# Patient Record
Sex: FEMALE | Race: WHITE | NOT HISPANIC OR LATINO | URBAN - METROPOLITAN AREA
[De-identification: names, ages, dates, MRNs, and addresses within clinical notes are randomized per-mention and may not be internally consistent; named-entity substitution may affect disease eponyms.]

---

## 2024-05-29 ENCOUNTER — OFFICE VISIT (OUTPATIENT)
Dept: SURGERY | Facility: CLINIC | Age: 49
End: 2024-05-29
Payer: COMMERCIAL

## 2024-05-29 DIAGNOSIS — C85.90 T-CELL LYMPHOMA (MULTI): Primary | ICD-10-CM

## 2024-05-29 DIAGNOSIS — R59.0 INGUINAL ADENOPATHY: ICD-10-CM

## 2024-05-29 PROCEDURE — 99203 OFFICE O/P NEW LOW 30 MIN: CPT | Performed by: PHYSICIAN ASSISTANT

## 2024-05-29 RX ORDER — QUETIAPINE FUMARATE 100 MG/1
1 TABLET, FILM COATED ORAL DAILY
COMMUNITY
Start: 2017-05-16

## 2024-05-29 RX ORDER — AMLODIPINE BESYLATE 5 MG/1
5 TABLET ORAL DAILY
COMMUNITY
Start: 2023-10-01

## 2024-05-29 RX ORDER — HYDROXYCHLOROQUINE SULFATE 200 MG/1
1 TABLET, FILM COATED ORAL DAILY
COMMUNITY
Start: 2024-05-07

## 2024-05-29 RX ORDER — FAMOTIDINE 20 MG/1
TABLET, FILM COATED ORAL
COMMUNITY
Start: 2024-04-25

## 2024-05-29 RX ORDER — METOPROLOL TARTRATE 25 MG/1
1 TABLET, FILM COATED ORAL DAILY
COMMUNITY

## 2024-05-29 NOTE — PROGRESS NOTES
Subjective   Patient ID: Sagrario aGrber is a 48 y.o. female who presents for New Patient Visit (T-cell Lymphoma ).    HPI  The 48-year-old female who was admitted to the hospital for severe fatigue and adenopathy.  Fine-needle aspirate was done and was found to be a T-cell lymphoma she is now following with Dr. Iraheta and oncology.  She has massive adenopathy.  She has had biopsies in the past that were negative for any type of lymphoma.  She also has a history of lupus.  She is a current every day smoker.  She is here today to discuss an lymph node biopsy      Review of Systems  Review of systems is negative other than what is mentioned above        Physical Exam  Eyes: Conjunctiva non -icteric and eye lids are without obvious rash or drooping. Pupils are symmetric.   Ears, Nose, Mouth, and Throat: External ears and nose appear to be without deformity or rash. No lesions or masses noted. Hearing is grossly intact.   Neck:. No JVD noted, tracheal position is midline. No thyromegaly, no thyroid nodules  Head and Face: Examination of the head and face revealed no abnormalities.   Respiratory: No gasping or shortness of breath noted, no use of accessory muscles noted.  Lungs are clear to auscultate  Cardiovascular: Examination for edema is normal, regular rate and rhythm S1-S2  GI: Abdomen non tender to palpation, bowel sounds are present  Lymph system:.  Patient has bilateral cervical adenopathy supraclavicular adenopathy bilateral axillary adenopathy left greater than right and bilateral massive inguinal adenopathy.  Skin: No rashes or open lesions/ulcers identified on skin.   Musk: Digits/nails show no clubbing or cyanosis. No asymmetry or masses noted of the musculature. Examination of the muscles/joints/bones show normal range of motion. Gait is grossly normally.   Neurologic: Cranial nerves II- XII intact, motor strength 5/5 muscle strength of the lower extremities bilaterally and equal.      Objective     No  diagnosis found.   There is no problem list on file for this patient.     Allergies   Allergen Reactions    Amoxicillin Hives      Medication Documentation Review Audit       Reviewed by Yvette Whiteside MA (Medical Assistant) on 05/29/24 at 0912      Medication Order Taking? Sig Documenting Provider Last Dose Status   amLODIPine (Norvasc) 5 mg tablet [id number] Yes Take 1 tablet (5 mg) by mouth once daily. Historical Provider, MD Taking Active   famotidine (Pepcid) 20 mg tablet [id number] Yes TAKE 1 TABLET BY MOUTH TWICE DAILY FOR 7 DAYS AS NEEDED for itching Historical Provider, MD Taking Active   hydroxychloroquine (Plaquenil) 200 mg tablet [id number] Yes Take 1 tablet (200 mg) by mouth once daily. Historical Provider, MD Taking Active   metoprolol tartrate (Lopressor) 25 mg tablet [id number] Yes Take 1 tablet (25 mg) by mouth once daily. Historical Provider, MD Taking Active   QUEtiapine (SEROquel) 100 mg tablet [id number] Yes Take 1 tablet (100 mg) by mouth once daily. Historical Provider, MD Taking Active                    Past Medical History:   Diagnosis Date    Essential (primary) hypertension 05/24/2017    HTN (hypertension), benign    Essential (primary) hypertension 10/09/2017    HTN (hypertension), benign    Personal history of nicotine dependence 05/24/2017    History of nicotine dependence     Social History     Tobacco Use   Smoking Status Every Day    Types: Cigarettes   Smokeless Tobacco Not on file     No family history on file.   Past Surgical History:   Procedure Laterality Date    OTHER SURGICAL HISTORY  05/24/2017    Oral Surgery Tooth Extraction [place] Tooth       Assessment/Plan   Patient was instructed that she will need a right inguinal biopsy.  Patient was instructed the entire lymph node would not be removed given how large it is.  This is done on an outpatient basis under general anesthesia and will take 1 hour.  Pathology will be sent out.  This can take up to 1 week or more to return.  We  discussed the possibility of bleeding and infection.  All questions were answered.  Patient would like to proceed.    Encounter Diagnoses   Name Primary?    T-cell lymphoma (Multi) Yes    Inguinal adenopathy      I have reviewed all data including labs,radiologic and previous reports.      **Portions of this medical record have been created using voice recognition software and may have minor errors which are inherent in voice recognition systems. It has not been fully edited for typographical or grammatical errors**

## 2024-06-12 ENCOUNTER — LAB REQUISITION (OUTPATIENT)
Dept: LAB | Facility: HOSPITAL | Age: 49
End: 2024-06-12
Payer: COMMERCIAL

## 2024-06-13 LAB
LABORATORY COMMENT REPORT: NORMAL
PATH REPORT.GROSS SPEC: NORMAL
PATH REPORT.TOTAL CANCER: NORMAL

## 2024-07-29 ENCOUNTER — PATIENT OUTREACH (OUTPATIENT)
Dept: CARE COORDINATION | Facility: CLINIC | Age: 49
End: 2024-07-29
Payer: COMMERCIAL

## 2024-07-29 NOTE — PROGRESS NOTES
Outreach call to patient to support a smooth transition of care from recent admission. Pt's number was a hotel number and the  that answered said she was no longer at the hotel and was in a facility. I then called the pt's daughter number listed and lvm.  Will continue to monitor through transition period.  Kirby Erwin RN [facility] [place]  [phone]

## 2024-08-12 ENCOUNTER — PATIENT OUTREACH (OUTPATIENT)
Dept: CARE COORDINATION | Facility: CLINIC | Age: 49
End: 2024-08-12
Payer: COMMERCIAL

## 2024-08-12 NOTE — PROGRESS NOTES
Outreach call to fu on PCP visit after dc, no answer.  Kirby Erwin RN [facility] [place]  [phone]

## 2024-09-03 ENCOUNTER — PATIENT OUTREACH (OUTPATIENT)
Dept: CARE COORDINATION | Facility: CLINIC | Age: 49
End: 2024-09-03
Payer: COMMERCIAL

## 2024-09-03 NOTE — PROGRESS NOTES
Outreach call to patient to follow up after 30 days of hospital discharge.   Unable to reach pt.   Kirby Erwin RN [facility] [place]  [phone]

## 2024-10-01 ENCOUNTER — LAB REQUISITION (OUTPATIENT)
Dept: LAB | Facility: HOSPITAL | Age: 49
End: 2024-10-01
Payer: COMMERCIAL

## 2024-10-01 PROCEDURE — 88321 CONSLTJ&REPRT SLD PREP ELSWR: CPT | Performed by: PATHOLOGY

## 2024-10-03 ENCOUNTER — TUMOR BOARD CONFERENCE (OUTPATIENT)
Dept: HEMATOLOGY/ONCOLOGY | Facility: HOSPITAL | Age: 49
End: 2024-10-03
Payer: COMMERCIAL

## 2024-10-04 LAB
LABORATORY COMMENT REPORT: NORMAL
LABORATORY COMMENT REPORT: NORMAL
PATH REPORT.FINAL DX SPEC: NORMAL
PATH REPORT.FINAL DX SPEC: NORMAL
PATH REPORT.GROSS SPEC: NORMAL
PATH REPORT.GROSS SPEC: NORMAL
PATH REPORT.RELEVANT HX SPEC: NORMAL
PATH REPORT.RELEVANT HX SPEC: NORMAL
PATH REPORT.TOTAL CANCER: NORMAL
PATH REPORT.TOTAL CANCER: NORMAL

## 2024-10-07 LAB
LABORATORY COMMENT REPORT: NORMAL
PATH REPORT.COMMENTS IMP SPEC: NORMAL
PATH REPORT.FINAL DX SPEC: NORMAL
PATH REPORT.GROSS SPEC: NORMAL
PATH REPORT.MICROSCOPIC SPEC OTHER STN: NORMAL
PATH REPORT.RELEVANT HX SPEC: NORMAL
PATH REPORT.TOTAL CANCER: NORMAL

## 2024-11-20 NOTE — PROGRESS NOTES
Patient ID: Sagrario Garber is a 49 y.o. female.    Diagnosis:   Angioimmunoblastic T-cell lymphoma,  CD30 pos,  stage IV disease, bulky tumor, high LDH, excellent partial remission after initial frontline therapy.        Treatment:   Oncology History    No history exists.       Response:     Past Medical History:     Past Medical History:   Diagnosis Date    Essential (primary) hypertension 05/24/2017    HTN (hypertension), benign    Essential (primary) hypertension 10/09/2017    HTN (hypertension), benign    Personal history of nicotine dependence 05/24/2017    History of nicotine dependence       Surgical History:     Past Surgical History:   Procedure Laterality Date    OTHER SURGICAL HISTORY  05/24/2017    Oral Surgery Tooth Extraction [place] Tooth        Family History:   Rheumatoid arthritis mother, skin cancer father and sister    Social History:     Social History     Tobacco Use    Smoking status: Every Day     Types: Cigarettes   Substance Use Topics    Alcohol use: Not Currently    Drug use: Never      -------------------------------------------------------------------------------------------------------  Subjective       HPI    49-year-old woman who has had lymphadenopathy for several years.  Previous biopsy in 2022 was read as reactive.  In May 2024 she presented with increasing lymph nodes repeat biopsy confirmed T cell lymphoma with TFH phenotype, CD30 positive and Alk negative.  Extremely bulky tumor.  High LDH. Bone marrow minimally involved.  (3-4% abnormal cells by flow)Treatment was administered consisting of brentuximab- CHP  An excellent response has been obtained, but several small lymph nodes with SUV of 3.4 remain.  Completed cycle 6 of brentuximab CHP 9/26/24    Review of Systems - Oncology   -------------------------------------------------------------------------------------------------------  Objective   BSA: There is no height or weight on file to calculate BSA.  There were no vitals  taken for this visit.    Physical Exam    Performance Status:  {ECOG performance status:59001}  -------------------------------------------------------------------------------------------------------  Assessment/Plan    {Assess/PlanSGal:49063}    RTC:          -------------------------------------------------------------------------------------------------------  Gunjan Varela MD

## 2024-11-21 ENCOUNTER — PATIENT OUTREACH (OUTPATIENT)
Dept: HEMATOLOGY/ONCOLOGY | Facility: HOSPITAL | Age: 49
End: 2024-11-21
Payer: COMMERCIAL

## 2024-11-21 ENCOUNTER — APPOINTMENT (OUTPATIENT)
Dept: HEMATOLOGY/ONCOLOGY | Facility: HOSPITAL | Age: 49
End: 2024-11-21
Payer: COMMERCIAL

## 2024-12-03 ENCOUNTER — PATIENT OUTREACH (OUTPATIENT)
Dept: HEMATOLOGY/ONCOLOGY | Facility: HOSPITAL | Age: 49
End: 2024-12-03
Payer: COMMERCIAL

## 2024-12-04 PROBLEM — C84.48 PERIPHERAL T CELL LYMPHOMA OF LYMPH NODES OF MULTIPLE SITES (MULTI): Status: ACTIVE | Noted: 2024-12-04

## 2024-12-05 ENCOUNTER — LAB (OUTPATIENT)
Dept: LAB | Facility: HOSPITAL | Age: 49
End: 2024-12-05
Payer: COMMERCIAL

## 2024-12-05 ENCOUNTER — SOCIAL WORK (OUTPATIENT)
Dept: HEMATOLOGY/ONCOLOGY | Facility: HOSPITAL | Age: 49
End: 2024-12-05
Payer: COMMERCIAL

## 2024-12-05 ENCOUNTER — OFFICE VISIT (OUTPATIENT)
Dept: HEMATOLOGY/ONCOLOGY | Facility: HOSPITAL | Age: 49
End: 2024-12-05
Payer: COMMERCIAL

## 2024-12-05 VITALS
RESPIRATION RATE: 16 BRPM | HEART RATE: 100 BPM | TEMPERATURE: 97.9 F | SYSTOLIC BLOOD PRESSURE: 157 MMHG | OXYGEN SATURATION: 96 % | WEIGHT: 159.61 LBS | DIASTOLIC BLOOD PRESSURE: 93 MMHG

## 2024-12-05 DIAGNOSIS — C84.40 PERIPHERAL T-CELL LYMPHOMA, NOT ELSEWHERE CLASSIFIED, UNSPECIFIED SITE (MULTI): ICD-10-CM

## 2024-12-05 DIAGNOSIS — Z76.82 STEM CELL TRANSPLANT CANDIDATE: ICD-10-CM

## 2024-12-05 DIAGNOSIS — C84.48 PERIPHERAL T CELL LYMPHOMA OF LYMPH NODES OF MULTIPLE SITES (MULTI): ICD-10-CM

## 2024-12-05 LAB
ALBUMIN SERPL BCP-MCNC: 4.5 G/DL (ref 3.4–5)
ALP SERPL-CCNC: 81 U/L (ref 33–110)
ALT SERPL W P-5'-P-CCNC: 64 U/L (ref 7–45)
ANION GAP SERPL CALC-SCNC: 15 MMOL/L (ref 10–20)
AST SERPL W P-5'-P-CCNC: 33 U/L (ref 9–39)
B2 MICROGLOB SERPL-MCNC: 2.3 MG/L (ref 0.7–2.2)
BASOPHILS # BLD MANUAL: 0.06 X10*3/UL (ref 0–0.1)
BASOPHILS NFR BLD MANUAL: 1 %
BILIRUB SERPL-MCNC: 0.4 MG/DL (ref 0–1.2)
BUN SERPL-MCNC: 8 MG/DL (ref 6–23)
BURR CELLS BLD QL SMEAR: ABNORMAL
CALCIUM SERPL-MCNC: 9.8 MG/DL (ref 8.6–10.3)
CHLORIDE SERPL-SCNC: 102 MMOL/L (ref 98–107)
CO2 SERPL-SCNC: 28 MMOL/L (ref 21–32)
CREAT SERPL-MCNC: 0.67 MG/DL (ref 0.5–1.05)
DACRYOCYTES BLD QL SMEAR: ABNORMAL
EGFRCR SERPLBLD CKD-EPI 2021: >90 ML/MIN/1.73M*2
EOSINOPHIL # BLD MANUAL: 0.94 X10*3/UL (ref 0–0.7)
EOSINOPHIL NFR BLD MANUAL: 17 %
ERYTHROCYTE [DISTWIDTH] IN BLOOD BY AUTOMATED COUNT: 12.8 % (ref 11.5–14.5)
GLUCOSE SERPL-MCNC: 101 MG/DL (ref 74–99)
HBV CORE AB SER QL: NONREACTIVE
HBV SURFACE AG SERPL QL IA: NONREACTIVE
HCT VFR BLD AUTO: 44 % (ref 36–46)
HCV AB SER QL: NONREACTIVE
HGB BLD-MCNC: 14.8 G/DL (ref 12–16)
HIV 1+2 AB+HIV1 P24 AG SERPL QL IA: NONREACTIVE
IMM GRANULOCYTES # BLD AUTO: 0.44 X10*3/UL (ref 0–0.7)
IMM GRANULOCYTES NFR BLD AUTO: 8 % (ref 0–0.9)
LDH SERPL L TO P-CCNC: 290 U/L (ref 84–246)
LYMPHOCYTES # BLD MANUAL: 1.76 X10*3/UL (ref 1.2–4.8)
LYMPHOCYTES NFR BLD MANUAL: 32 %
MCH RBC QN AUTO: 30.9 PG (ref 26–34)
MCHC RBC AUTO-ENTMCNC: 33.6 G/DL (ref 32–36)
MCV RBC AUTO: 92 FL (ref 80–100)
MONOCYTES # BLD MANUAL: 0.44 X10*3/UL (ref 0.1–1)
MONOCYTES NFR BLD MANUAL: 8 %
NEUTROPHILS # BLD MANUAL: 2.26 X10*3/UL (ref 1.2–7.7)
NEUTS BAND # BLD MANUAL: 0.06 X10*3/UL (ref 0–0.7)
NEUTS BAND NFR BLD MANUAL: 1 %
NEUTS SEG # BLD MANUAL: 2.2 X10*3/UL (ref 1.2–7)
NEUTS SEG NFR BLD MANUAL: 40 %
NRBC BLD-RTO: 0 /100 WBCS (ref 0–0)
PLATELET # BLD AUTO: 291 X10*3/UL (ref 150–450)
PLATELET CLUMP BLD QL SMEAR: PRESENT
POTASSIUM SERPL-SCNC: 3.7 MMOL/L (ref 3.5–5.3)
PROT SERPL-MCNC: 7.5 G/DL (ref 6.4–8.2)
RBC # BLD AUTO: 4.79 X10*6/UL (ref 4–5.2)
RBC MORPH BLD: ABNORMAL
SODIUM SERPL-SCNC: 141 MMOL/L (ref 136–145)
TOTAL CELLS COUNTED BLD: 100
VARIANT LYMPHS # BLD MANUAL: 0.06 X10*3/UL (ref 0–0.5)
VARIANT LYMPHS NFR BLD: 1 %
WBC # BLD AUTO: 5.5 X10*3/UL (ref 4.4–11.3)

## 2024-12-05 PROCEDURE — 86704 HEP B CORE ANTIBODY TOTAL: CPT

## 2024-12-05 PROCEDURE — 82232 ASSAY OF BETA-2 PROTEIN: CPT

## 2024-12-05 PROCEDURE — 99205 OFFICE O/P NEW HI 60 MIN: CPT | Performed by: INTERNAL MEDICINE

## 2024-12-05 PROCEDURE — 99215 OFFICE O/P EST HI 40 MIN: CPT | Performed by: INTERNAL MEDICINE

## 2024-12-05 PROCEDURE — 85007 BL SMEAR W/DIFF WBC COUNT: CPT

## 2024-12-05 PROCEDURE — 86803 HEPATITIS C AB TEST: CPT

## 2024-12-05 PROCEDURE — 87340 HEPATITIS B SURFACE AG IA: CPT

## 2024-12-05 PROCEDURE — 84075 ASSAY ALKALINE PHOSPHATASE: CPT

## 2024-12-05 PROCEDURE — 83615 LACTATE (LD) (LDH) ENZYME: CPT

## 2024-12-05 PROCEDURE — 87389 HIV-1 AG W/HIV-1&-2 AB AG IA: CPT

## 2024-12-05 PROCEDURE — 36415 COLL VENOUS BLD VENIPUNCTURE: CPT

## 2024-12-05 PROCEDURE — 85027 COMPLETE CBC AUTOMATED: CPT

## 2024-12-05 ASSESSMENT — PAIN SCALES - GENERAL: PAINLEVEL_OUTOF10: 8

## 2024-12-05 NOTE — PROGRESS NOTES
"Patient ID: Sagrario Garber is a 49 y.o. female.    Diagnosis:   Angioimmunoblastic T-cell lymphoma,  CD30 pos,  stage IV disease, bulky tumor, high LDH, excellent partial remission after initial frontline therapy.        Treatment:   Oncology History Overview Note   T cell lymphoma with TFH phenotype angioimmunoblastic CD30+, AK negative  11/10/21 CT imaging showed extensive hilar, mediastinal and bilateral axillary lymphadenopathy  2/14/22 right inguinal node biopsy follicular hyperplasia  4/6/22 right axillary lymph node reactive changes  5/12/24 progressive adenopathy, right inguinal node T cell lymphoma with TFH phenotype, CD30 focally positive  6/12/24 started BV-CHP Pet after cycle 5 interval resoluation of bulky adenopathy in axilla, abdomen, pelvis and groin focal hypermetabolic uptake in right inguinal node  9/25/24 C6D1 of BV-CHP     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      -HTN    -Patient has history of lupus for past 21/2 years and rheumatoid arthritis: currently on placquenil sees Dr. Dobbs for rheumatologist presenting with rash, pain in body, diffuse swellin joint pain. Was on Saphnelo, interferon alpha antagonist July 2023   Past Medical History:   Diagnosis Date    Essential (primary) hypertension 05/24/2017    HTN (hypertension), benign    Essential (primary) hypertension 10/09/2017    HTN (hypertension), benign    Personal history of nicotine dependence 05/24/2017    History of nicotine dependence       Surgical History:     Past Surgical History:   Procedure Laterality Date    OTHER SURGICAL HISTORY  05/24/2017    Oral Surgery Tooth Extraction [place] Tooth        Family History:   Rheumatoid arthritis mother, skin cancer father and sister, 2 daughters, 1 son with severe autism is in a home. Has an older sister estranged.     Social History:   is an alcoholic, and has been sober for 4 months, he is in a sober house. Lost home has lost her income and most recently was staying with her daughter and can't return there. Smokes 8 cigarettes, no ETOH, finished 10th grade level,worked full time.  at [employer] for 5 years lived at the [facility].  No .  for 6 years, previsoulsy relationship of 19 years with father of her children.        Social History     Tobacco Use    Smoking status: Every Day     Types: Cigarettes   Substance Use Topics    Alcohol use: Not Currently    Drug use: Never      -------------------------------------------------------------------------------------------------------  Subjective       HPI    49-year-old woman  with reported history of lupus and rheumatoid arthritis for several years who has had lymphadenopathy for several years.  Previous biopsy in 2022 was read as reactive.  In May 2024 she presented with increasing lymph nodes repeat biopsy confirmed T cell lymphoma with TFH phenotype, CD30 positive and Alk negative. Extremely bulky tumor.  High LDH.  Bone marrow minimally involved.  (3-4% abnormal cells by flow).     June 2024 treatment was administered consisting of brentuximab- CHP  Shortly after starting BV CHP patient developed abdominal ascites and was unable to eat and required TPN as well as discharge to nursing facility  PET imaging after cycle5 showed an excellent response has been obtained, but several small lymph nodes with SUV of 3.4 remain.  Completed cycle 6 of brentuximab CHP 9/26/24. Patient is referred by Dr. Iraheta for evaluation for autologous stem cell transplantation    Patient missed appointment last week due to hospitalization for mental illness  depression and anxiety 11/12/24 and was  discharged 11/26/2024 to skilled nursing.      Patient seen today 12/5/24 for first outpatient consultation for high risk TFH phenotype angioimmunoblastic T cell lymphoma. She has been putting on weight, no fevers or sweats, has itchy rash over shins, now has mouth sores. Now on cymbalta and ativan in addition. Currently in SNF to get stronger, but she is able to bathe and feed herself,  able to walk. Gets sweats not drenching, denies shortness of breath.  Does not know when she will be discharged from this facility. Eating fine and gaining weight. Notes painful ulcerative lesions lower lips and wonders whether she might have herpetic lesions    Review of Systems   All other systems reviewed and are negative.     -------------------------------------------------------------------------------------------------------  Objective   BSA: There is no height or weight on file to calculate BSA.  BP (!) 157/93 Comment: didnt take BP meds  Pulse 100   Temp 36.6 °C (97.9 °F)   Resp 16   Wt 72.4 kg (159 lb 9.8 oz)   SpO2 96%     Physical Exam  Constitutional:       Appearance: Normal appearance. She is well-developed.      Comments: Looks anxious.    HENT:      Head: Normocephalic and atraumatic.      Nose: Nose normal.      Mouth/Throat:      Dentition: Gum lesions (ulcerative lesions in front of bottom teeth) present.   Eyes:      Extraocular Movements: Extraocular movements intact.      Conjunctiva/sclera: Conjunctivae normal.      Pupils: Pupils are equal, round, and reactive to light.   Cardiovascular:      Rate and Rhythm: Normal rate and regular rhythm.   Pulmonary:      Breath sounds: Normal breath sounds and air entry.   Abdominal:      General: Abdomen is flat. Bowel sounds are normal.      Palpations: Abdomen is soft.   Musculoskeletal:         General: Normal range of motion.   Lymphadenopathy:      Lower Body: Right inguinal adenopathy present. Left inguinal adenopathy present.      Comments: Bilateral  enlarged inguinal lymph nodes, Right inguinal node rubbery, about 4x4 cm and left inguinal node about 3x3 cm   Skin:     General: Skin is warm and dry.             Comments: Rash in a malar distribution over cheeks. Flat macular rash most prominent over shins, however mildly erythematous patchy rash over anterior and posterior trunk as well   Neurological:      General: No focal deficit present.      Mental Status: She is alert and oriented to person, place, and time.   Psychiatric:         Mood and Affect: Mood normal.         Behavior: Behavior normal. Behavior is cooperative.         Thought Content: Thought content normal.      Comments: Anxious, normal cognition     Chest wall mediport intact    Performance Status:  Symptomatic; fully ambulatory  -------------------------------------------------------------------------------------------------------  Assessment/Plan      Angioimmunoblastic T-cell lymphoma,  CD30 pos,  stage IV disease, bulky tumor, high LDH, excellent partial remission after initial frontline therapy. Patient completed BV P September 2024 and PET scan after cycle 5 showed excellent response with some minimal residual uptake in right inguinal area.  Inguinal nodes obvious on physical exam ,,also had body rash which could be consistent with her lupus or antioimmunoblastic lymphoma.  I have recommended a repeat PET scan and bone marrow biopsy to assess response status as almost 3 months since last imaging.  Provided the patient has a very good response I would recommend consolidation with an autologous stem cell transplant with BEAM preparation.  We briefly discussed today the procedures involved in peripheral stem cell mobilization followed by  hospitalization for high dose chemotherapy and recovery from side effects. If patient is in a good remission, I estimate durable remission with this aggressive approach at 50-60%.    If patient has active disease would probably favor a romidepsin aza  regimen      2. History of lupus. Rheumatologist   Dr. Cathy Dobbs,  [address] [facility]  [phone] currently only on placquenil.  Attempt to contact his office for records regarding presentation, treatment and request potentially for followup    3. Psychosocial: patient has life long struggles with anxiety and depression and has had recent hospitalization- will need hospital mental health team on board.  Tragically,  patient is currently homeless and her  who is a recovering alcoholic currently lives in a sober house.  Unclear whether patient could return to SNF after her transplant for care, seems that she will not be able to stay with her daughter again. Social work in to provide support.      RTC: needs PET scan and BM biopsy ASAp, discussed autologous stem transplant procedure.  Concerned whether rash may be part of T cell lymphoma.  Return on December 26th.  Patient introduced to Nancy Galvan, nurse coordinator.          -------------------------------------------------------------------------------------------------------  Gunjan Varela MD

## 2024-12-05 NOTE — PROGRESS NOTES
SW met with patient during NPV on this day. The following psychosocial information was gathered:  Patient has recently lived in several different locations:  Initially, patient was living with her adult daughter, but patient reports that this relationship became strained, leading to the patient needing to move out. 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 The motel agreed to cover the cost of her room and board in exchange for work there. When she was diagnosed with lymphoma and unable to continue working because of it, she lost employment and subsequently housing. She stopped working in . Following this loss of housing, she was admitted to the hospital for a brief period, and upon discharge, went to a SNF, [facility] p: [phone] where she currently resides.    Currently, she is uncertain of when her discharge from the SNF will be. SW attempted to contact discharge planning/SW at facility, but  stated that it was not a good time and requested that SW call back later. Further information from SNF dc plan pending.    Patient's social support system is limited: Patient states that her  is actively struggling with alcohol addiction and is recently discharged from rehab. He is currently 3 months sober. He is not actively involved with the patient. Patient and daughter have a strained relationship and she is not actively involved with supporting the patient as well. Father  several years ago, and mother is aging and not able to act as caregiver, per patient. She denies having a / caregiver available to act in caregiver role after discharge from transplant admission.    Patient applied for SSDI in 2024, and application is still pending at this time.    [facility] continues to follow for transplant planning/psychosocial involvement.    ADDENDUM 1530 on 24:  [facility] discharge planner does not have confirmation/cannot  guarantee that patient will be approved by insurance to remain at facility for duration of time needed with 24/7 caregiver. A discharge plan/timeline for discharge has not yet been confirmed, per SNF discharge planner.

## 2024-12-05 NOTE — PROGRESS NOTES
12/5/24 11:30AM    Today I met with patient to discuss Auto SCT. I provided the patient with a binder and KARINA video instructions. Patient knows I will be in contact once we determine a timeline to transplant after disease reassessment.     Nancy Galvan RN

## 2024-12-12 ENCOUNTER — SOCIAL WORK (OUTPATIENT)
Dept: HEMATOLOGY/ONCOLOGY | Facility: HOSPITAL | Age: 49
End: 2024-12-12
Payer: COMMERCIAL

## 2024-12-12 NOTE — PROGRESS NOTES
SW attempted to reach patient on this day to schedule time to complete BMT Pre-Transplant Assessment. SW received voicemail box. Left voicemail requesting callback. Awaiting callback to schedule time/gain further psychosocial information.

## 2024-12-19 ENCOUNTER — LAB (OUTPATIENT)
Dept: LAB | Facility: HOSPITAL | Age: 49
End: 2024-12-19
Payer: COMMERCIAL

## 2024-12-19 ENCOUNTER — PROCEDURE VISIT (OUTPATIENT)
Dept: HEMATOLOGY/ONCOLOGY | Facility: HOSPITAL | Age: 49
End: 2024-12-19
Payer: COMMERCIAL

## 2024-12-19 VITALS
WEIGHT: 159.39 LBS | HEART RATE: 106 BPM | SYSTOLIC BLOOD PRESSURE: 133 MMHG | RESPIRATION RATE: 18 BRPM | TEMPERATURE: 98.4 F | DIASTOLIC BLOOD PRESSURE: 88 MMHG | OXYGEN SATURATION: 97 %

## 2024-12-19 DIAGNOSIS — C84.40 PERIPHERAL T-CELL LYMPHOMA, NOT ELSEWHERE CLASSIFIED, UNSPECIFIED SITE (MULTI): ICD-10-CM

## 2024-12-19 DIAGNOSIS — C84.40 PERIPHERAL T-CELL LYMPHOMA, UNSPECIFIED BODY REGION (MULTI): Primary | ICD-10-CM

## 2024-12-19 LAB
BASOPHILS # BLD AUTO: 0.07 X10*3/UL (ref 0–0.1)
BASOPHILS NFR BLD AUTO: 1.7 %
EOSINOPHIL # BLD AUTO: 0.26 X10*3/UL (ref 0–0.7)
EOSINOPHIL NFR BLD AUTO: 6.2 %
ERYTHROCYTE [DISTWIDTH] IN BLOOD BY AUTOMATED COUNT: 12.9 % (ref 11.5–14.5)
HCT VFR BLD AUTO: 46.7 % (ref 36–46)
HGB BLD-MCNC: 15.6 G/DL (ref 12–16)
HOLD SPECIMEN: NORMAL
IMM GRANULOCYTES # BLD AUTO: 0.18 X10*3/UL (ref 0–0.7)
IMM GRANULOCYTES NFR BLD AUTO: 4.3 % (ref 0–0.9)
LYMPHOCYTES # BLD AUTO: 0.97 X10*3/UL (ref 1.2–4.8)
LYMPHOCYTES NFR BLD AUTO: 23.3 %
MCH RBC QN AUTO: 30.5 PG (ref 26–34)
MCHC RBC AUTO-ENTMCNC: 33.4 G/DL (ref 32–36)
MCV RBC AUTO: 91 FL (ref 80–100)
MONOCYTES # BLD AUTO: 0.43 X10*3/UL (ref 0.1–1)
MONOCYTES NFR BLD AUTO: 10.3 %
NEUTROPHILS # BLD AUTO: 2.26 X10*3/UL (ref 1.2–7.7)
NEUTROPHILS NFR BLD AUTO: 54.2 %
NRBC BLD-RTO: 0 /100 WBCS (ref 0–0)
PLATELET # BLD AUTO: 245 X10*3/UL (ref 150–450)
RBC # BLD AUTO: 5.12 X10*6/UL (ref 4–5.2)
WBC # BLD AUTO: 4.2 X10*3/UL (ref 4.4–11.3)

## 2024-12-19 PROCEDURE — 36415 COLL VENOUS BLD VENIPUNCTURE: CPT

## 2024-12-19 PROCEDURE — 38222 DX BONE MARROW BX & ASPIR: CPT | Mod: LT | Performed by: PHYSICIAN ASSISTANT

## 2024-12-19 PROCEDURE — 85025 COMPLETE CBC W/AUTO DIFF WBC: CPT

## 2024-12-19 PROCEDURE — 85097 BONE MARROW INTERPRETATION: CPT | Performed by: PHYSICIAN ASSISTANT

## 2024-12-19 PROCEDURE — 38222 DX BONE MARROW BX & ASPIR: CPT | Performed by: PHYSICIAN ASSISTANT

## 2024-12-19 NOTE — PROGRESS NOTES
Informed consent was obtained and potential risks including bleeding, infection and pain were reviewed with the patient.       The patient was placed in the prone position, and the Left posterior iliac crest was prepped with chlorhexidine.     The skin, subcutaneous tissues, and periosteum were anesthetized with 5mL of 1% lidocaine and 8mL of 2% lidocaine.     A small incision was made with a #15 scalpel, and the Jamshidi needle was advanced through the periosteum into the intramedullary space.    7 mL bone marrow was aspirated; the needle was then advanced further we attempted but core biopsy was not obtained.     The needle was removed and hemostasis achieved.     The procedure was tolerated well and there were no complications.    Procedure supervised by: Conrad Parish PA-C

## 2024-12-23 ENCOUNTER — HOSPITAL ENCOUNTER (OUTPATIENT)
Dept: RADIOLOGY | Facility: HOSPITAL | Age: 49
Discharge: HOME | End: 2024-12-23
Payer: COMMERCIAL

## 2024-12-23 DIAGNOSIS — C84.48 PERIPHERAL T CELL LYMPHOMA OF LYMPH NODES OF MULTIPLE SITES (MULTI): Primary | ICD-10-CM

## 2024-12-23 DIAGNOSIS — C84.40 PERIPHERAL T-CELL LYMPHOMA, NOT ELSEWHERE CLASSIFIED, UNSPECIFIED SITE (MULTI): ICD-10-CM

## 2024-12-23 LAB — GLUCOSE BLD MANUAL STRIP-MCNC: 119 MG/DL (ref 74–99)

## 2024-12-23 PROCEDURE — 3430000001 HC RX 343 DIAGNOSTIC RADIOPHARMACEUTICALS: Mod: SE | Performed by: INTERNAL MEDICINE

## 2024-12-23 PROCEDURE — 82947 ASSAY GLUCOSE BLOOD QUANT: CPT

## 2024-12-23 PROCEDURE — 78815 PET IMAGE W/CT SKULL-THIGH: CPT | Mod: PS

## 2024-12-23 PROCEDURE — A9552 F18 FDG: HCPCS | Mod: SE | Performed by: INTERNAL MEDICINE

## 2024-12-23 PROCEDURE — 78816 PET IMAGE W/CT FULL BODY: CPT | Mod: PET TUMOR SUBSQ TX STRATEGY | Performed by: RADIOLOGY

## 2024-12-23 RX ORDER — FLUDEOXYGLUCOSE F 18 200 MCI/ML
14.6 INJECTION, SOLUTION INTRAVENOUS
Status: COMPLETED | OUTPATIENT
Start: 2024-12-23 | End: 2024-12-23

## 2024-12-24 LAB
CHROM ANALY OVERALL INTERP-IMP: NORMAL
ELECTRONICALLY SIGNED BY CYTOGENETICS: NORMAL

## 2024-12-26 ENCOUNTER — SOCIAL WORK (OUTPATIENT)
Dept: HEMATOLOGY/ONCOLOGY | Facility: HOSPITAL | Age: 49
End: 2024-12-26

## 2024-12-26 ENCOUNTER — OFFICE VISIT (OUTPATIENT)
Dept: HEMATOLOGY/ONCOLOGY | Facility: HOSPITAL | Age: 49
End: 2024-12-26
Payer: COMMERCIAL

## 2024-12-26 ENCOUNTER — TUMOR BOARD CONFERENCE (OUTPATIENT)
Dept: HEMATOLOGY/ONCOLOGY | Facility: HOSPITAL | Age: 49
End: 2024-12-26
Payer: COMMERCIAL

## 2024-12-26 VITALS
WEIGHT: 163.36 LBS | SYSTOLIC BLOOD PRESSURE: 126 MMHG | OXYGEN SATURATION: 98 % | RESPIRATION RATE: 16 BRPM | HEART RATE: 101 BPM | TEMPERATURE: 97.5 F | DIASTOLIC BLOOD PRESSURE: 80 MMHG

## 2024-12-26 DIAGNOSIS — C84.40 PERIPHERAL T-CELL LYMPHOMA, NOT ELSEWHERE CLASSIFIED, UNSPECIFIED SITE (MULTI): ICD-10-CM

## 2024-12-26 PROCEDURE — 99215 OFFICE O/P EST HI 40 MIN: CPT | Performed by: INTERNAL MEDICINE

## 2024-12-26 ASSESSMENT — ENCOUNTER SYMPTOMS
ABDOMINAL DISTENTION: 0
ARTHRALGIAS: 1
FEVER: 0
APPETITE CHANGE: 0
DYSURIA: 0
CHEST TIGHTNESS: 0
HEMATURIA: 0
CHILLS: 0
ABDOMINAL PAIN: 0

## 2024-12-26 ASSESSMENT — PAIN SCALES - GENERAL: PAINLEVEL_OUTOF10: 0-NO PAIN

## 2024-12-26 NOTE — PROGRESS NOTES
"Patient ID: Sagrario Garber is a 49 y.o. female.    Diagnosis:   Angioimmunoblastic T-cell lymphoma,  CD30 pos,  stage IV disease, bulky tumor, high LDH, excellent partial remission after initial frontline therapy.        Treatment:   Oncology History Overview Note   T cell lymphoma with TFH phenotype angioimmunoblastic CD30+, AK negative  11/10/21 CT imaging showed extensive hilar, mediastinal and bilateral axillary lymphadenopathy  2/14/22 right inguinal node biopsy follicular hyperplasia  4/6/22 right axillary lymph node reactive changes  5/12/24 progressive adenopathy, right inguinal node T cell lymphoma with TFH phenotype, CD30 focally positive  6/12/24 started BV-CHP Pet after cycle 5 interval resoluation of bulky adenopathy in axilla, abdomen, pelvis and groin focal hypermetabolic uptake in right inguinal node  9/25/24 C6D1 of BV-CHP     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      -HTN    -Patient has history of lupus for past 21/2 years and rheumatoid arthritis: currently on placquenil sees Dr. Dobbs for rheumatologist presenting with rash, pain in body, diffuse swellin joint pain. Was on Saphnelo, interferon alpha antagonist July 2023     Past Medical History:   Diagnosis Date    Essential (primary) hypertension 05/24/2017    HTN (hypertension), benign    Essential (primary) hypertension 10/09/2017    HTN (hypertension), benign    Personal history of nicotine dependence 05/24/2017    History of nicotine dependence       Surgical History:     Past Surgical History:   Procedure Laterality Date    OTHER SURGICAL HISTORY  05/24/2017    Oral Surgery Tooth Extraction [place] " Tooth        Family History:   Rheumatoid arthritis mother, skin cancer father and sister, 2 daughters, 1 son with severe autism is in a home. Has an older sister estranged.     Social History:   is an alcoholic, and has been sober for 4 months, he is in a sober house. Lost home has lost her income and most recently was staying with her daughter and can't return there. Smokes 8 cigarettes, no ETOH, finished 10th grade level,worked full time.  at [employer] for 5 years lived at the [facility].  No .  for 6 years, previsoulsy relationship of 19 years with father of her children.        Social History     Tobacco Use    Smoking status: Every Day     Types: Cigarettes   Substance Use Topics    Alcohol use: Not Currently    Drug use: Never      -------------------------------------------------------------------------------------------------------  Subjective       HPI    49-year-old woman  with reported history of lupus and rheumatoid arthritis for several years, anxiety and depression who has had lymphadenopathy for several years.  Previous biopsy in 2022 was read as reactive.  In May 2024 she presented with increasing lymph nodes repeat biopsy confirmed T cell lymphoma with TFH phenotype, CD30 positive and Alk negative. Extremely bulky tumor.  High LDH.  Bone marrow minimally involved.  (3-4% abnormal cells by flow).     June 2024 treatment was administered consisting of brentuximab- CHP  Shortly after starting BV CHP patient developed abdominal ascites and was unable to eat and required TPN as well as discharge to nursing facility  PET imaging after cycle5 showed an excellent response has been obtained, but several small lymph nodes with SUV of 3.4 remain.  Completed cycle 6 of brentuximab CHP 9/26/24. Patient is referred by Dr. Iraheta for evaluation for autologous stem cell transplantation    Patient hospitalized for mental illness  depression and anxiety 11/12/24 and was discharged 11/26/2024 to  skilled nursing.      Patient initially seen 12/5/24 for first outpatient consultation for high risk TFH phenotype angioimmunoblastic T cell lymphoma. She has been putting on weight, no fevers or sweats, has itchy rash over shins, now has mouth sores. Now on cymbalta and ativan in addition. Currently in SNF to get stronger, but she is able to bathe and feed herself,  able to walk. Gets sweats not drenching, denies shortness of breath.  Does not know when she will be discharged from this facility. Eating fine and gaining weight. Notes painful ulcerative lesions lower lips and wonders whether she might have herpetic lesions    Restaging evaluation 12/23/24 includes a PET scan which shows unchanged minimally PET avid right inguinal node and several non pet avid but left axillary and pretracheal nodes.  BM biopsy 12/19/24 shows low level 1.5% involvement by flow cytometry only.    Today (12/26/24) she is doing fair with no active symptoms. Denies any new rash, lumps/bumps, fevers, chills, CP, SOB, AP, diarrhea. Currently resides in nursing home.     Review of Systems   Constitutional:  Negative for appetite change, chills and fever.   HENT:   Negative for lump/mass.    Respiratory:  Negative for chest tightness.    Cardiovascular:  Negative for chest pain.   Gastrointestinal:  Negative for abdominal distention and abdominal pain.   Genitourinary:  Negative for dysuria and hematuria.    Musculoskeletal:  Positive for arthralgias.   Skin:  Positive for itching.   All other systems reviewed and are negative.     -------------------------------------------------------------------------------------------------------  Objective   BSA: There is no height or weight on file to calculate BSA.  There were no vitals taken for this visit.    Physical Exam  Constitutional:       Appearance: Normal appearance. She is well-developed.      Comments: Looks anxious.    HENT:      Head: Normocephalic and atraumatic.      Nose: Nose normal.       Mouth/Throat:      Dentition: Gum lesions (ulcerative lesions in front of bottom teeth) present.   Eyes:      Extraocular Movements: Extraocular movements intact.      Conjunctiva/sclera: Conjunctivae normal.      Pupils: Pupils are equal, round, and reactive to light.   Cardiovascular:      Rate and Rhythm: Normal rate and regular rhythm.   Pulmonary:      Breath sounds: Normal breath sounds and air entry.   Abdominal:      General: Abdomen is flat. Bowel sounds are normal.      Palpations: Abdomen is soft.   Musculoskeletal:         General: Normal range of motion.   Lymphadenopathy:      Lower Body: Right inguinal adenopathy present. Left inguinal adenopathy present.      Comments: Bilateral enlarged inguinal lymph nodes, Right inguinal node rubbery, about 4x4 cm and left inguinal node about 3x3 cm   Skin:     General: Skin is warm and dry.             Comments: Post scratching multiple small petechia looking superficial excoriations with overlying crusts   Neurological:      General: No focal deficit present.      Mental Status: She is alert and oriented to person, place, and time.   Psychiatric:         Mood and Affect: Mood normal.         Behavior: Behavior normal. Behavior is cooperative.         Thought Content: Thought content normal.      Comments: Anxious, normal cognition   Chest wall mediport intact    Performance Status:  Symptomatic; fully ambulatory  -------------------------------------------------------------------------------------------------------  Assessment/Plan      1.Angioimmunoblastic T-cell lymphoma,  CD30 pos,  stage IV disease, bulky tumor,   - High LDH, excellent partial remission after initial frontline therapy.   - Patient completed BV CHP September 2024 and PET scan after cycle 5 showed excellent response with some minimal residual uptake in right inguinal area.  Inguinal nodes obvious on physical exam ,,also had body rash which could be consistent with her lupus or  antioimmunoblastic lymphoma.    - Provided the patient has a very good response I would recommend consolidation with an autologous stem cell transplant with BEAM preparation.    - Briefly discussed the procedures involved in peripheral stem cell mobilization followed by  hospitalization for high dose chemotherapy and recovery from side effects. If patient is in a good remission, I estimate durable remission with this aggressive approach at 50-60%.  -Restaging evaluation 12/23/24 includes a PET scan which shows unchanged minimally PET avid right inguinal node   and several non pet avid but left axillary and pretracheal nodes.  -BM biopsy 12/19/24 shows low level 1.5% involvement by flow cytometry only.  -Today we discussed again the role of consolidation with ASCT; however we need to sort out her housing situation    2. History of lupus  Rheumatologist Dr. Cathy Dobbs,  [address] [facility]  [phone] currently only on placquenil.  Attempt to contact his office for records regarding presentation, treatment and request potentially for followup    3. Psychosocial: patient has life long struggles with anxiety and depression and has had recent hospitalization- will need hospital mental health team on board.  Tragically,  patient is currently homeless and her  who is a recovering alcoholic currently lives in a sober house.  Unclear whether patient could return to SNF after her transplant for care, seems that she will not be able to stay with her daughter again. Social work met her today.     Attending     Patient seems to have had an adequate response to 6 cycles of BVCHP with minimal residual disease and organ function testing underway.  PET imagning personally reviewed by me. She appears to be an excellent candidate for an autologous transplant consolidation with BEAM.  Unfortunately patient is homeless which ethically should not influence potentially life saving treatments.  Encouraged  patient to keep in touch with her  and work on obtaining SSI disability.  Ongoing planning for this.     RTC in 2 weeks. Patient introduced to Nancy Galvan, nurse coordinator.    -------------------------------------------------------------------------------------------------------  Gunjan Varela MD

## 2024-12-26 NOTE — PROGRESS NOTES
Social Work Note    ZAHRA spoke with patient on this day while in clinic to follow up re: housing and caregiver statuses.  Patient states that caregiver availability remains unchanged; her daughter is not able to fulfill the caregiver role (continued strained relationship), and the rest of her family is unavailable due to own psychosocial stressors/health concerns.  She is currently still at [facility] but she is unsure how much longer she will remain there.    ZAHRA spoke with SNF discharge planner who states that she has filed 2 appeals to insurance request to discharge. The first has appeal has already been denied by insurance, and the second appeal was submitted 12/20/24. Discharge planner states that if this appeal is also denied, patient will be requested to discharge. Discharge plan is to a homeless shelter.    SW spoke with patient about this plan while she was in clinic and patient understood that this was her option of where to go.    She is hopeful to be able to move forward with auto transplant, and at the same time, understands the complicated social situation (e.g., uncertain projected dates of need for caregiver/facility stay, and the lack of a guarantee that insurance will cover facility stay). ZAHRA has communicated the above concerns to inpatient ZAHRA Mendiola, to be able to anticipate this unusual circumstance for discharge planning.    Patient also provides temporary phone number where she can be reached: [phone].

## 2024-12-26 NOTE — TUMOR BOARD NOTE
TUMOR BOARD DISCUSSION SUMMARY    PRESENTER: Dr. Gunjan Varela    DIAGNOSIS: T cell lymphoma     SUMMARY/PRESENTATION: Sagrario Garber is a 49 y.o. female patient who presents with angioimmunoblastic T cell lymphoma with TFH phenotype    History: HTN, rheumatoid arthritis, lupus      Previous treatment: BV-CHP       Information reviewed: Radiology Review and Pathology Review    Radiology: PET CT 12/23/24  IMPRESSION:  Evaluation is limited secondary to diffuse soft tissue uptake. Within  this limitation:  1. Stable mildly FDG avid right inguinal lymph node.  2. Non FDG avid enlarged left axillary, right paratracheal and  retroperitoneal lymph nodes. Low-grade lymphomatous involvement  cannot be excluded.    Pathology: Bone Marrow Biopsy 12/19/24  --NORMOCELLULAR BONE MARROW (30-40%) WITH MATURING TRILINEAGE HEMATOPOIESIS WITH LOW LEVEL INVOLVEMENT BY T CELL LYMPHOMA (ABNORMAL T CELLS DETECTED BY FLOW CYTOMETRY), SEE NOTE     NOTE; A core biopsy was not available for review. Although morphologically lymphoma is not evidence, by flow cytometry a small (1.5%) abnormal CD4+, CD3 dim partial T cell population was detected consistent with patient's history of T cell lymphoma. Myeloid NGS is pending to assess for IDH and TET2 mutations. Clinical correlation recommended.      RECOMMENDATIONS: Proceed with auto transplant           Disclaimer     Psychiatric tumor board recommendations represent the consensus opinion of physicians present at a weekly patient care conference. The treating SCC physician is not always present, and many of the physicians formulating the recommendation have not personally seen or examined the patient under discussion. It is understood that the treating SCC physician considers the expertise of the Tumor Board Recommendation in formulating his/her plan for the patient. However, in many situations, based on individualized patient considerations, a different plan is determined by the treating physician to be  the optimal medical management.     Scribe Attestation  By signing my name below, I, Yessi Mulligan, Scribsaba   attest that this documentation has been prepared under the direction and in the presence of MALIGNANT HEME TUMOR BOARD.

## 2024-12-27 LAB
ELECTRONICALLY SIGNED BY: NORMAL
MYELOID NGS RESULTS: NORMAL
PATH REPORT.ADDENDUM SPEC: NORMAL
PATH REPORT.COMMENTS IMP SPEC: NORMAL
PATH REPORT.FINAL DX SPEC: NORMAL
PATH REPORT.GROSS SPEC: NORMAL
PATH REPORT.MICROSCOPIC SPEC OTHER STN: NORMAL
PATH REPORT.RELEVANT HX SPEC: NORMAL
PATH REPORT.TOTAL CANCER: NORMAL

## 2024-12-29 LAB
CELL COUNT (BLOOD): 3.52 X10*3/UL
CELL POPULATIONS: NORMAL
DIAGNOSIS: NORMAL
FLOW DIFFERENTIAL: NORMAL
FLOW TEST ORDERED: NORMAL
LAB TEST METHOD: NORMAL
NUMBER OF CELLS COLLECTED: NORMAL PER TUBE
PATH REPORT.TOTAL CANCER: NORMAL
SIGNATURE COMMENT: NORMAL
SPECIMEN VIABILITY: NORMAL

## 2025-01-02 ENCOUNTER — EDUCATION (OUTPATIENT)
Dept: OTHER | Facility: HOSPITAL | Age: 50
End: 2025-01-02
Payer: COMMERCIAL

## 2025-01-02 ENCOUNTER — SOCIAL WORK (OUTPATIENT)
Dept: HEMATOLOGY/ONCOLOGY | Facility: HOSPITAL | Age: 50
End: 2025-01-02
Payer: COMMERCIAL

## 2025-01-02 DIAGNOSIS — Z79.899 HIGH RISK MEDICATION USE: ICD-10-CM

## 2025-01-02 DIAGNOSIS — R06.89 DIFFICULTY BREATHING: ICD-10-CM

## 2025-01-02 DIAGNOSIS — Z76.82 STEM CELL TRANSPLANT CANDIDATE: ICD-10-CM

## 2025-01-02 DIAGNOSIS — C84.48 PERIPHERAL T CELL LYMPHOMA OF LYMPH NODES OF MULTIPLE SITES (MULTI): ICD-10-CM

## 2025-01-02 DIAGNOSIS — D69.6 THROMBOCYTOPENIA (CMS-HCC): ICD-10-CM

## 2025-01-02 DIAGNOSIS — Z79.899 ENCOUNTER FOR MONITORING CARDIOTOXIC DRUG THERAPY: ICD-10-CM

## 2025-01-02 DIAGNOSIS — Z51.81 ENCOUNTER FOR MONITORING CARDIOTOXIC DRUG THERAPY: ICD-10-CM

## 2025-01-02 NOTE — PROGRESS NOTES
12/26/24 1:00PM    Patient Education  Learner: patient  Educated on: Autologous Stem Cell Transplant  Readiness: acceptance  Preferred method for education: Explanation, Binder, Folder  Method used: explanation, handout, and video  Response: demonstrated understanding, needs reinforcement, and verbalizes understanding  Barriers: None  Preferred language: English    Today I met with Sagrario and to begin our discussion about autologous stem cell transplant. I explained that Dr. Varela has discussed with her the need for transplant. We discussed the general concept of transplant including stem cell collection, high dose chemotherapy administration, and cell rescue. We reviewed the workup process including organ function testing and laboratory testing, required for MD and financial clearance to proceed with stem cell collection and transplant. She will be mobilized using filgrastim/plerixafor. The administration and potential side effects of these medications were reviewed and the patient will be provided Lexicomp materials on these drugs. The patient was educated about the stem cell collection process and discussed that our collection target is 4 million CD34 cells. We discussed that once enough cells for transplant are obtained, she will admit for high dose chemotherapy and cell rescue and will remain in the hospital for 3-4 weeks. Sagrario will be receiving BEAM as the preparative regimen prior to transplant. Administration and potential side effects of this treatment were reviewed with the patient. We discussed neutropenia, anemia, thrombocytopenia, and the potential complications associated with these events. Infection prevention measures such as strict hand washing, low pathogen diet, daily showering/CHG bathing, and frequent walking were reviewed. We discussed the goals of discharge and the need for a caregiver and someone to accompany her to post-transplant visits in the Infusion center, which will occur 2-3  times per week. We discussed the importance of having a reliable caregiver post-transplant to drive patient to appointments, help with medication adherence, and assist with ADLS such as cooking and cleaning. The patient verbalized understanding of these measures and we are determining caregiver status with social work involvement. We discussed the need for staying close to the hospital following discharge. Patient has my contact information if she has any questions in the meantime. I will meet with patient again on 1/9 at [facility] to provide calendars.    Nancy Galvan RN
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
## 2025-01-08 ASSESSMENT — ENCOUNTER SYMPTOMS
ARTHRALGIAS: 1
CHEST TIGHTNESS: 0
HEMATURIA: 0
ABDOMINAL PAIN: 0
FEVER: 0
ABDOMINAL DISTENTION: 0
APPETITE CHANGE: 0
CHILLS: 0
DYSURIA: 0

## 2025-01-09 ENCOUNTER — DOCUMENTATION (OUTPATIENT)
Dept: OTHER | Facility: HOSPITAL | Age: 50
End: 2025-01-09
Payer: COMMERCIAL

## 2025-01-09 ENCOUNTER — OFFICE VISIT (OUTPATIENT)
Dept: HEMATOLOGY/ONCOLOGY | Facility: HOSPITAL | Age: 50
End: 2025-01-09
Payer: COMMERCIAL

## 2025-01-09 VITALS
HEART RATE: 90 BPM | TEMPERATURE: 97.5 F | DIASTOLIC BLOOD PRESSURE: 73 MMHG | WEIGHT: 166.89 LBS | OXYGEN SATURATION: 94 % | RESPIRATION RATE: 18 BRPM | SYSTOLIC BLOOD PRESSURE: 138 MMHG

## 2025-01-09 DIAGNOSIS — C84.40 PERIPHERAL T-CELL LYMPHOMA, NOT ELSEWHERE CLASSIFIED, UNSPECIFIED SITE (MULTI): ICD-10-CM

## 2025-01-09 PROCEDURE — 99214 OFFICE O/P EST MOD 30 MIN: CPT | Performed by: INTERNAL MEDICINE

## 2025-01-09 ASSESSMENT — PAIN SCALES - GENERAL: PAINLEVEL_OUTOF10: 8

## 2025-01-09 NOTE — PROGRESS NOTES
"Patient ID: Sagrario Garber is a 49 y.o. female.    Diagnosis:   Angioimmunoblastic T-cell lymphoma,  CD30 pos,  stage IV disease, bulky tumor, high LDH, excellent partial remission after initial frontline therapy.        Treatment:   Oncology History Overview Note   T cell lymphoma with TFH phenotype angioimmunoblastic CD30+, AK negative  11/10/21 CT imaging showed extensive hilar, mediastinal and bilateral axillary lymphadenopathy  2/14/22 right inguinal node biopsy follicular hyperplasia  4/6/22 right axillary lymph node reactive changes  5/12/24 progressive adenopathy, right inguinal node T cell lymphoma with TFH phenotype, CD30 focally positive  6/12/24 started BV-CHP Pet after cycle 5 interval resoluation of bulky adenopathy in axilla, abdomen, pelvis and groin focal hypermetabolic uptake in right inguinal node  9/25/24 C6D1 of BV-CHP     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      -HTN    -Patient has history of lupus for past 21/2 years and rheumatoid arthritis: currently on placquenil sees Dr. Dobbs for rheumatologist presenting with rash, pain in body, diffuse swellin joint pain. Was on Saphnelo, interferon alpha antagonist July 2023     Past Medical History:   Diagnosis Date    Essential (primary) hypertension 05/24/2017    HTN (hypertension), benign    Essential (primary) hypertension 10/09/2017    HTN (hypertension), benign    Personal history of nicotine dependence 05/24/2017    History of nicotine dependence       Surgical History:     Past Surgical History:   Procedure Laterality Date    OTHER SURGICAL HISTORY  05/24/2017    Oral Surgery Tooth Extraction Latta " Tooth        Family History:   Rheumatoid arthritis mother, skin cancer father and sister, 2 daughters, 1 son with severe autism is in a home. Has an older sister estranged.     Social History:   is an alcoholic, and has been sober for 4 months, he is in a sober house. Lost home has lost her income and most recently was staying with her daughter and can't return there. Smokes 8 cigarettes, no ETOH, finished 10th grade level,worked full time.  at [employer] for 5 years lived at the [facility].  No .  for 6 years, previsoulsy relationship of 19 years with father of her children.        Social History     Tobacco Use    Smoking status: Every Day     Types: Cigarettes   Substance Use Topics    Alcohol use: Not Currently    Drug use: Never      -------------------------------------------------------------------------------------------------------  Subjective       HPI    49-year-old woman  with reported history of lupus and rheumatoid arthritis for several years, anxiety and depression who has had lymphadenopathy for several years.  Previous biopsy in 2022 was read as reactive.  In May 2024 she presented with increasing lymph nodes repeat biopsy confirmed T cell lymphoma with TFH phenotype, CD30 positive and Alk negative. Extremely bulky tumor.  High LDH.  Bone marrow minimally involved.  (3-4% abnormal cells by flow).     June 2024 treatment was administered consisting of brentuximab- CHP  Shortly after starting BV CHP patient developed abdominal ascites and was unable to eat and required TPN as well as discharge to nursing facility  PET imaging after cycle5 showed an excellent response has been obtained, but several small lymph nodes with SUV of 3.4 remain.  Completed cycle 6 of brentuximab CHP 9/26/24. Patient is referred by Dr. Iraheta for evaluation for autologous stem cell transplantation    Patient hospitalized for mental illness  depression and anxiety 11/12/24 and was discharged 11/26/2024 to  skilled nursing.      Patient initially seen 12/5/24 for first outpatient consultation for high risk TFH phenotype angioimmunoblastic T cell lymphoma. She has been putting on weight, no fevers or sweats, has itchy rash over shins, now has mouth sores. Now on cymbalta and ativan in addition. Currently in SNF to get stronger, but she is able to bathe and feed herself,  able to walk. Gets sweats not drenching, denies shortness of breath.  Does not know when she will be discharged from this facility. Eating fine and gaining weight. Notes painful ulcerative lesions lower lips and wonders whether she might have herpetic lesions    Restaging evaluation 12/23/24 includes a PET scan which shows unchanged minimally PET avid right inguinal node and several non pet avid but left axillary and pretracheal nodes.  BM biopsy 12/19/24 shows low level 1.5% involvement by flow cytometry only.    Today (1/ 9/25) she is doing fair with no active symptoms. Denies any new rash, lumps/bumps, fevers, chills, CP, SOB, AP, diarrhea. Currently resides in nursing home. Working on SSI,  No shortness of breath,  lower legs itch at times.    Review of Systems   Constitutional:  Negative for appetite change, chills and fever.   HENT:   Negative for lump/mass.    Respiratory:  Negative for chest tightness.    Cardiovascular:  Negative for chest pain.   Gastrointestinal:  Negative for abdominal distention and abdominal pain.   Genitourinary:  Negative for dysuria and hematuria.    Musculoskeletal:  Positive for arthralgias.   Skin:  Positive for itching.   All other systems reviewed and are negative.     -------------------------------------------------------------------------------------------------------  Objective   BSA: There is no height or weight on file to calculate BSA.  /73   Pulse 90   Temp 36.4 °C (97.5 °F)   Resp 18   Wt 75.7 kg (166 lb 14.2 oz)   SpO2 94%     Physical Exam  Constitutional:       Appearance: Normal appearance.  She is well-developed.      Comments: Looks anxious.    HENT:      Head: Normocephalic and atraumatic.      Nose: Nose normal.      Mouth/Throat:      Dentition: Gum lesions (ulcerative lesions in front of bottom teeth) present.   Eyes:      Extraocular Movements: Extraocular movements intact.      Conjunctiva/sclera: Conjunctivae normal.      Pupils: Pupils are equal, round, and reactive to light.   Cardiovascular:      Rate and Rhythm: Normal rate and regular rhythm.   Pulmonary:      Breath sounds: Normal breath sounds and air entry.   Abdominal:      General: Abdomen is flat. Bowel sounds are normal.      Palpations: Abdomen is soft.   Musculoskeletal:         General: Normal range of motion.   Lymphadenopathy:      Lower Body: Right inguinal adenopathy present. Left inguinal adenopathy present.      Comments: Bilateral enlarged inguinal lymph nodes, Right inguinal node rubbery, about 4x4 cm and left inguinal node about 3x3 cm   Skin:     General: Skin is warm and dry.      Findings: Rash (dry rash above ankles) present.             Comments: Post scratching multiple small petechia looking superficial excoriations with overlying crusts   Neurological:      General: No focal deficit present.      Mental Status: She is alert and oriented to person, place, and time.   Psychiatric:         Mood and Affect: Mood normal.         Behavior: Behavior normal. Behavior is cooperative.         Thought Content: Thought content normal.      Comments: Anxious, normal cognition     Chest wall mediport intact    Performance Status:  Symptomatic; fully ambulatory  -------------------------------------------------------------------------------------------------------  Assessment/Plan      1.Angioimmunoblastic T-cell lymphoma,  CD30 pos,  stage IV disease, bulky tumor,   - High LDH, excellent partial remission after initial frontline therapy.   - Patient completed BV CHP September 2024 and PET scan after cycle 5 showed excellent  response with some minimal residual uptake in right inguinal area.  Inguinal nodes obvious on physical exam ,,also had body rash which could be consistent with her lupus or antioimmunoblastic lymphoma.    - Provided the patient has a very good response I would recommend consolidation with an autologous stem cell transplant with BEAM preparation.    - Briefly discussed the procedures involved in peripheral stem cell mobilization followed by  hospitalization for high dose chemotherapy and recovery from side effects. If patient is in a good remission, I estimate durable remission with this aggressive approach at 50-60%.  -Restaging evaluation 12/23/24 includes a PET scan which shows unchanged minimally PET avid right inguinal node   and several non pet avid but left axillary and pretracheal nodes.  -BM biopsy 12/19/24 shows low level 1.5% involvement by flow cytometry only.  -Patient has a tragic home situation and is currently homeless,  she is applying for SSI and currently lives in a nursing home.  Patient case discussed at BMT tumor board and consensus was that this should not preclude her from a potentially curative autologous stem cell transplant.  PBPC collections planned for early February.  Organ function testing in progress  2. History of lupus  Rheumatologist Dr. Cathy Dobbs,  [address] [facility]  [phone] currently only on placquenil.  Attempt to contact his office for records regarding presentation, treatment and request potentially for followup    3. Psychosocial: patient has life long struggles with anxiety and depression and has had recent hospitalization- will need hospital mental health team on board.  Tragically,  patient is currently homeless and her  who is a recovering alcoholic currently lives in a sober house.  Unclear whether patient could return to SNF after her transplant for care, seems that she will not be able to stay with her daughter again. Social work  met her today.     Attending     Patient seems to have had an adequate response to 6 cycles of BVCHP with minimal residual disease and organ function testing underway.  PET damianning personally reviewed by me. She appears to be an excellent candidate for an autologous transplant consolidation with BEAM.  Unfortunately patient is homeless which ethically should not influence potentially life saving treatments.  Encouraged patient to keep in touch with her  and work on obtaining SSI disability.  Ongoing planning for this.     RTC  mobilization for February 8,collection 2/12 and 2/13 , admissiion on February 18th. in 2 weeks. Patient met with  Nancy Galvan, nurse coordinator.    -------------------------------------------------------------------------------------------------------  Gunjan Varela MD

## 2025-01-09 NOTE — PROGRESS NOTES
1/9/2025 1:30PM    Met with patient at [facility] with Dr. Varela. I provided patient with folder containing calendars for testing, collection, and admission along with other transplant-related materials. I reviewed the calendars with the patient and reinforced Auto SCT education. Patient will do pre-transplant testing on 1/22. Patient has my contact information.    Nancy Galvan RN

## 2025-01-21 DIAGNOSIS — Z76.82 STEM CELL TRANSPLANT CANDIDATE: ICD-10-CM

## 2025-01-21 DIAGNOSIS — C84.48 PERIPHERAL T CELL LYMPHOMA OF LYMPH NODES OF MULTIPLE SITES (MULTI): ICD-10-CM

## 2025-01-22 ENCOUNTER — LAB (OUTPATIENT)
Dept: LAB | Facility: HOSPITAL | Age: 50
End: 2025-01-22
Payer: COMMERCIAL

## 2025-01-22 ENCOUNTER — CLINICAL SUPPORT (OUTPATIENT)
Dept: OTHER | Facility: HOSPITAL | Age: 50
End: 2025-01-22
Payer: COMMERCIAL

## 2025-01-22 ENCOUNTER — OFFICE VISIT (OUTPATIENT)
Dept: CARDIOLOGY | Facility: HOSPITAL | Age: 50
End: 2025-01-22
Payer: COMMERCIAL

## 2025-01-22 ENCOUNTER — HOSPITAL ENCOUNTER (OUTPATIENT)
Dept: RESPIRATORY THERAPY | Facility: HOSPITAL | Age: 50
Discharge: HOME | End: 2025-01-22
Payer: COMMERCIAL

## 2025-01-22 ENCOUNTER — HOSPITAL ENCOUNTER (OUTPATIENT)
Dept: CARDIOLOGY | Facility: HOSPITAL | Age: 50
Discharge: HOME | End: 2025-01-22
Payer: COMMERCIAL

## 2025-01-22 ENCOUNTER — HOSPITAL ENCOUNTER (OUTPATIENT)
Dept: RADIOLOGY | Facility: HOSPITAL | Age: 50
Discharge: HOME | End: 2025-01-22
Payer: COMMERCIAL

## 2025-01-22 VITALS
WEIGHT: 166.45 LBS | RESPIRATION RATE: 16 BRPM | SYSTOLIC BLOOD PRESSURE: 117 MMHG | TEMPERATURE: 97.2 F | HEART RATE: 88 BPM | DIASTOLIC BLOOD PRESSURE: 74 MMHG | OXYGEN SATURATION: 98 %

## 2025-01-22 DIAGNOSIS — Z76.82 STEM CELL TRANSPLANT CANDIDATE: ICD-10-CM

## 2025-01-22 DIAGNOSIS — C84.48 PERIPHERAL T CELL LYMPHOMA OF LYMPH NODES OF MULTIPLE SITES (MULTI): ICD-10-CM

## 2025-01-22 DIAGNOSIS — Z51.81 ENCOUNTER FOR MONITORING CARDIOTOXIC DRUG THERAPY: ICD-10-CM

## 2025-01-22 DIAGNOSIS — D69.6 THROMBOCYTOPENIA (CMS-HCC): ICD-10-CM

## 2025-01-22 DIAGNOSIS — R06.89 DIFFICULTY BREATHING: ICD-10-CM

## 2025-01-22 DIAGNOSIS — Z79.899 ENCOUNTER FOR MONITORING CARDIOTOXIC DRUG THERAPY: ICD-10-CM

## 2025-01-22 DIAGNOSIS — Z79.899 HIGH RISK MEDICATION USE: ICD-10-CM

## 2025-01-22 DIAGNOSIS — R07.89 ATYPICAL CHEST PAIN: Primary | ICD-10-CM

## 2025-01-22 PROBLEM — M32.9 LUPUS (SYSTEMIC LUPUS ERYTHEMATOSUS) (MULTI): Status: ACTIVE | Noted: 2025-01-22

## 2025-01-22 PROBLEM — F32.A DEPRESSION: Status: ACTIVE | Noted: 2025-01-22

## 2025-01-22 LAB
ABO GROUP (TYPE) IN BLOOD: NORMAL
ALBUMIN SERPL BCP-MCNC: 4.4 G/DL (ref 3.4–5)
ALP SERPL-CCNC: 70 U/L (ref 33–110)
ALT SERPL W P-5'-P-CCNC: 29 U/L (ref 7–45)
AMPHETAMINES UR QL SCN: ABNORMAL
ANION GAP SERPL CALC-SCNC: 14 MMOL/L (ref 10–20)
ANTIBODY SCREEN: NORMAL
AORTIC VALVE PEAK VELOCITY: 1.15 M/S
APTT PPP: 33 SECONDS (ref 27–38)
AST SERPL W P-5'-P-CCNC: 18 U/L (ref 9–39)
AV PEAK GRADIENT: 5 MMHG
AVA (PEAK VEL): 2.92 CM2
BARBITURATES UR QL SCN: ABNORMAL
BASOPHILS # BLD AUTO: 0.06 X10*3/UL (ref 0–0.1)
BASOPHILS NFR BLD AUTO: 0.7 %
BENZODIAZ UR QL SCN: ABNORMAL
BILIRUB SERPL-MCNC: 0.3 MG/DL (ref 0–1.2)
BNP SERPL-MCNC: 14 PG/ML (ref 0–99)
BUN SERPL-MCNC: 13 MG/DL (ref 6–23)
BZE UR QL SCN: ABNORMAL
CA-I BLD-SCNC: 1.24 MMOL/L (ref 1.1–1.33)
CALCIUM SERPL-MCNC: 9.6 MG/DL (ref 8.6–10.3)
CANNABINOIDS UR QL SCN: ABNORMAL
CARDIAC TROPONIN I PNL SERPL HS: 15 NG/L (ref 0–34)
CHLORIDE SERPL-SCNC: 101 MMOL/L (ref 98–107)
CMV IGG AVIDITY SERPL IA-RTO: NONREACTIVE %
CO2 SERPL-SCNC: 29 MMOL/L (ref 21–32)
CREAT SERPL-MCNC: 0.65 MG/DL (ref 0.5–1.05)
EBV EA IGG SER QL: NEGATIVE
EBV NA AB SER QL: POSITIVE
EBV VCA IGG SER IA-ACNC: POSITIVE
EBV VCA IGM SER IA-ACNC: NEGATIVE
EGFRCR SERPLBLD CKD-EPI 2021: >90 ML/MIN/1.73M*2
EJECTION FRACTION APICAL 4 CHAMBER: 66.4
EJECTION FRACTION: 64 %
EOSINOPHIL # BLD AUTO: 0.21 X10*3/UL (ref 0–0.7)
EOSINOPHIL NFR BLD AUTO: 2.5 %
ERYTHROCYTE [DISTWIDTH] IN BLOOD BY AUTOMATED COUNT: 13.4 % (ref 11.5–14.5)
FENTANYL+NORFENTANYL UR QL SCN: ABNORMAL
GLUCOSE SERPL-MCNC: 100 MG/DL (ref 74–99)
HBV CORE AB SER QL: NONREACTIVE
HBV CORE IGM SER QL: NONREACTIVE
HBV SURFACE AB SER-ACNC: <3.1 MIU/ML
HBV SURFACE AG SERPL QL IA: NONREACTIVE
HCT VFR BLD AUTO: 46.1 % (ref 36–46)
HCV AB SER QL: NONREACTIVE
HEMOGLOBIN A2: 2.9 % (ref 2–3.5)
HEMOGLOBIN A: 96.7 % (ref 95.8–98)
HEMOGLOBIN F: 0.4 % (ref 0–2)
HEMOGLOBIN IDENTIFICATION INTERPRETATION: NORMAL
HERPES SIMPLEX VIRUS 1 IGG: 7.3 INDEX
HERPES SIMPLEX VIRUS 2 IGG: <0.2 INDEX
HGB BLD-MCNC: 15.4 G/DL (ref 12–16)
HIV 1+2 AB+HIV1 P24 AG SERPL QL IA: NONREACTIVE
IMM GRANULOCYTES # BLD AUTO: 0.02 X10*3/UL (ref 0–0.7)
IMM GRANULOCYTES NFR BLD AUTO: 0.2 % (ref 0–0.9)
INR PPP: 1 (ref 0.9–1.1)
LEFT ATRIUM VOLUME AREA LENGTH INDEX BSA: 20.1 ML/M2
LEFT VENTRICLE INTERNAL DIMENSION DIASTOLE: 4.68 CM (ref 3.5–6)
LEFT VENTRICULAR OUTFLOW TRACT DIAMETER: 2.11 CM
LYMPHOCYTES # BLD AUTO: 1.86 X10*3/UL (ref 1.2–4.8)
LYMPHOCYTES NFR BLD AUTO: 22.1 %
MAGNESIUM SERPL-MCNC: 1.95 MG/DL (ref 1.6–2.4)
MCH RBC QN AUTO: 30.9 PG (ref 26–34)
MCHC RBC AUTO-ENTMCNC: 33.4 G/DL (ref 32–36)
MCV RBC AUTO: 93 FL (ref 80–100)
METHADONE UR QL SCN: ABNORMAL
MGC ASCENT PFT - FEV1 - POST: 2.37
MGC ASCENT PFT - FEV1 - PRE: 2.25
MGC ASCENT PFT - FEV1 - PREDICTED: 2.8
MGC ASCENT PFT - FVC - POST: 3.58
MGC ASCENT PFT - FVC - PRE: 3.51
MGC ASCENT PFT - FVC - PREDICTED: 3.46
MITRAL VALVE E/A RATIO: 0.9
MONOCYTES # BLD AUTO: 0.59 X10*3/UL (ref 0.1–1)
MONOCYTES NFR BLD AUTO: 7 %
NEUTROPHILS # BLD AUTO: 5.69 X10*3/UL (ref 1.2–7.7)
NEUTROPHILS NFR BLD AUTO: 67.5 %
NRBC BLD-RTO: 0 /100 WBCS (ref 0–0)
OPIATES UR QL SCN: ABNORMAL
OXYCODONE+OXYMORPHONE UR QL SCN: ABNORMAL
PATH REVIEW-HGB IDENTIFICATION: NORMAL
PCP UR QL SCN: ABNORMAL
PLATELET # BLD AUTO: 240 X10*3/UL (ref 150–450)
POTASSIUM SERPL-SCNC: 3.6 MMOL/L (ref 3.5–5.3)
PROT SERPL-MCNC: 7.2 G/DL (ref 6.4–8.2)
PROTHROMBIN TIME: 10.8 SECONDS (ref 9.8–12.8)
RBC # BLD AUTO: 4.98 X10*6/UL (ref 4–5.2)
RH FACTOR (ANTIGEN D): NORMAL
RIGHT VENTRICLE FREE WALL PEAK S': 14 CM/S
RIGHT VENTRICLE PEAK SYSTOLIC PRESSURE: 24 MMHG
SODIUM SERPL-SCNC: 140 MMOL/L (ref 136–145)
T GONDII IGG SER-ACNC: REACTIVE
TREPONEMA PALLIDUM IGG+IGM AB [PRESENCE] IN SERUM OR PLASMA BY IMMUNOASSAY: NONREACTIVE
TRICUSPID ANNULAR PLANE SYSTOLIC EXCURSION: 2.1 CM
URATE SERPL-MCNC: 4.1 MG/DL (ref 2.3–6.7)
VARICELLA ZOSTER IGG INDEX: 1.6 IA
VZV IGG SER QL IA: POSITIVE
WBC # BLD AUTO: 8.4 X10*3/UL (ref 4.4–11.3)

## 2025-01-22 PROCEDURE — 87799 DETECT AGENT NOS DNA QUANT: CPT

## 2025-01-22 PROCEDURE — 94060 EVALUATION OF WHEEZING: CPT | Performed by: STUDENT IN AN ORGANIZED HEALTH CARE EDUCATION/TRAINING PROGRAM

## 2025-01-22 PROCEDURE — 93306 TTE W/DOPPLER COMPLETE: CPT

## 2025-01-22 PROCEDURE — 99214 OFFICE O/P EST MOD 30 MIN: CPT | Performed by: INTERNAL MEDICINE

## 2025-01-22 PROCEDURE — 82330 ASSAY OF CALCIUM: CPT

## 2025-01-22 PROCEDURE — 86644 CMV ANTIBODY: CPT

## 2025-01-22 PROCEDURE — 86665 EPSTEIN-BARR CAPSID VCA: CPT

## 2025-01-22 PROCEDURE — 86645 CMV ANTIBODY IGM: CPT

## 2025-01-22 PROCEDURE — 86704 HEP B CORE ANTIBODY TOTAL: CPT

## 2025-01-22 PROCEDURE — 86780 TREPONEMA PALLIDUM: CPT

## 2025-01-22 PROCEDURE — 93306 TTE W/DOPPLER COMPLETE: CPT | Performed by: INTERNAL MEDICINE

## 2025-01-22 PROCEDURE — 85730 THROMBOPLASTIN TIME PARTIAL: CPT

## 2025-01-22 PROCEDURE — 94729 DIFFUSING CAPACITY: CPT | Performed by: STUDENT IN AN ORGANIZED HEALTH CARE EDUCATION/TRAINING PROGRAM

## 2025-01-22 PROCEDURE — 84484 ASSAY OF TROPONIN QUANT: CPT

## 2025-01-22 PROCEDURE — 87340 HEPATITIS B SURFACE AG IA: CPT

## 2025-01-22 PROCEDURE — 84550 ASSAY OF BLOOD/URIC ACID: CPT

## 2025-01-22 PROCEDURE — 87389 HIV-1 AG W/HIV-1&-2 AB AG IA: CPT

## 2025-01-22 PROCEDURE — 86777 TOXOPLASMA ANTIBODY: CPT

## 2025-01-22 PROCEDURE — 80307 DRUG TEST PRSMV CHEM ANLYZR: CPT

## 2025-01-22 PROCEDURE — 83021 HEMOGLOBIN CHROMOTOGRAPHY: CPT

## 2025-01-22 PROCEDURE — 86696 HERPES SIMPLEX TYPE 2 TEST: CPT

## 2025-01-22 PROCEDURE — 83880 ASSAY OF NATRIURETIC PEPTIDE: CPT

## 2025-01-22 PROCEDURE — 86803 HEPATITIS C AB TEST: CPT

## 2025-01-22 PROCEDURE — 94726 PLETHYSMOGRAPHY LUNG VOLUMES: CPT | Performed by: STUDENT IN AN ORGANIZED HEALTH CARE EDUCATION/TRAINING PROGRAM

## 2025-01-22 PROCEDURE — 86850 RBC ANTIBODY SCREEN: CPT

## 2025-01-22 PROCEDURE — 93005 ELECTROCARDIOGRAM TRACING: CPT

## 2025-01-22 PROCEDURE — 86706 HEP B SURFACE ANTIBODY: CPT

## 2025-01-22 PROCEDURE — 86705 HEP B CORE ANTIBODY IGM: CPT

## 2025-01-22 PROCEDURE — 83735 ASSAY OF MAGNESIUM: CPT

## 2025-01-22 PROCEDURE — 80365 DRUG SCREENING OXYCODONE: CPT

## 2025-01-22 PROCEDURE — 85025 COMPLETE CBC W/AUTO DIFF WBC: CPT

## 2025-01-22 PROCEDURE — 86787 VARICELLA-ZOSTER ANTIBODY: CPT

## 2025-01-22 PROCEDURE — 36415 COLL VENOUS BLD VENIPUNCTURE: CPT

## 2025-01-22 PROCEDURE — 86790 VIRUS ANTIBODY NOS: CPT

## 2025-01-22 PROCEDURE — 94726 PLETHYSMOGRAPHY LUNG VOLUMES: CPT

## 2025-01-22 PROCEDURE — 80053 COMPREHEN METABOLIC PANEL: CPT

## 2025-01-22 PROCEDURE — 71046 X-RAY EXAM CHEST 2 VIEWS: CPT

## 2025-01-22 PROCEDURE — 99244 OFF/OP CNSLTJ NEW/EST MOD 40: CPT | Performed by: INTERNAL MEDICINE

## 2025-01-22 RX ORDER — DULOXETIN HYDROCHLORIDE 60 MG/1
60 CAPSULE, DELAYED RELEASE ORAL DAILY
COMMUNITY

## 2025-01-22 RX ORDER — ATORVASTATIN CALCIUM 40 MG/1
40 TABLET, FILM COATED ORAL DAILY
COMMUNITY

## 2025-01-22 RX ORDER — PREDNISONE 1 MG/1
1 TABLET ORAL DAILY
COMMUNITY

## 2025-01-22 ASSESSMENT — ENCOUNTER SYMPTOMS
LIGHT-HEADEDNESS: 0
SHORTNESS OF BREATH: 0
NECK PAIN: 1
HEADACHES: 0
CHOKING: 0
DIZZINESS: 0
EYES NEGATIVE: 1
ARTHRALGIAS: 1
WHEEZING: 0
FEVER: 0
PALPITATIONS: 1
STRIDOR: 0
GASTROINTESTINAL NEGATIVE: 1
CHILLS: 0
FATIGUE: 1
COUGH: 0
NERVOUS/ANXIOUS: 1

## 2025-01-22 ASSESSMENT — PAIN SCALES - GENERAL: PAINLEVEL_OUTOF10: 8

## 2025-01-22 NOTE — PROGRESS NOTES
I had the pleasure of meeting 49 year old Sagrario Gee (MRN [id number]) for an Autologous DHQ. Collection is planned for 2/12 and 2/13 on SCC 2. Pt has PMH of T cell lymphoma, lupus, rheumatoid arthritis, peripheral neuropathy, anxiety, and depression. No recent travel or exposure to any communicable diseases. Reviewed PI sheet with patient and answered all questions. Reviewed this information with Dr. Cardozo; IV calcium has been indicated for hx peripheral neuropathy.     Allergies: Atenolol, penicillin, solu-medrol    Vital Signs: Ht= 165.3cm, Wt= 76kg, BP= 123/79, SpO2= 99% @room air, RR= 18, T= 36.5, HR= 81    Medications: Ativan, amlodipine, famotidine, plaquenil, Seroquel, Lipitor, Percocet

## 2025-01-22 NOTE — PROGRESS NOTES
CARDIOLOGY NEW PATIENT OFFICE VISIT    Patient:  Sagrario Garber  YOB: 1975  MRN: [id number]       Chief Complaint/Active Symptoms:       Sagrario Garber is a 49 y.o. female who is being seen today at the request of Dr. Gunjan Varela for evaluation of stem cell transplant.    No chief complaint on file.      History of Present Illness:   HPI    Patient is here for cardio-oncology evaluation prior to SCT. Patient has no known cardiac history. Has occasional chest discomfort - once every month or two that is brief in duration and describes like a muscle cramp. No history of MI. No regular or exercise provoked chest pain or discomfort. Denies SOB, orthopnea or PND. Is aware of an increased heart rate at times, usually when anxious or upset. NO syncope or near syncope. No edema.     Has received treatment at [facility]. Information regarding her total dose of Doxorubicin received from the Oncology group. Cannot access their records/information.     Cancer Diagnosis: Angioimmunoblastic Peripheral T cell lymphoma  Oncologist: Gunjan Varela  Treatment: BVCHP - completed 9/2024, for autologous SCT  Total dose: 300 mg/m2    Risk Factors: Tobacco use, Hx SLE, HLD    Testing:   Echo prelim today - Normal LV Function, EF 60%.   EKG today - unable to be reviewed  Prior EKG 10/30/2011 - sinus tachycardia, left atrial enlargement.   BNP 14  No lipid panel available.     Allergies:     Allergies   Allergen Reactions    Amoxicillin Hives    Atenolol Hives    Prednisone Hives     Specifically Solumedrol        Outpatient Medications:     Current Outpatient Medications   Medication Instructions    amLODIPine (NORVASC) 5 mg, Daily    atorvastatin (LIPITOR) 40 mg, Daily    DULoxetine (CYMBALTA) 60 mg, Daily    famotidine (Pepcid) 20 mg tablet TAKE 1 TABLET BY MOUTH TWICE DAILY FOR 7 DAYS AS NEEDED for itching    hydroxychloroquine (Plaquenil) 200 mg tablet 1 tablet, Daily    metoprolol tartrate (Lopressor)  25 mg tablet 1 tablet, Daily    predniSONE (DELTASONE) 1 mg, Daily    QUEtiapine (SEROquel) 100 mg tablet 1 tablet, Daily        Past Medical History:     Past Medical History:   Diagnosis Date    Essential (primary) hypertension 05/24/2017    HTN (hypertension), benign    Essential (primary) hypertension 10/09/2017    HTN (hypertension), benign    Personal history of nicotine dependence 05/24/2017    History of nicotine dependence       Social History:     Social History     Socioeconomic History    Marital status: Single   Tobacco Use    Smoking status: Every Day     Types: Cigarettes   Substance and Sexual Activity    Alcohol use: Not Currently    Drug use: Never       Family History:      No family history on file.    Review of Systems:     Review of Systems   Constitutional:  Positive for fatigue. Negative for chills and fever.   HENT:  Positive for congestion.    Eyes: Negative.    Respiratory:  Negative for cough, choking, shortness of breath, wheezing and stridor.    Cardiovascular:  Positive for chest pain and palpitations. Negative for leg swelling.   Gastrointestinal: Negative.    Endocrine: Positive for cold intolerance.   Genitourinary: Negative.    Musculoskeletal:  Positive for arthralgias and neck pain.   Neurological:  Negative for dizziness, syncope, light-headedness and headaches.   Psychiatric/Behavioral:  The patient is nervous/anxious.    All other systems reviewed and are negative.      Objective:     Vitals:    01/22/25 1118   BP: 117/74   Pulse: 88   Resp: 16   Temp: 36.2 °C (97.2 °F)   SpO2: 98%       Vitals:    01/22/25 1118   Weight: 75.5 kg (166 lb 7.2 oz)       Physical Examination:   GENERAL:  Well developed, well nourished, in no acute distress.  HEENT: NC AT EOMI with anicteric sclera  NECK:  Supple, no JVD, no bruit.  CHEST:  Symmetric and nontender.  LUNGS:  Normal respiratory effort. Bilateral breath sounds clear to auscultation.   HEART:  PMI nondisplaced. Rate and rhythm regular  with no evident murmur, no gallop appreciated. There are no rubs, clicks or heaves.  PERIPHERAL VASCULAR:  Pulses present and equally palpable; 2+ throughout.  ABDOMEN: Soft, NT, ND without HSM or palpable organomegaly, no bruits, normoactive bowel sounds  MUSCULOSKELETAL: No significant joint or limb deformity  EXTREMITIES:  Warm with good color, no clubbing or cyanosis.  There is no edema noted.  NEURO/PSYCH:  Alert and oriented times three with with mild anxiety. No focal motor deficits, normal gait and ambulation  SKIN: No rashes on exposed skin, no reported skin lesions.     Lab:     CBC:   Lab Results   Component Value Date    WBC 8.4 01/22/2025    RBC 4.98 01/22/2025    HGB 15.4 01/22/2025    HCT 46.1 (H) 01/22/2025     01/22/2025      Lab Results   Component Value Date    WBC 8.4 01/22/2025    WBC 4.2 (L) 12/19/2024    WBC 5.5 12/05/2024    RBC 4.98 01/22/2025    RBC 5.12 12/19/2024    RBC 4.79 12/05/2024    HGB 15.4 01/22/2025    HGB 15.6 12/19/2024    HGB 14.8 12/05/2024    HCT 46.1 (H) 01/22/2025    HCT 46.7 (H) 12/19/2024    HCT 44.0 12/05/2024    MCV 93 01/22/2025    MCV 91 12/19/2024    MCV 92 12/05/2024    MCH 30.9 01/22/2025    MCH 30.5 12/19/2024    MCH 30.9 12/05/2024    MCHC 33.4 01/22/2025    MCHC 33.4 12/19/2024    MCHC 33.6 12/05/2024    RDW 13.4 01/22/2025    RDW 12.9 12/19/2024    RDW 12.8 12/05/2024     01/22/2025     12/19/2024     12/05/2024       CMP:    Lab Results   Component Value Date     01/22/2025    K 3.6 01/22/2025     01/22/2025    CO2 29 01/22/2025    BUN 13 01/22/2025    CREATININE 0.65 01/22/2025    GLUCOSE 100 (H) 01/22/2025    CALCIUM 9.6 01/22/2025     Lab Results   Component Value Date     01/22/2025     12/05/2024    K 3.6 01/22/2025    K 3.7 12/05/2024     01/22/2025     12/05/2024    CO2 29 01/22/2025    CO2 28 12/05/2024    BUN 13 01/22/2025    BUN 8 12/05/2024    CREATININE 0.65 01/22/2025    CREATININE  "0.67 12/05/2024     Lab Results   Component Value Date    ALKPHOS 70 01/22/2025    ALT 29 01/22/2025    AST 18 01/22/2025    PROT 7.2 01/22/2025    BILITOT 0.3 01/22/2025       Magnesium:    Lab Results   Component Value Date    MG 1.95 01/22/2025       Lipid Profile:    No results found for: \"CHLPL\", \"TRIG\", \"HDL\", \"LDLCALC\", \"LDLDIRECT\"    TSH:    No results found for: \"TSH\"    BNP:   Lab Results   Component Value Date    BNP 14 01/22/2025        PT/INR:    Lab Results   Component Value Date    PROTIME 10.8 01/22/2025    INR 1.0 01/22/2025       HgBA1c:    No results found for: \"HGBA1C\"    Cardiac Enzymes:    No results found for: \"TROPHS\"    Labs reviewed.     Diagnostic Studies:     No echocardiogram results found for the past 12 months.  Echo images for today reviewed - normal LV function,    No nuclear medicine results found for the past 12 months    No valid procedures specified.    Radiology:     No valid procedures specified.    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 Hx of cardiotoxic chemotherapy. Stem cell transplant candidate. Patient without signs or symptoms of heart failure. Chest pain as described is atypical and not suggestive of angina. May proceed with transplant as clinically appropriate. Will be seen in follow-up by Wendy Márquez given her hx of Doxorubicin at 300 mg/m2. Current echo with normal LV function.   Atypical chest pain. Not suggestive of angina and limited risk factors for CAD (tobacco use). Patient self reports elevated cholesterol but no lipid panel in current chart. Would get lipid panel but wait until post-transplant to start Rx.   Anxiety/depression.  Under the care of psychiatry. Feels stable at this time but anxious.     Will follow " post-transplant.

## 2025-01-23 LAB
CMV DNA SERPL NAA+PROBE-LOG IU: NORMAL {LOG_IU}/ML
EBV DNA SPEC NAA+PROBE-LOG#: NORMAL {LOG_COPIES}/ML
LABORATORY COMMENT REPORT: NOT DETECTED
LABORATORY COMMENT REPORT: NOT DETECTED

## 2025-01-24 LAB
6MAM UR CFM-MCNC: <25 NG/ML
ADENOVIRUS QPCR,PLASMA, VIRC: NOT DETECTED COPIES/ML
CMV IGM SERPL-ACNC: <8 AU/ML
CODEINE UR CFM-MCNC: <50 NG/ML
HYDROCODONE CTO UR CFM-MCNC: <25 NG/ML
HYDROMORPHONE UR CFM-MCNC: 516 NG/ML
MORPHINE UR CFM-MCNC: <50 NG/ML
NORHYDROCODONE UR CFM-MCNC: <25 NG/ML
NOROXYCODONE UR CFM-MCNC: >1000 NG/ML
OXYCODONE UR CFM-MCNC: >2500 NG/ML
OXYMORPHONE UR CFM-MCNC: 2442 NG/ML

## 2025-01-25 LAB — HTLV I+II AB SER QL IA: NEGATIVE

## 2025-02-05 ASSESSMENT — ENCOUNTER SYMPTOMS
FEVER: 0
ARTHRALGIAS: 1
ABDOMINAL PAIN: 0
HEMATURIA: 0
DYSURIA: 0
CHILLS: 0
APPETITE CHANGE: 0
CHEST TIGHTNESS: 0
ABDOMINAL DISTENTION: 0

## 2025-02-06 ENCOUNTER — TELEPHONE (OUTPATIENT)
Dept: OTHER | Facility: HOSPITAL | Age: 50
End: 2025-02-06

## 2025-02-06 ENCOUNTER — LAB (OUTPATIENT)
Dept: OTHER | Facility: HOSPITAL | Age: 50
End: 2025-02-06
Payer: COMMERCIAL

## 2025-02-06 ENCOUNTER — OFFICE VISIT (OUTPATIENT)
Dept: HEMATOLOGY/ONCOLOGY | Facility: HOSPITAL | Age: 50
End: 2025-02-06
Payer: COMMERCIAL

## 2025-02-06 ENCOUNTER — LAB (OUTPATIENT)
Dept: LAB | Facility: HOSPITAL | Age: 50
End: 2025-02-06
Payer: COMMERCIAL

## 2025-02-06 VITALS
OXYGEN SATURATION: 98 % | RESPIRATION RATE: 17 BRPM | SYSTOLIC BLOOD PRESSURE: 115 MMHG | DIASTOLIC BLOOD PRESSURE: 74 MMHG | HEART RATE: 88 BPM | WEIGHT: 169.31 LBS | TEMPERATURE: 97.7 F

## 2025-02-06 DIAGNOSIS — C84.48 PERIPHERAL T CELL LYMPHOMA OF LYMPH NODES OF MULTIPLE SITES (MULTI): ICD-10-CM

## 2025-02-06 DIAGNOSIS — Z76.82 STEM CELL TRANSPLANT CANDIDATE: ICD-10-CM

## 2025-02-06 LAB
ALBUMIN SERPL BCP-MCNC: 4.4 G/DL (ref 3.4–5)
ALP SERPL-CCNC: 86 U/L (ref 33–110)
ALT SERPL W P-5'-P-CCNC: 39 U/L (ref 7–45)
ANION GAP SERPL CALC-SCNC: 14 MMOL/L (ref 10–20)
AST SERPL W P-5'-P-CCNC: 21 U/L (ref 9–39)
BASOPHILS # BLD AUTO: 0.08 X10*3/UL (ref 0–0.1)
BASOPHILS NFR BLD AUTO: 0.6 %
BILIRUB SERPL-MCNC: 0.4 MG/DL (ref 0–1.2)
BUN SERPL-MCNC: 17 MG/DL (ref 6–23)
CALCIUM SERPL-MCNC: 9.7 MG/DL (ref 8.6–10.3)
CHLORIDE SERPL-SCNC: 104 MMOL/L (ref 98–107)
CO2 SERPL-SCNC: 27 MMOL/L (ref 21–32)
CREAT SERPL-MCNC: 0.66 MG/DL (ref 0.5–1.05)
EGFRCR SERPLBLD CKD-EPI 2021: >90 ML/MIN/1.73M*2
EOSINOPHIL # BLD AUTO: 0.14 X10*3/UL (ref 0–0.7)
EOSINOPHIL NFR BLD AUTO: 1 %
ERYTHROCYTE [DISTWIDTH] IN BLOOD BY AUTOMATED COUNT: 13.9 % (ref 11.5–14.5)
GLUCOSE SERPL-MCNC: 127 MG/DL (ref 74–99)
HCG UR QL IA.RAPID: NEGATIVE
HCT VFR BLD AUTO: 44.4 % (ref 36–46)
HGB BLD-MCNC: 15 G/DL (ref 12–16)
IMM GRANULOCYTES # BLD AUTO: 0.04 X10*3/UL (ref 0–0.7)
IMM GRANULOCYTES NFR BLD AUTO: 0.3 % (ref 0–0.9)
LYMPHOCYTES # BLD AUTO: 0.99 X10*3/UL (ref 1.2–4.8)
LYMPHOCYTES NFR BLD AUTO: 7.3 %
MAGNESIUM SERPL-MCNC: 1.99 MG/DL (ref 1.6–2.4)
MCH RBC QN AUTO: 31.4 PG (ref 26–34)
MCHC RBC AUTO-ENTMCNC: 33.8 G/DL (ref 32–36)
MCV RBC AUTO: 93 FL (ref 80–100)
MONOCYTES # BLD AUTO: 0.48 X10*3/UL (ref 0.1–1)
MONOCYTES NFR BLD AUTO: 3.6 %
NEUTROPHILS # BLD AUTO: 11.77 X10*3/UL (ref 1.2–7.7)
NEUTROPHILS NFR BLD AUTO: 87.2 %
NRBC BLD-RTO: 0 /100 WBCS (ref 0–0)
PLATELET # BLD AUTO: 268 X10*3/UL (ref 150–450)
POTASSIUM SERPL-SCNC: 4.6 MMOL/L (ref 3.5–5.3)
PROT SERPL-MCNC: 7.3 G/DL (ref 6.4–8.2)
RBC # BLD AUTO: 4.78 X10*6/UL (ref 4–5.2)
SODIUM SERPL-SCNC: 140 MMOL/L (ref 136–145)
WBC # BLD AUTO: 13.5 X10*3/UL (ref 4.4–11.3)

## 2025-02-06 PROCEDURE — 99214 OFFICE O/P EST MOD 30 MIN: CPT | Performed by: INTERNAL MEDICINE

## 2025-02-06 PROCEDURE — 81025 URINE PREGNANCY TEST: CPT

## 2025-02-06 PROCEDURE — 87799 DETECT AGENT NOS DNA QUANT: CPT

## 2025-02-06 PROCEDURE — 85025 COMPLETE CBC W/AUTO DIFF WBC: CPT

## 2025-02-06 PROCEDURE — 93005 ELECTROCARDIOGRAM TRACING: CPT

## 2025-02-06 PROCEDURE — 36415 COLL VENOUS BLD VENIPUNCTURE: CPT

## 2025-02-06 PROCEDURE — 84075 ASSAY ALKALINE PHOSPHATASE: CPT

## 2025-02-06 PROCEDURE — 83735 ASSAY OF MAGNESIUM: CPT

## 2025-02-06 RX ORDER — HEPARIN SODIUM,PORCINE/PF 10 UNIT/ML
50 SYRINGE (ML) INTRAVENOUS AS NEEDED
OUTPATIENT
Start: 2025-02-11

## 2025-02-06 RX ORDER — DIPHENHYDRAMINE HYDROCHLORIDE 50 MG/ML
50 INJECTION INTRAMUSCULAR; INTRAVENOUS AS NEEDED
Status: CANCELLED | OUTPATIENT
Start: 2025-02-08

## 2025-02-06 RX ORDER — FAMOTIDINE 10 MG/ML
20 INJECTION INTRAVENOUS ONCE AS NEEDED
Status: CANCELLED | OUTPATIENT
Start: 2025-02-08

## 2025-02-06 RX ORDER — HYDROMORPHONE HYDROCHLORIDE 2 MG/1
2 TABLET ORAL 2 TIMES DAILY PRN
COMMUNITY

## 2025-02-06 RX ORDER — LORAZEPAM 0.5 MG/1
0.5 TABLET ORAL 2 TIMES DAILY PRN
COMMUNITY

## 2025-02-06 RX ORDER — LOPERAMIDE HYDROCHLORIDE 2 MG/1
2 CAPSULE ORAL ONCE AS NEEDED
Status: CANCELLED | OUTPATIENT
Start: 2025-02-08

## 2025-02-06 RX ORDER — ALBUTEROL SULFATE 0.83 MG/ML
3 SOLUTION RESPIRATORY (INHALATION) AS NEEDED
Status: CANCELLED | OUTPATIENT
Start: 2025-02-08

## 2025-02-06 RX ORDER — BISMUTH SUBSALICYLATE 262 MG
1 TABLET,CHEWABLE ORAL DAILY
COMMUNITY

## 2025-02-06 RX ORDER — PREDNISONE 10 MG/1
10 TABLET ORAL DAILY
COMMUNITY

## 2025-02-06 RX ORDER — HEPARIN 100 UNIT/ML
500 SYRINGE INTRAVENOUS AS NEEDED
OUTPATIENT
Start: 2025-02-11

## 2025-02-06 RX ORDER — OXYCODONE AND ACETAMINOPHEN 5; 325 MG/1; MG/1
2 TABLET ORAL EVERY 6 HOURS PRN
COMMUNITY

## 2025-02-06 RX ORDER — EPINEPHRINE 0.3 MG/.3ML
0.3 INJECTION SUBCUTANEOUS EVERY 5 MIN PRN
Status: CANCELLED | OUTPATIENT
Start: 2025-02-08

## 2025-02-06 RX ORDER — HEPARIN SODIUM 1000 [USP'U]/ML
2000 INJECTION, SOLUTION INTRAVENOUS; SUBCUTANEOUS AS NEEDED
OUTPATIENT
Start: 2025-02-11

## 2025-02-06 RX ORDER — PLERIXAFOR 20 MG/ML
20 INJECTION, SOLUTION SUBCUTANEOUS ONCE
Status: CANCELLED | OUTPATIENT
Start: 2025-02-08

## 2025-02-06 ASSESSMENT — PAIN SCALES - GENERAL: PAINLEVEL_OUTOF10: 8

## 2025-02-06 NOTE — PROGRESS NOTES
2/6/25 3:30PM    Met with patient at pre-mobilization visit with Dr. Varela. I provided patient with calendars for her upcoming collection and transplant and reviewed them in-depth. All questions were answered thoroughly by both myself and Dr. Varela. Patient reviewed and signed consents (NMDP 3Z02, CASE 12Z05C, and Mobilization/Collection, Storage) with Dr. Varela. Patient provided directions to B402 for line placement on 2/11. Patient has my contact information if she needs anything.     Nancy Galvan RN

## 2025-02-06 NOTE — PROGRESS NOTES
This RN went to [facility] room 39 during patient visit with  and Pharmacist to obtain EKG on patient per order. EKG obtained per order. Pt remains in room alert, oriented, ambulatory, and unaccompanied.

## 2025-02-06 NOTE — PROGRESS NOTES
PRE-AUTOLOGOUS STEM CELL TRANSPLANT ONCOLOGY PHARMACY EDUCATION NOTE   Sagrario Garber is a 49 y.o. female with a diagnosis of  angioimmunoblastic T-cell lymphoma  scheduled to undergo an autologous stem cell transplant on 2/24/2025. The patient will receive conditioning chemotherapy with BEAM: carmustine 300 mg/m2 IV D-6, etoposide 200 mg/m2 IV D-5, D-4, D-3, D-2, cytarabine 200 mg/m2 IV every 12 hours D-5, D-4, D-3, D-2, and melphalan 140 mg/m2 IV D-1. The patient's primary transplant oncologist is Dr. Varela. Patient presents to clinic today for evaluation. Pharmacist was consulted to provide education regarding conditioning chemotherapy, which will be initiated on 2/18/2025 and assist with transitions of care.    Chemotherapy Education: The chemotherapy regimen was discussed with the patient/family including treatment schedule, potential side effects, and management of side effects. Potential side effects discussed include: myelosuppression, infection, nausea/vomiting, diarrhea, alopecia, fatigue, mucositis, taste changes, and infusion reactions. Management techniques of these side effects were discussed. Supportive care medications were briefly discussed in terms of use and potential side effects. Patient does not have ondansetron and prochlorperazine available at home. Prescriptions will need to be sent.     Home Medications: Reviewed medication list. Drug interactions identified include: fluconazole x quetiapine/levofloxacin - increased risk for QTc prolongation. Last EKG performed on 1/22/2025 - unable to be read.  ECHO showed normal LV function. Cardiology is following. Patient currently does not take herbal supplements. Discussed that herbal supplements are not regulated by the FDA and thus have not been well studied to assess drug-drug, drug-food, drug-disease interactions.     Anti-Infective Prophylaxis: The patient will require the following anti-infective medications.     HSV Fungal Bacterial PJP    Acyclovir 400 mg tablets: take 1 tablet by mouth every 12 hours Fluconazole 200 mg tablets: take 2 tablets by mouth once daily Levofloxacin 500 mg tablets: take 1 tablet by mouth once daily Bactrim /160 mg tablets: take 1 tablet by mouth on Mondays, Wednesdays, and Fridays   Start on Day 0 and continue for 1 year Start on Day +1 and continue until engraftment Start once neutropenic and continue until engraftment Start on day +30 and continue for 3-6 months after transplant     All questions were answered. Patient/family verbalized understanding of the plan of care and information provided. Will follow as necessary. Time spent with patient/family and/or coordinating care: 60 minutes.      Darlyn Kelly, PharmD  PGY-2 Oncology Pharmacy Resident      Current Outpatient Medications   Medication Instructions    amLODIPine (NORVASC) 5 mg, Daily    atorvastatin (LIPITOR) 40 mg, Daily    DULoxetine (CYMBALTA) 60 mg, Daily    famotidine (Pepcid) 20 mg tablet TAKE 1 TABLET BY MOUTH TWICE DAILY FOR 7 DAYS AS NEEDED for itching    HYDROmorphone (DILAUDID) 2 mg, 2 times daily PRN    hydroxychloroquine (Plaquenil) 200 mg tablet 1 tablet, Daily    LORazepam (ATIVAN) 0.5 mg, 2 times daily PRN    metoprolol tartrate (Lopressor) 25 mg tablet 1 tablet, Daily    multivitamin tablet 1 tablet, Daily    oxyCODONE-acetaminophen (Percocet) 5-325 mg tablet 2 tablets, Every 6 hours PRN    predniSONE (DELTASONE) 10 mg, Daily    QUEtiapine (SEROquel) 100 mg tablet 1 tablet, Daily

## 2025-02-06 NOTE — PROGRESS NOTES
"Patient ID: Sagrario Garber is a 49 y.o. female.    Diagnosis:   Angioimmunoblastic T-cell lymphoma,  CD30 pos,  stage IV disease, bulky tumor, high LDH, excellent partial remission after initial frontline therapy.        Treatment:   Oncology History Overview Note   T cell lymphoma with TFH phenotype angioimmunoblastic CD30+, AK negative  11/10/21 CT imaging showed extensive hilar, mediastinal and bilateral axillary lymphadenopathy  2/14/22 right inguinal node biopsy follicular hyperplasia  4/6/22 right axillary lymph node reactive changes  5/12/24 progressive adenopathy, right inguinal node T cell lymphoma with TFH phenotype, CD30 focally positive  6/12/24 started BV-CHP Pet after cycle 5 interval resoluation of bulky adenopathy in axilla, abdomen, pelvis and groin focal hypermetabolic uptake in right inguinal node  9/25/24 C6D1 of BV-CHP     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      -HTN    -Patient has history of lupus for past 21/2 years and rheumatoid arthritis: currently on placquenil sees Dr. Dobbs for rheumatologist presenting with rash, pain in body, diffuse swellin joint pain. Was on Saphnelo, interferon alpha antagonist July 2023     Past Medical History:   Diagnosis Date    Essential (primary) hypertension 05/24/2017    HTN (hypertension), benign    Essential (primary) hypertension 10/09/2017    HTN (hypertension), benign    Personal history of nicotine dependence 05/24/2017    History of nicotine dependence       Surgical History:     Past Surgical History:   Procedure Laterality Date    OTHER SURGICAL HISTORY  05/24/2017    Oral Surgery Tooth Extraction [facility] " Tooth        Family History:   Rheumatoid arthritis mother, skin cancer father and sister, 2 daughters, 1 son with severe autism is in a home. Has an older sister estranged.     Social History:   is an alcoholic, and has been sober for 4 months, he is in a sober house. Lost home has lost her income and most recently was staying with her daughter and can't return there. Smokes 8 cigarettes, no ETOH, finished 10th grade level,worked full time.  at [employer] for 5 years lived at the [facility].  No .  for 6 years, previsoulsy relationship of 19 years with father of her children.        Social History     Tobacco Use    Smoking status: Every Day     Types: Cigarettes   Substance Use Topics    Alcohol use: Not Currently    Drug use: Never      -------------------------------------------------------------------------------------------------------  Subjective       HPI    49-year-old woman  with reported history of lupus and rheumatoid arthritis for several years, anxiety and depression who has had lymphadenopathy for several years.  Previous biopsy in 2022 was read as reactive.  In May 2024 she presented with increasing lymph nodes repeat biopsy confirmed T cell lymphoma with TFH phenotype, CD30 positive and Alk negative. Extremely bulky tumor.  High LDH.  Bone marrow minimally involved.  (3-4% abnormal cells by flow).     June 2024 treatment was administered consisting of brentuximab- CHP  Shortly after starting BV CHP patient developed abdominal ascites and was unable to eat and required TPN as well as discharge to nursing facility  PET imaging after cycle5 showed an excellent response has been obtained, but several small lymph nodes with SUV of 3.4 remain.  Completed cycle 6 of brentuximab CHP 9/26/24. Patient is referred by Dr. Iraheta for evaluation for autologous stem cell transplantation    Patient hospitalized for mental illness  depression and anxiety 11/12/24 and was discharged 11/26/2024 to  skilled nursing.      Patient initially seen 12/5/24 for first outpatient consultation for high risk TFH phenotype angioimmunoblastic T cell lymphoma. She has been putting on weight, no fevers or sweats, has itchy rash over shins, now has mouth sores. Now on cymbalta and ativan in addition. Currently in SNF to get stronger, but she is able to bathe and feed herself,  able to walk. Gets sweats not drenching, denies shortness of breath.  Does not know when she will be discharged from this facility. Eating fine and gaining weight. Notes painful ulcerative lesions lower lips and wonders whether she might have herpetic lesions    Restaging evaluation 12/23/24 includes a PET scan which shows unchanged minimally PET avid right inguinal node and several non pet avid but left axillary and pretracheal nodes.  BM biopsy 12/19/24 shows low level 1.5% involvement by flow cytometry only.    Today (2/6/25) patient is being seen in preparation for peripheral blood progenitor cell collections in preparation for an autologous stem cell transplant with  BEAM.  She is doing fair and has contracted a recent URI, for last 9 or 10 days. tested negative for COVID per patient. Denies any new rash, lumps/bumps, fevers, chills, CP, SOB, AP, diarrhea. Currently resides in nursing home. Patient has been approved for SSI.  No shortness of breath,  lower legs itch at times. Crocheting to keep calm. Has been gaining weight    Review of Systems   Constitutional:  Negative for appetite change, chills and fever.   HENT:   Negative for lump/mass.    Respiratory:  Negative for chest tightness.    Cardiovascular:  Negative for chest pain.   Gastrointestinal:  Negative for abdominal distention and abdominal pain.   Genitourinary:  Negative for dysuria and hematuria.    Musculoskeletal:  Positive for arthralgias.   Skin:  Positive for itching.   All other systems reviewed and are negative.      -------------------------------------------------------------------------------------------------------  Objective   BSA: There is no height or weight on file to calculate BSA.  There were no vitals taken for this visit.    Physical Exam  Constitutional:       Appearance: Normal appearance. She is well-developed.      Comments: Generally looks well.    HENT:      Head: Normocephalic and atraumatic.      Nose: Nose normal.      Mouth/Throat:      Dentition: Gum lesions (ulcerative lesions in front of bottom teeth) present.   Eyes:      Extraocular Movements: Extraocular movements intact.      Conjunctiva/sclera: Conjunctivae normal.      Pupils: Pupils are equal, round, and reactive to light.   Cardiovascular:      Rate and Rhythm: Normal rate and regular rhythm.   Pulmonary:      Breath sounds: Normal breath sounds and air entry.   Abdominal:      General: Abdomen is flat. Bowel sounds are normal.      Palpations: Abdomen is soft.   Musculoskeletal:         General: Normal range of motion.   Lymphadenopathy:      Lower Body: Right inguinal adenopathy present. Left inguinal adenopathy present.      Comments: Right inguinal node remains enlarged about 3x 3 cm, unchanged.   Skin:     General: Skin is warm and dry.      Findings: Rash (dry rash above ankles) present.             Comments: Post scratching multiple small petechia looking superficial excoriations with overlying crusts   Neurological:      General: No focal deficit present.      Mental Status: She is alert and oriented to person, place, and time.   Psychiatric:         Mood and Affect: Mood normal.         Behavior: Behavior normal. Behavior is cooperative.         Thought Content: Thought content normal.      Comments: Anxious, normal cognition     Chest wall mediport intact    Performance Status:  Symptomatic; fully ambulatory  -------------------------------------------------------------------------------------------------------  Assessment/Plan       1.Angioimmunoblastic T-cell lymphoma,  CD30 pos,  stage IV disease, bulky tumor,   - High LDH, excellent partial remission after initial frontline therapy.   - Patient completed BV CHP September 2024 and PET scan after cycle 5 showed excellent response with some minimal residual uptake in right inguinal area.  Inguinal nodes obvious on physical exam ,,also had body rash which could be consistent with her lupus or antioimmunoblastic lymphoma.    - Provided the patient has a very good response I would recommend consolidation with an autologous stem cell transplant with BEAM preparation.    - Briefly discussed the procedures involved in peripheral stem cell mobilization followed by  hospitalization for high dose chemotherapy and recovery from side effects. If patient is in a good remission, I estimate durable remission with this aggressive approach at 50-60%.  -Restaging evaluation 12/23/24 includes a PET scan which shows unchanged minimally PET avid right inguinal node   and several non pet avid but left axillary and pretracheal nodes.  -BM biopsy 12/19/24 shows low level 1.5% involvement by flow cytometry only.  -Patient has a tragic home situation and is currently homeless,  she is applying for SSI and currently lives in a nursing home.  Patient case discussed at BMT tumor board and consensus was that this should not preclude her from a potentially curative autologous stem cell transplant.     Organ function testing:    PFTs 1/22/25 FEV1 80%,%,FEV1/FVC 79%, DLCO 104%  ECHO LVEF64%    2/6/25  Patient overall is doing well. Today we reviewed PBPC collections procedures. I reviewed risks of autologous PBPC procedures with the patient including potential thrombosis, bleeding, thrombi, infection and pneumothorax from central venous catheter,  side effects of neupogen including bone pain, rash, fever, splenic swelling, and nausea and if applicable gi distress from plerixafor.  Complications of fluid shifts and  hypocalcemia from mobilization as well as possibility of unsuccessful mobilization reviewed. Patient had all his questions ansered and signed mobilization consent. Tumor repository and database studies signed and reviewed as well.  All questions answered. Patient will be receiving claritin to prevent bone pain and prn immodium for diarrhea from plerixafor.     2. History of lupus  Rheumatologist Dr. Cathy Dobbs,  [address] [facility]  [phone] currently only on placquenil.      3. Psychosocial: patient has life long struggles with anxiety and depression and has had recent hospitalization- will need [facility] mental health team on board.  Tragically,  patient is currently homeless and her  who is a recovering alcoholic currently lives in a sober house.  Unclear whether patient could return to SNF after her transplant for care, seems that she will not be able to stay with her daughter again.     RTC  mobilization for February 6,collection 2/12 and 2/13 , admissiion on February 18th. in 2 weeks. Patient met with  Nancy Galvan, nurse coordinator.    -------------------------------------------------------------------------------------------------------  Gunjan Varela MD

## 2025-02-08 ENCOUNTER — INFUSION (OUTPATIENT)
Dept: HEMATOLOGY/ONCOLOGY | Facility: HOSPITAL | Age: 50
End: 2025-02-08
Payer: COMMERCIAL

## 2025-02-08 VITALS
WEIGHT: 171.3 LBS | OXYGEN SATURATION: 97 % | HEART RATE: 98 BPM | RESPIRATION RATE: 18 BRPM | TEMPERATURE: 97.2 F | DIASTOLIC BLOOD PRESSURE: 76 MMHG | SYSTOLIC BLOOD PRESSURE: 122 MMHG

## 2025-02-08 DIAGNOSIS — C84.48 PERIPHERAL T CELL LYMPHOMA OF LYMPH NODES OF MULTIPLE SITES (MULTI): ICD-10-CM

## 2025-02-08 DIAGNOSIS — Z76.82 STEM CELL TRANSPLANT CANDIDATE: ICD-10-CM

## 2025-02-08 PROCEDURE — 2500000004 HC RX 250 GENERAL PHARMACY W/ HCPCS (ALT 636 FOR OP/ED): Mod: JZ,SE | Performed by: INTERNAL MEDICINE

## 2025-02-08 PROCEDURE — 96372 THER/PROPH/DIAG INJ SC/IM: CPT

## 2025-02-08 RX ORDER — FAMOTIDINE 10 MG/ML
20 INJECTION INTRAVENOUS ONCE AS NEEDED
Status: CANCELLED | OUTPATIENT
Start: 2025-02-09

## 2025-02-08 RX ORDER — EPINEPHRINE 0.3 MG/.3ML
0.3 INJECTION SUBCUTANEOUS EVERY 5 MIN PRN
Status: CANCELLED | OUTPATIENT
Start: 2025-02-09

## 2025-02-08 RX ORDER — LOPERAMIDE HYDROCHLORIDE 2 MG/1
2 CAPSULE ORAL ONCE AS NEEDED
Status: CANCELLED | OUTPATIENT
Start: 2025-02-09

## 2025-02-08 RX ORDER — ALBUTEROL SULFATE 0.83 MG/ML
3 SOLUTION RESPIRATORY (INHALATION) AS NEEDED
Status: CANCELLED | OUTPATIENT
Start: 2025-02-09

## 2025-02-08 RX ORDER — PLERIXAFOR 20 MG/ML
20 INJECTION, SOLUTION SUBCUTANEOUS ONCE
Status: CANCELLED | OUTPATIENT
Start: 2025-02-09

## 2025-02-08 RX ORDER — DIPHENHYDRAMINE HYDROCHLORIDE 50 MG/ML
50 INJECTION INTRAMUSCULAR; INTRAVENOUS AS NEEDED
Status: CANCELLED | OUTPATIENT
Start: 2025-02-09

## 2025-02-08 RX ADMIN — FILGRASTIM-SNDZ 780 MCG: 480 INJECTION, SOLUTION INTRAVENOUS; SUBCUTANEOUS at 08:57

## 2025-02-08 ASSESSMENT — PAIN SCALES - GENERAL: PAINLEVEL_OUTOF10: 7

## 2025-02-09 ENCOUNTER — INFUSION (OUTPATIENT)
Dept: HEMATOLOGY/ONCOLOGY | Facility: HOSPITAL | Age: 50
End: 2025-02-09
Payer: COMMERCIAL

## 2025-02-09 VITALS
RESPIRATION RATE: 18 BRPM | WEIGHT: 170.19 LBS | SYSTOLIC BLOOD PRESSURE: 134 MMHG | OXYGEN SATURATION: 100 % | HEIGHT: 66 IN | TEMPERATURE: 97.9 F | HEART RATE: 93 BPM | DIASTOLIC BLOOD PRESSURE: 75 MMHG | BODY MASS INDEX: 27.35 KG/M2

## 2025-02-09 DIAGNOSIS — Z76.82 STEM CELL TRANSPLANT CANDIDATE: ICD-10-CM

## 2025-02-09 DIAGNOSIS — C84.48 PERIPHERAL T CELL LYMPHOMA OF LYMPH NODES OF MULTIPLE SITES (MULTI): ICD-10-CM

## 2025-02-09 PROCEDURE — 96372 THER/PROPH/DIAG INJ SC/IM: CPT

## 2025-02-09 PROCEDURE — 2500000004 HC RX 250 GENERAL PHARMACY W/ HCPCS (ALT 636 FOR OP/ED): Mod: JZ,SE | Performed by: INTERNAL MEDICINE

## 2025-02-09 RX ORDER — ALBUTEROL SULFATE 0.83 MG/ML
3 SOLUTION RESPIRATORY (INHALATION) AS NEEDED
OUTPATIENT
Start: 2025-02-10

## 2025-02-09 RX ORDER — PLERIXAFOR 20 MG/ML
20 INJECTION, SOLUTION SUBCUTANEOUS ONCE
OUTPATIENT
Start: 2025-02-10

## 2025-02-09 RX ORDER — EPINEPHRINE 0.3 MG/.3ML
0.3 INJECTION SUBCUTANEOUS EVERY 5 MIN PRN
OUTPATIENT
Start: 2025-02-10

## 2025-02-09 RX ORDER — FAMOTIDINE 10 MG/ML
20 INJECTION INTRAVENOUS ONCE AS NEEDED
OUTPATIENT
Start: 2025-02-10

## 2025-02-09 RX ORDER — LOPERAMIDE HYDROCHLORIDE 2 MG/1
2 CAPSULE ORAL ONCE AS NEEDED
OUTPATIENT
Start: 2025-02-10

## 2025-02-09 RX ORDER — DIPHENHYDRAMINE HYDROCHLORIDE 50 MG/ML
50 INJECTION INTRAMUSCULAR; INTRAVENOUS AS NEEDED
OUTPATIENT
Start: 2025-02-10

## 2025-02-09 RX ADMIN — FILGRASTIM-SNDZ 780 MCG: 480 INJECTION, SOLUTION INTRAVENOUS; SUBCUTANEOUS at 08:28

## 2025-02-09 NOTE — PROGRESS NOTES
Pt here for pre BMT zarxio. Pt tolerating injections well. No complaints at this time. Injections given in right arm. Pt given follow up and discharged home.

## 2025-02-10 ENCOUNTER — TREATMENT (OUTPATIENT)
Dept: OTHER | Facility: HOSPITAL | Age: 50
End: 2025-02-10
Payer: COMMERCIAL

## 2025-02-10 VITALS
SYSTOLIC BLOOD PRESSURE: 134 MMHG | OXYGEN SATURATION: 96 % | HEART RATE: 107 BPM | TEMPERATURE: 96.4 F | WEIGHT: 170.86 LBS | DIASTOLIC BLOOD PRESSURE: 79 MMHG | BODY MASS INDEX: 27.17 KG/M2 | RESPIRATION RATE: 18 BRPM

## 2025-02-10 DIAGNOSIS — Z76.82 STEM CELL TRANSPLANT CANDIDATE: ICD-10-CM

## 2025-02-10 DIAGNOSIS — C84.48 PERIPHERAL T CELL LYMPHOMA OF LYMPH NODES OF MULTIPLE SITES (MULTI): ICD-10-CM

## 2025-02-10 PROCEDURE — 2500000004 HC RX 250 GENERAL PHARMACY W/ HCPCS (ALT 636 FOR OP/ED): Mod: JZ,SE | Performed by: INTERNAL MEDICINE

## 2025-02-10 PROCEDURE — 96372 THER/PROPH/DIAG INJ SC/IM: CPT | Performed by: INTERNAL MEDICINE

## 2025-02-10 RX ORDER — FAMOTIDINE 10 MG/ML
20 INJECTION INTRAVENOUS ONCE AS NEEDED
Status: CANCELLED | OUTPATIENT
Start: 2025-02-11

## 2025-02-10 RX ORDER — DIPHENHYDRAMINE HYDROCHLORIDE 50 MG/ML
50 INJECTION INTRAMUSCULAR; INTRAVENOUS AS NEEDED
Status: CANCELLED | OUTPATIENT
Start: 2025-02-11

## 2025-02-10 RX ORDER — ALBUTEROL SULFATE 0.83 MG/ML
3 SOLUTION RESPIRATORY (INHALATION) AS NEEDED
Status: CANCELLED | OUTPATIENT
Start: 2025-02-11

## 2025-02-10 RX ORDER — LOPERAMIDE HYDROCHLORIDE 2 MG/1
2 CAPSULE ORAL ONCE AS NEEDED
Status: CANCELLED | OUTPATIENT
Start: 2025-02-11

## 2025-02-10 RX ORDER — PLERIXAFOR 20 MG/ML
20 INJECTION, SOLUTION SUBCUTANEOUS ONCE
Status: CANCELLED | OUTPATIENT
Start: 2025-02-11

## 2025-02-10 RX ORDER — EPINEPHRINE 0.3 MG/.3ML
0.3 INJECTION SUBCUTANEOUS EVERY 5 MIN PRN
Status: CANCELLED | OUTPATIENT
Start: 2025-02-11

## 2025-02-10 RX ADMIN — FILGRASTIM-SNDZ 780 MCG: 480 INJECTION, SOLUTION INTRAVENOUS; SUBCUTANEOUS at 08:40

## 2025-02-10 NOTE — PROGRESS NOTES
Pt presents to ASCTU via self-ambulation for filgrastim injection 780 mcg given in the left arm. Tolerated well. Pt left via self-ambulation.

## 2025-02-11 ENCOUNTER — TREATMENT (OUTPATIENT)
Dept: OTHER | Facility: HOSPITAL | Age: 50
End: 2025-02-11
Payer: COMMERCIAL

## 2025-02-11 ENCOUNTER — HOSPITAL ENCOUNTER (OUTPATIENT)
Dept: RADIOLOGY | Facility: HOSPITAL | Age: 50
Discharge: HOME | End: 2025-02-11
Payer: COMMERCIAL

## 2025-02-11 VITALS
TEMPERATURE: 97.9 F | OXYGEN SATURATION: 97 % | RESPIRATION RATE: 16 BRPM | DIASTOLIC BLOOD PRESSURE: 66 MMHG | SYSTOLIC BLOOD PRESSURE: 101 MMHG | HEART RATE: 98 BPM

## 2025-02-11 VITALS
DIASTOLIC BLOOD PRESSURE: 68 MMHG | RESPIRATION RATE: 18 BRPM | OXYGEN SATURATION: 94 % | SYSTOLIC BLOOD PRESSURE: 113 MMHG | HEART RATE: 107 BPM | WEIGHT: 168.65 LBS | TEMPERATURE: 97.7 F | BODY MASS INDEX: 26.82 KG/M2

## 2025-02-11 DIAGNOSIS — C84.48 PERIPHERAL T CELL LYMPHOMA OF LYMPH NODES OF MULTIPLE SITES (MULTI): Primary | ICD-10-CM

## 2025-02-11 DIAGNOSIS — C84.48 PERIPHERAL T CELL LYMPHOMA OF LYMPH NODES OF MULTIPLE SITES (MULTI): ICD-10-CM

## 2025-02-11 DIAGNOSIS — Z76.82 STEM CELL TRANSPLANT CANDIDATE: ICD-10-CM

## 2025-02-11 LAB — TOXOPLASMA GONDII PCR QUANTITATIVE,WHOLE BLOOD: NOT DETECTED COPIES/ML

## 2025-02-11 PROCEDURE — 2780000003 HC OR 278 NO HCPCS

## 2025-02-11 PROCEDURE — 99152 MOD SED SAME PHYS/QHP 5/>YRS: CPT

## 2025-02-11 PROCEDURE — 96372 THER/PROPH/DIAG INJ SC/IM: CPT

## 2025-02-11 PROCEDURE — C1752 CATH,HEMODIALYSIS,SHORT-TERM: HCPCS

## 2025-02-11 PROCEDURE — 96372 THER/PROPH/DIAG INJ SC/IM: CPT | Mod: 59 | Performed by: INTERNAL MEDICINE

## 2025-02-11 PROCEDURE — 2500000004 HC RX 250 GENERAL PHARMACY W/ HCPCS (ALT 636 FOR OP/ED): Mod: SE | Performed by: STUDENT IN AN ORGANIZED HEALTH CARE EDUCATION/TRAINING PROGRAM

## 2025-02-11 PROCEDURE — 76937 US GUIDE VASCULAR ACCESS: CPT | Performed by: RADIOLOGY

## 2025-02-11 PROCEDURE — 99153 MOD SED SAME PHYS/QHP EA: CPT

## 2025-02-11 PROCEDURE — 77001 FLUOROGUIDE FOR VEIN DEVICE: CPT | Performed by: RADIOLOGY

## 2025-02-11 PROCEDURE — 96372 THER/PROPH/DIAG INJ SC/IM: CPT | Performed by: INTERNAL MEDICINE

## 2025-02-11 PROCEDURE — 7100000009 HC PHASE TWO TIME - INITIAL BASE CHARGE

## 2025-02-11 PROCEDURE — 36556 INSERT NON-TUNNEL CV CATH: CPT | Performed by: RADIOLOGY

## 2025-02-11 PROCEDURE — 2500000004 HC RX 250 GENERAL PHARMACY W/ HCPCS (ALT 636 FOR OP/ED): Mod: JZ,SE | Performed by: INTERNAL MEDICINE

## 2025-02-11 PROCEDURE — 99153 MOD SED SAME PHYS/QHP EA: CPT | Performed by: RADIOLOGY

## 2025-02-11 PROCEDURE — 99152 MOD SED SAME PHYS/QHP 5/>YRS: CPT | Performed by: RADIOLOGY

## 2025-02-11 PROCEDURE — 2500000004 HC RX 250 GENERAL PHARMACY W/ HCPCS (ALT 636 FOR OP/ED): Mod: SE | Performed by: INTERNAL MEDICINE

## 2025-02-11 PROCEDURE — 2500000004 HC RX 250 GENERAL PHARMACY W/ HCPCS (ALT 636 FOR OP/ED): Mod: SE | Performed by: RADIOLOGY

## 2025-02-11 PROCEDURE — 7100000010 HC PHASE TWO TIME - EACH INCREMENTAL 1 MINUTE

## 2025-02-11 RX ORDER — ALBUTEROL SULFATE 0.83 MG/ML
3 SOLUTION RESPIRATORY (INHALATION) AS NEEDED
Status: CANCELLED | OUTPATIENT
Start: 2025-02-12

## 2025-02-11 RX ORDER — PLERIXAFOR 20 MG/ML
20 INJECTION, SOLUTION SUBCUTANEOUS ONCE
Status: COMPLETED | OUTPATIENT
Start: 2025-02-11 | End: 2025-02-11

## 2025-02-11 RX ORDER — DIPHENHYDRAMINE HYDROCHLORIDE 50 MG/ML
50 INJECTION INTRAMUSCULAR; INTRAVENOUS AS NEEDED
Status: CANCELLED | OUTPATIENT
Start: 2025-02-12

## 2025-02-11 RX ORDER — MIDAZOLAM HYDROCHLORIDE 1 MG/ML
INJECTION INTRAMUSCULAR; INTRAVENOUS
Status: COMPLETED | OUTPATIENT
Start: 2025-02-11 | End: 2025-02-11

## 2025-02-11 RX ORDER — LANOLIN ALCOHOL/MO/W.PET/CERES
400 CREAM (GRAM) TOPICAL ONCE AS NEEDED
Status: CANCELLED | OUTPATIENT
Start: 2025-02-12

## 2025-02-11 RX ORDER — PLERIXAFOR 20 MG/ML
20 INJECTION, SOLUTION SUBCUTANEOUS ONCE
Status: CANCELLED | OUTPATIENT
Start: 2025-02-12

## 2025-02-11 RX ORDER — LOPERAMIDE HYDROCHLORIDE 2 MG/1
2 CAPSULE ORAL ONCE AS NEEDED
Status: CANCELLED | OUTPATIENT
Start: 2025-02-12

## 2025-02-11 RX ORDER — HEPARIN 100 UNIT/ML
500 SYRINGE INTRAVENOUS AS NEEDED
Status: DISCONTINUED | OUTPATIENT
Start: 2025-02-11 | End: 2025-02-12 | Stop reason: HOSPADM

## 2025-02-11 RX ORDER — FAMOTIDINE 10 MG/ML
20 INJECTION INTRAVENOUS ONCE AS NEEDED
Status: CANCELLED | OUTPATIENT
Start: 2025-02-12

## 2025-02-11 RX ORDER — FENTANYL CITRATE 50 UG/ML
INJECTION, SOLUTION INTRAMUSCULAR; INTRAVENOUS
Status: COMPLETED | OUTPATIENT
Start: 2025-02-11 | End: 2025-02-11

## 2025-02-11 RX ORDER — CALCIUM CARBONATE 200(500)MG
2 TABLET,CHEWABLE ORAL EVERY 5 MIN PRN
Status: CANCELLED | OUTPATIENT
Start: 2025-02-12

## 2025-02-11 RX ORDER — FAMOTIDINE 10 MG/ML
20 INJECTION, SOLUTION INTRAVENOUS ONCE AS NEEDED
Status: CANCELLED | OUTPATIENT
Start: 2025-02-12

## 2025-02-11 RX ORDER — EPINEPHRINE 0.3 MG/.3ML
0.3 INJECTION SUBCUTANEOUS EVERY 5 MIN PRN
Status: CANCELLED | OUTPATIENT
Start: 2025-02-12

## 2025-02-11 RX ORDER — MAGNESIUM SULFATE HEPTAHYDRATE 40 MG/ML
4 INJECTION, SOLUTION INTRAVENOUS ONCE AS NEEDED
Status: CANCELLED | OUTPATIENT
Start: 2025-02-12

## 2025-02-11 RX ORDER — POTASSIUM CHLORIDE 750 MG/1
40 TABLET, FILM COATED, EXTENDED RELEASE ORAL ONCE AS NEEDED
Status: CANCELLED | OUTPATIENT
Start: 2025-02-12

## 2025-02-11 RX ORDER — MAGNESIUM SULFATE HEPTAHYDRATE 40 MG/ML
2 INJECTION, SOLUTION INTRAVENOUS ONCE AS NEEDED
Status: CANCELLED | OUTPATIENT
Start: 2025-02-12

## 2025-02-11 RX ADMIN — FILGRASTIM-SNDZ 780 MCG: 480 INJECTION, SOLUTION INTRAVENOUS; SUBCUTANEOUS at 09:26

## 2025-02-11 RX ADMIN — PLERIXAFOR 20 MG: 24 SOLUTION SUBCUTANEOUS at 16:29

## 2025-02-11 RX ADMIN — FENTANYL CITRATE 50 MCG: 50 INJECTION, SOLUTION INTRAMUSCULAR; INTRAVENOUS at 13:00

## 2025-02-11 RX ADMIN — MIDAZOLAM HYDROCHLORIDE 0.5 MG: 1 INJECTION, SOLUTION INTRAMUSCULAR; INTRAVENOUS at 12:31

## 2025-02-11 RX ADMIN — Medication 500 UNITS: at 14:38

## 2025-02-11 RX ADMIN — FENTANYL CITRATE 50 MCG: 50 INJECTION, SOLUTION INTRAMUSCULAR; INTRAVENOUS at 12:31

## 2025-02-11 ASSESSMENT — PAIN SCALES - GENERAL
PAINLEVEL_OUTOF10: 4
PAINLEVEL_OUTOF10: 0 - NO PAIN
PAINLEVEL_OUTOF10: 8
PAINLEVEL_OUTOF10: 0 - NO PAIN
PAINLEVEL_OUTOF10: 0 - NO PAIN
PAINLEVEL_OUTOF10: 8
PAINLEVEL_OUTOF10: 0 - NO PAIN
PAINLEVEL_OUTOF10: 8
PAINLEVEL_OUTOF10: 0 - NO PAIN

## 2025-02-11 ASSESSMENT — PAIN DESCRIPTION - DESCRIPTORS: DESCRIPTORS: ACHING

## 2025-02-11 ASSESSMENT — PAIN - FUNCTIONAL ASSESSMENT

## 2025-02-11 NOTE — PROGRESS NOTES
PATIENT EDUCATION:  Learner: patient  Educated on: plan of care. Zarxio injection   Readiness: acceptance  Preferred learning method: preferred: listening  Method used: explanation  Response: demonstrated understanding  Barriers: None  Preferred language: English

## 2025-02-11 NOTE — POST-PROCEDURE NOTE
Interventional Radiology Brief Postprocedure Note    Attending: Dr. Barrett Boyd MD    Assistant: Dr. 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 Need for nontunneled central venous catheter for stem cell transplant.    Description of procedure: A time out was performed identifying the correct procedure, the correct location with the nursing staff.  The right supraclavicular lower cervical neck was prepped with 2% chlorhexidine and draped with a full length sterile sheet in the usual fashion.  1% lidocaine was administered subcutaneously and down to the level outside of the right internal jugular vein for local anesthesia.  The right internal jugular vein was subsequently accessed under ultrasound guidance with an 22 gauge needle.  A catheter was inserted via the seldinger technique under fluoroscopic examination that confirmed superior right atrium tip location.  Dark, nonpulsatile blood was withdrawn and flushed easily with normal saline and each port was locked with heparin.  The catheter was sutured and sterilely dressed over the site prior to removal of drapes.      The patient tolerated the procedure well and there were no complications.      Anesthesia:  Local, Sedation    Complications: None    Estimated Blood Loss: minimal    Medications (Filter: Administrations occurring from 1220 to 1312 on 02/11/25) As of 02/11/25 1312      fentaNYL PF (Sublimaze) injection (mcg) Total dose:  100 mcg      Date/Time Rate/Dose/Volume Action       02/11/25  1231 50 mcg Given      1300 50 mcg Given               midazolam (Versed) injection (mg) Total dose:  0.5 mg      Date/Time Rate/Dose/Volume Action       02/11/25  1231 0.5 mg Given                   No specimens collected      See detailed result report with images in PACS.    The patient tolerated the procedure well without incident or  complication and is in stable condition.

## 2025-02-11 NOTE — PROGRESS NOTES
Patient arrived to SCT unit via independent ambulation. Vitals taken earlier in shift. Patient received subcutaneous Plerixafor injection given into the L upper arm. Patient then left unit via independent ambulation with no further needs or assistance.

## 2025-02-11 NOTE — PRE-PROCEDURE NOTE
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    Relevant review of systems: NA    Relevant Labs:   Lab Results   Component Value Date    CREATININE 0.66 02/06/2025    EGFR >90 02/06/2025    INR 1.0 01/22/2025    PROTIME 10.8 01/22/2025       Planned Sedation/Anesthesia: Moderate    Airway assessment: normal    Directed physical examination:    Physical Exam  HENT:      Head: Normocephalic.   Eyes:      Pupils: Pupils are equal, round, and reactive to light.   Pulmonary:      Effort: Pulmonary effort is normal.   Musculoskeletal:      Cervical back: Normal range of motion.   Skin:     General: Skin is warm and dry.   Neurological:      Mental Status: She is alert.          Mallampati: II (hard and soft palate, upper portion of tonsils and uvula visible)    ASA Score: ASA 3 - Patient with moderate systemic disease with functional limitations    Benefits, risks and alternatives of procedure and planned sedation have been discussed with the patient and/or their representative. All questions answered and they agree to proceed.

## 2025-02-11 NOTE — PROGRESS NOTES
Patient in clinic for filgrastim (zarxio) injection. Tolerated without issue. Patient to have port placed this afternoon and will return to ASCTU at 4:30 for plerixafor injection. Patient aware of the plan.

## 2025-02-12 ENCOUNTER — TREATMENT (OUTPATIENT)
Dept: OTHER | Facility: HOSPITAL | Age: 50
End: 2025-02-12
Payer: COMMERCIAL

## 2025-02-12 VITALS
OXYGEN SATURATION: 95 % | TEMPERATURE: 99.3 F | DIASTOLIC BLOOD PRESSURE: 68 MMHG | HEART RATE: 90 BPM | RESPIRATION RATE: 18 BRPM | SYSTOLIC BLOOD PRESSURE: 117 MMHG

## 2025-02-12 VITALS
DIASTOLIC BLOOD PRESSURE: 74 MMHG | OXYGEN SATURATION: 99 % | SYSTOLIC BLOOD PRESSURE: 104 MMHG | WEIGHT: 168.98 LBS | BODY MASS INDEX: 26.87 KG/M2 | HEART RATE: 106 BPM | TEMPERATURE: 97.5 F | RESPIRATION RATE: 18 BRPM

## 2025-02-12 DIAGNOSIS — C84.48 PERIPHERAL T CELL LYMPHOMA OF LYMPH NODES OF MULTIPLE SITES (MULTI): ICD-10-CM

## 2025-02-12 DIAGNOSIS — Z76.82 STEM CELL TRANSPLANT CANDIDATE: Primary | ICD-10-CM

## 2025-02-12 DIAGNOSIS — Z76.82 STEM CELL TRANSPLANT CANDIDATE: ICD-10-CM

## 2025-02-12 LAB
ABO GROUP (TYPE) IN BLOOD: NORMAL
ALBUMIN SERPL BCP-MCNC: 3.4 G/DL (ref 3.4–5)
ALBUMIN SERPL BCP-MCNC: 4.1 G/DL (ref 3.4–5)
ALP SERPL-CCNC: 140 U/L (ref 33–110)
ALP SERPL-CCNC: 183 U/L (ref 33–110)
ALT SERPL W P-5'-P-CCNC: 16 U/L (ref 7–45)
ALT SERPL W P-5'-P-CCNC: 20 U/L (ref 7–45)
ANION GAP SERPL CALC-SCNC: 13 MMOL/L (ref 10–20)
ANION GAP SERPL CALC-SCNC: 13 MMOL/L (ref 10–20)
AST SERPL W P-5'-P-CCNC: 11 U/L (ref 9–39)
AST SERPL W P-5'-P-CCNC: 14 U/L (ref 9–39)
AUTOMATED IMMATURE GRAN % COLLECTION: 7.7 %
AUTOMATED IMMATURE GRAN ABSOLUTE COLLECTION: 24.12 X10*3/UL
BASOPHIL % COLLECTION: 0 %
BASOPHIL ABSOLUTE COLLECTION: 0.14 X10*3/UL
BASOPHILS # BLD MANUAL: 0 X10*3/UL (ref 0–0.1)
BASOPHILS NFR BLD MANUAL: 0 %
BILIRUB SERPL-MCNC: 0.3 MG/DL (ref 0–1.2)
BILIRUB SERPL-MCNC: 0.3 MG/DL (ref 0–1.2)
BUN SERPL-MCNC: 10 MG/DL (ref 6–23)
BUN SERPL-MCNC: 8 MG/DL (ref 6–23)
CA-I BLD-SCNC: 1.17 MMOL/L (ref 1.1–1.33)
CALCIUM SERPL-MCNC: 9.3 MG/DL (ref 8.6–10.3)
CALCIUM SERPL-MCNC: 9.9 MG/DL (ref 8.6–10.3)
CD34 CELLS # SPEC: 2949.82 VIABLE CD34+/UL
CD34 CELLS NFR SPEC: 0.22 %
CHLORIDE SERPL-SCNC: 104 MMOL/L (ref 98–107)
CHLORIDE SERPL-SCNC: 104 MMOL/L (ref 98–107)
CO2 SERPL-SCNC: 29 MMOL/L (ref 21–32)
CO2 SERPL-SCNC: 32 MMOL/L (ref 21–32)
CREAT SERPL-MCNC: 0.62 MG/DL (ref 0.5–1.05)
CREAT SERPL-MCNC: 0.66 MG/DL (ref 0.5–1.05)
EGFRCR SERPLBLD CKD-EPI 2021: >90 ML/MIN/1.73M*2
EGFRCR SERPLBLD CKD-EPI 2021: >90 ML/MIN/1.73M*2
EOSINOPHIL # BLD MANUAL: 1.56 X10*3/UL (ref 0–0.7)
EOSINOPHIL % COLLECTION: 0.2 %
EOSINOPHIL ABSOLUTE COLLECTION: 0.61 X10*3/UL
EOSINOPHIL NFR BLD MANUAL: 3 %
ERYTHROCYTE [DISTWIDTH] IN BLOOD BY AUTOMATED COUNT: 14.7 % (ref 11.5–14.5)
ERYTHROCYTE [DISTWIDTH] IN BLOOD BY AUTOMATED COUNT: 14.7 % (ref 11.5–14.5)
GLUCOSE SERPL-MCNC: 115 MG/DL (ref 74–99)
GLUCOSE SERPL-MCNC: 97 MG/DL (ref 74–99)
HCT VFR BLD AUTO: 36.6 % (ref 36–46)
HCT VFR BLD AUTO: 42.5 % (ref 36–46)
HCT, COLLECTION: 2.3 %
HGB BLD-MCNC: 12.2 G/DL (ref 12–16)
HGB BLD-MCNC: 13.9 G/DL (ref 12–16)
HGB, COLLECTION: 0.9 G/DL
IMM GRANULOCYTES # BLD AUTO: 8.15 X10*3/UL (ref 0–0.7)
IMM GRANULOCYTES NFR BLD AUTO: 15.7 % (ref 0–0.9)
LYMPHOCYTE % COLLECTION: 20.8 %
LYMPHOCYTE ABSOLUTE COLLECTION: 65.39 X10*3/UL
LYMPHOCYTES # BLD MANUAL: 3.12 X10*3/UL (ref 1.2–4.8)
LYMPHOCYTES NFR BLD MANUAL: 6 %
MAGNESIUM SERPL-MCNC: 1.66 MG/DL (ref 1.6–2.4)
MAGNESIUM SERPL-MCNC: 1.86 MG/DL (ref 1.6–2.4)
MCH RBC QN AUTO: 31.3 PG (ref 26–34)
MCH RBC QN AUTO: 31.4 PG (ref 26–34)
MCHC RBC AUTO-ENTMCNC: 32.7 G/DL (ref 32–36)
MCHC RBC AUTO-ENTMCNC: 33.3 G/DL (ref 32–36)
MCV RBC AUTO: 94 FL (ref 80–100)
MCV RBC AUTO: 96 FL (ref 80–100)
MONOCYTE % COLLECTION: 25 %
MONOCYTE ABSOLUTE COLLECTION: 78.41 X10*3/UL
MONOCYTES # BLD MANUAL: 4.16 X10*3/UL (ref 0.1–1)
MONOCYTES NFR BLD MANUAL: 8 %
MYELOCYTES # BLD MANUAL: 1.04 X10*3/UL
MYELOCYTES NFR BLD MANUAL: 2 %
NEUTROPHIL % COLLECTION: 46.3 %
NEUTROPHIL ABSOLUTE COLLECTION: 145.19 X10*3/UL
NEUTROPHILS # BLD MANUAL: 41.08 X10*3/UL (ref 1.2–7.7)
NEUTS BAND # BLD MANUAL: 5.2 X10*3/UL (ref 0–0.7)
NEUTS BAND NFR BLD MANUAL: 10 %
NEUTS SEG # BLD MANUAL: 35.88 X10*3/UL (ref 1.2–7)
NEUTS SEG NFR BLD MANUAL: 69 %
NRBC BLD-RTO: 0 /100 WBCS (ref 0–0)
NRBC BLD-RTO: 0 /100 WBCS (ref 0–0)
NUCLEATED RBC, COLLECTION: 0.1 /100 WBCS
OVALOCYTES BLD QL SMEAR: ABNORMAL
PLATELET # BLD AUTO: 158 X10*3/UL (ref 150–450)
PLATELET # BLD AUTO: 212 X10*3/UL (ref 150–450)
PLATELET CLUMP BLD QL SMEAR: PRESENT
PLT, COLLECTION: 1542 X10*3/UL
POLYCHROMASIA BLD QL SMEAR: ABNORMAL
POTASSIUM SERPL-SCNC: 3.5 MMOL/L (ref 3.5–5.3)
POTASSIUM SERPL-SCNC: 4 MMOL/L (ref 3.5–5.3)
PROT SERPL-MCNC: 5.5 G/DL (ref 6.4–8.2)
PROT SERPL-MCNC: 6.7 G/DL (ref 6.4–8.2)
RBC # BLD AUTO: 3.89 X10*6/UL (ref 4–5.2)
RBC # BLD AUTO: 4.44 X10*6/UL (ref 4–5.2)
RBC MORPH BLD: ABNORMAL
RBC, COLLECTION: 0.18 X10*6/UL
RH FACTOR (ANTIGEN D): NORMAL
SODIUM SERPL-SCNC: 142 MMOL/L (ref 136–145)
SODIUM SERPL-SCNC: 145 MMOL/L (ref 136–145)
TOTAL CELLS COUNTED BLD: 100
VARIANT LYMPHS # BLD MANUAL: 1.04 X10*3/UL (ref 0–0.5)
VARIANT LYMPHS NFR BLD: 2 %
VIABLE CELLS NFR SPEC: 99.7 %
WBC # BLD AUTO: 32.9 X10*3/UL (ref 4.4–11.3)
WBC # BLD AUTO: 52 X10*3/UL (ref 4.4–11.3)
WBC, COLLECTION: 313.9 X10*3/UL

## 2025-02-12 PROCEDURE — 38214 VOLUME DEPLETE OF HARVEST: CPT

## 2025-02-12 PROCEDURE — 88185 FLOWCYTOMETRY/TC ADD-ON: CPT

## 2025-02-12 PROCEDURE — 85027 COMPLETE CBC AUTOMATED: CPT

## 2025-02-12 PROCEDURE — 80053 COMPREHEN METABOLIC PANEL: CPT

## 2025-02-12 PROCEDURE — 36511 APHERESIS WBC: CPT

## 2025-02-12 PROCEDURE — 96366 THER/PROPH/DIAG IV INF ADDON: CPT

## 2025-02-12 PROCEDURE — 96365 THER/PROPH/DIAG IV INF INIT: CPT

## 2025-02-12 PROCEDURE — 82330 ASSAY OF CALCIUM: CPT

## 2025-02-12 PROCEDURE — 38207 CRYOPRESERVE STEM CELLS: CPT

## 2025-02-12 PROCEDURE — 85007 BL SMEAR W/DIFF WBC COUNT: CPT

## 2025-02-12 PROCEDURE — 85025 COMPLETE CBC W/AUTO DIFF WBC: CPT

## 2025-02-12 PROCEDURE — 83735 ASSAY OF MAGNESIUM: CPT

## 2025-02-12 PROCEDURE — 96372 THER/PROPH/DIAG INJ SC/IM: CPT | Performed by: INTERNAL MEDICINE

## 2025-02-12 PROCEDURE — 2500000004 HC RX 250 GENERAL PHARMACY W/ HCPCS (ALT 636 FOR OP/ED): Mod: JZ,SE | Performed by: INTERNAL MEDICINE

## 2025-02-12 PROCEDURE — 2500000004 HC RX 250 GENERAL PHARMACY W/ HCPCS (ALT 636 FOR OP/ED): Mod: SE | Performed by: INTERNAL MEDICINE

## 2025-02-12 RX ORDER — ALBUTEROL SULFATE 0.83 MG/ML
3 SOLUTION RESPIRATORY (INHALATION) AS NEEDED
OUTPATIENT
Start: 2025-02-13

## 2025-02-12 RX ORDER — DIPHENHYDRAMINE HYDROCHLORIDE 50 MG/ML
50 INJECTION INTRAMUSCULAR; INTRAVENOUS AS NEEDED
OUTPATIENT
Start: 2025-02-13

## 2025-02-12 RX ORDER — HEPARIN 100 UNIT/ML
500 SYRINGE INTRAVENOUS AS NEEDED
OUTPATIENT
Start: 2025-02-12

## 2025-02-12 RX ORDER — FAMOTIDINE 10 MG/ML
20 INJECTION INTRAVENOUS ONCE AS NEEDED
OUTPATIENT
Start: 2025-02-13

## 2025-02-12 RX ORDER — EPINEPHRINE 0.3 MG/.3ML
0.3 INJECTION SUBCUTANEOUS EVERY 5 MIN PRN
OUTPATIENT
Start: 2025-02-13

## 2025-02-12 RX ORDER — CALCIUM CARBONATE 200(500)MG
2 TABLET,CHEWABLE ORAL EVERY 5 MIN PRN
OUTPATIENT
Start: 2025-02-13

## 2025-02-12 RX ORDER — MAGNESIUM SULFATE HEPTAHYDRATE 40 MG/ML
4 INJECTION, SOLUTION INTRAVENOUS ONCE AS NEEDED
OUTPATIENT
Start: 2025-02-13

## 2025-02-12 RX ORDER — HEPARIN SODIUM 1000 [USP'U]/ML
2000 INJECTION, SOLUTION INTRAVENOUS; SUBCUTANEOUS AS NEEDED
Status: DISCONTINUED | OUTPATIENT
Start: 2025-02-12 | End: 2025-02-12 | Stop reason: HOSPADM

## 2025-02-12 RX ORDER — MAGNESIUM SULFATE HEPTAHYDRATE 40 MG/ML
2 INJECTION, SOLUTION INTRAVENOUS ONCE AS NEEDED
OUTPATIENT
Start: 2025-02-13

## 2025-02-12 RX ORDER — POTASSIUM CHLORIDE 20 MEQ/1
40 TABLET, EXTENDED RELEASE ORAL ONCE AS NEEDED
OUTPATIENT
Start: 2025-02-13

## 2025-02-12 RX ORDER — PLERIXAFOR 20 MG/ML
20 INJECTION, SOLUTION SUBCUTANEOUS ONCE
OUTPATIENT
Start: 2025-02-13

## 2025-02-12 RX ORDER — HEPARIN SODIUM,PORCINE/PF 10 UNIT/ML
50 SYRINGE (ML) INTRAVENOUS AS NEEDED
OUTPATIENT
Start: 2025-02-12

## 2025-02-12 RX ORDER — HEPARIN SODIUM 1000 [USP'U]/ML
2000 INJECTION, SOLUTION INTRAVENOUS; SUBCUTANEOUS AS NEEDED
OUTPATIENT
Start: 2025-02-12

## 2025-02-12 RX ORDER — LANOLIN ALCOHOL/MO/W.PET/CERES
400 CREAM (GRAM) TOPICAL ONCE AS NEEDED
OUTPATIENT
Start: 2025-02-13

## 2025-02-12 RX ORDER — LOPERAMIDE HYDROCHLORIDE 2 MG/1
2 CAPSULE ORAL ONCE AS NEEDED
OUTPATIENT
Start: 2025-02-13

## 2025-02-12 RX ADMIN — HEPARIN SODIUM 2000 UNITS: 1000 INJECTION INTRAVENOUS; SUBCUTANEOUS at 12:29

## 2025-02-12 RX ADMIN — FILGRASTIM-SNDZ 780 MCG: 480 INJECTION, SOLUTION INTRAVENOUS; SUBCUTANEOUS at 07:38

## 2025-02-12 RX ADMIN — HEPARIN SODIUM 2000 UNITS: 1000 INJECTION INTRAVENOUS; SUBCUTANEOUS at 12:28

## 2025-02-12 RX ADMIN — CALCIUM GLUCONATE 50 ML/HR: 20 INJECTION, SOLUTION INTRAVENOUS at 10:12

## 2025-02-12 RX ADMIN — CALCIUM GLUCONATE 50 ML/HR: 20 INJECTION, SOLUTION INTRAVENOUS at 08:32

## 2025-02-12 ASSESSMENT — PAIN - FUNCTIONAL ASSESSMENT: PAIN_FUNCTIONAL_ASSESSMENT: 0-10

## 2025-02-12 ASSESSMENT — PAIN SCALES - GENERAL: PAINLEVEL_OUTOF10: 7

## 2025-02-12 NOTE — PROGRESS NOTES
Pt arrived to unit alert, oriented and ambulatory, and unaccompanied. Pt was brought to hospital via provide a ride. Vitals taken, subcutaneous filgrastim given, and labs drawn via apheresis catheter; okay to use line verified via EMR. Report was called to Dr. Cortez and OK to Proceed given at 0824. Collection began at 0835 and continued for 149 minutes with no acute issues reported or observed. Pt had  IV calcium gluconate infusing during duration of collection. At the end of collection, vitals were taken and labs were drawn from patient and product. Apheresis catheter was flushed with saline and caps replaced. Post labs were reviewed with patient and patient required no interventions.  Apheresis catheter dressing changed per protocol and lumens flushed with heparin. Pt confirmed understanding of all instructions and teaching and has no additional learning needs at this time. At 1320 pt was informed that the target was met and no further injections or days of collection were needed. Pt left the unit at 1320 alert, oriented and ambulatory, and unaccompanied- patient to meet provide a ride  down in lobby level.

## 2025-02-12 NOTE — POST-PROCEDURE NOTE
This is a 49 y.o. female with  Angioimmunoblastic T-cell lymphoma  who underwent peripheral stem cell collection procedure #1 for autologous stem cell transplant.     I saw and evaluated the patient during the procedure.  The patient was resting in bed.  The vascular access functioned well.     Pre-procedure labs:  WBC   Date/Time Value Ref Range Status   02/12/2025 07:50 AM 52.0 (HH) 4.4 - 11.3 x10*3/uL Final     Comment:     Previous result verified on 2/12/2025 0825 on specimen/case [id number] called with component WBC Auto for procedure CBC and Auto Differential with value 52.0 x10*3/uL.     Hemoglobin   Date/Time Value Ref Range Status   02/12/2025 07:50 AM 13.9 12.0 - 16.0 g/dL Final     Hematocrit   Date/Time Value Ref Range Status   02/12/2025 07:50 AM 42.5 36.0 - 46.0 % Final     Platelets   Date/Time Value Ref Range Status   02/12/2025 07:50  150 - 450 x10*3/uL Final      POCT Calcium, Ionized   Date/Time Value Ref Range Status   02/12/2025 07:50 AM 1.17 1.1 - 1.33 mmol/L Final     Comment:     The performance characteristics of ionized calcium tested  in heparinized plasma or serum have been validated by the  individual  laboratory site where testing is performed.   Testing on heparinized plasma or serum is not approved by   the FDA; however, such approval is not necessary.      CD34%   Date/Time Value Ref Range Status   02/12/2025 07:50 AM 0.2176 not established % Final     Comment:     Percent is of total cells analyzed.        Pre-procedure vital signs at 0809:  T: 37 C, HR: 91, RR: 18, /64  Pulse oximetry: 98% on room air    Post-procedure vital signs at 1117:  T: 37.4 C, HR: 90, RR: 18, BP: 117/68       Outcome: Peripheral stem cell collection completed.   Adverse Reaction: No    Assessment and Plan:  49 y.o. female with  Angioimmunoblastic T-cell Lymphoma  who underwent peripheral stem cell collection procedure #1 for autologous stem cell transplant.  Draw post-procedure  labs  Vascular access to be managed by clinical team.  Further procedures to be determined based on patient's reaching target collection goal.

## 2025-02-12 NOTE — PROGRESS NOTES
Pt presents to ASCTU via self-ambulation for filgrastim injection 780 mcg given in the right upper arm. Tolerated well. Pt remains on unit for stem cell collection.

## 2025-02-12 NOTE — POST-PROCEDURE NOTE
This is a 49 y.o. female with  Angioimmunoblastic T-cell lymphoma  who underwent peripheral stem cell collection procedure #1 for autologous stem cell transplant.     I saw and evaluated the patient during the procedure.  The patient was resting in bed.  The vascular access functioned well.     Pre-procedure labs:  WBC   Date/Time Value Ref Range Status   02/12/2025 11:01 AM 32.9 (H) 4.4 - 11.3 x10*3/uL Final     Hemoglobin   Date/Time Value Ref Range Status   02/12/2025 11:01 AM 12.2 12.0 - 16.0 g/dL Final     Hematocrit   Date/Time Value Ref Range Status   02/12/2025 11:01 AM 36.6 36.0 - 46.0 % Final     Platelets   Date/Time Value Ref Range Status   02/12/2025 11:01  150 - 450 x10*3/uL Final      POCT Calcium, Ionized   Date/Time Value Ref Range Status   02/12/2025 07:50 AM 1.17 1.1 - 1.33 mmol/L Final     Comment:     The performance characteristics of ionized calcium tested  in heparinized plasma or serum have been validated by the  individual  laboratory site where testing is performed.   Testing on heparinized plasma or serum is not approved by   the FDA; however, such approval is not necessary.      CD34%   Date/Time Value Ref Range Status   02/12/2025 07:50 AM 0.2176 not established % Final     Comment:     Percent is of total cells analyzed.        Pre-procedure vital signs at 0809:  T: 37 C, HR: 91, RR: 18, /64  Pulse oximetry: 98% on room air    Post-procedure vital signs at 1117:  T: 37.4 C, HR: 90, RR: 18, BP: 117/68     Pulse oximetry: 95% on room air    Outcome: Peripheral stem cell collection completed.   Adverse Reaction: No    Assessment and Plan:  49 y.o. female with  Angioimmunoblastic T-cell lymphoma  who underwent peripheral stem cell collection procedure #1 for autologous stem cell transplant.  Draw post-procedure labs  Vascular access to be managed by clinical team.  Further procedures to be determined based on patient's reaching target collection goal.

## 2025-02-13 ENCOUNTER — APPOINTMENT (OUTPATIENT)
Dept: OTHER | Facility: HOSPITAL | Age: 50
End: 2025-02-13
Payer: COMMERCIAL

## 2025-02-13 DIAGNOSIS — Z76.82 STEM CELL TRANSPLANT CANDIDATE: ICD-10-CM

## 2025-02-13 DIAGNOSIS — C84.48 PERIPHERAL T CELL LYMPHOMA OF LYMPH NODES OF MULTIPLE SITES (MULTI): ICD-10-CM

## 2025-02-14 PROBLEM — C84.40: Status: ACTIVE | Noted: 2025-02-14

## 2025-02-16 ASSESSMENT — ENCOUNTER SYMPTOMS
ARTHRALGIAS: 1
CHEST TIGHTNESS: 0
FEVER: 0
ABDOMINAL PAIN: 0
DYSURIA: 0
ABDOMINAL DISTENTION: 0
APPETITE CHANGE: 0
CHILLS: 0
HEMATURIA: 0

## 2025-02-17 ENCOUNTER — DOCUMENTATION (OUTPATIENT)
Dept: OTHER | Facility: HOSPITAL | Age: 50
End: 2025-02-17

## 2025-02-17 ENCOUNTER — LAB (OUTPATIENT)
Dept: HEMATOLOGY/ONCOLOGY | Facility: HOSPITAL | Age: 50
End: 2025-02-17
Payer: COMMERCIAL

## 2025-02-17 ENCOUNTER — OFFICE VISIT (OUTPATIENT)
Dept: HEMATOLOGY/ONCOLOGY | Facility: HOSPITAL | Age: 50
End: 2025-02-17
Payer: COMMERCIAL

## 2025-02-17 ENCOUNTER — APPOINTMENT (OUTPATIENT)
Dept: LAB | Facility: HOSPITAL | Age: 50
End: 2025-02-17
Payer: COMMERCIAL

## 2025-02-17 VITALS
SYSTOLIC BLOOD PRESSURE: 116 MMHG | WEIGHT: 168.43 LBS | TEMPERATURE: 97 F | HEART RATE: 90 BPM | DIASTOLIC BLOOD PRESSURE: 73 MMHG | RESPIRATION RATE: 17 BRPM | OXYGEN SATURATION: 98 % | BODY MASS INDEX: 26.78 KG/M2

## 2025-02-17 DIAGNOSIS — C84.48 PERIPHERAL T CELL LYMPHOMA OF LYMPH NODES OF MULTIPLE SITES (MULTI): ICD-10-CM

## 2025-02-17 DIAGNOSIS — Z76.82 STEM CELL TRANSPLANT CANDIDATE: ICD-10-CM

## 2025-02-17 DIAGNOSIS — C84.48 PERIPHERAL T CELL LYMPHOMA OF LYMPH NODES OF MULTIPLE SITES (MULTI): Primary | ICD-10-CM

## 2025-02-17 LAB
ALBUMIN SERPL BCP-MCNC: 4.3 G/DL (ref 3.4–5)
ALP SERPL-CCNC: 104 U/L (ref 33–110)
ALT SERPL W P-5'-P-CCNC: 25 U/L (ref 7–45)
ANION GAP SERPL CALC-SCNC: 13 MMOL/L (ref 10–20)
AST SERPL W P-5'-P-CCNC: 17 U/L (ref 9–39)
BASOPHILS # BLD AUTO: 0.1 X10*3/UL (ref 0–0.1)
BASOPHILS NFR BLD AUTO: 0.6 %
BILIRUB SERPL-MCNC: 0.3 MG/DL (ref 0–1.2)
BUN SERPL-MCNC: 10 MG/DL (ref 6–23)
CALCIUM SERPL-MCNC: 9.9 MG/DL (ref 8.6–10.3)
CHLORIDE SERPL-SCNC: 103 MMOL/L (ref 98–107)
CO2 SERPL-SCNC: 29 MMOL/L (ref 21–32)
CREAT SERPL-MCNC: 0.58 MG/DL (ref 0.5–1.05)
EGFRCR SERPLBLD CKD-EPI 2021: >90 ML/MIN/1.73M*2
EOSINOPHIL # BLD AUTO: 0.33 X10*3/UL (ref 0–0.7)
EOSINOPHIL NFR BLD AUTO: 2 %
ERYTHROCYTE [DISTWIDTH] IN BLOOD BY AUTOMATED COUNT: 14.6 % (ref 11.5–14.5)
GLUCOSE SERPL-MCNC: 116 MG/DL (ref 74–99)
HCG UR QL IA.RAPID: NEGATIVE
HCT VFR BLD AUTO: 43.2 % (ref 36–46)
HGB BLD-MCNC: 14.4 G/DL (ref 12–16)
IMM GRANULOCYTES # BLD AUTO: 0.7 X10*3/UL (ref 0–0.7)
IMM GRANULOCYTES NFR BLD AUTO: 4.2 % (ref 0–0.9)
LYMPHOCYTES # BLD AUTO: 0.94 X10*3/UL (ref 1.2–4.8)
LYMPHOCYTES NFR BLD AUTO: 5.6 %
MAGNESIUM SERPL-MCNC: 1.98 MG/DL (ref 1.6–2.4)
MCH RBC QN AUTO: 31 PG (ref 26–34)
MCHC RBC AUTO-ENTMCNC: 33.3 G/DL (ref 32–36)
MCV RBC AUTO: 93 FL (ref 80–100)
MONOCYTES # BLD AUTO: 0.56 X10*3/UL (ref 0.1–1)
MONOCYTES NFR BLD AUTO: 3.3 %
NEUTROPHILS # BLD AUTO: 14.2 X10*3/UL (ref 1.2–7.7)
NEUTROPHILS NFR BLD AUTO: 84.3 %
NRBC BLD-RTO: 0 /100 WBCS (ref 0–0)
PLATELET # BLD AUTO: 127 X10*3/UL (ref 150–450)
POTASSIUM SERPL-SCNC: 4.2 MMOL/L (ref 3.5–5.3)
PROT SERPL-MCNC: 6.9 G/DL (ref 6.4–8.2)
RBC # BLD AUTO: 4.64 X10*6/UL (ref 4–5.2)
SODIUM SERPL-SCNC: 141 MMOL/L (ref 136–145)
URATE SERPL-MCNC: 4.9 MG/DL (ref 2.3–6.7)
WBC # BLD AUTO: 16.8 X10*3/UL (ref 4.4–11.3)

## 2025-02-17 PROCEDURE — 36591 DRAW BLOOD OFF VENOUS DEVICE: CPT

## 2025-02-17 PROCEDURE — 99215 OFFICE O/P EST HI 40 MIN: CPT | Performed by: INTERNAL MEDICINE

## 2025-02-17 PROCEDURE — 85025 COMPLETE CBC W/AUTO DIFF WBC: CPT

## 2025-02-17 PROCEDURE — 84550 ASSAY OF BLOOD/URIC ACID: CPT

## 2025-02-17 PROCEDURE — 83735 ASSAY OF MAGNESIUM: CPT

## 2025-02-17 PROCEDURE — 2500000004 HC RX 250 GENERAL PHARMACY W/ HCPCS (ALT 636 FOR OP/ED): Mod: SE | Performed by: INTERNAL MEDICINE

## 2025-02-17 PROCEDURE — 80053 COMPREHEN METABOLIC PANEL: CPT

## 2025-02-17 PROCEDURE — 81025 URINE PREGNANCY TEST: CPT

## 2025-02-17 RX ORDER — ONDANSETRON HYDROCHLORIDE 8 MG/1
16 TABLET, FILM COATED ORAL ONCE
Status: CANCELLED | OUTPATIENT
Start: 2025-02-19 | End: 2025-02-19

## 2025-02-17 RX ORDER — EPINEPHRINE 1 MG/ML
0.3 INJECTION INTRAMUSCULAR; INTRAVENOUS; SUBCUTANEOUS EVERY 5 MIN PRN
Status: CANCELLED | OUTPATIENT
Start: 2025-02-18

## 2025-02-17 RX ORDER — HEPARIN SODIUM,PORCINE/PF 10 UNIT/ML
50 SYRINGE (ML) INTRAVENOUS AS NEEDED
OUTPATIENT
Start: 2025-02-17

## 2025-02-17 RX ORDER — HEPARIN 100 UNIT/ML
500 SYRINGE INTRAVENOUS AS NEEDED
OUTPATIENT
Start: 2025-02-17

## 2025-02-17 RX ORDER — DEXAMETHASONE 4 MG/1
12 TABLET ORAL ONCE
Status: CANCELLED | OUTPATIENT
Start: 2025-02-20 | End: 2025-02-20

## 2025-02-17 RX ORDER — DEXAMETHASONE 4 MG/1
12 TABLET ORAL ONCE
Status: CANCELLED | OUTPATIENT
Start: 2025-02-21 | End: 2025-02-21

## 2025-02-17 RX ORDER — HEPARIN SODIUM 1000 [USP'U]/ML
2000 INJECTION, SOLUTION INTRAVENOUS; SUBCUTANEOUS AS NEEDED
OUTPATIENT
Start: 2025-02-17

## 2025-02-17 RX ORDER — DEXAMETHASONE 4 MG/1
12 TABLET ORAL ONCE
Status: CANCELLED | OUTPATIENT
Start: 2025-02-18 | End: 2025-02-18

## 2025-02-17 RX ORDER — LORAZEPAM 2 MG/ML
1 INJECTION INTRAMUSCULAR AS NEEDED
OUTPATIENT
Start: 2025-02-24 | End: 2025-02-24

## 2025-02-17 RX ORDER — OLANZAPINE 5 MG/1
5 TABLET ORAL NIGHTLY
Status: CANCELLED | OUTPATIENT
Start: 2025-02-18

## 2025-02-17 RX ORDER — FLUCONAZOLE 200 MG/1
400 TABLET ORAL DAILY
OUTPATIENT
Start: 2025-02-25

## 2025-02-17 RX ORDER — DIPHENHYDRAMINE HYDROCHLORIDE 50 MG/ML
50 INJECTION INTRAMUSCULAR; INTRAVENOUS AS NEEDED
Status: CANCELLED | OUTPATIENT
Start: 2025-02-18

## 2025-02-17 RX ORDER — DEXAMETHASONE 4 MG/1
12 TABLET ORAL ONCE
Status: CANCELLED | OUTPATIENT
Start: 2025-02-19 | End: 2025-02-19

## 2025-02-17 RX ORDER — HEPARIN SODIUM,PORCINE/PF 10 UNIT/ML
50 SYRINGE (ML) INTRAVENOUS AS NEEDED
Status: DISCONTINUED | OUTPATIENT
Start: 2025-02-17 | End: 2025-02-17 | Stop reason: HOSPADM

## 2025-02-17 RX ORDER — DEXAMETHASONE 4 MG/1
12 TABLET ORAL ONCE
Status: CANCELLED | OUTPATIENT
Start: 2025-02-23 | End: 2025-02-23

## 2025-02-17 RX ORDER — ACYCLOVIR 400 MG/1
400 TABLET ORAL EVERY 12 HOURS
OUTPATIENT
Start: 2025-02-24

## 2025-02-17 RX ORDER — PROCHLORPERAZINE EDISYLATE 5 MG/ML
10 INJECTION INTRAMUSCULAR; INTRAVENOUS EVERY 6 HOURS PRN
Status: CANCELLED | OUTPATIENT
Start: 2025-02-18

## 2025-02-17 RX ORDER — ALBUTEROL SULFATE 0.83 MG/ML
3 SOLUTION RESPIRATORY (INHALATION) AS NEEDED
Status: CANCELLED | OUTPATIENT
Start: 2025-02-18

## 2025-02-17 RX ORDER — ONDANSETRON HYDROCHLORIDE 8 MG/1
16 TABLET, FILM COATED ORAL ONCE
Status: CANCELLED | OUTPATIENT
Start: 2025-02-20 | End: 2025-02-20

## 2025-02-17 RX ORDER — DIPHENHYDRAMINE HCL 25 MG
25 CAPSULE ORAL ONCE
OUTPATIENT
Start: 2025-02-24 | End: 2025-02-24

## 2025-02-17 RX ORDER — ONDANSETRON HYDROCHLORIDE 8 MG/1
16 TABLET, FILM COATED ORAL ONCE
Status: CANCELLED | OUTPATIENT
Start: 2025-02-22 | End: 2025-02-22

## 2025-02-17 RX ORDER — MELPHALAN HCL 50 MG
140 VIAL (EA) INTRAVENOUS ONCE
Status: CANCELLED | OUTPATIENT
Start: 2025-02-23 | End: 2025-02-23

## 2025-02-17 RX ORDER — HEPARIN SODIUM 1000 [USP'U]/ML
2000 INJECTION, SOLUTION INTRAVENOUS; SUBCUTANEOUS AS NEEDED
Status: DISCONTINUED | OUTPATIENT
Start: 2025-02-17 | End: 2025-02-17 | Stop reason: HOSPADM

## 2025-02-17 RX ORDER — DEXAMETHASONE 4 MG/1
12 TABLET ORAL ONCE
Status: CANCELLED | OUTPATIENT
Start: 2025-02-22 | End: 2025-02-22

## 2025-02-17 RX ORDER — ONDANSETRON HYDROCHLORIDE 2 MG/ML
8 INJECTION, SOLUTION INTRAVENOUS ONCE
Status: CANCELLED | OUTPATIENT
Start: 2025-02-18 | End: 2025-02-18

## 2025-02-17 RX ORDER — ONDANSETRON HYDROCHLORIDE 8 MG/1
16 TABLET, FILM COATED ORAL ONCE
Status: CANCELLED | OUTPATIENT
Start: 2025-02-23 | End: 2025-02-23

## 2025-02-17 RX ORDER — ONDANSETRON HYDROCHLORIDE 8 MG/1
16 TABLET, FILM COATED ORAL ONCE
Status: CANCELLED | OUTPATIENT
Start: 2025-02-21 | End: 2025-02-21

## 2025-02-17 RX ORDER — SODIUM CHLORIDE 9 MG/ML
125 INJECTION, SOLUTION INTRAVENOUS CONTINUOUS
OUTPATIENT
Start: 2025-02-24

## 2025-02-17 RX ORDER — PROCHLORPERAZINE MALEATE 10 MG
10 TABLET ORAL EVERY 6 HOURS PRN
Status: CANCELLED | OUTPATIENT
Start: 2025-02-18

## 2025-02-17 RX ORDER — FAMOTIDINE 10 MG/ML
20 INJECTION, SOLUTION INTRAVENOUS AS NEEDED
Status: CANCELLED | OUTPATIENT
Start: 2025-02-18

## 2025-02-17 RX ADMIN — HEPARIN, PORCINE (PF) 10 UNIT/ML INTRAVENOUS SYRINGE 50 UNITS: at 13:12

## 2025-02-17 RX ADMIN — HEPARIN, PORCINE (PF) 10 UNIT/ML INTRAVENOUS SYRINGE 50 UNITS: at 13:16

## 2025-02-17 RX ADMIN — HEPARIN SODIUM 2000 UNITS: 1000 INJECTION INTRAVENOUS; SUBCUTANEOUS at 13:26

## 2025-02-17 RX ADMIN — HEPARIN SODIUM 2000 UNITS: 1000 INJECTION INTRAVENOUS; SUBCUTANEOUS at 13:12

## 2025-02-17 RX ADMIN — HEPARIN, PORCINE (PF) 10 UNIT/ML INTRAVENOUS SYRINGE 50 UNITS: at 13:14

## 2025-02-17 ASSESSMENT — PAIN SCALES - GENERAL: PAINLEVEL_OUTOF10: 8

## 2025-02-17 NOTE — PROGRESS NOTES
"Patient ID: Sagrario Garber is a 49 y.o. female.    Diagnosis:   Angioimmunoblastic T-cell lymphoma,  CD30 pos,  stage IV disease, bulky tumor, high LDH, excellent partial remission after initial frontline therapy.        Treatment:   Oncology History Overview Note   T cell lymphoma with TFH phenotype angioimmunoblastic CD30+, AK negative  11/10/21 CT imaging showed extensive hilar, mediastinal and bilateral axillary lymphadenopathy  2/14/22 right inguinal node biopsy follicular hyperplasia  4/6/22 right axillary lymph node reactive changes  5/12/24 progressive adenopathy, right inguinal node T cell lymphoma with TFH phenotype, CD30 focally positive  6/12/24 started BV-CHP Pet after cycle 5 interval resoluation of bulky adenopathy in axilla, abdomen, pelvis and groin focal hypermetabolic uptake in right inguinal node  9/25/24 C6D1 of BV-CHP     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      -HTN    -Patient has history of lupus for past 21/2 years and rheumatoid arthritis: currently on placquenil sees Dr. Dobbs for rheumatologist presenting with rash, pain in body, diffuse swellin joint pain. Was on Saphnelo, interferon alpha antagonist July 2023     Past Medical History:   Diagnosis Date    Essential (primary) hypertension 05/24/2017    HTN (hypertension), benign    Essential (primary) hypertension 10/09/2017    HTN (hypertension), benign    Personal history of nicotine dependence 05/24/2017    History of nicotine dependence       Surgical History:     Past Surgical History:   Procedure Laterality Date    OTHER SURGICAL HISTORY  05/24/2017 "    Oral Surgery Tooth Extraction [facility]        Family History:   Rheumatoid arthritis mother, skin cancer father and sister, 2 daughters, 1 son with severe autism is in a home. Has an older sister estranged.     Social History:   is an alcoholic, and has been sober for 4 months, he is in a sober house. Lost home has lost her income and most recently was staying with her daughter and can't return there. Smokes 8 cigarettes, no ETOH, finished 10th grade level,worked full time.  at [employer] for 5 years lived at the [facility].  No .  for 6 years, previsoulsy relationship of 19 years with father of her children.        Social History     Tobacco Use    Smoking status: Every Day     Types: Cigarettes   Substance Use Topics    Alcohol use: Not Currently    Drug use: Never      -------------------------------------------------------------------------------------------------------  Subjective       HPI    49-year-old woman  with reported history of lupus and rheumatoid arthritis for several years, anxiety and depression who has had lymphadenopathy for several years.  Previous biopsy in 2022 was read as reactive.  In May 2024 she presented with increasing lymph nodes repeat biopsy confirmed T cell lymphoma with TFH phenotype, CD30 positive and Alk negative. Extremely bulky tumor.  High LDH.  Bone marrow minimally involved.  (3-4% abnormal cells by flow).     June 2024 treatment was administered consisting of brentuximab- CHP  Shortly after starting BV CHP patient developed abdominal ascites and was unable to eat and required TPN as well as discharge to nursing facility  PET imaging after cycle5 showed an excellent response has been obtained, but several small lymph nodes with SUV of 3.4 remain.  Completed cycle 6 of brentuximab CHP 9/26/24. Patient is referred by Dr. Iraheta for evaluation for autologous stem cell transplantation    Patient hospitalized for mental illness  depression and anxiety  11/12/24 and was discharged 11/26/2024 to skilled nursing.      Patient initially seen 12/5/24 for first outpatient consultation for high risk TFH phenotype angioimmunoblastic T cell lymphoma. Remains in SNF to get stronger, but has pretty much recovered and remains there due to social issues. She is pretty much independent with her ADLS.    Restaging evaluation 12/23/24 includes a PET scan which shows unchanged minimally PET avid right inguinal node and several non pet avid but left axillary and pretracheal nodes.  BM biopsy 12/19/24 shows low level 1.5% involvement by flow cytometry only.    Today (2/17/25) patient is being seen in preparation for admission for BEAM preparative regimen.  She collected 5.68x 106 CD 34 cells in one collection.  URI cleared up,  denies fevers, new rash, stable inguinal.lymph nodes CP, SOB, AP, diarrhea. Currently resides in nursing home. Patient has been approved for SSI.  No shortness of breath,  lower legs itch at times. Crocheting to keep calm. Has been gaining weight.  who is a recovering alcoholic has a stroke involving left side of body a few days ago and is recovering nicely.    Review of Systems   Constitutional:  Negative for appetite change, chills and fever.   HENT:   Negative for lump/mass.    Respiratory:  Negative for chest tightness.    Cardiovascular:  Negative for chest pain.   Gastrointestinal:  Negative for abdominal distention and abdominal pain.   Genitourinary:  Negative for dysuria and hematuria.    Musculoskeletal:  Positive for arthralgias.   Skin:  Positive for itching.   All other systems reviewed and are negative.     -------------------------------------------------------------------------------------------------------  Objective   BSA: 1.89 meters squared  /73   Pulse 90   Temp 36.1 °C (97 °F)   Resp 17   Wt 76.4 kg (168 lb 6.9 oz)   SpO2 98%   BMI 26.78 kg/m²     Physical Exam  Constitutional:       Appearance: Normal appearance. She  is well-developed.      Comments: Generally looks well.    HENT:      Head: Normocephalic and atraumatic.      Nose: Nose normal.      Mouth/Throat:      Dentition: No gum lesions.   Eyes:      Extraocular Movements: Extraocular movements intact.      Conjunctiva/sclera: Conjunctivae normal.      Pupils: Pupils are equal, round, and reactive to light.   Cardiovascular:      Rate and Rhythm: Normal rate and regular rhythm.   Pulmonary:      Breath sounds: Normal breath sounds and air entry.   Abdominal:      General: Abdomen is flat. Bowel sounds are normal.      Palpations: Abdomen is soft.   Musculoskeletal:         General: Normal range of motion.   Lymphadenopathy:      Lower Body: Right inguinal adenopathy present. Left inguinal adenopathy present.      Comments: Right inguinal node remains enlarged about 3x 3 cm, unchanged.   Skin:     General: Skin is warm and dry.      Findings: Rash (dry rash above ankles) present.             Comments: Post scratching multiple small petechia looking superficial excoriations with overlying crusts   Neurological:      General: No focal deficit present.      Mental Status: She is alert and oriented to person, place, and time.   Psychiatric:         Mood and Affect: Mood normal.         Behavior: Behavior normal. Behavior is cooperative.         Thought Content: Thought content normal.      Comments: Anxious, normal cognition     Chest wall mediport intact    Performance Status:  Symptomatic; fully ambulatory  -------------------------------------------------------------------------------------------------------  Assessment/Plan      1.Angioimmunoblastic T-cell lymphoma,  CD30 pos,  stage IV disease, bulky tumor,   - High LDH, excellent partial remission after initial frontline therapy.   - Patient completed BV CHP September 2024 and PET scan after cycle 5 showed excellent response with some minimal residual uptake in right inguinal area.  Inguinal nodes obvious on physical  exam ,,also had body rash which could be consistent with her lupus or antioimmunoblastic lymphoma.    - Provided the patient has a very good response I would recommend consolidation with an autologous stem cell transplant with BEAM preparation.    - Briefly discussed the procedures involved in peripheral stem cell mobilization followed by  hospitalization for high dose chemotherapy and recovery from side effects. If patient is in a good remission, I estimate durable remission with this aggressive approach at 50-60%.  -Restaging evaluation 12/23/24 includes a PET scan which shows unchanged minimally PET avid right inguinal node   and several non pet avid but left axillary and pretracheal nodes.  -BM biopsy 12/19/24 shows low level 1.5% involvement by flow cytometry only.  -Patient has a tragic home situation and is currently homeless,  she is applying for SSI and currently lives in a nursing home.  Patient case discussed at BMT tumor board and consensus was that this should not preclude her from a potentially curative autologous stem cell transplant.     Organ function testing:    PFTs 1/22/25 FEV1 80%,%,FEV1/FVC 79%, DLCO 104%  ECHO LVEF64%CMI 4: for age > 40, anxiety disorder, and rheumatologic condition    2/17/25 Patient collected 5.68 x 106 CD 34 cells/kg in one collection on 2/12.  She is feeling well and is ready to be admitted for her autologous transplant with BEAM preparation.  Today we discussed bcnu, etoposide, cytarabine and melphalan chemotherapy and toxicities of each agent with general toxicities of this combination of infections which could be severe, hair loss, GI toxicities, cytopenias requiring transfusions and potential for organ toxicities severe enough to require management in the intensive care unit and even potential, albeit small for death. The importance of eating, bathing, and remaining active mentally and physically reviewed.  Autologous transplant consent reviewed and signed with  the patient.   All questions answered.     We also discussed who would be her health care power of  should she be unable to decide for herself. Given her husbands recent health issues, patient states that her mother would be health care power dea , Stephanie Wilson.     2. History of lupus  Rheumatologist Dr. Cathy Dobbs,  [id number] [facility]  [phone] currently only on placquenil.      3. Psychosocial: patient has life long struggles with anxiety and depression and has had recent hospitalization- will need hospital mental health team on board.  Tragically,  patient is currently homeless and her  who is a recovering alcoholic currently lives in a sober house.  Unclear whether patient could return to SNF after her transplant for care, seems that she will not be able to stay with her daughter again.     RTC  Admission for BEAM on 2/18/25.Patient met with  Nancy Galvan, nurse coordinator.  Please have patient fill out health care power of  paperwork on admission.  Given her home insecurity will  need to have social work start discharge planning on admission.   -------------------------------------------------------------------------------------------------------  Gunjan Varela MD

## 2025-02-17 NOTE — PROGRESS NOTES
2/17/25 1:30PM    Met with patient at pre-admission appointment with Dr. Varela. I provided patient with a calendar for her transplant and reviewed it with her. All questions were answered by both myself and Dr. Varela. I reinforced Auto SCT education with the patient. Patient reviewed and signed transplant consent with Dr. Varela. Patient scheduled to admit tomorrow, 2/18, with BEAM prep and a T=0 of 2/24. Patient has my contact information if she needs anything prior to admission.    Nancy Galvan RN

## 2025-02-18 ENCOUNTER — APPOINTMENT (OUTPATIENT)
Dept: HEMATOLOGY/ONCOLOGY | Facility: HOSPITAL | Age: 50
End: 2025-02-18
Payer: COMMERCIAL

## 2025-02-18 ENCOUNTER — HOSPITAL ENCOUNTER (INPATIENT)
Facility: HOSPITAL | Age: 50
End: 2025-02-18
Attending: INTERNAL MEDICINE | Admitting: NURSE PRACTITIONER
Payer: COMMERCIAL

## 2025-02-18 ENCOUNTER — APPOINTMENT (OUTPATIENT)
Dept: RADIOLOGY | Facility: HOSPITAL | Age: 50
End: 2025-02-18
Payer: COMMERCIAL

## 2025-02-18 DIAGNOSIS — M32.19 OTHER SYSTEMIC LUPUS ERYTHEMATOSUS WITH OTHER ORGAN INVOLVEMENT: ICD-10-CM

## 2025-02-18 DIAGNOSIS — C84.48 PERIPHERAL T CELL LYMPHOMA OF LYMPH NODES OF MULTIPLE SITES (MULTI): Primary | ICD-10-CM

## 2025-02-18 DIAGNOSIS — F33.1 MODERATE EPISODE OF RECURRENT MAJOR DEPRESSIVE DISORDER: ICD-10-CM

## 2025-02-18 DIAGNOSIS — M32.9 SYSTEMIC LUPUS ERYTHEMATOSUS, UNSPECIFIED SLE TYPE, UNSPECIFIED ORGAN INVOLVEMENT STATUS (MULTI): ICD-10-CM

## 2025-02-18 DIAGNOSIS — C86.50 ANGIOIMMUNOBLASTIC T-CELL LYMPHOMA: ICD-10-CM

## 2025-02-18 PROCEDURE — 71045 X-RAY EXAM CHEST 1 VIEW: CPT

## 2025-02-18 PROCEDURE — 2500000001 HC RX 250 WO HCPCS SELF ADMINISTERED DRUGS (ALT 637 FOR MEDICARE OP): Mod: SE | Performed by: NURSE PRACTITIONER

## 2025-02-18 PROCEDURE — 1170000001 HC PRIVATE ONCOLOGY ROOM DAILY

## 2025-02-18 PROCEDURE — 93005 ELECTROCARDIOGRAM TRACING: CPT

## 2025-02-18 PROCEDURE — 71045 X-RAY EXAM CHEST 1 VIEW: CPT | Performed by: RADIOLOGY

## 2025-02-18 PROCEDURE — 2500000004 HC RX 250 GENERAL PHARMACY W/ HCPCS (ALT 636 FOR OP/ED): Mod: SE | Performed by: INTERNAL MEDICINE

## 2025-02-18 PROCEDURE — 2500000005 HC RX 250 GENERAL PHARMACY W/O HCPCS: Mod: SE | Performed by: NURSE PRACTITIONER

## 2025-02-18 PROCEDURE — 99223 1ST HOSP IP/OBS HIGH 75: CPT | Performed by: STUDENT IN AN ORGANIZED HEALTH CARE EDUCATION/TRAINING PROGRAM

## 2025-02-18 PROCEDURE — 2500000002 HC RX 250 W HCPCS SELF ADMINISTERED DRUGS (ALT 637 FOR MEDICARE OP, ALT 636 FOR OP/ED): Mod: SE | Performed by: INTERNAL MEDICINE

## 2025-02-18 PROCEDURE — S4991 NICOTINE PATCH NONLEGEND: HCPCS | Mod: SE | Performed by: NURSE PRACTITIONER

## 2025-02-18 PROCEDURE — 2500000002 HC RX 250 W HCPCS SELF ADMINISTERED DRUGS (ALT 637 FOR MEDICARE OP, ALT 636 FOR OP/ED): Mod: SE | Performed by: NURSE PRACTITIONER

## 2025-02-18 RX ORDER — ATORVASTATIN CALCIUM 40 MG/1
40 TABLET, FILM COATED ORAL DAILY
Status: DISCONTINUED | OUTPATIENT
Start: 2025-02-19 | End: 2025-03-17 | Stop reason: HOSPADM

## 2025-02-18 RX ORDER — PANTOPRAZOLE SODIUM 40 MG/1
40 TABLET, DELAYED RELEASE ORAL
Status: DISCONTINUED | OUTPATIENT
Start: 2025-02-19 | End: 2025-03-17 | Stop reason: HOSPADM

## 2025-02-18 RX ORDER — PROCHLORPERAZINE EDISYLATE 5 MG/ML
10 INJECTION INTRAMUSCULAR; INTRAVENOUS EVERY 6 HOURS PRN
Status: DISCONTINUED | OUTPATIENT
Start: 2025-02-18 | End: 2025-02-26

## 2025-02-18 RX ORDER — FAMOTIDINE 10 MG/ML
20 INJECTION, SOLUTION INTRAVENOUS AS NEEDED
Status: COMPLETED | OUTPATIENT
Start: 2025-02-18 | End: 2025-02-18

## 2025-02-18 RX ORDER — AMLODIPINE BESYLATE 5 MG/1
5 TABLET ORAL DAILY
Status: DISCONTINUED | OUTPATIENT
Start: 2025-02-19 | End: 2025-03-11

## 2025-02-18 RX ORDER — ONDANSETRON HYDROCHLORIDE 2 MG/ML
8 INJECTION, SOLUTION INTRAVENOUS ONCE
Status: COMPLETED | OUTPATIENT
Start: 2025-02-18 | End: 2025-02-18

## 2025-02-18 RX ORDER — HYDROMORPHONE HYDROCHLORIDE 2 MG/1
2 TABLET ORAL 2 TIMES DAILY PRN
Status: DISCONTINUED | OUTPATIENT
Start: 2025-02-18 | End: 2025-03-17 | Stop reason: HOSPADM

## 2025-02-18 RX ORDER — EPINEPHRINE 1 MG/ML
0.3 INJECTION, SOLUTION, CONCENTRATE INTRAVENOUS EVERY 5 MIN PRN
Status: DISCONTINUED | OUTPATIENT
Start: 2025-02-18 | End: 2025-03-09

## 2025-02-18 RX ORDER — DEXAMETHASONE 4 MG/1
12 TABLET ORAL ONCE
Status: COMPLETED | OUTPATIENT
Start: 2025-02-18 | End: 2025-02-18

## 2025-02-18 RX ORDER — POLYETHYLENE GLYCOL 3350 17 G/17G
17 POWDER, FOR SOLUTION ORAL DAILY
Status: DISCONTINUED | OUTPATIENT
Start: 2025-02-18 | End: 2025-02-22

## 2025-02-18 RX ORDER — PROCHLORPERAZINE MALEATE 10 MG
10 TABLET ORAL EVERY 6 HOURS PRN
Status: DISCONTINUED | OUTPATIENT
Start: 2025-02-18 | End: 2025-02-27

## 2025-02-18 RX ORDER — LORAZEPAM 0.5 MG/1
0.5 TABLET ORAL 2 TIMES DAILY PRN
Status: DISCONTINUED | OUTPATIENT
Start: 2025-02-18 | End: 2025-03-17 | Stop reason: HOSPADM

## 2025-02-18 RX ORDER — PREDNISONE 10 MG/1
10 TABLET ORAL DAILY
Status: DISCONTINUED | OUTPATIENT
Start: 2025-02-19 | End: 2025-03-17 | Stop reason: HOSPADM

## 2025-02-18 RX ORDER — SODIUM CHLORIDE 0.9 G/100ML
30 INJECTION, SOLUTION IRRIGATION
Status: DISCONTINUED | OUTPATIENT
Start: 2025-02-18 | End: 2025-02-21

## 2025-02-18 RX ORDER — MULTIVIT-MIN/IRON FUM/FOLIC AC 7.5 MG-4
1 TABLET ORAL DAILY
COMMUNITY

## 2025-02-18 RX ORDER — ALBUTEROL SULFATE 0.83 MG/ML
3 SOLUTION RESPIRATORY (INHALATION) AS NEEDED
Status: DISCONTINUED | OUTPATIENT
Start: 2025-02-18 | End: 2025-03-17 | Stop reason: HOSPADM

## 2025-02-18 RX ORDER — OLANZAPINE 5 MG/1
5 TABLET ORAL NIGHTLY
Status: COMPLETED | OUTPATIENT
Start: 2025-02-18 | End: 2025-02-27

## 2025-02-18 RX ORDER — QUETIAPINE FUMARATE 25 MG/1
100 TABLET, FILM COATED ORAL NIGHTLY
Status: DISCONTINUED | OUTPATIENT
Start: 2025-02-18 | End: 2025-03-17 | Stop reason: HOSPADM

## 2025-02-18 RX ORDER — IBUPROFEN 200 MG
1 TABLET ORAL DAILY
Status: DISCONTINUED | OUTPATIENT
Start: 2025-02-18 | End: 2025-02-20

## 2025-02-18 RX ORDER — HYDROXYCHLOROQUINE SULFATE 200 MG/1
200 TABLET, FILM COATED ORAL DAILY
Status: DISCONTINUED | OUTPATIENT
Start: 2025-02-19 | End: 2025-03-17 | Stop reason: HOSPADM

## 2025-02-18 RX ORDER — DULOXETIN HYDROCHLORIDE 60 MG/1
60 CAPSULE, DELAYED RELEASE ORAL DAILY
Status: DISCONTINUED | OUTPATIENT
Start: 2025-02-19 | End: 2025-02-20

## 2025-02-18 RX ORDER — DIPHENHYDRAMINE HYDROCHLORIDE 50 MG/ML
50 INJECTION, SOLUTION INTRAMUSCULAR; INTRAVENOUS AS NEEDED
Status: COMPLETED | OUTPATIENT
Start: 2025-02-18 | End: 2025-02-18

## 2025-02-18 RX ADMIN — FAMOTIDINE 20 MG: 10 INJECTION INTRAVENOUS at 22:48

## 2025-02-18 RX ADMIN — HYDROMORPHONE HYDROCHLORIDE 2 MG: 2 TABLET ORAL at 18:15

## 2025-02-18 RX ADMIN — DIPHENHYDRAMINE HYDROCHLORIDE 50 MG: 50 INJECTION INTRAMUSCULAR; INTRAVENOUS at 22:48

## 2025-02-18 RX ADMIN — SODIUM CHLORIDE 500 ML: 9 INJECTION, SOLUTION INTRAVENOUS at 22:48

## 2025-02-18 RX ADMIN — QUETIAPINE FUMARATE 100 MG: 25 TABLET ORAL at 20:36

## 2025-02-18 RX ADMIN — NICOTINE 1 PATCH: 14 PATCH, EXTENDED RELEASE TRANSDERMAL at 17:55

## 2025-02-18 RX ADMIN — ONDANSETRON 8 MG: 2 INJECTION INTRAMUSCULAR; INTRAVENOUS at 20:36

## 2025-02-18 RX ADMIN — DEXAMETHASONE 12 MG: 4 TABLET ORAL at 20:36

## 2025-02-18 RX ADMIN — SODIUM CHLORIDE 30 ML: 900 IRRIGANT IRRIGATION at 13:49

## 2025-02-18 RX ADMIN — Medication 522 MG: at 21:49

## 2025-02-18 RX ADMIN — OLANZAPINE 5 MG: 5 TABLET, FILM COATED ORAL at 20:36

## 2025-02-18 RX ADMIN — FOSAPREPITANT 150 MG: 150 INJECTION, POWDER, LYOPHILIZED, FOR SOLUTION INTRAVENOUS at 20:36

## 2025-02-18 SDOH — SOCIAL STABILITY: SOCIAL INSECURITY
WITHIN THE LAST YEAR, HAVE YOU BEEN KICKED, HIT, SLAPPED, OR OTHERWISE PHYSICALLY HURT BY YOUR PARTNER OR EX-PARTNER?: NO

## 2025-02-18 SDOH — SOCIAL STABILITY: SOCIAL INSECURITY: WERE YOU ABLE TO COMPLETE ALL THE BEHAVIORAL HEALTH SCREENINGS?: YES

## 2025-02-18 SDOH — SOCIAL STABILITY: SOCIAL INSECURITY: DO YOU FEEL UNSAFE GOING BACK TO THE PLACE WHERE YOU ARE LIVING?: NO

## 2025-02-18 SDOH — SOCIAL STABILITY: SOCIAL INSECURITY
WITHIN THE LAST YEAR, HAVE YOU BEEN RAPED OR FORCED TO HAVE ANY KIND OF SEXUAL ACTIVITY BY YOUR PARTNER OR EX-PARTNER?: NO

## 2025-02-18 SDOH — ECONOMIC STABILITY: INCOME INSECURITY: IN THE PAST 12 MONTHS HAS THE ELECTRIC, GAS, OIL, OR WATER COMPANY THREATENED TO SHUT OFF SERVICES IN YOUR HOME?: YES

## 2025-02-18 SDOH — SOCIAL STABILITY: SOCIAL INSECURITY: HAVE YOU HAD THOUGHTS OF HARMING ANYONE ELSE?: NO

## 2025-02-18 SDOH — SOCIAL STABILITY: SOCIAL INSECURITY: ARE YOU OR HAVE YOU BEEN THREATENED OR ABUSED PHYSICALLY, EMOTIONALLY, OR SEXUALLY BY ANYONE?: NO

## 2025-02-18 SDOH — SOCIAL STABILITY: SOCIAL INSECURITY: WITHIN THE LAST YEAR, HAVE YOU BEEN AFRAID OF YOUR PARTNER OR EX-PARTNER?: NO

## 2025-02-18 SDOH — SOCIAL STABILITY: SOCIAL INSECURITY: DO YOU FEEL ANYONE HAS EXPLOITED OR TAKEN ADVANTAGE OF YOU FINANCIALLY OR OF YOUR PERSONAL PROPERTY?: NO

## 2025-02-18 SDOH — ECONOMIC STABILITY: FOOD INSECURITY
WITHIN THE PAST 12 MONTHS, YOU WORRIED THAT YOUR FOOD WOULD RUN OUT BEFORE YOU GOT THE MONEY TO BUY MORE.: SOMETIMES TRUE

## 2025-02-18 SDOH — SOCIAL STABILITY: SOCIAL INSECURITY: WITHIN THE LAST YEAR, HAVE YOU BEEN HUMILIATED OR EMOTIONALLY ABUSED IN OTHER WAYS BY YOUR PARTNER OR EX-PARTNER?: NO

## 2025-02-18 SDOH — SOCIAL STABILITY: SOCIAL INSECURITY: ABUSE: ADULT

## 2025-02-18 SDOH — SOCIAL STABILITY: SOCIAL INSECURITY: HAS ANYONE EVER THREATENED TO HURT YOUR FAMILY OR YOUR PETS?: NO

## 2025-02-18 SDOH — ECONOMIC STABILITY: FOOD INSECURITY: WITHIN THE PAST 12 MONTHS, THE FOOD YOU BOUGHT JUST DIDN'T LAST AND YOU DIDN'T HAVE MONEY TO GET MORE.: SOMETIMES TRUE

## 2025-02-18 SDOH — SOCIAL STABILITY: SOCIAL INSECURITY: DOES ANYONE TRY TO KEEP YOU FROM HAVING/CONTACTING OTHER FRIENDS OR DOING THINGS OUTSIDE YOUR HOME?: NO

## 2025-02-18 SDOH — SOCIAL STABILITY: SOCIAL INSECURITY: ARE THERE ANY APPARENT SIGNS OF INJURIES/BEHAVIORS THAT COULD BE RELATED TO ABUSE/NEGLECT?: NO

## 2025-02-18 ASSESSMENT — ENCOUNTER SYMPTOMS
FEVER: 0
WEAKNESS: 0
DIZZINESS: 0
VOMITING: 0
NAUSEA: 0
RHINORRHEA: 0
CONSTIPATION: 0
SORE THROAT: 0
ABDOMINAL PAIN: 0
SHORTNESS OF BREATH: 0
BLOOD IN STOOL: 0
HEADACHES: 0
ACTIVITY CHANGE: 0
COUGH: 0
JOINT SWELLING: 1
SINUS PAIN: 0
DYSURIA: 0
CHEST TIGHTNESS: 0
FATIGUE: 0
FLANK PAIN: 0
DIFFICULTY URINATING: 0
ARTHRALGIAS: 1
SINUS PRESSURE: 0
NUMBNESS: 0
TROUBLE SWALLOWING: 0
PALPITATIONS: 0
ABDOMINAL DISTENTION: 0
WHEEZING: 0
CHILLS: 0
NECK STIFFNESS: 0
PHOTOPHOBIA: 0

## 2025-02-18 ASSESSMENT — ACTIVITIES OF DAILY LIVING (ADL)
FEEDING YOURSELF: INDEPENDENT
HEARING - RIGHT EAR: FUNCTIONAL
DRESSING YOURSELF: INDEPENDENT
BATHING: INDEPENDENT
HEARING - LEFT EAR: FUNCTIONAL
ADEQUATE_TO_COMPLETE_ADL: YES
GROOMING: INDEPENDENT
LACK_OF_TRANSPORTATION: YES
PATIENT'S MEMORY ADEQUATE TO SAFELY COMPLETE DAILY ACTIVITIES?: YES
WALKS IN HOME: INDEPENDENT
JUDGMENT_ADEQUATE_SAFELY_COMPLETE_DAILY_ACTIVITIES: YES
TOILETING: INDEPENDENT

## 2025-02-18 ASSESSMENT — COGNITIVE AND FUNCTIONAL STATUS - GENERAL
PATIENT BASELINE BEDBOUND: NO
DAILY ACTIVITIY SCORE: 24
MOBILITY SCORE: 24

## 2025-02-18 ASSESSMENT — PATIENT HEALTH QUESTIONNAIRE - PHQ9
1. LITTLE INTEREST OR PLEASURE IN DOING THINGS: NOT AT ALL
SUM OF ALL RESPONSES TO PHQ9 QUESTIONS 1 & 2: 0
2. FEELING DOWN, DEPRESSED OR HOPELESS: NOT AT ALL

## 2025-02-18 ASSESSMENT — COLUMBIA-SUICIDE SEVERITY RATING SCALE - C-SSRS
2. HAVE YOU ACTUALLY HAD ANY THOUGHTS OF KILLING YOURSELF?: NO
6. HAVE YOU EVER DONE ANYTHING, STARTED TO DO ANYTHING, OR PREPARED TO DO ANYTHING TO END YOUR LIFE?: NO
1. IN THE PAST MONTH, HAVE YOU WISHED YOU WERE DEAD OR WISHED YOU COULD GO TO SLEEP AND NOT WAKE UP?: NO

## 2025-02-18 ASSESSMENT — PAIN SCALES - GENERAL
PAINLEVEL_OUTOF10: 2
PAINLEVEL_OUTOF10: 7

## 2025-02-18 ASSESSMENT — LIFESTYLE VARIABLES
PRESCIPTION_ABUSE_PAST_12_MONTHS: NO
SKIP TO QUESTIONS 9-10: 1
SUBSTANCE_ABUSE_PAST_12_MONTHS: NO
AUDIT-C TOTAL SCORE: 0
AUDIT-C TOTAL SCORE: 0
HOW OFTEN DO YOU HAVE A DRINK CONTAINING ALCOHOL: NEVER
HOW MANY STANDARD DRINKS CONTAINING ALCOHOL DO YOU HAVE ON A TYPICAL DAY: PATIENT DOES NOT DRINK
HOW OFTEN DO YOU HAVE 6 OR MORE DRINKS ON ONE OCCASION: NEVER

## 2025-02-18 ASSESSMENT — PAIN DESCRIPTION - LOCATION: LOCATION: BACK

## 2025-02-18 ASSESSMENT — PAIN - FUNCTIONAL ASSESSMENT: PAIN_FUNCTIONAL_ASSESSMENT: 0-10

## 2025-02-18 NOTE — H&P
History Of Present Illness  Ms. 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 admitting for Auto Transplant prepped with BEAM (T0=2/24/25)      Past Medical History  She has a past medical history of Essential (primary) hypertension (05/24/2017), Essential (primary) hypertension (10/09/2017), and Personal history of nicotine dependence (05/24/2017).    Surgical History  She has a past surgical history that includes Other surgical history (05/24/2017).    Oncology History Overview Note   T cell lymphoma with TFH phenotype angioimmunoblastic CD30+, AK negative  11/10/21 CT imaging showed extensive hilar, mediastinal and bilateral axillary lymphadenopathy  2/14/22 right inguinal node biopsy follicular hyperplasia  4/6/22 right axillary lymph node reactive changes  5/12/24 progressive adenopathy, right inguinal node T cell lymphoma with TFH phenotype, CD30 focally positive  6/12/24 started BV-CHP Pet after cycle 5 interval resoluation of bulky adenopathy in axilla, abdomen, pelvis and groin focal hypermetabolic uptake in right inguinal node  9/25/24 C6D1 of BV-CHP     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 She does not have any smokeless tobacco history on file. She reports that she does not currently use alcohol. She reports that she does not use drugs.     Allergies  Amoxicillin, Atenolol, and Prednisone    Review of Systems   Constitutional:  Negative for activity change, chills, fatigue and fever.   HENT:  Negative for congestion, ear pain, mouth sores, nosebleeds, postnasal drip, rhinorrhea, sinus pressure, sinus pain, sore throat and trouble swallowing.    Eyes:  Negative for  photophobia and visual disturbance.   Respiratory:  Negative for cough, chest tightness, shortness of breath and wheezing.    Cardiovascular:  Positive for leg swelling. Negative for chest pain and palpitations.   Gastrointestinal:  Negative for abdominal distention, abdominal pain, blood in stool, constipation, nausea and vomiting.   Genitourinary:  Negative for difficulty urinating, dysuria, flank pain and vaginal bleeding.   Musculoskeletal:  Positive for arthralgias and joint swelling. Negative for neck stiffness.   Neurological:  Negative for dizziness, syncope, weakness, numbness and headaches.   Psychiatric/Behavioral:  Negative for behavioral problems.         Physical Exam  Constitutional:       General: She is not in acute distress.     Appearance: Normal appearance. She is not ill-appearing.   HENT:      Head: Normocephalic.      Mouth/Throat:      Mouth: Mucous membranes are moist.      Pharynx: Oropharynx is clear.      Comments: Poor dentition  Eyes:      General: No scleral icterus.     Pupils: Pupils are equal, round, and reactive to light.   Cardiovascular:      Rate and Rhythm: Normal rate and regular rhythm.      Pulses: Normal pulses.      Heart sounds: Normal heart sounds.   Pulmonary:      Effort: Pulmonary effort is normal.      Breath sounds: Normal breath sounds.   Abdominal:      General: Bowel sounds are normal.      Palpations: Abdomen is soft.   Musculoskeletal:         General: Normal range of motion.      Cervical back: Normal range of motion. No rigidity.      Right lower le+ Edema present.      Left lower le+ Edema present.   Lymphadenopathy:      Lower Body: Right inguinal adenopathy (round LN 4 x 4 cm) present.   Skin:     Capillary Refill: Capillary refill takes less than 2 seconds.      Coloration: Skin is pale.      Findings: Bruising present.   Neurological:      Mental Status: She is oriented to person, place, and time.          Last Recorded Vitals  Blood pressure  "115/79, pulse 100, resp. rate 18, height 1.685 m (5' 6.34\"), weight 77.1 kg (169 lb 15.6 oz), SpO2 95%.    Relevant Results  Scheduled medications  [START ON 2/19/2025] amLODIPine, 5 mg, oral, Daily  [START ON 2/19/2025] atorvastatin, 40 mg, oral, Daily  [START ON 2/19/2025] DULoxetine, 60 mg, oral, Daily  [START ON 2/19/2025] hydroxychloroquine, 200 mg, oral, Daily  nicotine, 1 patch, transdermal, Daily  [START ON 2/19/2025] pantoprazole, 40 mg, oral, Daily before breakfast  polyethylene glycol, 17 g, oral, Daily  [START ON 2/19/2025] predniSONE, 10 mg, oral, Daily  QUEtiapine, 100 mg, oral, Nightly  sodium chloride, 30 mL, irrigation, 5x daily      Continuous medications     PRN medications  PRN medications: alteplase, HYDROmorphone, LORazepam       Assessment/Plan   Assessment & Plan  Peripheral T-cell lymphoma (Multi)    Angioimmunoblastic T-cell lymphoma    .  Ms. 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 admitting for Auto Transplant prepped with BEAM (T0=2/24/25)     Currently T-6 Auto prepped with BEAM (T0=2/24/25)     ONC: (per chart review; see onc history for complete treatment)    --Angioimmunoblastic T-Cell Lymphoma (CD 30+ AK Negative)   Staging PET s/p Cycle 5: excellent response (see PET dated 12/23/24)   S/P BV-CHP (June 2024) x 6 cycles      Autologous Stem Cell Transplant   Prep: BEAM   Carmustine 300mg/m2 (T-6)   Cytarabine 200mg/m2 (T-5, -4, -3, -2)   Etoposide 200mg/m2 (T-5, -4, -3, -2)   Melphalan 140mg/m2 (T-1)   T0= 2/24/25: Cells Infused 5.68 x 10^6 CD34 cells      Surveillance Monitoring   IgG: On admission   Coag/Fibrinogen 2x/week   LDH/Hapto: Weekly       HEME:   --Mild Thrombocytopenia likely secondary to recent chemotherapy, no evidence of bleeding    --No evidence of DIC or hypercoag state   --Transfuse if Hgb<7 and/or Plt<10 (hold DVT ppx when PLT<50k)   --Lovenox 40mg for DVT ppx  "     ID:   Allergy: Amoxicillin    Leukocytosis on admit (WBC 16k) secondary to recent Neulasta administration,    --No evidence of active infection on admit   --Start ACV on T+0 and Fluconazole T+1     --Plan Levofloxacin prophylaxis when neutropenic   --Plan Cefepime for neutropenic fever due to PCN allergy    --Plan Bactrim 800/160mg qMWF start T+30    FEN/GI:   Admit Wt: 77.1 Kg    --Hydration per chemotherapy order set    --PPI prophy w/protonix on admit   --Monitor electrolytes, replace as needed   --Dietician consulted on admission     CARDIAC:   History of HTN  --Continue home Norvasc 5mg/day and Atorvastatin 40mg/day @ HS    --ECHO (1/22/25): EF 60-65%  --Weekly EKG (2/18): PENDING   --PT consulted on admission     RHEUMATOID:   History of Lupus and Rheumatoid Arthritis    --Currently on Placquenil, Prednisone 10mg/day,    --Follows with Dr Dobbs        --Previously on Saphnelo (interferon alpha antagonist until 7/2023)     PSYCH:   Anxiety/Depression    --Continue home Seroquil, Cymbalta   --Will need in patient psych consult (2/19): PENDING      MISC:   Nicotine Dependence    Currently smoker; Nicotine patch ordered on admit   Patient considering smoking cessation    Social Situation, currently homeless, living in SNF     DISPO:   Full Code confirmed on admit    NOK Mom: Stephanie Wilson [phone]   Non-Tunneled CVC (placed 2/11) remove at discharge     Blood Consent Signed on Admission   Primary Onc: Dr. Gunjan Varela      Patient seen, examined and discussed with Malignant Heme attending Dr. Houston Mcgraw, APRN-CNP

## 2025-02-18 NOTE — PROGRESS NOTES
Pharmacy Medication History Review    Sagrario Garber is a 49 y.o. female admitted for Peripheral T-cell lymphoma (Multi). Pharmacy reviewed the patient's [id number] medications and allergies for accuracy.    Medications ADDED:  None  Medications CHANGED:  Cymbalta  Multivitamin  Seroquel  Medications REMOVED:   Pepcid     The list below reflects the updated PTA list.   Prior to Admission Medications   Prescriptions Informant   DULoxetine (Cymbalta) 60 mg DR capsule Other   Sig: Take 1 capsule (60 mg) by mouth 2 times a day. Do not crush or chew.   HYDROmorphone (Dilaudid) 2 mg tablet Other   Sig: Take 1 tablet (2 mg) by mouth 2 times a day as needed for severe pain (7 - 10).   LORazepam (Ativan) 0.5 mg tablet Other   Sig: Take 1 tablet (0.5 mg) by mouth 2 times a day as needed for anxiety.   QUEtiapine (SEROquel) 50 mg tablet Other   Sig: Take 1 tablet (50 mg) by mouth once daily at bedtime.   amLODIPine (Norvasc) 5 mg tablet Other   Sig: Take 1 tablet (5 mg) by mouth once daily.   atorvastatin (Lipitor) 40 mg tablet Other   Sig: Take 1 tablet (40 mg) by mouth once daily at bedtime.   hydroxychloroquine (Plaquenil) 200 mg tablet Other   Sig: Take 1 tablet (200 mg) by mouth once daily.   multivitamin with minerals tablet Other   Sig: Take 1 tablet by mouth once daily.   oxyCODONE-acetaminophen (Percocet) 5-325 mg tablet Other   Sig: Take 2 tablets by mouth every 6 hours if needed (pain).   predniSONE (Deltasone) 10 mg tablet Other   Sig: Take 1 tablet (10 mg) by mouth once daily.      Facility-Administered Medications: None        The list below reflects the updated allergy list. Please review each documented allergy for additional clarification and justification.  Allergies  Reviewed by BREONNA Jenkins-LISSY on 2/18/2025        Severity Reactions Comments    Amoxicillin Medium Hives Patient says gets hives/welts     Atenolol Medium Hives Patient says gets hives/welts    Prednisone Medium Hives  "Specifically Solumedrol Able to tolerate prednisone  Patient says gets hives/welts            Patient declines M2B at discharge.     Sources:   [facility]  Pharmacy dispense history  Chart Review  SNF  [facility] #[phone] medication list    Additional Comments:  I called the [facility] SNF #[phone] and requested the medication list. I was informed that the medication list was given to the patient along with her               medications. The patient had medications and SNF documents in a gray sealed bag with her belongings. The patient gave me the SNF documents, which included the medication list. A copy          was made and provided to the floor  to include in the physical chart and the original given back to patient. Both medications and SNF documents were left with the patient  The SNF documents were uploaded to the patient's chart under Chart Review media tab  The PTA med list was updated per SNF medication list      Jefferson Lawrence, PharmD  Transitions of Care Pharmacist  02/18/25     Secure Chat preferred   If no response call [phone] or Vocera \"Med Rec\"    "

## 2025-02-18 NOTE — HOSPITAL COURSE
Ms. 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 admitting for Auto Transplant prepped with BEAM (T0=2/24/25)

## 2025-02-19 ENCOUNTER — APPOINTMENT (OUTPATIENT)
Dept: HEMATOLOGY/ONCOLOGY | Facility: HOSPITAL | Age: 50
End: 2025-02-19
Payer: COMMERCIAL

## 2025-02-19 LAB
ALBUMIN SERPL BCP-MCNC: 3.8 G/DL (ref 3.4–5)
ALBUMIN SERPL BCP-MCNC: 3.8 G/DL (ref 3.4–5)
ALP SERPL-CCNC: 88 U/L (ref 33–110)
ALP SERPL-CCNC: 89 U/L (ref 33–110)
ALT SERPL W P-5'-P-CCNC: 19 U/L (ref 7–45)
ALT SERPL W P-5'-P-CCNC: 20 U/L (ref 7–45)
ANION GAP SERPL CALC-SCNC: 13 MMOL/L (ref 10–20)
APTT PPP: 29 SECONDS (ref 27–38)
AST SERPL W P-5'-P-CCNC: 11 U/L (ref 9–39)
AST SERPL W P-5'-P-CCNC: 11 U/L (ref 9–39)
BASOPHILS # BLD AUTO: 0.03 X10*3/UL (ref 0–0.1)
BASOPHILS # BLD AUTO: 0.03 X10*3/UL (ref 0–0.1)
BASOPHILS NFR BLD AUTO: 0.5 %
BASOPHILS NFR BLD AUTO: 0.5 %
BILIRUB DIRECT SERPL-MCNC: 0 MG/DL (ref 0–0.3)
BILIRUB DIRECT SERPL-MCNC: 0 MG/DL (ref 0–0.3)
BILIRUB SERPL-MCNC: 0.3 MG/DL (ref 0–1.2)
BILIRUB SERPL-MCNC: 0.3 MG/DL (ref 0–1.2)
BUN SERPL-MCNC: 14 MG/DL (ref 6–23)
CALCIUM SERPL-MCNC: 8.7 MG/DL (ref 8.6–10.6)
CHLORIDE SERPL-SCNC: 106 MMOL/L (ref 98–107)
CO2 SERPL-SCNC: 25 MMOL/L (ref 21–32)
CREAT SERPL-MCNC: 0.57 MG/DL (ref 0.5–1.05)
EGFRCR SERPLBLD CKD-EPI 2021: >90 ML/MIN/1.73M*2
EOSINOPHIL # BLD AUTO: 0.06 X10*3/UL (ref 0–0.7)
EOSINOPHIL # BLD AUTO: 0.07 X10*3/UL (ref 0–0.7)
EOSINOPHIL NFR BLD AUTO: 1 %
EOSINOPHIL NFR BLD AUTO: 1.1 %
ERYTHROCYTE [DISTWIDTH] IN BLOOD BY AUTOMATED COUNT: 14.2 % (ref 11.5–14.5)
ERYTHROCYTE [DISTWIDTH] IN BLOOD BY AUTOMATED COUNT: 14.3 % (ref 11.5–14.5)
FIBRINOGEN PPP-MCNC: 317 MG/DL (ref 200–400)
GLUCOSE SERPL-MCNC: 157 MG/DL (ref 74–99)
HCT VFR BLD AUTO: 38.4 % (ref 36–46)
HCT VFR BLD AUTO: 39.7 % (ref 36–46)
HGB BLD-MCNC: 12.4 G/DL (ref 12–16)
HGB BLD-MCNC: 12.6 G/DL (ref 12–16)
IGG SERPL-MCNC: 643 MG/DL (ref 700–1600)
IMM GRANULOCYTES # BLD AUTO: 0.07 X10*3/UL (ref 0–0.7)
IMM GRANULOCYTES # BLD AUTO: 0.08 X10*3/UL (ref 0–0.7)
IMM GRANULOCYTES NFR BLD AUTO: 1.1 % (ref 0–0.9)
IMM GRANULOCYTES NFR BLD AUTO: 1.4 % (ref 0–0.9)
INR PPP: 1 (ref 0.9–1.1)
LDH SERPL L TO P-CCNC: 201 U/L (ref 84–246)
LDH SERPL L TO P-CCNC: 222 U/L (ref 84–246)
LYMPHOCYTES # BLD AUTO: 0.39 X10*3/UL (ref 1.2–4.8)
LYMPHOCYTES # BLD AUTO: 0.48 X10*3/UL (ref 1.2–4.8)
LYMPHOCYTES NFR BLD AUTO: 6.6 %
LYMPHOCYTES NFR BLD AUTO: 7.9 %
MAGNESIUM SERPL-MCNC: 1.99 MG/DL (ref 1.6–2.4)
MAGNESIUM SERPL-MCNC: 2 MG/DL (ref 1.6–2.4)
MCH RBC QN AUTO: 30.2 PG (ref 26–34)
MCH RBC QN AUTO: 31.1 PG (ref 26–34)
MCHC RBC AUTO-ENTMCNC: 31.2 G/DL (ref 32–36)
MCHC RBC AUTO-ENTMCNC: 32.8 G/DL (ref 32–36)
MCV RBC AUTO: 95 FL (ref 80–100)
MCV RBC AUTO: 97 FL (ref 80–100)
MONOCYTES # BLD AUTO: 0.05 X10*3/UL (ref 0.1–1)
MONOCYTES # BLD AUTO: 0.06 X10*3/UL (ref 0.1–1)
MONOCYTES NFR BLD AUTO: 0.8 %
MONOCYTES NFR BLD AUTO: 1 %
NEUTROPHILS # BLD AUTO: 5.27 X10*3/UL (ref 1.2–7.7)
NEUTROPHILS # BLD AUTO: 5.41 X10*3/UL (ref 1.2–7.7)
NEUTROPHILS NFR BLD AUTO: 88.6 %
NEUTROPHILS NFR BLD AUTO: 89.5 %
NRBC BLD-RTO: 0 /100 WBCS (ref 0–0)
NRBC BLD-RTO: 0 /100 WBCS (ref 0–0)
PHOSPHATE SERPL-MCNC: 4.1 MG/DL (ref 2.5–4.9)
PLATELET # BLD AUTO: 110 X10*3/UL (ref 150–450)
PLATELET # BLD AUTO: 110 X10*3/UL (ref 150–450)
POTASSIUM SERPL-SCNC: 4.3 MMOL/L (ref 3.5–5.3)
PROT SERPL-MCNC: 5.9 G/DL (ref 6.4–8.2)
PROT SERPL-MCNC: 6 G/DL (ref 6.4–8.2)
PROTHROMBIN TIME: 11.3 SECONDS (ref 9.8–12.8)
RBC # BLD AUTO: 4.05 X10*6/UL (ref 4–5.2)
RBC # BLD AUTO: 4.11 X10*6/UL (ref 4–5.2)
SODIUM SERPL-SCNC: 140 MMOL/L (ref 136–145)
WBC # BLD AUTO: 5.9 X10*3/UL (ref 4.4–11.3)
WBC # BLD AUTO: 6.1 X10*3/UL (ref 4.4–11.3)

## 2025-02-19 PROCEDURE — 99233 SBSQ HOSP IP/OBS HIGH 50: CPT | Performed by: STUDENT IN AN ORGANIZED HEALTH CARE EDUCATION/TRAINING PROGRAM

## 2025-02-19 PROCEDURE — 84075 ASSAY ALKALINE PHOSPHATASE: CPT | Performed by: NURSE PRACTITIONER

## 2025-02-19 PROCEDURE — 2500000002 HC RX 250 W HCPCS SELF ADMINISTERED DRUGS (ALT 637 FOR MEDICARE OP, ALT 636 FOR OP/ED): Mod: SE | Performed by: INTERNAL MEDICINE

## 2025-02-19 PROCEDURE — 93005 ELECTROCARDIOGRAM TRACING: CPT

## 2025-02-19 PROCEDURE — 85384 FIBRINOGEN ACTIVITY: CPT | Performed by: NURSE PRACTITIONER

## 2025-02-19 PROCEDURE — 83615 LACTATE (LD) (LDH) ENZYME: CPT | Performed by: NURSE PRACTITIONER

## 2025-02-19 PROCEDURE — 84100 ASSAY OF PHOSPHORUS: CPT | Performed by: NURSE PRACTITIONER

## 2025-02-19 PROCEDURE — 97161 PT EVAL LOW COMPLEX 20 MIN: CPT | Mod: GP

## 2025-02-19 PROCEDURE — 85610 PROTHROMBIN TIME: CPT | Performed by: NURSE PRACTITIONER

## 2025-02-19 PROCEDURE — 82784 ASSAY IGA/IGD/IGG/IGM EACH: CPT | Performed by: NURSE PRACTITIONER

## 2025-02-19 PROCEDURE — 2500000004 HC RX 250 GENERAL PHARMACY W/ HCPCS (ALT 636 FOR OP/ED): Mod: SE | Performed by: NURSE PRACTITIONER

## 2025-02-19 PROCEDURE — 1170000001 HC PRIVATE ONCOLOGY ROOM DAILY

## 2025-02-19 PROCEDURE — 2500000005 HC RX 250 GENERAL PHARMACY W/O HCPCS: Mod: SE | Performed by: NURSE PRACTITIONER

## 2025-02-19 PROCEDURE — 82565 ASSAY OF CREATININE: CPT | Performed by: NURSE PRACTITIONER

## 2025-02-19 PROCEDURE — S4991 NICOTINE PATCH NONLEGEND: HCPCS | Mod: SE | Performed by: NURSE PRACTITIONER

## 2025-02-19 PROCEDURE — 85025 COMPLETE CBC W/AUTO DIFF WBC: CPT | Performed by: NURSE PRACTITIONER

## 2025-02-19 PROCEDURE — 83735 ASSAY OF MAGNESIUM: CPT | Performed by: NURSE PRACTITIONER

## 2025-02-19 PROCEDURE — 2500000001 HC RX 250 WO HCPCS SELF ADMINISTERED DRUGS (ALT 637 FOR MEDICARE OP): Mod: SE | Performed by: NURSE PRACTITIONER

## 2025-02-19 PROCEDURE — 2500000004 HC RX 250 GENERAL PHARMACY W/ HCPCS (ALT 636 FOR OP/ED): Mod: SE | Performed by: INTERNAL MEDICINE

## 2025-02-19 PROCEDURE — 2500000002 HC RX 250 W HCPCS SELF ADMINISTERED DRUGS (ALT 637 FOR MEDICARE OP, ALT 636 FOR OP/ED): Mod: SE | Performed by: NURSE PRACTITIONER

## 2025-02-19 RX ORDER — ONDANSETRON HYDROCHLORIDE 8 MG/1
16 TABLET, FILM COATED ORAL ONCE
Status: COMPLETED | OUTPATIENT
Start: 2025-02-19 | End: 2025-02-19

## 2025-02-19 RX ORDER — DIPHENHYDRAMINE HYDROCHLORIDE 50 MG/ML
25 INJECTION, SOLUTION INTRAMUSCULAR; INTRAVENOUS DAILY
Status: COMPLETED | OUTPATIENT
Start: 2025-02-20 | End: 2025-02-22

## 2025-02-19 RX ORDER — FAMOTIDINE 10 MG/ML
20 INJECTION, SOLUTION INTRAVENOUS ONCE
Status: COMPLETED | OUTPATIENT
Start: 2025-02-19 | End: 2025-02-19

## 2025-02-19 RX ORDER — DIPHENHYDRAMINE HYDROCHLORIDE 50 MG/ML
25 INJECTION, SOLUTION INTRAMUSCULAR; INTRAVENOUS ONCE
Status: COMPLETED | OUTPATIENT
Start: 2025-02-19 | End: 2025-02-19

## 2025-02-19 RX ORDER — DEXAMETHASONE 4 MG/1
12 TABLET ORAL ONCE
Status: COMPLETED | OUTPATIENT
Start: 2025-02-19 | End: 2025-02-19

## 2025-02-19 RX ADMIN — CYTARABINE 380 MG: 100 INJECTION, SOLUTION INTRATHECAL; INTRAVENOUS; SUBCUTANEOUS at 10:03

## 2025-02-19 RX ADMIN — QUETIAPINE FUMARATE 100 MG: 25 TABLET ORAL at 20:44

## 2025-02-19 RX ADMIN — PREDNISONE 10 MG: 10 TABLET ORAL at 10:03

## 2025-02-19 RX ADMIN — SODIUM CHLORIDE 380 MG: 0.9 INJECTION, SOLUTION INTRAVENOUS at 11:46

## 2025-02-19 RX ADMIN — POLYETHYLENE GLYCOL 3350 17 G: 17 POWDER, FOR SOLUTION ORAL at 13:42

## 2025-02-19 RX ADMIN — FAMOTIDINE 20 MG: 10 INJECTION INTRAVENOUS at 13:53

## 2025-02-19 RX ADMIN — HYDROXYCHLOROQUINE SULFATE 200 MG: 200 TABLET, FILM COATED ORAL at 10:03

## 2025-02-19 RX ADMIN — DIPHENHYDRAMINE HYDROCHLORIDE 25 MG: 50 INJECTION INTRAMUSCULAR; INTRAVENOUS at 13:52

## 2025-02-19 RX ADMIN — HYDROMORPHONE HYDROCHLORIDE 2 MG: 2 TABLET ORAL at 13:40

## 2025-02-19 RX ADMIN — SODIUM CHLORIDE 30 ML: 900 IRRIGANT IRRIGATION at 14:21

## 2025-02-19 RX ADMIN — PANTOPRAZOLE SODIUM 40 MG: 40 TABLET, DELAYED RELEASE ORAL at 10:04

## 2025-02-19 RX ADMIN — ONDANSETRON HYDROCHLORIDE 16 MG: 8 TABLET, FILM COATED ORAL at 10:03

## 2025-02-19 RX ADMIN — CYTARABINE 380 MG: 100 INJECTION, SOLUTION INTRATHECAL; INTRAVENOUS; SUBCUTANEOUS at 20:44

## 2025-02-19 RX ADMIN — NICOTINE 1 PATCH: 14 PATCH, EXTENDED RELEASE TRANSDERMAL at 16:30

## 2025-02-19 RX ADMIN — SODIUM CHLORIDE 30 ML: 900 IRRIGANT IRRIGATION at 10:33

## 2025-02-19 RX ADMIN — DEXAMETHASONE 12 MG: 4 TABLET ORAL at 10:03

## 2025-02-19 RX ADMIN — DULOXETINE HYDROCHLORIDE 60 MG: 60 CAPSULE, DELAYED RELEASE ORAL at 10:04

## 2025-02-19 RX ADMIN — ATORVASTATIN CALCIUM 40 MG: 40 TABLET, FILM COATED ORAL at 10:03

## 2025-02-19 RX ADMIN — OLANZAPINE 5 MG: 5 TABLET, FILM COATED ORAL at 20:44

## 2025-02-19 RX ADMIN — LORAZEPAM 0.5 MG: 0.5 TABLET ORAL at 16:36

## 2025-02-19 RX ADMIN — AMLODIPINE BESYLATE 5 MG: 5 TABLET ORAL at 10:03

## 2025-02-19 SDOH — ECONOMIC STABILITY: GENERAL: SOCIAL DRIVERS OF HEALTH CONSIDERATIONS: HOUSING INSECURITY;SOCIOECONOMIC CONCERNS

## 2025-02-19 ASSESSMENT — ACTIVITIES OF DAILY LIVING (ADL): ADL_ASSISTANCE: INDEPENDENT

## 2025-02-19 ASSESSMENT — COGNITIVE AND FUNCTIONAL STATUS - GENERAL
MOBILITY SCORE: 23
DAILY ACTIVITIY SCORE: 24
MOBILITY SCORE: 24
MOBILITY SCORE: 24
CLIMB 3 TO 5 STEPS WITH RAILING: A LITTLE
DAILY ACTIVITIY SCORE: 24

## 2025-02-19 ASSESSMENT — PAIN SCALES - GENERAL
PAINLEVEL_OUTOF10: 2
PAINLEVEL_OUTOF10: 8

## 2025-02-19 ASSESSMENT — PAIN - FUNCTIONAL ASSESSMENT: PAIN_FUNCTIONAL_ASSESSMENT: 0-10

## 2025-02-19 NOTE — PROGRESS NOTES
Physical Therapy    Physical Therapy Evaluation    Patient Name: Sagrario Garber  MRN: [id number]  Department: [facility]  Room: [facility]-  Today's Date: 2/19/2025   Time Calculation  Start Time: 1220  Stop Time: 1237  Time Calculation (min): 17 min    Assessment/Plan   PT Assessment  PT Assessment Results:  (potential for impaired mobility, strength and balance with prolonged hospitialization)  Rehab Prognosis: Good  Barriers to Discharge Home: Social Drivers of Health considerations  Social Drivers of Health Considerations: Housing insecurity, Socioeconomic concerns  Evaluation/Treatment Tolerance: Patient tolerated treatment well  Medical Staff Made Aware: Yes  Strengths: Attitude of self  Barriers to Participation: Comorbidities  End of Session Communication: Bedside nurse  Assessment Comment: pt admitted for auto transplant 2/24/25. pt does not demonstrate any impairments but benefits from PT following with proglonged hosptialization to reduce risk of impaired mobility and strength.  End of Session Patient Position: Bed, 3 rail up, Alarm off, not on at start of session  IP OR SWING BED PT PLAN  Inpatient or Swing Bed: Inpatient  PT Plan  Treatment/Interventions: Bed mobility, Transfer training, Gait training, Stair training, Balance training, Strengthening, Endurance training, Range of motion, Therapeutic exercise, Therapeutic activity, Home exercise program, Positioning  PT Plan: Ongoing PT  PT Frequency: 1 time per week  PT Discharge Recommendations: No PT needed after discharge  Equipment Recommended upon Discharge:  (TBD)  PT Recommended Transfer Status: Independent  PT - OK to Discharge: Yes    Subjective   General Visit Information:  General  Reason for Referral: Auto Transplant prepped with [facility] (T0=2/24/25)  Past Medical History Relevant to Rehab: Anxiety/Depression, HTN, Lupus and 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 doesnt have set housing post DC  Prior Level of Function:  Prior Function Per Pt/Caregiver Report  Level of [place]: Independent with ADLs and functional transfers  ADL Assistance: Independent  Homemaking Assistance: Independent  Ambulatory Assistance: Independent  Vocational: Unemployed  Prior Function Comments: - falls; uses provide a ride  Precautions:  Precautions  Medical Precautions: Fall precautions  Precautions Comment: protective (H/H 12.6/38.4; WBC 6.1; platelets 110)             Objective   Pain:  Pain Assessment  Pain Assessment: 0-10  0-10 (Numeric) Pain Score: 2  Pain Type: Chronic pain  Pain Location: Generalized  Cognition:  Cognition  Overall Cognitive Status: Within Functional Limits    General Assessments:                  Activity Tolerance  Endurance: Tolerates 10 - 20 min exercise with multiple rests    Sensation  Light Touch:  (neuropathy feet)            Perception  Inattention/Neglect: Appears intact  Initiation: Appears intact  Motor Planning: Appears intact  Perseveration: Not present      Coordination  Movements are Fluid and Coordinated: Yes    Postural Control  Postural Control: Within Functional Limits    Static Sitting Balance  Static Sitting-Balance Support: No upper extremity supported, Feet supported  Static Sitting-Level of Assistance: Independent  Dynamic Sitting Balance  Dynamic Sitting-Balance Support: No upper extremity supported, Feet supported  Dynamic Sitting-Level of Assistance: Independent  Dynamic Sitting-Balance: Trunk control activities, Forward lean    Static Standing Balance  Static Standing-Balance Support: Right upper extremity supported (IV pole)  Static Standing-Level of Assistance: Distant  supervision  Dynamic Standing Balance  Dynamic Standing-Balance Support: Right upper extremity supported (IV pole)  Dynamic Standing-Level of Assistance: Close supervision  Dynamic Standing-Balance: Turning  Functional Assessments:  Bed Mobility  Bed Mobility: Yes  Bed Mobility 1  Bed Mobility 1: Supine to sitting, Sitting to supine  Level of Assistance 1: Distant supervision  Bed Mobility Comments 1: HOB max elevated    Transfers  Transfer: Yes  Transfer 1  Transfer From 1: Sit to, Stand to  Transfer to 1: Sit, Stand  Technique 1: Sit to stand, Stand to sit  Transfer Device 1:  (NA)  Transfer Level of Assistance 1: Close supervision  Trials/Comments 1: x1; additional 9 during 30 STS    Ambulation/Gait Training  Ambulation/Gait Training Performed: Yes  Ambulation/Gait Training 1  Surface 1: Level tile  Device 1: IV Pole  Assistance 1: Close supervision  Quality of Gait 1: Narrow base of support  Comments/Distance (ft) 1: 15ftx1; 1220 ft    Stairs  Stairs: No  Extremity/Trunk Assessments:  RUE   RUE : Within Functional Limits  LUE   LUE: Within Functional Limits  RLE   RLE : Within Functional Limits  LLE   LLE : Within Functional Limits  Outcome Measures:  [facility] Basic Mobility  Turning from your back to your side while in a flat bed without using bedrails: None  Moving from lying on your back to sitting on the side of a flat bed without using bedrails: None  Moving to and from bed to chair (including a wheelchair): None  Standing up from a chair using your arms (e.g. wheelchair or bedside chair): None  To walk in hospital room: None  Climbing 3-5 steps with railing: A little  Basic Mobility - Total Score: 23    Tinetti  Sitting Balance: Steady, safe  Arises: Able without using arms  Attempts to Arise: Able to arise, one attempt  Immediate Standing Balance (First 5 Seconds): Steady without walker or other support  Standing Balance: Narrow stance without support  Nudged: Steady without walker or other support  Eyes  Closed: Steady  Turned 360 Degrees: Steadiness: Steady  Turned 360 Degrees: Continuity of Steps: Continuous  Sitting Down: Safe, smooth motion  Balance Score: 16  Initiation of Gait: No hesitancy  Step Height: R Swing Foot: Right foot complete clears floor  Step Length: R Swing Foot: Passes left stance foot  Step Height: L Swing Foot: Left foot complete clears floor  Step Length: L Swing Foot: Passes right stance foot  Step Symmetry: Right and left step appear equal  Step Continuity: Steps appear continuous  Path: Mild/moderate deviation or uses walking aid  Trunk: No sway but flexion of knees or back or spreads arms out while walking  Walking Time: Heels almost touching while walking  Gait Score: 10  Total Score: 26    Other Measures  6 min Walk Test: 1220ft; use of IV pole  30 Second Sit to Stand: 9    Encounter Problems       Encounter Problems (Active)       Balance       pt will achieve >/=24/28 on Tinetti Assessment.  (Progressing)       Start:  02/19/25    Expected End:  03/12/25            Pt will perform the 30 second STS test performing 13 STS to demonstrate average functional lower limb muscle strength for a community dwelling adult.  (Progressing)       Start:  02/19/25    Expected End:  03/12/25               Mobility       STG - Patient will ambulate >/= 1000 ft independently  (Progressing)       Start:  02/19/25    Expected End:  03/12/25            STG - Patient will ascend and descend a flight of stairs independently  (Progressing)       Start:  02/19/25    Expected End:  03/12/25            pt will ambulate >600 feet during 6MWT without requiring rest breaks with stable vitals and RPE </=13/20, RPD </=3/10  (Progressing)       Start:  02/19/25    Expected End:  03/12/25               PT Transfers       STG - Patient will perform bed mobility independently  (Progressing)       Start:  02/19/25    Expected End:  03/12/25            STG - Patient will transfer sit to and from stand independently   (Progressing)       Start:  02/19/25    Expected End:  03/12/25                       Education Documentation  Precautions, taught by Cherry Dasilva PT at 2/19/2025  2:30 PM.  Learner: Patient  Readiness: Acceptance  Method: Explanation  Response: Verbalizes Understanding    Body Mechanics, taught by Cherry Dasilva PT at 2/19/2025  2:30 PM.  Learner: Patient  Readiness: Acceptance  Method: Explanation  Response: Verbalizes Understanding    Mobility Training, taught by Cherry Dasilva PT at 2/19/2025  2:30 PM.  Learner: Patient  Readiness: Acceptance  Method: Explanation  Response: Verbalizes Understanding    Education Comments  No comments found.      02/19/25 at 2:31 PM - Cherry Dasilva PT

## 2025-02-19 NOTE — NURSING NOTE
Dose # 1 of carmustine chemotherapy 522 mg in 656.6 mL given over 2 hours via right trialysis catheter.  Dose up at 2149  and down at 2349.  Pre-medicated with dexamethasone, ondansetron, and fosaprepitant.  Patient, dose and rate verified with second RN, Ronda Martin.  + BBR obtained via syringe aspiration before and after administration.  Patient with no side effects.

## 2025-02-19 NOTE — PROGRESS NOTES
"Sagrario Garber is a 49 y.o. female on day 1 of admission presenting with Peripheral T-cell lymphoma (Multi).    Subjective   Afebrile. Offers no complaints    Had mild reaction to Carmustine, rescue meds given without residual compromise. Currently off oxygen and feeling well, sitting up in bed eating breakfast with Mom and sister Liliana at bedside.        Objective     Physical Exam  Constitutional:       General: She is not in acute distress.     Appearance: Normal appearance. She is not ill-appearing.   HENT:      Head: Normocephalic.      Nose: Nose normal. No congestion.      Mouth/Throat:      Mouth: Mucous membranes are moist.      Pharynx: Oropharynx is clear.      Comments: Poor dentition   Eyes:      General: No scleral icterus.     Pupils: Pupils are equal, round, and reactive to light.   Cardiovascular:      Rate and Rhythm: Normal rate and regular rhythm.      Heart sounds: Normal heart sounds.      Comments: Trace BLE edema (non pitting)  Pulmonary:      Effort: Pulmonary effort is normal.      Breath sounds: Normal breath sounds and air entry.   Abdominal:      General: Bowel sounds are normal. There is no distension.      Palpations: Abdomen is soft.      Tenderness: There is no abdominal tenderness.   Lymphadenopathy:      Lower Body: No right inguinal (Right inguinal adenopathy (round LN 4 x 4 cm)) adenopathy.   Skin:     General: Skin is warm.      Coloration: Skin is pale.   Neurological:      Mental Status: She is alert.         Last Recorded Vitals  Blood pressure 104/68, pulse 97, temperature 36.2 °C (97.2 °F), temperature source Temporal, resp. rate 18, height 1.685 m (5' 6.34\"), weight 77.4 kg (170 lb 10.5 oz), SpO2 92%.  Intake/Output last 3 Shifts:  I/O last 3 completed shifts:  In: 906.6 (11.7 mL/kg) [IV Piggyback:906.6]  Out: - (0 mL/kg)   Weight: 77.4 kg     Relevant Results  Scheduled medications  amLODIPine, 5 mg, oral, Daily  atorvastatin, 40 mg, oral, Daily  cytarabine, 200 mg/m2 " (Treatment Plan Recorded), intravenous, q12h  DULoxetine, 60 mg, oral, Daily  etoposide, 200 mg/m2 (Treatment Plan Recorded), intravenous, Once  hydroxychloroquine, 200 mg, oral, Daily  nicotine, 1 patch, transdermal, Daily  OLANZapine, 5 mg, oral, Nightly  pantoprazole, 40 mg, oral, Daily before breakfast  polyethylene glycol, 17 g, oral, Daily  predniSONE, 10 mg, oral, Daily  QUEtiapine, 100 mg, oral, Nightly  sodium chloride, 30 mL, irrigation, 5x daily         This patient has a central line   Reason for the central line remaining today? Hemodynamic monitoring    Assessment/Plan   Assessment & Plan  Peripheral T-cell lymphoma (Multi)    Angioimmunoblastic T-cell lymphoma    Ms. 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 admitting for Auto Transplant prepped with [facility] (T0=2/24/25)     Currently T-5 Auto prepped with [facility] (T0=2/24/25)      ONC: (per chart review; see onc history for complete treatment)    --Angioimmunoblastic T-Cell Lymphoma (CD 30+ AK Negative)   Staging PET s/p Cycle 5: excellent response (see PET dated 12/23/24)   S/P BV-CHP (June 2024) x 6 cycles       Autologous Stem Cell Transplant   Prep: [facility]   Carmustine 300mg/m2 (T-6)   Cytarabine 200mg/m2 (T-5, -4, -3, -2)   Etoposide 200mg/m2 (T-5, -4, -3, -2)   Melphalan 140mg/m2 (T-1)    Cells Collected 5.68 x 10^6 CD34 cells    Cells Infused (2/24): PENDING      Surveillance Monitoring   IgG: On admission : PENDING  Coag/Fibrinogen 2x/week (2/18): WNL   LDH/Hapto: Weekly (2/18): WNL       HEME:   --Mild Thrombocytopenia likely secondary to recent chemotherapy, no evidence of bleeding    --No evidence of DIC or hypercoag state   --Transfuse if Hgb<7 and/or Plt<10 (hold DVT ppx when PLT<50k)   --Lovenox 40mg for DVT ppx       ID:   Allergy: Amoxicillin    --No evidence of active infection on admit   --Start ACV on T+0 and Fluconazole T+1     --Plan  Levofloxacin prophylaxis when neutropenic   --Plan Cefepime for neutropenic fever due to PCN allergy    --Plan Bactrim 800/160mg qMWF start T+30     FEN/GI:   Admit Wt: 77.1 Kg    --Hydration per chemotherapy order set    --PPI prophy w/protonix on admit   --Monitor electrolytes, replace as needed   --Dietician consulted on admission      CARDIAC:   History of HTN  --Continue home Norvasc 5mg/day and Atorvastatin 40mg/day @ HS    --ECHO (1/22/25): EF 60-65%  --Weekly EKG (2/18): PENDING   --PT consulted on admission      RHEUMATOID:   History of Lupus and Rheumatoid Arthritis    --Currently on Placquenil, Prednisone 10mg/day,    --Follows with Dr Dobbs        --Previously on Saphnelo (interferon alpha antagonist until 7/2023)      PSYCH:   Anxiety/Depression    --Continue home Seroquil, Cymbalta   --Will need in patient psych consult (2/20): PENDING       MISC:   Nicotine Dependence    Currently smoker; Nicotine patch ordered on admit   Patient considering smoking cessation    Social Situation, currently homeless, living in SNF      DISPO:   Full Code confirmed on admit    NOK Mom: Stephanie Wilson [phone]   Non-Tunneled CVC (placed 2/11) remove at discharge     Blood Consent Signed on Admission   Primary Onc: Dr. Gunjan Varela       Patient seen, examined and discussed with Malignant Heme attending Dr. Houston Mcgraw, APRN-CNP

## 2025-02-19 NOTE — CARE PLAN
The patient's goals for the shift include      The clinical goals for the shift include Pt will remain HDS throughout shift    Problem: Pain - Adult  Goal: Verbalizes/displays adequate comfort level or baseline comfort level  Outcome: Progressing     Problem: Safety - Adult  Goal: Free from fall injury  Outcome: Progressing     Problem: Nutrition  Goal: Nutrient intake appropriate for maintaining nutritional needs  Outcome: Progressing     Problem: Fall/Injury  Goal: Not fall by end of shift  Outcome: Progressing  Goal: Be free from injury by end of the shift  Outcome: Progressing  Goal: Verbalize understanding of personal risk factors for fall in the hospital  Outcome: Progressing  Goal: Verbalize understanding of risk factor reduction measures to prevent injury from fall in the home  Outcome: Progressing  Goal: Use assistive devices by end of the shift  Outcome: Progressing  Goal: Pace activities to prevent fatigue by end of the shift  Outcome: Progressing

## 2025-02-19 NOTE — SIGNIFICANT EVENT
Rapid Response Nurse Note: RADAR alert: 7    Pager time:   Arrival time:   Event end time:   Location: [facility]  [x] Triage by phone or secure messaging    Rapid response initiated by:  [] Rapid response RN [] Family [] Nursing Supervisor [] Physician   [x] RADAR auto page [] Sepsis auto-page [] RN [] RT   [] NP/PA [] Other:     Primary reason for call:   [] BAT [] New CPAP/BiPAP [] Bleeding [] Change in mental status   [] Chest pain [] Code blue [] FiO2 >/= 50% [] HR </= 40 bpm   [] HR >/= 130 bpm [] Hyperglycemia [] Hypoglycemia [x] RADAR    [] RR </= 8 bpm [] RR >/= 30 bpm [] SBP </= 90 mmHg [] SpO2 < 90%   [] Seizure [] Sepsis [] Shortness of breath  [] Staff concern: see comments     Initial VS and/or RADAR VS: T 36.3 °C; HR 81; RR 18; BP 89/56; SPO2 93%.    Providers present at bedside (if applicable):     Name of ICU Provider contacted (if applicable):     Interventions:  [x] None [] ABG/VBG [] Assist w/ICU transfer [] BAT paged    [] Bag mask [] Blood [] Cardioversion [] Code Blue   [] Code blue for intubation [] Code status changed [] Chest x-ray [] EKG   [] IV fluid/bolus [] KUB x-ray [] Labs/cultures [] Medication   [] Nebulizer treatment [] NIPPV (CPAP/BiPAP) [] Oxygen [] Oral airway   [] Peripheral IV [] Palliative care consult [] CT/MRI [] Sepsis protocol    [] Suctioned [] Other:     Outcome:  [] Coded and  [] Code blue for intubation [] Coded and transferred to ICU []  on division   [x] Remained on division (no change) [] Remained on division + additional monitoring [] Remained in ED [] Transferred to ED   [] Transferred to ICU [] Transferred to inpatient status [] Transferred for interventions (procedure) [] Transferred to ICU stepdown    [] Transferred to surgery [] Transferred to telemetry [] Sepsis protocol [] STEMI protocol   [] Stroke protocol [x] Bedside nurse instructed to page rapid response for any concerns or acute change in condition/VS     Additional Comments:  Reviewed above RADAR VS with bedside RN.  Per bedside RN, the patient is asleep after getting benadryl, zyprexa, seroquel and zofran prior to Carmustine infusion.   Patient is currently getting a 500 ml fluid bolus per the teams orders.   No interventions by rapid response team indicated at this time.  Staff to page rapid response for any concerns or acute change in condition/VS.

## 2025-02-20 LAB
ALBUMIN SERPL BCP-MCNC: 3.6 G/DL (ref 3.4–5)
ALP SERPL-CCNC: 80 U/L (ref 33–110)
ALT SERPL W P-5'-P-CCNC: 19 U/L (ref 7–45)
ANION GAP SERPL CALC-SCNC: 8 MMOL/L (ref 10–20)
AST SERPL W P-5'-P-CCNC: 11 U/L (ref 9–39)
ATRIAL RATE: 85 BPM
BASOPHILS # BLD AUTO: 0.01 X10*3/UL (ref 0–0.1)
BASOPHILS NFR BLD AUTO: 0.2 %
BILIRUB DIRECT SERPL-MCNC: 0 MG/DL (ref 0–0.3)
BILIRUB SERPL-MCNC: 0.2 MG/DL (ref 0–1.2)
BUN SERPL-MCNC: 17 MG/DL (ref 6–23)
CALCIUM SERPL-MCNC: 8.6 MG/DL (ref 8.6–10.6)
CHLORIDE SERPL-SCNC: 110 MMOL/L (ref 98–107)
CO2 SERPL-SCNC: 27 MMOL/L (ref 21–32)
CREAT SERPL-MCNC: 0.5 MG/DL (ref 0.5–1.05)
EGFRCR SERPLBLD CKD-EPI 2021: >90 ML/MIN/1.73M*2
EOSINOPHIL # BLD AUTO: 0.01 X10*3/UL (ref 0–0.7)
EOSINOPHIL NFR BLD AUTO: 0.2 %
ERYTHROCYTE [DISTWIDTH] IN BLOOD BY AUTOMATED COUNT: 14.2 % (ref 11.5–14.5)
GLUCOSE SERPL-MCNC: 195 MG/DL (ref 74–99)
HCT VFR BLD AUTO: 33.8 % (ref 36–46)
HGB BLD-MCNC: 11 G/DL (ref 12–16)
IMM GRANULOCYTES # BLD AUTO: 0.04 X10*3/UL (ref 0–0.7)
IMM GRANULOCYTES NFR BLD AUTO: 0.7 % (ref 0–0.9)
LYMPHOCYTES # BLD AUTO: 0.48 X10*3/UL (ref 1.2–4.8)
LYMPHOCYTES NFR BLD AUTO: 8.8 %
MAGNESIUM SERPL-MCNC: 2.18 MG/DL (ref 1.6–2.4)
MCH RBC QN AUTO: 30.8 PG (ref 26–34)
MCHC RBC AUTO-ENTMCNC: 32.5 G/DL (ref 32–36)
MCV RBC AUTO: 95 FL (ref 80–100)
MONOCYTES # BLD AUTO: 0.24 X10*3/UL (ref 0.1–1)
MONOCYTES NFR BLD AUTO: 4.4 %
NEUTROPHILS # BLD AUTO: 4.66 X10*3/UL (ref 1.2–7.7)
NEUTROPHILS NFR BLD AUTO: 85.7 %
NRBC BLD-RTO: 0 /100 WBCS (ref 0–0)
P AXIS: 68 DEGREES
P OFFSET: 196 MS
P ONSET: 147 MS
PHOSPHATE SERPL-MCNC: 2.9 MG/DL (ref 2.5–4.9)
PLATELET # BLD AUTO: 146 X10*3/UL (ref 150–450)
POTASSIUM SERPL-SCNC: 3.9 MMOL/L (ref 3.5–5.3)
PR INTERVAL: 136 MS
PROT SERPL-MCNC: 5.6 G/DL (ref 6.4–8.2)
Q ONSET: 215 MS
QRS COUNT: 14 BEATS
QRS DURATION: 92 MS
QT INTERVAL: 374 MS
QTC CALCULATION(BAZETT): 445 MS
QTC FREDERICIA: 420 MS
R AXIS: 31 DEGREES
RBC # BLD AUTO: 3.57 X10*6/UL (ref 4–5.2)
SODIUM SERPL-SCNC: 141 MMOL/L (ref 136–145)
T AXIS: 40 DEGREES
T OFFSET: 402 MS
VENTRICULAR RATE: 85 BPM
WBC # BLD AUTO: 5.4 X10*3/UL (ref 4.4–11.3)

## 2025-02-20 PROCEDURE — 84100 ASSAY OF PHOSPHORUS: CPT | Performed by: NURSE PRACTITIONER

## 2025-02-20 PROCEDURE — 2500000001 HC RX 250 WO HCPCS SELF ADMINISTERED DRUGS (ALT 637 FOR MEDICARE OP): Mod: SE | Performed by: NURSE PRACTITIONER

## 2025-02-20 PROCEDURE — 2500000005 HC RX 250 GENERAL PHARMACY W/O HCPCS: Mod: SE | Performed by: NURSE PRACTITIONER

## 2025-02-20 PROCEDURE — 83735 ASSAY OF MAGNESIUM: CPT | Performed by: NURSE PRACTITIONER

## 2025-02-20 PROCEDURE — 2500000004 HC RX 250 GENERAL PHARMACY W/ HCPCS (ALT 636 FOR OP/ED): Mod: SE | Performed by: NURSE PRACTITIONER

## 2025-02-20 PROCEDURE — 85025 COMPLETE CBC W/AUTO DIFF WBC: CPT | Performed by: NURSE PRACTITIONER

## 2025-02-20 PROCEDURE — 80053 COMPREHEN METABOLIC PANEL: CPT | Performed by: NURSE PRACTITIONER

## 2025-02-20 PROCEDURE — 1170000001 HC PRIVATE ONCOLOGY ROOM DAILY

## 2025-02-20 PROCEDURE — S4991 NICOTINE PATCH NONLEGEND: HCPCS | Mod: SE | Performed by: NURSE PRACTITIONER

## 2025-02-20 PROCEDURE — 99233 SBSQ HOSP IP/OBS HIGH 50: CPT | Performed by: STUDENT IN AN ORGANIZED HEALTH CARE EDUCATION/TRAINING PROGRAM

## 2025-02-20 PROCEDURE — 2500000004 HC RX 250 GENERAL PHARMACY W/ HCPCS (ALT 636 FOR OP/ED): Mod: SE | Performed by: INTERNAL MEDICINE

## 2025-02-20 PROCEDURE — 2500000002 HC RX 250 W HCPCS SELF ADMINISTERED DRUGS (ALT 637 FOR MEDICARE OP, ALT 636 FOR OP/ED): Mod: SE | Performed by: INTERNAL MEDICINE

## 2025-02-20 PROCEDURE — 2500000002 HC RX 250 W HCPCS SELF ADMINISTERED DRUGS (ALT 637 FOR MEDICARE OP, ALT 636 FOR OP/ED): Mod: SE | Performed by: NURSE PRACTITIONER

## 2025-02-20 RX ORDER — NICOTINE 7MG/24HR
1 PATCH, TRANSDERMAL 24 HOURS TRANSDERMAL DAILY
Status: DISCONTINUED | OUTPATIENT
Start: 2025-02-20 | End: 2025-03-02

## 2025-02-20 RX ORDER — DULOXETIN HYDROCHLORIDE 60 MG/1
60 CAPSULE, DELAYED RELEASE ORAL 2 TIMES DAILY
Status: DISCONTINUED | OUTPATIENT
Start: 2025-02-20 | End: 2025-03-17 | Stop reason: HOSPADM

## 2025-02-20 RX ORDER — ONDANSETRON HYDROCHLORIDE 8 MG/1
16 TABLET, FILM COATED ORAL ONCE
Status: COMPLETED | OUTPATIENT
Start: 2025-02-20 | End: 2025-02-20

## 2025-02-20 RX ORDER — OXYCODONE HYDROCHLORIDE 5 MG/1
5 TABLET ORAL EVERY 6 HOURS PRN
Status: DISCONTINUED | OUTPATIENT
Start: 2025-02-20 | End: 2025-03-17 | Stop reason: HOSPADM

## 2025-02-20 RX ORDER — SENNOSIDES 8.6 MG/1
1 TABLET ORAL NIGHTLY
Status: DISCONTINUED | OUTPATIENT
Start: 2025-02-20 | End: 2025-02-22

## 2025-02-20 RX ORDER — DEXAMETHASONE 4 MG/1
12 TABLET ORAL ONCE
Status: COMPLETED | OUTPATIENT
Start: 2025-02-20 | End: 2025-02-20

## 2025-02-20 RX ADMIN — OXYCODONE 5 MG: 5 TABLET ORAL at 20:47

## 2025-02-20 RX ADMIN — OXYCODONE 5 MG: 5 TABLET ORAL at 15:16

## 2025-02-20 RX ADMIN — QUETIAPINE FUMARATE 100 MG: 25 TABLET ORAL at 20:41

## 2025-02-20 RX ADMIN — DIPHENHYDRAMINE HYDROCHLORIDE 25 MG: 50 INJECTION INTRAMUSCULAR; INTRAVENOUS at 09:31

## 2025-02-20 RX ADMIN — SODIUM CHLORIDE 380 MG: 0.9 INJECTION, SOLUTION INTRAVENOUS at 12:38

## 2025-02-20 RX ADMIN — SODIUM CHLORIDE 30 ML: 900 IRRIGANT IRRIGATION at 18:42

## 2025-02-20 RX ADMIN — CYTARABINE 380 MG: 100 INJECTION, SOLUTION INTRATHECAL; INTRAVENOUS; SUBCUTANEOUS at 10:59

## 2025-02-20 RX ADMIN — DULOXETINE HYDROCHLORIDE 60 MG: 60 CAPSULE, DELAYED RELEASE ORAL at 09:32

## 2025-02-20 RX ADMIN — HYDROXYCHLOROQUINE SULFATE 200 MG: 200 TABLET, FILM COATED ORAL at 09:32

## 2025-02-20 RX ADMIN — HYDROMORPHONE HYDROCHLORIDE 2 MG: 2 TABLET ORAL at 09:32

## 2025-02-20 RX ADMIN — PREDNISONE 10 MG: 10 TABLET ORAL at 09:32

## 2025-02-20 RX ADMIN — ONDANSETRON HYDROCHLORIDE 16 MG: 8 TABLET, FILM COATED ORAL at 09:32

## 2025-02-20 RX ADMIN — SODIUM CHLORIDE 30 ML: 900 IRRIGANT IRRIGATION at 22:37

## 2025-02-20 RX ADMIN — PANTOPRAZOLE SODIUM 40 MG: 40 TABLET, DELAYED RELEASE ORAL at 06:13

## 2025-02-20 RX ADMIN — AMLODIPINE BESYLATE 5 MG: 5 TABLET ORAL at 09:32

## 2025-02-20 RX ADMIN — SODIUM CHLORIDE 30 ML: 900 IRRIGANT IRRIGATION at 13:36

## 2025-02-20 RX ADMIN — POLYETHYLENE GLYCOL 3350 17 G: 17 POWDER, FOR SOLUTION ORAL at 13:36

## 2025-02-20 RX ADMIN — DULOXETINE HYDROCHLORIDE 60 MG: 60 CAPSULE, DELAYED RELEASE ORAL at 20:41

## 2025-02-20 RX ADMIN — SODIUM CHLORIDE 30 ML: 900 IRRIGANT IRRIGATION at 09:41

## 2025-02-20 RX ADMIN — CYTARABINE 380 MG: 100 INJECTION, SOLUTION INTRATHECAL; INTRAVENOUS; SUBCUTANEOUS at 22:37

## 2025-02-20 RX ADMIN — SENNOSIDES 8.6 MG: 8.6 TABLET, FILM COATED ORAL at 20:41

## 2025-02-20 RX ADMIN — DEXAMETHASONE 12 MG: 4 TABLET ORAL at 09:31

## 2025-02-20 RX ADMIN — LORAZEPAM 0.5 MG: 0.5 TABLET ORAL at 09:32

## 2025-02-20 RX ADMIN — OLANZAPINE 5 MG: 5 TABLET, FILM COATED ORAL at 20:41

## 2025-02-20 RX ADMIN — LORAZEPAM 0.5 MG: 0.5 TABLET ORAL at 22:47

## 2025-02-20 RX ADMIN — NICOTINE 7 MG/24 HR DAILY TRANSDERMAL PATCH 1 PATCH: at 10:58

## 2025-02-20 RX ADMIN — ATORVASTATIN CALCIUM 40 MG: 40 TABLET, FILM COATED ORAL at 09:32

## 2025-02-20 ASSESSMENT — COGNITIVE AND FUNCTIONAL STATUS - GENERAL
DAILY ACTIVITIY SCORE: 24
MOBILITY SCORE: 24
DAILY ACTIVITIY SCORE: 24
MOBILITY SCORE: 24

## 2025-02-20 ASSESSMENT — ACTIVITIES OF DAILY LIVING (ADL): LACK_OF_TRANSPORTATION: YES

## 2025-02-20 ASSESSMENT — PAIN SCALES - GENERAL
PAINLEVEL_OUTOF10: 0 - NO PAIN
PAINLEVEL_OUTOF10: 8
PAINLEVEL_OUTOF10: 8

## 2025-02-20 NOTE — PROGRESS NOTES
02/20/25 1300   Discharge Planning   Living Arrangements Other (Comment)  (nursing home)   Support Systems Other (Comment)  (daughter sometimes)   Type of Residence Nursing home/residential care   Home or Post Acute Services Post acute facilities (Rehab/SNF/etc)   Type of Post Acute Facility Services Other (Comment)   Expected Discharge Disposition SNF   Financial Resource Strain   How hard is it for you to pay for the very basics like food, housing, medical care, and heating? Hard   Housing Stability   In the last 12 months, was there a time when you were not able to pay the mortgage or rent on time? Y   In the past 12 months, how many times have you moved where you were living? 4   At any time in the past 12 months, were you homeless or living in a shelter (including now)? N   Transportation Needs   In the past 12 months, has lack of transportation kept you from medical appointments or from getting medications? yes   In the past 12 months, has lack of transportation kept you from meetings, work, or from getting things needed for daily living? Yes     SW met with the patient at the bedside to complete discharge assessment and assist with planning for the patient's care at discharge. Patient shared that [facility] until this admission. Patient shared that when she went to the SNF she was very deconditioned and needed assistance with her ADL's. Patient shared she is doing much better and would prefer not returning to the nursing facility if possible. Patient did share that she is homeless and the address on her demographics is her daughter's address and she will not be able to stay with her daughter at discharge. The patient shared her daughter is supportive but lives over an hour an a half away and she would not be able to transport the patient to her appointments.  Patient prefers staying in the [facility] and being closer to her medical  team.   Patient shared she has a severely autistic son and he lives in a group home on the [place] [facility]. Patient has a  and he recently completed a program for alcohol abuse and currently is  living with his sponsor. They have a good relationship but he is not available for support.   Patient provided the patient with information about [facility] and the patient is very interested and SW left a message for intake at [facility] and requested a call back. Will continue to follow for support and assisting with a safe discharge plan.  QI Garcia  2/20/25@1:45pm

## 2025-02-20 NOTE — PROGRESS NOTES
"Sagrario Garber is a 49 y.o. female on day 2 of admission presenting with Peripheral T-cell lymphoma (Multi).    Subjective   Offers no complaints denies CP SOB palpitations HA vision changes no abdominal pain noo NVD no dysuria or flank pain no bleeding noted. Appetite robust, ambulating in halls       + mild constipation        Objective     Physical Exam  Constitutional:       General: She is not in acute distress.     Appearance: Normal appearance. She is not ill-appearing.   HENT:      Head: Normocephalic.      Nose: Nose normal. No congestion.      Mouth/Throat:      Mouth: Mucous membranes are moist.      Pharynx: Oropharynx is clear.      Comments: Poor dentition   Eyes:      General: No scleral icterus.     Pupils: Pupils are equal, round, and reactive to light.   Cardiovascular:      Rate and Rhythm: Normal rate and regular rhythm.      Heart sounds: Normal heart sounds.      Comments: Trace BLE edema (non pitting)  Pulmonary:      Effort: Pulmonary effort is normal.      Breath sounds: Normal breath sounds and air entry.   Abdominal:      General: Bowel sounds are normal. There is no distension.      Palpations: Abdomen is soft.      Tenderness: There is no abdominal tenderness.   Lymphadenopathy:      Lower Body: No right inguinal (Right inguinal adenopathy (round LN 4 x 4 cm)) adenopathy.   Skin:     General: Skin is warm.      Coloration: Skin is pale.      Comments: Facial erythema    Neurological:      Mental Status: She is alert.         Last Recorded Vitals  Blood pressure 100/64, pulse 76, temperature 36.5 °C (97.7 °F), temperature source Oral, resp. rate 18, height 1.685 m (5' 6.34\"), weight 77.4 kg (170 lb 10.5 oz), SpO2 97%.  Intake/Output last 3 Shifts:  I/O last 3 completed shifts:  In: 2285 (29.5 mL/kg) [IV Piggyback:2285]  Out: - (0 mL/kg)   Weight: 77.4 kg     Relevant Results  Scheduled medications  amLODIPine, 5 mg, oral, Daily  atorvastatin, 40 mg, oral, Daily  cytarabine, 200 mg/m2 " (Treatment Plan Recorded), intravenous, q12h  diphenhydrAMINE, 25 mg, intravenous, Daily  DULoxetine, 60 mg, oral, Daily  etoposide, 200 mg/m2 (Treatment Plan Recorded), intravenous, Once  hydroxychloroquine, 200 mg, oral, Daily  nicotine, 1 patch, transdermal, Daily  OLANZapine, 5 mg, oral, Nightly  pantoprazole, 40 mg, oral, Daily before breakfast  polyethylene glycol, 17 g, oral, Daily  predniSONE, 10 mg, oral, Daily  QUEtiapine, 100 mg, oral, Nightly  sodium chloride, 30 mL, irrigation, 5x daily         This patient has a central line   Reason for the central line remaining today? Hemodynamic monitoring    Assessment/Plan   Assessment & Plan  Peripheral T-cell lymphoma (Multi)    Angioimmunoblastic T-cell lymphoma    Ms. 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 admitting for Auto Transplant prepped with BE[facility] (T0=2/24/25)     Currently T-4 Auto prepped with BEAM (T0=2/24/25)      ONC: (per chart review; see onc history for complete treatment)    --Angioimmunoblastic T-Cell Lymphoma (CD 30+ AK Negative)   Staging PET s/p Cycle 5: excellent response (see PET dated 12/23/24)   S/P BV-CHP (June 2024) x 6 cycles       Autologous Stem Cell Transplant   Prep: BEAM   Carmustine 300mg/m2 (T-6)   Cytarabine 200mg/m2 (T-5, -4, -3, -2)   Etoposide 200mg/m2 (T-5, -4, -3, -2)   --Due to RA high risk for reactions scheduled Benadryl 25mg with each dose   Melphalan 140mg/m2 (T-1)    Cells Collected 5.68 x 10^6 CD34 cells    Cells Infused (2/24): PENDING      Surveillance Monitoring   IgG: On admission : 445  Coag/Fibrinogen 2x/week (2/18): WNL   LDH/Hapto: Weekly (2/18): WNL       HEME:   --Mild Thrombocytopenia likely secondary to recent chemotherapy, no evidence of bleeding    --No evidence of DIC or hypercoag state   --Transfuse if Hgb<7 and/or Plt<10 (hold DVT ppx when PLT<50k)   --Lovenox 40mg for DVT ppx       ID:   Allergy:  Amoxicillin    --No evidence of active infection on admit   --Start ACV on T+0 and Fluconazole T+1     --Plan Levofloxacin prophylaxis when neutropenic   --Plan Cefepime for neutropenic fever due to PCN allergy    --Plan Bactrim 800/160mg qMWF start T+30     FEN/GI:   Admit Wt: 77.1 Kg    --Hydration per chemotherapy order set    --PPI prophy w/protonix on admit   --Monitor electrolytes, replace as needed   --Dietician consulted on admission   Constipation   --Miralax and Senna      CARDIAC:   History of HTN  --Continue home Norvasc 5mg/day and Atorvastatin 40mg/day @ HS    --ECHO (1/22/25): EF 60-65%  --Weekly EKG (2/18) due to QT prolonging meds: (2/19)   --PT consulted on admission      RHEUMATOID:   History of Lupus and Rheumatoid Arthritis    --Currently on Placquenil, Prednisone 10mg/day,    --Follows with Dr Dobbs        --Previously on Saphnelo (interferon alpha antagonist until 7/2023)      PSYCH:   Anxiety/Depression    --Continue home Seroquil, Cymbalta   --Consider in patient psych consult:        MISC:   Nicotine Dependence    Currently smoker; Nicotine patch ordered on admit   Patient considering smoking cessation    Social Situation, currently homeless, living in SNF      DISPO:   Full Code confirmed on admit    NOK Mom: Stephanie Wilson [phone]   Non-Tunneled CVC (placed 2/11) remove at discharge     Blood Consent Signed on Admission   Primary Onc: Dr. Gunjan Varela       Patient seen, examined and discussed with Malignant Heme attending Dr. Houston Mcgraw, APRN-CNP

## 2025-02-20 NOTE — CARE PLAN
The patient's goals for the shift include      The clinical goals for the shift include Pt will remain HDS throughout shift    Problem: Pain - Adult  Goal: Verbalizes/displays adequate comfort level or baseline comfort level  Outcome: Progressing     Problem: Safety - Adult  Goal: Free from fall injury  Outcome: Progressing     Problem: Discharge Planning  Goal: Discharge to home or other facility with appropriate resources  Outcome: Progressing     Problem: Nutrition  Goal: Nutrient intake appropriate for maintaining nutritional needs  Outcome: Progressing     Problem: Fall/Injury  Goal: Not fall by end of shift  Outcome: Progressing  Goal: Be free from injury by end of the shift  Outcome: Progressing  Goal: Verbalize understanding of personal risk factors for fall in the hospital  Outcome: Progressing  Goal: Verbalize understanding of risk factor reduction measures to prevent injury from fall in the home  Outcome: Progressing  Goal: Use assistive devices by end of the shift  Outcome: Progressing  Goal: Pace activities to prevent fatigue by end of the shift  Outcome: Progressing

## 2025-02-21 LAB
ALBUMIN SERPL BCP-MCNC: 3.6 G/DL (ref 3.4–5)
ALP SERPL-CCNC: 65 U/L (ref 33–110)
ALT SERPL W P-5'-P-CCNC: 26 U/L (ref 7–45)
ANION GAP SERPL CALC-SCNC: 11 MMOL/L (ref 10–20)
APTT PPP: 26 SECONDS (ref 27–38)
AST SERPL W P-5'-P-CCNC: 15 U/L (ref 9–39)
BASOPHILS # BLD AUTO: 0 X10*3/UL (ref 0–0.1)
BASOPHILS NFR BLD AUTO: 0 %
BILIRUB DIRECT SERPL-MCNC: 0 MG/DL (ref 0–0.3)
BILIRUB SERPL-MCNC: 0.3 MG/DL (ref 0–1.2)
BUN SERPL-MCNC: 17 MG/DL (ref 6–23)
CALCIUM SERPL-MCNC: 8.5 MG/DL (ref 8.6–10.6)
CHLORIDE SERPL-SCNC: 109 MMOL/L (ref 98–107)
CO2 SERPL-SCNC: 26 MMOL/L (ref 21–32)
CREAT SERPL-MCNC: 0.54 MG/DL (ref 0.5–1.05)
EGFRCR SERPLBLD CKD-EPI 2021: >90 ML/MIN/1.73M*2
EOSINOPHIL # BLD AUTO: 0 X10*3/UL (ref 0–0.7)
EOSINOPHIL NFR BLD AUTO: 0 %
ERYTHROCYTE [DISTWIDTH] IN BLOOD BY AUTOMATED COUNT: 14.5 % (ref 11.5–14.5)
FIBRINOGEN PPP-MCNC: 216 MG/DL (ref 200–400)
GLUCOSE SERPL-MCNC: 111 MG/DL (ref 74–99)
HCT VFR BLD AUTO: 33 % (ref 36–46)
HGB BLD-MCNC: 11 G/DL (ref 12–16)
IMM GRANULOCYTES # BLD AUTO: 0.03 X10*3/UL (ref 0–0.7)
IMM GRANULOCYTES NFR BLD AUTO: 0.8 % (ref 0–0.9)
INR PPP: 0.9 (ref 0.9–1.1)
LDH SERPL L TO P-CCNC: 177 U/L (ref 84–246)
LYMPHOCYTES # BLD AUTO: 0.77 X10*3/UL (ref 1.2–4.8)
LYMPHOCYTES NFR BLD AUTO: 19.7 %
MAGNESIUM SERPL-MCNC: 2.16 MG/DL (ref 1.6–2.4)
MCH RBC QN AUTO: 31.7 PG (ref 26–34)
MCHC RBC AUTO-ENTMCNC: 33.3 G/DL (ref 32–36)
MCV RBC AUTO: 95 FL (ref 80–100)
MONOCYTES # BLD AUTO: 0.12 X10*3/UL (ref 0.1–1)
MONOCYTES NFR BLD AUTO: 3.1 %
NEUTROPHILS # BLD AUTO: 2.98 X10*3/UL (ref 1.2–7.7)
NEUTROPHILS NFR BLD AUTO: 76.4 %
NRBC BLD-RTO: 0 /100 WBCS (ref 0–0)
PHOSPHATE SERPL-MCNC: 2.7 MG/DL (ref 2.5–4.9)
PLATELET # BLD AUTO: 157 X10*3/UL (ref 150–450)
POTASSIUM SERPL-SCNC: 3.8 MMOL/L (ref 3.5–5.3)
PROT SERPL-MCNC: 5.4 G/DL (ref 6.4–8.2)
PROTHROMBIN TIME: 10.5 SECONDS (ref 9.8–12.8)
RBC # BLD AUTO: 3.47 X10*6/UL (ref 4–5.2)
SODIUM SERPL-SCNC: 142 MMOL/L (ref 136–145)
WBC # BLD AUTO: 3.9 X10*3/UL (ref 4.4–11.3)

## 2025-02-21 PROCEDURE — S4991 NICOTINE PATCH NONLEGEND: HCPCS | Mod: SE | Performed by: NURSE PRACTITIONER

## 2025-02-21 PROCEDURE — 84100 ASSAY OF PHOSPHORUS: CPT | Performed by: NURSE PRACTITIONER

## 2025-02-21 PROCEDURE — 2500000004 HC RX 250 GENERAL PHARMACY W/ HCPCS (ALT 636 FOR OP/ED): Mod: SE | Performed by: NURSE PRACTITIONER

## 2025-02-21 PROCEDURE — 83735 ASSAY OF MAGNESIUM: CPT | Performed by: NURSE PRACTITIONER

## 2025-02-21 PROCEDURE — 85610 PROTHROMBIN TIME: CPT | Performed by: NURSE PRACTITIONER

## 2025-02-21 PROCEDURE — 2500000001 HC RX 250 WO HCPCS SELF ADMINISTERED DRUGS (ALT 637 FOR MEDICARE OP): Mod: SE | Performed by: NURSE PRACTITIONER

## 2025-02-21 PROCEDURE — 80053 COMPREHEN METABOLIC PANEL: CPT | Performed by: NURSE PRACTITIONER

## 2025-02-21 PROCEDURE — 2500000002 HC RX 250 W HCPCS SELF ADMINISTERED DRUGS (ALT 637 FOR MEDICARE OP, ALT 636 FOR OP/ED): Mod: SE | Performed by: INTERNAL MEDICINE

## 2025-02-21 PROCEDURE — 2500000004 HC RX 250 GENERAL PHARMACY W/ HCPCS (ALT 636 FOR OP/ED): Mod: SE | Performed by: INTERNAL MEDICINE

## 2025-02-21 PROCEDURE — 85025 COMPLETE CBC W/AUTO DIFF WBC: CPT | Performed by: NURSE PRACTITIONER

## 2025-02-21 PROCEDURE — 2500000005 HC RX 250 GENERAL PHARMACY W/O HCPCS: Mod: SE | Performed by: NURSE PRACTITIONER

## 2025-02-21 PROCEDURE — 83615 LACTATE (LD) (LDH) ENZYME: CPT | Performed by: NURSE PRACTITIONER

## 2025-02-21 PROCEDURE — 1170000001 HC PRIVATE ONCOLOGY ROOM DAILY

## 2025-02-21 PROCEDURE — 2500000002 HC RX 250 W HCPCS SELF ADMINISTERED DRUGS (ALT 637 FOR MEDICARE OP, ALT 636 FOR OP/ED): Mod: SE | Performed by: NURSE PRACTITIONER

## 2025-02-21 PROCEDURE — 85384 FIBRINOGEN ACTIVITY: CPT | Performed by: NURSE PRACTITIONER

## 2025-02-21 PROCEDURE — 99233 SBSQ HOSP IP/OBS HIGH 50: CPT | Performed by: STUDENT IN AN ORGANIZED HEALTH CARE EDUCATION/TRAINING PROGRAM

## 2025-02-21 RX ORDER — DEXAMETHASONE 4 MG/1
12 TABLET ORAL ONCE
Status: COMPLETED | OUTPATIENT
Start: 2025-02-21 | End: 2025-02-21

## 2025-02-21 RX ORDER — ONDANSETRON HYDROCHLORIDE 8 MG/1
16 TABLET, FILM COATED ORAL ONCE
Status: COMPLETED | OUTPATIENT
Start: 2025-02-21 | End: 2025-02-21

## 2025-02-21 RX ADMIN — OLANZAPINE 5 MG: 5 TABLET, FILM COATED ORAL at 20:26

## 2025-02-21 RX ADMIN — LORAZEPAM 0.5 MG: 0.5 TABLET ORAL at 20:29

## 2025-02-21 RX ADMIN — NICOTINE 7 MG/24 HR DAILY TRANSDERMAL PATCH 1 PATCH: at 09:00

## 2025-02-21 RX ADMIN — AMLODIPINE BESYLATE 5 MG: 5 TABLET ORAL at 08:59

## 2025-02-21 RX ADMIN — DEXAMETHASONE 12 MG: 4 TABLET ORAL at 08:59

## 2025-02-21 RX ADMIN — ATORVASTATIN CALCIUM 40 MG: 40 TABLET, FILM COATED ORAL at 08:59

## 2025-02-21 RX ADMIN — DULOXETINE HYDROCHLORIDE 60 MG: 60 CAPSULE, DELAYED RELEASE ORAL at 08:59

## 2025-02-21 RX ADMIN — QUETIAPINE FUMARATE 100 MG: 25 TABLET ORAL at 21:07

## 2025-02-21 RX ADMIN — PREDNISONE 10 MG: 10 TABLET ORAL at 08:59

## 2025-02-21 RX ADMIN — DIPHENHYDRAMINE HYDROCHLORIDE 25 MG: 50 INJECTION INTRAMUSCULAR; INTRAVENOUS at 09:00

## 2025-02-21 RX ADMIN — PANTOPRAZOLE SODIUM 40 MG: 40 TABLET, DELAYED RELEASE ORAL at 06:23

## 2025-02-21 RX ADMIN — LORAZEPAM 0.5 MG: 0.5 TABLET ORAL at 10:17

## 2025-02-21 RX ADMIN — ETOPOSIDE 380 MG: 20 INJECTION, SOLUTION INTRAVENOUS at 11:26

## 2025-02-21 RX ADMIN — SENNOSIDES 8.6 MG: 8.6 TABLET, FILM COATED ORAL at 20:26

## 2025-02-21 RX ADMIN — ONDANSETRON HYDROCHLORIDE 16 MG: 8 TABLET, FILM COATED ORAL at 08:59

## 2025-02-21 RX ADMIN — POLYETHYLENE GLYCOL 3350 17 G: 17 POWDER, FOR SOLUTION ORAL at 13:55

## 2025-02-21 RX ADMIN — OXYCODONE 5 MG: 5 TABLET ORAL at 20:29

## 2025-02-21 RX ADMIN — SODIUM CHLORIDE 30 ML: 900 IRRIGANT IRRIGATION at 06:23

## 2025-02-21 RX ADMIN — CYTARABINE 380 MG: 100 INJECTION, SOLUTION INTRATHECAL; INTRAVENOUS; SUBCUTANEOUS at 21:07

## 2025-02-21 RX ADMIN — DULOXETINE HYDROCHLORIDE 60 MG: 60 CAPSULE, DELAYED RELEASE ORAL at 20:26

## 2025-02-21 RX ADMIN — CYTARABINE 380 MG: 100 INJECTION, SOLUTION INTRATHECAL; INTRAVENOUS; SUBCUTANEOUS at 09:54

## 2025-02-21 RX ADMIN — HYDROXYCHLOROQUINE SULFATE 200 MG: 200 TABLET, FILM COATED ORAL at 08:59

## 2025-02-21 RX ADMIN — OXYCODONE 5 MG: 5 TABLET ORAL at 10:17

## 2025-02-21 ASSESSMENT — COGNITIVE AND FUNCTIONAL STATUS - GENERAL
MOBILITY SCORE: 24
MOBILITY SCORE: 24
DAILY ACTIVITIY SCORE: 24
DAILY ACTIVITIY SCORE: 24

## 2025-02-21 ASSESSMENT — PAIN - FUNCTIONAL ASSESSMENT
PAIN_FUNCTIONAL_ASSESSMENT: 0-10

## 2025-02-21 ASSESSMENT — PAIN SCALES - GENERAL
PAINLEVEL_OUTOF10: 5 - MODERATE PAIN
PAINLEVEL_OUTOF10: 0 - NO PAIN
PAINLEVEL_OUTOF10: 0 - NO PAIN
PAINLEVEL_OUTOF10: 7
PAINLEVEL_OUTOF10: 6

## 2025-02-21 NOTE — PROGRESS NOTES
Art Therapy Note    Sagrario Garber     Therapy Session  Referral Type: New referral this admission  Visit Type: New visit  Session Start Time: 1541  Session End Time: 1547  Intervention Delivery: In-person  Conflict of Service: None  Number of family members present: 0              Treatment/Interventions  Areas of Focus: Self-expression, Normalization, Coping, Relaxation  Art Therapy Interventions: Assessment, Education/instruction, Empathic listening/validating emotions  Interruption: No  Patient Fell Asleep at End of Session: No    Post-assessment  Total Session Time (min): 6 minutes    Narrative  Assessment Detail: ATR had two encounters with Pt today.  When rounding the Pt approached ATR about services and the two spoke of how services work. ATR offered to se the Pt in a little while, simpl needing time to gather materials requested.  Pt open to having a visit in her room later.  Evaluation: ATR made visit to Pt.'s room to officially discuss servcies, assess needs and want, explore the AT cart of interventions and pick desired beading materials requested.  Pt shared with ATR she likes to stay busy and enjoys crafting and shared how long she would be here and tht she was getting a transplant.  Currently, is interested in beadingherself a bracelet and necklace.  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  She did not wnat to work at time of visit but wished to ahve the materials for later in the evening when she become bored.  The Pt wa appreciaitive for opfferings and is open to future visits and session to maeningful work durign her admission.  Follow-up: ATR, per Pt request will continue to follow Pt and offer AT services    Education Documentation  No documentation found.

## 2025-02-21 NOTE — PROGRESS NOTES
"Sagrario Garber is a 49 y.o. female on day 3 of admission presenting with Peripheral T-cell lymphoma (Multi).    Subjective   Patient feels well, is keeping busy with crafts. She denies CP, SOB, palpitations, HA, vision changes no abdominal pain, NVD, dysuria or flank pain or bleeding noted. She is tolerating PO well. She has not had a BM and is agreeable to a laxative.        Objective     Physical Exam  Constitutional:       General: She is not in acute distress.     Appearance: Normal appearance. She is not ill-appearing.   HENT:      Head: Normocephalic.      Nose: Nose normal. No congestion.      Mouth/Throat:      Mouth: Mucous membranes are moist.      Pharynx: Oropharynx is clear.      Comments: Poor dentition   Eyes:      General: No scleral icterus.     Pupils: Pupils are equal, round, and reactive to light.   Cardiovascular:      Rate and Rhythm: Normal rate and regular rhythm.      Heart sounds: Normal heart sounds.      Comments: Trace BLE edema (non pitting)  Pulmonary:      Effort: Pulmonary effort is normal.      Breath sounds: Normal breath sounds and air entry.   Abdominal:      General: Bowel sounds are normal. There is no distension.      Palpations: Abdomen is soft.      Tenderness: There is no abdominal tenderness.   Lymphadenopathy:      Lower Body: No right inguinal (Right inguinal adenopathy (round LN 4 x 4 cm)) adenopathy.   Skin:     General: Skin is warm.      Coloration: Skin is pale.      Comments: Facial erythema    Neurological:      Mental Status: She is alert.       Last Recorded Vitals  Blood pressure 124/76, pulse 87, temperature 36.4 °C (97.5 °F), temperature source Temporal, resp. rate 18, height 1.685 m (5' 6.34\"), weight 77.4 kg (170 lb 10.5 oz), SpO2 96%.  Intake/Output last 3 Shifts:  I/O last 3 completed shifts:  In: 1524.8 (19.7 mL/kg) [IV Piggyback:1524.8]  Out: - (0 mL/kg)   Weight: 77.4 kg     Relevant Results  Scheduled medications  amLODIPine, 5 mg, oral, " Daily  atorvastatin, 40 mg, oral, Daily  cytarabine, 200 mg/m2 (Treatment Plan Recorded), intravenous, q12h  diphenhydrAMINE, 25 mg, intravenous, Daily  DULoxetine, 60 mg, oral, BID  hydroxychloroquine, 200 mg, oral, Daily  nicotine, 1 patch, transdermal, Daily  OLANZapine, 5 mg, oral, Nightly  pantoprazole, 40 mg, oral, Daily before breakfast  polyethylene glycol, 17 g, oral, Daily  predniSONE, 10 mg, oral, Daily  QUEtiapine, 100 mg, oral, Nightly  sennosides, 1 tablet, oral, Nightly         This patient has a central line   Reason for the central line remaining today? Hemodynamic monitoring    Assessment/Plan   Assessment & Plan  Peripheral T-cell lymphoma (Multi)    Angioimmunoblastic T-cell lymphoma    Ms. 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 admitting for Auto Transplant prepped with [facility] (T0=2/24/25)     Currently T-3 Auto prepped with BEAM (T0=2/24/25)      ONC: (per chart review; see onc history for complete treatment)    --Angioimmunoblastic T-Cell Lymphoma (CD 30+ AK Negative)   Staging PET s/p Cycle 5: excellent response (see PET dated 12/23/24)   S/P BV-CHP (June 2024) x 6 cycles       Autologous Stem Cell Transplant   Prep: [facility]   Carmustine 300mg/m2 (T-6)   Cytarabine 200mg/m2 (T-5, -4, -3, -2)   Etoposide 200mg/m2 (T-5, -4, -3, -2)   --Due to RA high risk for reactions scheduled Benadryl 25mg with each dose   Melphalan 140mg/m2 (T-1)    Cells Collected 5.68 x 10^6 CD34 cells    Cells Infused (2/24): PENDING      Surveillance Monitoring   IgG: On admission : 445  Coag/Fibrinogen 2x/week (2/18): WNL   LDH/Hapto: Weekly (2/18): WNL       HEME:   --Mild Thrombocytopenia likely secondary to recent chemotherapy, no evidence of bleeding    --No evidence of DIC or hypercoag state   --Transfuse if Hgb<7 and/or Plt<10 (hold DVT ppx when PLT<50k)   --Lovenox 40mg for DVT ppx       ID:   Allergy: Amoxicillin     --No evidence of active infection on admit   --Start ACV on T+0 and Fluconazole T+1     --Plan Levofloxacin prophylaxis when neutropenic   --Plan Cefepime for neutropenic fever due to PCN allergy    --Plan Bactrim 800/160mg qMWF start T+30     FEN/GI:   Admit Wt: 77.1 Kg    --Hydration per chemotherapy order set    --PPI prophy w/protonix on admit   --Monitor electrolytes, replace as needed   --Dietician consulted on admission   Constipation   --Miralax and Senna      CARDIAC:   History of HTN  --Continue home Norvasc 5mg/day and Atorvastatin 40mg/day @ HS    --ECHO (1/22/25): EF 60-65%  --Weekly EKG (2/18) due to QT prolonging meds: (2/19)   --PT consulted on admission      RHEUMATOID:   History of Lupus and Rheumatoid Arthritis    --Currently on Placquenil, Prednisone 10mg/day  --Follows with Dr Dobbs        --Previously on Saphnelo (interferon alpha antagonist until 7/2023)      PSYCH:   Anxiety/Depression    --Continue home Seroquil, Cymbalta   --Consider in patient psych consult:        MISC:   Nicotine Dependence    Currently smoker; Nicotine patch ordered on admit   Patient considering smoking cessation    Social Situation, currently homeless, was living in SNF     DISPO:   Full Code confirmed on admit    NOK Mom: Stephanie Wilson [phone]   Non-Tunneled CVC (placed 2/11) remove at discharge     Blood Consent Signed on Admission   Primary Onc: Dr. Gunjan Varela       Patient seen, examined and discussed with Malignant Heme attending Dr. Huoston Vance, APRN-CNP

## 2025-02-21 NOTE — CARE PLAN
The patient's goals for the shift include      The clinical goals for the shift include Patient will remain HDS through end of shift    Over the shift, the patient did make progress toward the following goals. Barriers to progression include anxiety. Recommendations to address these barriers include educate patient on relaxation techniques.

## 2025-02-21 NOTE — CARE PLAN
Problem: Pain - Adult  Goal: Verbalizes/displays adequate comfort level or baseline comfort level  Outcome: Progressing     Problem: Safety - Adult  Goal: Free from fall injury  Outcome: Progressing     Problem: Chronic Conditions and Co-morbidities  Goal: Patient's chronic conditions and co-morbidity symptoms are monitored and maintained or improved  Outcome: Progressing     Problem: Nutrition  Goal: Nutrient intake appropriate for maintaining nutritional needs  Outcome: Progressing   The patient's goals for the shift include      The clinical goals for the shift include Remain HDS

## 2025-02-21 NOTE — NURSING NOTE
Dose # 2 of Etoposide chemotherapy 380 mg in 1000 mL given over 2 hours via a right sided trialysis catheter.  Dose up at 1126 and down at 1338.  Pre-medicated with dexamethasone and ondansetron.  Patient, dose and rate verified with second RN, Rip Levine.  + BBR obtained via syringe aspiration before and after administration.  Patient with no side effects.

## 2025-02-21 NOTE — NURSING NOTE
Cytarabine given to the trialysis catheter.  Patient, dose and rate verified with second RNMaryellen.  + BBR obtained via syringe aspiration before and after administration.  Patient with no side effects.

## 2025-02-21 NOTE — PROGRESS NOTES
"Sagrario Garber is a 49 y.o. female on day 3 of admission presenting with Peripheral T-cell lymphoma (Multi).    Subjective   She feels well. Staying busy to control her anxiety with crafts. Tolerating PO well. Denies CP, SOB, palpitations, HA, vision changes, abdominal pain, NVD, dysuria or flank pain or bleeding. She continues to walk the halls to stay active.     Objective     Physical Exam  Constitutional:       General: She is not in acute distress.     Appearance: Normal appearance. She is not ill-appearing.   HENT:      Head: Normocephalic.      Nose: Nose normal. No congestion.      Mouth/Throat:      Mouth: Mucous membranes are moist.      Pharynx: Oropharynx is clear.      Comments: Poor dentition   Eyes:      General: No scleral icterus.     Pupils: Pupils are equal, round, and reactive to light.   Cardiovascular:      Rate and Rhythm: Normal rate and regular rhythm.      Heart sounds: Normal heart sounds.      Comments: Trace BLE edema (non pitting)  Pulmonary:      Effort: Pulmonary effort is normal.      Breath sounds: Normal breath sounds and air entry.   Abdominal:      General: Bowel sounds are normal. There is no distension.      Palpations: Abdomen is soft.      Tenderness: There is no abdominal tenderness.   Lymphadenopathy:      Lower Body: No right inguinal (Right inguinal adenopathy (round LN 4 x 4 cm)) adenopathy.   Skin:     General: Skin is warm.      Coloration: Skin is pale.      Comments: Facial erythema    Neurological:      Mental Status: She is alert.       Last Recorded Vitals  Blood pressure 124/76, pulse 87, temperature 36.4 °C (97.5 °F), temperature source Temporal, resp. rate 18, height 1.685 m (5' 6.34\"), weight 77.4 kg (170 lb 10.5 oz), SpO2 96%.  Intake/Output last 3 Shifts:  I/O last 3 completed shifts:  In: 1524.8 (19.7 mL/kg) [IV Piggyback:1524.8]  Out: - (0 mL/kg)   Weight: 77.4 kg     Relevant Results  Scheduled medications  amLODIPine, 5 mg, oral, Daily  atorvastatin, 40 " mg, oral, Daily  cytarabine, 200 mg/m2 (Treatment Plan Recorded), intravenous, q12h  diphenhydrAMINE, 25 mg, intravenous, Daily  DULoxetine, 60 mg, oral, BID  hydroxychloroquine, 200 mg, oral, Daily  nicotine, 1 patch, transdermal, Daily  OLANZapine, 5 mg, oral, Nightly  pantoprazole, 40 mg, oral, Daily before breakfast  polyethylene glycol, 17 g, oral, Daily  predniSONE, 10 mg, oral, Daily  QUEtiapine, 100 mg, oral, Nightly  sennosides, 1 tablet, oral, Nightly         This patient has a central line   Reason for the central line remaining today? Hemodynamic monitoring    Assessment/Plan   Assessment & Plan  Peripheral T-cell lymphoma (Multi)    Angioimmunoblastic T-cell lymphoma    Ms. 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 admitting for Auto Transplant prepped with BEAM (T0=2/24/25)     Currently T-3 Auto prepped with BEAM (T0=2/24/25)      ONC: (per chart review; see onc history for complete treatment)    --Angioimmunoblastic T-Cell Lymphoma (CD 30+ AK Negative)   Staging PET s/p Cycle 5: excellent response (see PET dated 12/23/24)   S/P BV-CHP (June 2024) x 6 cycles       Autologous Stem Cell Transplant   Prep: [facility]   Carmustine 300mg/m2 (T-6)   Cytarabine 200mg/m2 (T-5, -4, -3, -2)   Etoposide 200mg/m2 (T-5, -4, -3, -2)   --Due to RA high risk for reactions scheduled Benadryl 25mg with each dose   Melphalan 140mg/m2 (T-1)    Cells Collected 5.68 x 10^6 CD34 cells    Cells Infused (2/24): PENDING      Surveillance Monitoring   IgG: On admission : 445  Coag/Fibrinogen 2x/week (2/18): WNL   LDH/Hapto: Weekly (2/18): WNL       HEME:   --Mild Thrombocytopenia likely secondary to recent chemotherapy, no evidence of bleeding    --No evidence of DIC or hypercoag state   --Transfuse if Hgb<7 and/or Plt<10 (hold DVT ppx when PLT<50k)   --Lovenox 40mg for DVT ppx       ID:   Allergy: Amoxicillin    --No evidence of active  infection on admit   --Start ACV on T+0 and Fluconazole T+1     --Plan Levofloxacin prophylaxis when neutropenic   --Plan Cefepime for neutropenic fever due to PCN allergy    --Plan Bactrim 800/160mg qMWF start T+30     FEN/GI:   Admit Wt: 77.1 Kg    --Hydration per chemotherapy order set    --PPI prophy w/protonix on admit   --Monitor electrolytes, replace as needed   --Dietician consulted on admission   Constipation   --Miralax and Senna      CARDIAC:   History of HTN  --Continue home Norvasc 5mg/day and Atorvastatin 40mg/day @ HS    --ECHO (1/22/25): EF 60-65%  --Weekly EKG (2/18) due to QT prolonging meds: (2/19)   --PT consulted on admission      RHEUMATOID:   History of Lupus and Rheumatoid Arthritis    --Currently on Placquenil, Prednisone 10mg/day,    --Follows with Dr Dobbs        --Previously on Saphnelo (interferon alpha antagonist until 7/2023)      PSYCH:   Anxiety/Depression    --Continue home Seroquil, Cymbalta, Ativan  -- Has followed with behavioral health at [facility]  -- Plan to consult inpatient psychiatry Monday 2/24  (for med mgmt and social assistance)  -- consult art therapy     MISC:   Nicotine Dependence    Currently smoker; Nicotine patch ordered on admit   Patient considering smoking cessation    Social Situation, currently homeless, was living in SNF      DISPO:   Full Code confirmed on admit    NOK Mom: Stephanie Wilson [phone]   Non-Tunneled CVC (placed 2/11) remove at discharge     Blood Consent Signed on Admission   Primary Onc: Dr. Gunjan Varela       Patient seen, examined and discussed with Malignant Heme attending Dr. Houston Vance, APRN-CNP

## 2025-02-21 NOTE — NURSING NOTE
Dose # 3 of Cytarabine chemotherapy 380 mg in 270.8 mL given over 60 via right sided trialysis catheter.  Dose up at 0954 and down at 1055.  Pre-medicated with dexamethasone and ondansetron.  Patient, dose and rate verified with second RN, Mayra Jefferson.  + BBR obtained via syringe aspiration before and after administration.  Patient with no side effects.

## 2025-02-22 LAB
ALBUMIN SERPL BCP-MCNC: 3.7 G/DL (ref 3.4–5)
ALP SERPL-CCNC: 64 U/L (ref 33–110)
ALT SERPL W P-5'-P-CCNC: 32 U/L (ref 7–45)
ANION GAP SERPL CALC-SCNC: 13 MMOL/L (ref 10–20)
AST SERPL W P-5'-P-CCNC: 18 U/L (ref 9–39)
BASOPHILS # BLD AUTO: 0.01 X10*3/UL (ref 0–0.1)
BASOPHILS NFR BLD AUTO: 0.3 %
BILIRUB DIRECT SERPL-MCNC: 0.1 MG/DL (ref 0–0.3)
BILIRUB SERPL-MCNC: 0.4 MG/DL (ref 0–1.2)
BUN SERPL-MCNC: 19 MG/DL (ref 6–23)
CALCIUM SERPL-MCNC: 8.7 MG/DL (ref 8.6–10.6)
CHLORIDE SERPL-SCNC: 107 MMOL/L (ref 98–107)
CO2 SERPL-SCNC: 26 MMOL/L (ref 21–32)
CREAT SERPL-MCNC: 0.51 MG/DL (ref 0.5–1.05)
EGFRCR SERPLBLD CKD-EPI 2021: >90 ML/MIN/1.73M*2
EOSINOPHIL # BLD AUTO: 0.01 X10*3/UL (ref 0–0.7)
EOSINOPHIL NFR BLD AUTO: 0.3 %
ERYTHROCYTE [DISTWIDTH] IN BLOOD BY AUTOMATED COUNT: 14.4 % (ref 11.5–14.5)
GLUCOSE SERPL-MCNC: 110 MG/DL (ref 74–99)
HCT VFR BLD AUTO: 35.2 % (ref 36–46)
HGB BLD-MCNC: 11 G/DL (ref 12–16)
IMM GRANULOCYTES # BLD AUTO: 0.01 X10*3/UL (ref 0–0.7)
IMM GRANULOCYTES NFR BLD AUTO: 0.3 % (ref 0–0.9)
LYMPHOCYTES # BLD AUTO: 0.74 X10*3/UL (ref 1.2–4.8)
LYMPHOCYTES NFR BLD AUTO: 23.2 %
MAGNESIUM SERPL-MCNC: 2.09 MG/DL (ref 1.6–2.4)
MCH RBC QN AUTO: 30.1 PG (ref 26–34)
MCHC RBC AUTO-ENTMCNC: 31.3 G/DL (ref 32–36)
MCV RBC AUTO: 96 FL (ref 80–100)
MONOCYTES # BLD AUTO: 0.03 X10*3/UL (ref 0.1–1)
MONOCYTES NFR BLD AUTO: 0.9 %
NEUTROPHILS # BLD AUTO: 2.39 X10*3/UL (ref 1.2–7.7)
NEUTROPHILS NFR BLD AUTO: 75 %
NRBC BLD-RTO: 0 /100 WBCS (ref 0–0)
PHOSPHATE SERPL-MCNC: 3 MG/DL (ref 2.5–4.9)
PLATELET # BLD AUTO: 179 X10*3/UL (ref 150–450)
POTASSIUM SERPL-SCNC: 3.8 MMOL/L (ref 3.5–5.3)
PROT SERPL-MCNC: 5.7 G/DL (ref 6.4–8.2)
RBC # BLD AUTO: 3.65 X10*6/UL (ref 4–5.2)
SODIUM SERPL-SCNC: 142 MMOL/L (ref 136–145)
WBC # BLD AUTO: 3.2 X10*3/UL (ref 4.4–11.3)

## 2025-02-22 PROCEDURE — 80076 HEPATIC FUNCTION PANEL: CPT | Performed by: NURSE PRACTITIONER

## 2025-02-22 PROCEDURE — 1170000001 HC PRIVATE ONCOLOGY ROOM DAILY

## 2025-02-22 PROCEDURE — 2500000001 HC RX 250 WO HCPCS SELF ADMINISTERED DRUGS (ALT 637 FOR MEDICARE OP): Mod: SE | Performed by: PHYSICIAN ASSISTANT

## 2025-02-22 PROCEDURE — 2500000004 HC RX 250 GENERAL PHARMACY W/ HCPCS (ALT 636 FOR OP/ED): Mod: SE | Performed by: INTERNAL MEDICINE

## 2025-02-22 PROCEDURE — 2500000002 HC RX 250 W HCPCS SELF ADMINISTERED DRUGS (ALT 637 FOR MEDICARE OP, ALT 636 FOR OP/ED): Mod: SE | Performed by: INTERNAL MEDICINE

## 2025-02-22 PROCEDURE — 85025 COMPLETE CBC W/AUTO DIFF WBC: CPT | Performed by: NURSE PRACTITIONER

## 2025-02-22 PROCEDURE — 2500000004 HC RX 250 GENERAL PHARMACY W/ HCPCS (ALT 636 FOR OP/ED): Mod: SE | Performed by: PHYSICIAN ASSISTANT

## 2025-02-22 PROCEDURE — 2500000002 HC RX 250 W HCPCS SELF ADMINISTERED DRUGS (ALT 637 FOR MEDICARE OP, ALT 636 FOR OP/ED): Mod: SE | Performed by: NURSE PRACTITIONER

## 2025-02-22 PROCEDURE — 84100 ASSAY OF PHOSPHORUS: CPT | Performed by: NURSE PRACTITIONER

## 2025-02-22 PROCEDURE — 2500000001 HC RX 250 WO HCPCS SELF ADMINISTERED DRUGS (ALT 637 FOR MEDICARE OP): Mod: SE | Performed by: NURSE PRACTITIONER

## 2025-02-22 PROCEDURE — 2500000004 HC RX 250 GENERAL PHARMACY W/ HCPCS (ALT 636 FOR OP/ED): Mod: SE | Performed by: NURSE PRACTITIONER

## 2025-02-22 PROCEDURE — 83735 ASSAY OF MAGNESIUM: CPT | Performed by: NURSE PRACTITIONER

## 2025-02-22 PROCEDURE — 99233 SBSQ HOSP IP/OBS HIGH 50: CPT | Performed by: STUDENT IN AN ORGANIZED HEALTH CARE EDUCATION/TRAINING PROGRAM

## 2025-02-22 PROCEDURE — S4991 NICOTINE PATCH NONLEGEND: HCPCS | Mod: SE | Performed by: NURSE PRACTITIONER

## 2025-02-22 RX ORDER — DEXAMETHASONE 4 MG/1
12 TABLET ORAL ONCE
Status: COMPLETED | OUTPATIENT
Start: 2025-02-22 | End: 2025-02-22

## 2025-02-22 RX ORDER — ONDANSETRON HYDROCHLORIDE 8 MG/1
16 TABLET, FILM COATED ORAL ONCE
Status: COMPLETED | OUTPATIENT
Start: 2025-02-22 | End: 2025-02-22

## 2025-02-22 RX ORDER — POLYETHYLENE GLYCOL 3350 17 G/17G
17 POWDER, FOR SOLUTION ORAL 2 TIMES DAILY
Status: DISCONTINUED | OUTPATIENT
Start: 2025-02-22 | End: 2025-02-23

## 2025-02-22 RX ORDER — AMOXICILLIN 250 MG
1 CAPSULE ORAL 2 TIMES DAILY
Status: DISCONTINUED | OUTPATIENT
Start: 2025-02-22 | End: 2025-03-01

## 2025-02-22 RX ADMIN — QUETIAPINE FUMARATE 100 MG: 25 TABLET ORAL at 20:40

## 2025-02-22 RX ADMIN — ETOPOSIDE 380 MG: 20 INJECTION, SOLUTION INTRAVENOUS at 11:19

## 2025-02-22 RX ADMIN — SENNOSIDES AND DOCUSATE SODIUM 1 TABLET: 50; 8.6 TABLET ORAL at 20:40

## 2025-02-22 RX ADMIN — OXYCODONE 5 MG: 5 TABLET ORAL at 20:40

## 2025-02-22 RX ADMIN — OXYCODONE 5 MG: 5 TABLET ORAL at 09:12

## 2025-02-22 RX ADMIN — PANTOPRAZOLE SODIUM 40 MG: 40 TABLET, DELAYED RELEASE ORAL at 06:36

## 2025-02-22 RX ADMIN — SENNOSIDES AND DOCUSATE SODIUM 1 TABLET: 50; 8.6 TABLET ORAL at 14:08

## 2025-02-22 RX ADMIN — POLYETHYLENE GLYCOL 3350 17 G: 17 POWDER, FOR SOLUTION ORAL at 20:40

## 2025-02-22 RX ADMIN — DIPHENHYDRAMINE HYDROCHLORIDE 25 MG: 50 INJECTION INTRAMUSCULAR; INTRAVENOUS at 09:04

## 2025-02-22 RX ADMIN — CYTARABINE 380 MG: 100 INJECTION, SOLUTION INTRATHECAL; INTRAVENOUS; SUBCUTANEOUS at 20:40

## 2025-02-22 RX ADMIN — DULOXETINE HYDROCHLORIDE 60 MG: 60 CAPSULE, DELAYED RELEASE ORAL at 20:40

## 2025-02-22 RX ADMIN — OLANZAPINE 5 MG: 5 TABLET, FILM COATED ORAL at 20:40

## 2025-02-22 RX ADMIN — HYDROXYCHLOROQUINE SULFATE 200 MG: 200 TABLET, FILM COATED ORAL at 09:04

## 2025-02-22 RX ADMIN — DULOXETINE HYDROCHLORIDE 60 MG: 60 CAPSULE, DELAYED RELEASE ORAL at 09:04

## 2025-02-22 RX ADMIN — DEXAMETHASONE 12 MG: 4 TABLET ORAL at 09:04

## 2025-02-22 RX ADMIN — ONDANSETRON HYDROCHLORIDE 16 MG: 8 TABLET, FILM COATED ORAL at 09:04

## 2025-02-22 RX ADMIN — NICOTINE 7 MG/24 HR DAILY TRANSDERMAL PATCH 1 PATCH: at 09:04

## 2025-02-22 RX ADMIN — AMLODIPINE BESYLATE 5 MG: 5 TABLET ORAL at 09:04

## 2025-02-22 RX ADMIN — LORAZEPAM 0.5 MG: 0.5 TABLET ORAL at 20:40

## 2025-02-22 RX ADMIN — ATORVASTATIN CALCIUM 40 MG: 40 TABLET, FILM COATED ORAL at 09:04

## 2025-02-22 RX ADMIN — PREDNISONE 10 MG: 10 TABLET ORAL at 09:04

## 2025-02-22 RX ADMIN — LORAZEPAM 0.5 MG: 0.5 TABLET ORAL at 09:12

## 2025-02-22 RX ADMIN — CYTARABINE 380 MG: 100 INJECTION, SOLUTION INTRATHECAL; INTRAVENOUS; SUBCUTANEOUS at 09:53

## 2025-02-22 ASSESSMENT — COGNITIVE AND FUNCTIONAL STATUS - GENERAL
DAILY ACTIVITIY SCORE: 24
MOBILITY SCORE: 24

## 2025-02-22 ASSESSMENT — PAIN SCALES - GENERAL
PAINLEVEL_OUTOF10: 6
PAINLEVEL_OUTOF10: 5 - MODERATE PAIN

## 2025-02-22 NOTE — PROGRESS NOTES
"Sagrario Garber is a 49 y.o. female on day 4 of admission presenting with Peripheral T-cell lymphoma (Multi).    Subjective   Afebrile. Patient reports no bowel movement in a few days. Denies SOB, N/V, or abdominal pain. Ambulates the hallways. ROS otherwise unremarkable.    Objective     Physical Exam  Constitutional:       General: She is not in acute distress.     Appearance: Normal appearance. She is not ill-appearing.   HENT:      Head: Normocephalic.      Nose: Nose normal. No congestion.      Mouth/Throat:      Mouth: Mucous membranes are moist.      Pharynx: Oropharynx is clear.      Comments: Poor dentition   Eyes:      General: No scleral icterus.     Pupils: Pupils are equal, round, and reactive to light.   Cardiovascular:      Rate and Rhythm: Normal rate and regular rhythm.      Heart sounds: Normal heart sounds.      Comments: Trace BLE edema (non pitting)  Pulmonary:      Effort: Pulmonary effort is normal.      Breath sounds: Normal breath sounds and air entry.   Abdominal:      General: Bowel sounds are normal. There is no distension.      Palpations: Abdomen is soft.      Tenderness: There is no abdominal tenderness.   Lymphadenopathy:      Lower Body: No right inguinal (Right inguinal adenopathy (round LN 4 x 4 cm)) adenopathy.   Skin:     General: Skin is warm.      Coloration: Skin is pale.    Neurological:      Mental Status: She is alert.     Last Recorded Vitals  Blood pressure 128/82, pulse 86, temperature 36.1 °C (97 °F), temperature source Temporal, resp. rate 16, height 1.685 m (5' 6.34\"), weight 77.8 kg (171 lb 8.3 oz), SpO2 95%.  Intake/Output last 3 Shifts:  I/O last 3 completed shifts:  In: 1641.8 (21.2 mL/kg) [IV Piggyback:1641.8]  Out: - (0 mL/kg)   Weight: 77.4 kg     Relevant Results  Scheduled medications  amLODIPine, 5 mg, oral, Daily  atorvastatin, 40 mg, oral, Daily  cytarabine, 200 mg/m2 (Treatment Plan Recorded), intravenous, q12h  DULoxetine, 60 mg, oral, " BID  hydroxychloroquine, 200 mg, oral, Daily  nicotine, 1 patch, transdermal, Daily  OLANZapine, 5 mg, oral, Nightly  pantoprazole, 40 mg, oral, Daily before breakfast  polyethylene glycol, 17 g, oral, Daily  predniSONE, 10 mg, oral, Daily  QUEtiapine, 100 mg, oral, Nightly  sennosides-docusate sodium, 1 tablet, oral, BID      Type   Results for orders placed or performed during the hospital encounter of 02/18/25 (from the past 24 hours)   Lactate Dehydrogenase   Result Value Ref Range     84 - 246 U/L   Magnesium   Result Value Ref Range    Magnesium 2.09 1.60 - 2.40 mg/dL   CBC and Auto Differential   Result Value Ref Range    WBC 3.2 (L) 4.4 - 11.3 x10*3/uL    nRBC 0.0 0.0 - 0.0 /100 WBCs    RBC 3.65 (L) 4.00 - 5.20 x10*6/uL    Hemoglobin 11.0 (L) 12.0 - 16.0 g/dL    Hematocrit 35.2 (L) 36.0 - 46.0 %    MCV 96 80 - 100 fL    MCH 30.1 26.0 - 34.0 pg    MCHC 31.3 (L) 32.0 - 36.0 g/dL    RDW 14.4 11.5 - 14.5 %    Platelets 179 150 - 450 x10*3/uL    Neutrophils % 75.0 40.0 - 80.0 %    Immature Granulocytes %, Automated 0.3 0.0 - 0.9 %    Lymphocytes % 23.2 13.0 - 44.0 %    Monocytes % 0.9 2.0 - 10.0 %    Eosinophils % 0.3 0.0 - 6.0 %    Basophils % 0.3 0.0 - 2.0 %    Neutrophils Absolute 2.39 1.20 - 7.70 x10*3/uL    Immature Granulocytes Absolute, Automated 0.01 0.00 - 0.70 x10*3/uL    Lymphocytes Absolute 0.74 (L) 1.20 - 4.80 x10*3/uL    Monocytes Absolute 0.03 (L) 0.10 - 1.00 x10*3/uL    Eosinophils Absolute 0.01 0.00 - 0.70 x10*3/uL    Basophils Absolute 0.01 0.00 - 0.10 x10*3/uL   Hepatic Function Panel   Result Value Ref Range    Albumin 3.7 3.4 - 5.0 g/dL    Bilirubin, Total 0.4 0.0 - 1.2 mg/dL    Bilirubin, Direct 0.1 0.0 - 0.3 mg/dL    Alkaline Phosphatase 64 33 - 110 U/L    ALT 32 7 - 45 U/L    AST 18 9 - 39 U/L    Total Protein 5.7 (L) 6.4 - 8.2 g/dL   Phosphorus   Result Value Ref Range    Phosphorus 3.0 2.5 - 4.9 mg/dL   Basic Metabolic Panel   Result Value Ref Range    Glucose 110 (H) 74 - 99  mg/dL    Sodium 142 136 - 145 mmol/L    Potassium 3.8 3.5 - 5.3 mmol/L    Chloride 107 98 - 107 mmol/L    Bicarbonate 26 21 - 32 mmol/L    Anion Gap 13 10 - 20 mmol/L    Urea Nitrogen 19 6 - 23 mg/dL    Creatinine 0.51 0.50 - 1.05 mg/dL    eGFR >90 >60 mL/min/1.73m*2    Calcium 8.7 8.6 - 10.6 mg/dL          This patient has a central line   Reason for the central line remaining today? Hemodynamic monitoring    Assessment/Plan   Assessment & Plan  Peripheral T-cell lymphoma (Multi)    Angioimmunoblastic T-cell lymphoma    Ms. 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 admitting for Auto Transplant prepped with BEAM (T0=2/24/25)     Currently T-2 Auto prepped with BEAM (T0=2/24/25)      ONC: (per chart review; see onc history for complete treatment)    --Angioimmunoblastic T-Cell Lymphoma (CD 30+ AK Negative)   Staging PET s/p Cycle 5: excellent response (see PET dated 12/23/24)   S/P BV-CHP (June 2024) x 6 cycles       Autologous Stem Cell Transplant   Prep: BEAM   Carmustine 300mg/m2 (T-6)   Cytarabine 200mg/m2 (T-5, -4, -3, -2)   Etoposide 200mg/m2 (T-5, -4, -3, -2)   --Due to RA high risk for reactions scheduled Benadryl 25mg with each dose   Melphalan 140mg/m2 (T-1)    Cells Collected 5.68 x 10^6 CD34 cells    Cells Infused (2/24): PENDING      Surveillance Monitoring   IgG: On admission : 445  Coag/Fibrinogen 2x/week (2/18): WNL   LDH/Hapto: Weekly (2/18): WNL       HEME:   --Mild Thrombocytopenia likely secondary to recent chemotherapy, no evidence of bleeding    --No evidence of DIC or hypercoag state   --Transfuse if Hgb<7 and/or Plt<10 (hold DVT ppx when PLT<50k)   --Lovenox 40mg for DVT ppx       ID:   Allergy: Amoxicillin    --No evidence of active infection on admit   --Start ACV on T+0 and Fluconazole T+1     --Plan Levofloxacin prophylaxis when neutropenic   --Plan Cefepime for neutropenic fever due to PCN  allergy    --Plan Bactrim 800/160mg qMWF start T+30     FEN/GI:   Admit Wt: 77.1 Kg,  current wt: 77.8 kg  --Hydration per chemotherapy order set    --PPI prophy w/protonix on admit   --Monitor electrolytes, replace as needed   --Dietician consulted on admission   Constipation   --Miralax and Senokot     CARDIAC:   History of HTN  --Continue home Norvasc 5mg/day and Atorvastatin 40mg/day @ HS    --ECHO (1/22/25): EF 60-65%  --Weekly EKG (2/18) due to QT prolonging meds: (2/19)   --PT consulted on admission      RHEUMATOID:   History of Lupus and Rheumatoid Arthritis    --Currently on Placquenil, Prednisone 10mg/day,    --Follows with Dr Dobbs        --Previously on Saphnelo (interferon alpha antagonist until 7/2023)      PSYCH:   Anxiety/Depression    --Continue home Seroquil, Cymbalta, Ativan  -- Has followed with behavioral health at [facility]  -- Plan to consult inpatient psychiatry Monday 2/24  (for med mgmt and social assistance)  -- consult art therapy     MISC:   Nicotine Dependence    Currently smoker; Nicotine patch ordered on admit   Patient considering smoking cessation    Social Situation, currently homeless, was living in SNF      DISPO:   Full Code confirmed on admit    NOK Mom: Stephanie Wilson [phone]   Non-Tunneled CVC (placed 2/11) remove at discharge     Blood Consent Signed on Admission   Primary Onc: Dr. Gunjan Varela       Patient seen, examined and discussed with Malignant Heme attending Dr. Houston Berger PA-C

## 2025-02-22 NOTE — CARE PLAN
Problem: Pain - Adult  Goal: Verbalizes/displays adequate comfort level or baseline comfort level  2/21/2025 2342 by Lord Nikhil RN  Outcome: Progressing  2/21/2025 2342 by Lord Nikhil RN  Outcome: Progressing  2/21/2025 2341 by Lord Nikhil RN  Outcome: Progressing     Problem: Safety - Adult  Goal: Free from fall injury  2/21/2025 2342 by Lord Nikhil RN  Outcome: Progressing  2/21/2025 2342 by Lord Nikhil RN  Outcome: Progressing  2/21/2025 2341 by Lord Nikhil RN  Outcome: Progressing     Problem: Chronic Conditions and Co-morbidities  Goal: Patient's chronic conditions and co-morbidity symptoms are monitored and maintained or improved  2/21/2025 2342 by Lord Nikhil RN  Outcome: Progressing  2/21/2025 2342 by Lord Nikhil RN  Outcome: Progressing  2/21/2025 2341 by Lord Nikhil RN  Outcome: Progressing   The patient's goals for the shift include      The clinical goals for the shift include Remain HDS

## 2025-02-22 NOTE — CARE PLAN
Problem: Pain - Adult  Goal: Verbalizes/displays adequate comfort level or baseline comfort level  Outcome: Progressing     Problem: Safety - Adult  Goal: Free from fall injury  Outcome: Progressing     Problem: Chronic Conditions and Co-morbidities  Goal: Patient's chronic conditions and co-morbidity symptoms are monitored and maintained or improved  Outcome: Progressing     Problem: Nutrition  Goal: Nutrient intake appropriate for maintaining nutritional needs  Outcome: Progressing     Problem: Fall/Injury  Goal: Not fall by end of shift  Outcome: Progressing  Goal: Be free from injury by end of the shift  Outcome: Progressing  Goal: Verbalize understanding of personal risk factors for fall in the hospital  Outcome: Progressing  Goal: Verbalize understanding of risk factor reduction measures to prevent injury from fall in the home  Outcome: Progressing  Goal: Use assistive devices by end of the shift  Outcome: Progressing  Goal: Pace activities to prevent fatigue by end of the shift  Outcome: Progressing   The patient's goals for the shift include      The clinical goals for the shift include Remain HDS

## 2025-02-22 NOTE — NURSING NOTE
Cytarabine chemotherapy 380 mg in 270mL given over 1 hour via a right sided trialysis catheter. Patient, dose and rate verified with second RNEdmund.  + BBR obtained via syringe aspiration before and after administration.  Patient with no side effects.

## 2025-02-23 VITALS
SYSTOLIC BLOOD PRESSURE: 91 MMHG | OXYGEN SATURATION: 96 % | HEART RATE: 78 BPM | BODY MASS INDEX: 27.35 KG/M2 | HEIGHT: 66 IN | RESPIRATION RATE: 18 BRPM | DIASTOLIC BLOOD PRESSURE: 56 MMHG | TEMPERATURE: 98.8 F | WEIGHT: 170.19 LBS

## 2025-02-23 LAB
ALBUMIN SERPL BCP-MCNC: 3.9 G/DL (ref 3.4–5)
ALP SERPL-CCNC: 63 U/L (ref 33–110)
ALT SERPL W P-5'-P-CCNC: 28 U/L (ref 7–45)
ANION GAP SERPL CALC-SCNC: 13 MMOL/L (ref 10–20)
AST SERPL W P-5'-P-CCNC: 12 U/L (ref 9–39)
BASOPHILS # BLD AUTO: 0 X10*3/UL (ref 0–0.1)
BASOPHILS NFR BLD AUTO: 0 %
BILIRUB DIRECT SERPL-MCNC: 0.1 MG/DL (ref 0–0.3)
BILIRUB SERPL-MCNC: 0.4 MG/DL (ref 0–1.2)
BUN SERPL-MCNC: 19 MG/DL (ref 6–23)
CALCIUM SERPL-MCNC: 8.8 MG/DL (ref 8.6–10.6)
CHLORIDE SERPL-SCNC: 107 MMOL/L (ref 98–107)
CO2 SERPL-SCNC: 26 MMOL/L (ref 21–32)
CREAT SERPL-MCNC: 0.52 MG/DL (ref 0.5–1.05)
EGFRCR SERPLBLD CKD-EPI 2021: >90 ML/MIN/1.73M*2
EOSINOPHIL # BLD AUTO: 0.03 X10*3/UL (ref 0–0.7)
EOSINOPHIL NFR BLD AUTO: 0.8 %
ERYTHROCYTE [DISTWIDTH] IN BLOOD BY AUTOMATED COUNT: 14.4 % (ref 11.5–14.5)
GLUCOSE SERPL-MCNC: 93 MG/DL (ref 74–99)
HCT VFR BLD AUTO: 35.1 % (ref 36–46)
HGB BLD-MCNC: 11.4 G/DL (ref 12–16)
IMM GRANULOCYTES # BLD AUTO: 0.01 X10*3/UL (ref 0–0.7)
IMM GRANULOCYTES NFR BLD AUTO: 0.3 % (ref 0–0.9)
LYMPHOCYTES # BLD AUTO: 0.82 X10*3/UL (ref 1.2–4.8)
LYMPHOCYTES NFR BLD AUTO: 22.6 %
MAGNESIUM SERPL-MCNC: 2.2 MG/DL (ref 1.6–2.4)
MCH RBC QN AUTO: 30.6 PG (ref 26–34)
MCHC RBC AUTO-ENTMCNC: 32.5 G/DL (ref 32–36)
MCV RBC AUTO: 94 FL (ref 80–100)
MONOCYTES # BLD AUTO: 0.01 X10*3/UL (ref 0.1–1)
MONOCYTES NFR BLD AUTO: 0.3 %
NEUTROPHILS # BLD AUTO: 2.76 X10*3/UL (ref 1.2–7.7)
NEUTROPHILS NFR BLD AUTO: 76 %
NRBC BLD-RTO: 0 /100 WBCS (ref 0–0)
PHOSPHATE SERPL-MCNC: 3.3 MG/DL (ref 2.5–4.9)
PLATELET # BLD AUTO: 198 X10*3/UL (ref 150–450)
POTASSIUM SERPL-SCNC: 4.1 MMOL/L (ref 3.5–5.3)
PROT SERPL-MCNC: 5.7 G/DL (ref 6.4–8.2)
RBC # BLD AUTO: 3.72 X10*6/UL (ref 4–5.2)
SODIUM SERPL-SCNC: 142 MMOL/L (ref 136–145)
WBC # BLD AUTO: 3.6 X10*3/UL (ref 4.4–11.3)

## 2025-02-23 PROCEDURE — 80053 COMPREHEN METABOLIC PANEL: CPT | Performed by: NURSE PRACTITIONER

## 2025-02-23 PROCEDURE — 2500000001 HC RX 250 WO HCPCS SELF ADMINISTERED DRUGS (ALT 637 FOR MEDICARE OP): Mod: SE | Performed by: PHYSICIAN ASSISTANT

## 2025-02-23 PROCEDURE — 83735 ASSAY OF MAGNESIUM: CPT | Performed by: NURSE PRACTITIONER

## 2025-02-23 PROCEDURE — 2500000001 HC RX 250 WO HCPCS SELF ADMINISTERED DRUGS (ALT 637 FOR MEDICARE OP): Mod: SE | Performed by: NURSE PRACTITIONER

## 2025-02-23 PROCEDURE — 84100 ASSAY OF PHOSPHORUS: CPT | Performed by: NURSE PRACTITIONER

## 2025-02-23 PROCEDURE — 2500000004 HC RX 250 GENERAL PHARMACY W/ HCPCS (ALT 636 FOR OP/ED): Mod: SE | Performed by: NURSE PRACTITIONER

## 2025-02-23 PROCEDURE — 2500000004 HC RX 250 GENERAL PHARMACY W/ HCPCS (ALT 636 FOR OP/ED): Mod: SE | Performed by: PHYSICIAN ASSISTANT

## 2025-02-23 PROCEDURE — 2500000004 HC RX 250 GENERAL PHARMACY W/ HCPCS (ALT 636 FOR OP/ED): Mod: SE | Performed by: INTERNAL MEDICINE

## 2025-02-23 PROCEDURE — 1170000001 HC PRIVATE ONCOLOGY ROOM DAILY

## 2025-02-23 PROCEDURE — 2500000002 HC RX 250 W HCPCS SELF ADMINISTERED DRUGS (ALT 637 FOR MEDICARE OP, ALT 636 FOR OP/ED): Mod: SE | Performed by: INTERNAL MEDICINE

## 2025-02-23 PROCEDURE — 2500000002 HC RX 250 W HCPCS SELF ADMINISTERED DRUGS (ALT 637 FOR MEDICARE OP, ALT 636 FOR OP/ED): Mod: SE | Performed by: NURSE PRACTITIONER

## 2025-02-23 PROCEDURE — 99233 SBSQ HOSP IP/OBS HIGH 50: CPT | Performed by: STUDENT IN AN ORGANIZED HEALTH CARE EDUCATION/TRAINING PROGRAM

## 2025-02-23 PROCEDURE — 85025 COMPLETE CBC W/AUTO DIFF WBC: CPT | Performed by: NURSE PRACTITIONER

## 2025-02-23 PROCEDURE — 82248 BILIRUBIN DIRECT: CPT | Performed by: NURSE PRACTITIONER

## 2025-02-23 PROCEDURE — S4991 NICOTINE PATCH NONLEGEND: HCPCS | Mod: SE | Performed by: NURSE PRACTITIONER

## 2025-02-23 RX ORDER — MELPHALAN HCL 50 MG
140 VIAL (EA) INTRAVENOUS ONCE
Status: COMPLETED | OUTPATIENT
Start: 2025-02-23 | End: 2025-02-23

## 2025-02-23 RX ORDER — ONDANSETRON HYDROCHLORIDE 8 MG/1
16 TABLET, FILM COATED ORAL ONCE
Status: COMPLETED | OUTPATIENT
Start: 2025-02-23 | End: 2025-02-23

## 2025-02-23 RX ORDER — DEXAMETHASONE 4 MG/1
12 TABLET ORAL ONCE
Status: COMPLETED | OUTPATIENT
Start: 2025-02-23 | End: 2025-02-23

## 2025-02-23 RX ADMIN — DULOXETINE HYDROCHLORIDE 60 MG: 60 CAPSULE, DELAYED RELEASE ORAL at 08:42

## 2025-02-23 RX ADMIN — LORAZEPAM 0.5 MG: 0.5 TABLET ORAL at 08:42

## 2025-02-23 RX ADMIN — SENNOSIDES AND DOCUSATE SODIUM 1 TABLET: 50; 8.6 TABLET ORAL at 08:42

## 2025-02-23 RX ADMIN — NICOTINE 7 MG/24 HR DAILY TRANSDERMAL PATCH 1 PATCH: at 08:51

## 2025-02-23 RX ADMIN — LORAZEPAM 0.5 MG: 0.5 TABLET ORAL at 20:53

## 2025-02-23 RX ADMIN — DEXAMETHASONE 12 MG: 4 TABLET ORAL at 08:42

## 2025-02-23 RX ADMIN — DULOXETINE HYDROCHLORIDE 60 MG: 60 CAPSULE, DELAYED RELEASE ORAL at 20:53

## 2025-02-23 RX ADMIN — AMLODIPINE BESYLATE 5 MG: 5 TABLET ORAL at 08:42

## 2025-02-23 RX ADMIN — ATORVASTATIN CALCIUM 40 MG: 40 TABLET, FILM COATED ORAL at 08:42

## 2025-02-23 RX ADMIN — FOSAPREPITANT 150 MG: 150 INJECTION, POWDER, LYOPHILIZED, FOR SOLUTION INTRAVENOUS at 08:42

## 2025-02-23 RX ADMIN — OLANZAPINE 5 MG: 5 TABLET, FILM COATED ORAL at 20:53

## 2025-02-23 RX ADMIN — PREDNISONE 10 MG: 10 TABLET ORAL at 08:42

## 2025-02-23 RX ADMIN — PANTOPRAZOLE SODIUM 40 MG: 40 TABLET, DELAYED RELEASE ORAL at 05:50

## 2025-02-23 RX ADMIN — POLYETHYLENE GLYCOL 3350 17 G: 17 POWDER, FOR SOLUTION ORAL at 08:42

## 2025-02-23 RX ADMIN — OXYCODONE 5 MG: 5 TABLET ORAL at 20:54

## 2025-02-23 RX ADMIN — QUETIAPINE FUMARATE 100 MG: 25 TABLET ORAL at 20:53

## 2025-02-23 RX ADMIN — Medication 266 MG: at 09:29

## 2025-02-23 RX ADMIN — ONDANSETRON HYDROCHLORIDE 16 MG: 8 TABLET, FILM COATED ORAL at 08:42

## 2025-02-23 RX ADMIN — OXYCODONE 5 MG: 5 TABLET ORAL at 08:42

## 2025-02-23 RX ADMIN — HYDROXYCHLOROQUINE SULFATE 200 MG: 200 TABLET, FILM COATED ORAL at 08:51

## 2025-02-23 ASSESSMENT — COGNITIVE AND FUNCTIONAL STATUS - GENERAL
MOBILITY SCORE: 24
DAILY ACTIVITIY SCORE: 24

## 2025-02-23 ASSESSMENT — PAIN SCALES - GENERAL
PAINLEVEL_OUTOF10: 6
PAINLEVEL_OUTOF10: 7
PAINLEVEL_OUTOF10: 5 - MODERATE PAIN

## 2025-02-23 NOTE — PROGRESS NOTES
"Sagrario Garber is a 49 y.o. female on day 5 of admission presenting with Peripheral T-cell lymphoma (Multi).    Subjective   Afebrile. Patient had a bowel movement. Asking to discontinue her scheduled Miralax. Ambulating a few times a day. Anxious about her transplant. Denies N/V. ROS otherwise unremarkable.      Objective     Physical Exam  Constitutional:       General: She is not in acute distress.     Appearance: Normal appearance. She is not ill-appearing.   HENT:      Head: Normocephalic.      Nose: Nose normal. No congestion.      Mouth/Throat:      Mouth: Mucous membranes are moist.      Pharynx: Oropharynx is clear.      Comments: Poor dentition   Eyes:      General: No scleral icterus.     Pupils: Pupils are equal, round, and reactive to light.   Cardiovascular:      Rate and Rhythm: Normal rate and regular rhythm.      Heart sounds: Normal heart sounds.      Comments: Trace BLE edema (non pitting)  Pulmonary:      Effort: Pulmonary effort is normal.      Breath sounds: Normal breath sounds and air entry.   Abdominal:      General: Bowel sounds are normal. There is no distension.      Palpations: Abdomen is soft.      Tenderness: There is no abdominal tenderness.   Lymphadenopathy:      Lower Body: No right inguinal (Right inguinal adenopathy (round LN 4 x 4 cm)) adenopathy.   Skin:     General: Skin is warm.      Coloration: Skin is pale.    Neurological:      Mental Status: She is alert.     Last Recorded Vitals  Blood pressure 116/72, pulse 101, temperature 35.8 °C (96.4 °F), temperature source Temporal, resp. rate 18, height 1.685 m (5' 6.34\"), weight 77.2 kg (170 lb 3.1 oz), SpO2 94%.  Intake/Output last 3 Shifts:  I/O last 3 completed shifts:  In: 1579.8 (20.3 mL/kg) [P.O.:240; IV Piggyback:1339.8]  Out: - (0 mL/kg)   Weight: 77.8 kg     Relevant Results  Scheduled medications  amLODIPine, 5 mg, oral, Daily  atorvastatin, 40 mg, oral, Daily  DULoxetine, 60 mg, oral, BID  hydroxychloroquine, 200 mg, " oral, Daily  nicotine, 1 patch, transdermal, Daily  OLANZapine, 5 mg, oral, Nightly  pantoprazole, 40 mg, oral, Daily before breakfast  predniSONE, 10 mg, oral, Daily  QUEtiapine, 100 mg, oral, Nightly  sennosides-docusate sodium, 1 tablet, oral, BID      Type   Results for orders placed or performed during the hospital encounter of 02/18/25 (from the past 24 hours)   Magnesium   Result Value Ref Range    Magnesium 2.20 1.60 - 2.40 mg/dL   CBC and Auto Differential   Result Value Ref Range    WBC 3.6 (L) 4.4 - 11.3 x10*3/uL    nRBC 0.0 0.0 - 0.0 /100 WBCs    RBC 3.72 (L) 4.00 - 5.20 x10*6/uL    Hemoglobin 11.4 (L) 12.0 - 16.0 g/dL    Hematocrit 35.1 (L) 36.0 - 46.0 %    MCV 94 80 - 100 fL    MCH 30.6 26.0 - 34.0 pg    MCHC 32.5 32.0 - 36.0 g/dL    RDW 14.4 11.5 - 14.5 %    Platelets 198 150 - 450 x10*3/uL    Neutrophils % 76.0 40.0 - 80.0 %    Immature Granulocytes %, Automated 0.3 0.0 - 0.9 %    Lymphocytes % 22.6 13.0 - 44.0 %    Monocytes % 0.3 2.0 - 10.0 %    Eosinophils % 0.8 0.0 - 6.0 %    Basophils % 0.0 0.0 - 2.0 %    Neutrophils Absolute 2.76 1.20 - 7.70 x10*3/uL    Immature Granulocytes Absolute, Automated 0.01 0.00 - 0.70 x10*3/uL    Lymphocytes Absolute 0.82 (L) 1.20 - 4.80 x10*3/uL    Monocytes Absolute 0.01 (L) 0.10 - 1.00 x10*3/uL    Eosinophils Absolute 0.03 0.00 - 0.70 x10*3/uL    Basophils Absolute 0.00 0.00 - 0.10 x10*3/uL   Hepatic Function Panel   Result Value Ref Range    Albumin 3.9 3.4 - 5.0 g/dL    Bilirubin, Total 0.4 0.0 - 1.2 mg/dL    Bilirubin, Direct 0.1 0.0 - 0.3 mg/dL    Alkaline Phosphatase 63 33 - 110 U/L    ALT 28 7 - 45 U/L    AST 12 9 - 39 U/L    Total Protein 5.7 (L) 6.4 - 8.2 g/dL   Phosphorus   Result Value Ref Range    Phosphorus 3.3 2.5 - 4.9 mg/dL   Basic Metabolic Panel   Result Value Ref Range    Glucose 93 74 - 99 mg/dL    Sodium 142 136 - 145 mmol/L    Potassium 4.1 3.5 - 5.3 mmol/L    Chloride 107 98 - 107 mmol/L    Bicarbonate 26 21 - 32 mmol/L    Anion Gap 13 10 - 20  mmol/L    Urea Nitrogen 19 6 - 23 mg/dL    Creatinine 0.52 0.50 - 1.05 mg/dL    eGFR >90 >60 mL/min/1.73m*2    Calcium 8.8 8.6 - 10.6 mg/dL          This patient has a central line   Reason for the central line remaining today? Hemodynamic monitoring    Assessment/Plan   Assessment & Plan  Peripheral T-cell lymphoma (Multi)    Angioimmunoblastic T-cell lymphoma    Ms. 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 admitting for Auto Transplant prepped with BEAM (T0=2/24/25)     Currently T-1 Auto prepped with BEAM (T0=2/24/25)      ONC: (per chart review; see onc history for complete treatment)    --Angioimmunoblastic T-Cell Lymphoma (CD 30+ AK Negative)   Staging PET s/p Cycle 5: excellent response (see PET dated 12/23/24)   S/P BV-CHP (June 2024) x 6 cycles       Autologous Stem Cell Transplant   Prep: BEAM   Carmustine 300mg/m2 (T-6)   Cytarabine 200mg/m2 (T-5, -4, -3, -2)   Etoposide 200mg/m2 (T-5, -4, -3, -2)   --Due to RA high risk for reactions scheduled Benadryl 25mg with each dose   Melphalan 140mg/m2 (T-1)    Cells Collected 5.68 x 10^6 CD34 cells    Cells Infused (2/24): PENDING      Surveillance Monitoring   IgG: On admission : 445  Coag/Fibrinogen 2x/week (2/18): WNL   LDH/Hapto: Weekly (2/18): WNL       HEME:   --Mild Thrombocytopenia likely secondary to recent chemotherapy, no evidence of bleeding    --No evidence of DIC or hypercoag state   --Transfuse if Hgb<7 and/or Plt<10 (hold DVT ppx when PLT<50k)   --Lovenox 40mg for DVT ppx       ID:   Allergy: Amoxicillin    --No evidence of active infection on admit   --Start ACV on T+0 and Fluconazole T+1     --Plan Levofloxacin prophylaxis when neutropenic   --Plan Cefepime for neutropenic fever due to PCN allergy    --Plan Bactrim 800/160mg qMWF start T+30     FEN/GI:   Admit Wt: 77.1 Kg,  current wt: 77.2 kg  --Hydration per chemotherapy order set    --PPI prophy  w/protonix on admit   --Monitor electrolytes, replace as needed   --Dietician consulted on admission   Constipation. Resolved. Continue Senokot. Discontinued Miralax.       CARDIAC:   History of HTN  --Continue home Norvasc 5mg/day and Atorvastatin 40mg/day @ HS    --ECHO (1/22/25): EF 60-65%  --Weekly EKG (2/18) due to QT prolonging meds: (2/19)   --PT consulted on admission      RHEUMATOID:   History of Lupus and Rheumatoid Arthritis    --Currently on Placquenil, Prednisone 10mg/day,    --Follows with Dr Dobbs        --Previously on Saphnelo (interferon alpha antagonist until 7/2023)      PSYCH:   Anxiety/Depression    --Continue home Seroquil, Cymbalta, Ativan  -- Has followed with behavioral health at [facility]  -- Plan to consult inpatient psychiatry Monday 2/24  (for med mgmt and social assistance)  -- consult art therapy     MISC:   Nicotine Dependence    Currently smoker; Nicotine patch ordered on admit   Patient considering smoking cessation    Social Situation, currently homeless, was living in SNF      DISPO:   Full Code confirmed on admit    NOK Mom: Stephanie Wilson [phone]   Non-Tunneled CVC (placed 2/11) remove at discharge     Blood Consent Signed on Admission   Primary Onc: Dr. Gunjan Varela       Patient seen, examined and discussed with Malignant Heme attending Dr. Houston Berger PA-C

## 2025-02-23 NOTE — NURSING NOTE
Dose # 1 of Melphalan chemotherapy 266mg in 53.2 mL given over 11 minutes via left trialysis catheter.  Dose up at 0929 and down at 0940.  Pre-medicated with ondansetron, dexamethasone and fosaprepitant.  Patient, dose and rate verified with second RNOksana.  + BBR obtained via syringe aspiration before and after administration.  Patient with no side effects.

## 2025-02-23 NOTE — CARE PLAN
The patient's goals for the shift include      The clinical goals for the shift include Patient will remain HDS through end of shift    Over the shift, the patient did  make progress toward the following goals. Barriers to progression include melphalan administration. Recommendations to address these barriers include monitor patient for side effects.

## 2025-02-24 ENCOUNTER — APPOINTMENT (OUTPATIENT)
Dept: HEMATOLOGY/ONCOLOGY | Facility: HOSPITAL | Age: 50
End: 2025-02-24
Payer: COMMERCIAL

## 2025-02-24 LAB
ALBUMIN SERPL BCP-MCNC: 3.8 G/DL (ref 3.4–5)
ALP SERPL-CCNC: 61 U/L (ref 33–110)
ALT SERPL W P-5'-P-CCNC: 23 U/L (ref 7–45)
ANION GAP SERPL CALC-SCNC: 12 MMOL/L (ref 10–20)
APTT PPP: 27 SECONDS (ref 27–38)
AST SERPL W P-5'-P-CCNC: 8 U/L (ref 9–39)
BASOPHILS # BLD MANUAL: 0 X10*3/UL (ref 0–0.1)
BASOPHILS NFR BLD MANUAL: 0 %
BILIRUB DIRECT SERPL-MCNC: 0.1 MG/DL (ref 0–0.3)
BILIRUB SERPL-MCNC: 0.5 MG/DL (ref 0–1.2)
BUN SERPL-MCNC: 19 MG/DL (ref 6–23)
CALCIUM SERPL-MCNC: 8.9 MG/DL (ref 8.6–10.6)
CHLORIDE SERPL-SCNC: 104 MMOL/L (ref 98–107)
CO2 SERPL-SCNC: 29 MMOL/L (ref 21–32)
CREAT SERPL-MCNC: 0.54 MG/DL (ref 0.5–1.05)
EGFRCR SERPLBLD CKD-EPI 2021: >90 ML/MIN/1.73M*2
EOSINOPHIL # BLD MANUAL: 0.03 X10*3/UL (ref 0–0.7)
EOSINOPHIL NFR BLD MANUAL: 1 %
ERYTHROCYTE [DISTWIDTH] IN BLOOD BY AUTOMATED COUNT: 13.8 % (ref 11.5–14.5)
FIBRINOGEN PPP-MCNC: 210 MG/DL (ref 200–400)
GLUCOSE SERPL-MCNC: 92 MG/DL (ref 74–99)
HCT VFR BLD AUTO: 33.1 % (ref 36–46)
HGB BLD-MCNC: 11.1 G/DL (ref 12–16)
IMM GRANULOCYTES # BLD AUTO: 0 X10*3/UL (ref 0–0.7)
IMM GRANULOCYTES NFR BLD AUTO: 0 % (ref 0–0.9)
INR PPP: 1 (ref 0.9–1.1)
LDH SERPL L TO P-CCNC: 183 U/L (ref 84–246)
LYMPHOCYTES # BLD MANUAL: 0.87 X10*3/UL (ref 1.2–4.8)
LYMPHOCYTES NFR BLD MANUAL: 28 %
MAGNESIUM SERPL-MCNC: 2.2 MG/DL (ref 1.6–2.4)
MCH RBC QN AUTO: 31.1 PG (ref 26–34)
MCHC RBC AUTO-ENTMCNC: 33.5 G/DL (ref 32–36)
MCV RBC AUTO: 93 FL (ref 80–100)
MONOCYTES # BLD MANUAL: 0 X10*3/UL (ref 0.1–1)
MONOCYTES NFR BLD MANUAL: 0 %
NEUTS SEG # BLD MANUAL: 2.2 X10*3/UL (ref 1.2–7)
NEUTS SEG NFR BLD MANUAL: 71 %
NRBC BLD-RTO: 0 /100 WBCS (ref 0–0)
PHOSPHATE SERPL-MCNC: 4.2 MG/DL (ref 2.5–4.9)
PLATELET # BLD AUTO: 191 X10*3/UL (ref 150–450)
POTASSIUM SERPL-SCNC: 4 MMOL/L (ref 3.5–5.3)
PROT SERPL-MCNC: 5.5 G/DL (ref 6.4–8.2)
PROTHROMBIN TIME: 10.7 SECONDS (ref 9.8–12.8)
RBC # BLD AUTO: 3.57 X10*6/UL (ref 4–5.2)
RBC MORPH BLD: ABNORMAL
SODIUM SERPL-SCNC: 141 MMOL/L (ref 136–145)
TOTAL CELLS COUNTED BLD: 100
WBC # BLD AUTO: 3.1 X10*3/UL (ref 4.4–11.3)

## 2025-02-24 PROCEDURE — 2500000001 HC RX 250 WO HCPCS SELF ADMINISTERED DRUGS (ALT 637 FOR MEDICARE OP): Mod: SE | Performed by: PHYSICIAN ASSISTANT

## 2025-02-24 PROCEDURE — 83615 LACTATE (LD) (LDH) ENZYME: CPT | Performed by: NURSE PRACTITIONER

## 2025-02-24 PROCEDURE — 87493 C DIFF AMPLIFIED PROBE: CPT

## 2025-02-24 PROCEDURE — 85027 COMPLETE CBC AUTOMATED: CPT | Performed by: NURSE PRACTITIONER

## 2025-02-24 PROCEDURE — 30243Y0 TRANSFUSION OF AUTOLOGOUS HEMATOPOIETIC STEM CELLS INTO CENTRAL VEIN, PERCUTANEOUS APPROACH: ICD-10-PCS | Performed by: STUDENT IN AN ORGANIZED HEALTH CARE EDUCATION/TRAINING PROGRAM

## 2025-02-24 PROCEDURE — 2500000004 HC RX 250 GENERAL PHARMACY W/ HCPCS (ALT 636 FOR OP/ED): Mod: SE | Performed by: NURSE PRACTITIONER

## 2025-02-24 PROCEDURE — 38241 TRANSPLT AUTOL HCT/DONOR: CPT | Performed by: STUDENT IN AN ORGANIZED HEALTH CARE EDUCATION/TRAINING PROGRAM

## 2025-02-24 PROCEDURE — 85384 FIBRINOGEN ACTIVITY: CPT | Performed by: NURSE PRACTITIONER

## 2025-02-24 PROCEDURE — 2500000002 HC RX 250 W HCPCS SELF ADMINISTERED DRUGS (ALT 637 FOR MEDICARE OP, ALT 636 FOR OP/ED): Mod: SE | Performed by: NURSE PRACTITIONER

## 2025-02-24 PROCEDURE — 93005 ELECTROCARDIOGRAM TRACING: CPT

## 2025-02-24 PROCEDURE — 85007 BL SMEAR W/DIFF WBC COUNT: CPT | Performed by: NURSE PRACTITIONER

## 2025-02-24 PROCEDURE — 2500000001 HC RX 250 WO HCPCS SELF ADMINISTERED DRUGS (ALT 637 FOR MEDICARE OP): Mod: SE | Performed by: NURSE PRACTITIONER

## 2025-02-24 PROCEDURE — 84100 ASSAY OF PHOSPHORUS: CPT | Performed by: NURSE PRACTITIONER

## 2025-02-24 PROCEDURE — 1170000001 HC PRIVATE ONCOLOGY ROOM DAILY

## 2025-02-24 PROCEDURE — 85610 PROTHROMBIN TIME: CPT | Performed by: NURSE PRACTITIONER

## 2025-02-24 PROCEDURE — 38241 TRANSPLT AUTOL HCT/DONOR: CPT

## 2025-02-24 PROCEDURE — 38208 THAW PRESERVED STEM CELLS: CPT

## 2025-02-24 PROCEDURE — 83735 ASSAY OF MAGNESIUM: CPT | Performed by: NURSE PRACTITIONER

## 2025-02-24 PROCEDURE — S4991 NICOTINE PATCH NONLEGEND: HCPCS | Mod: SE | Performed by: NURSE PRACTITIONER

## 2025-02-24 PROCEDURE — 2500000002 HC RX 250 W HCPCS SELF ADMINISTERED DRUGS (ALT 637 FOR MEDICARE OP, ALT 636 FOR OP/ED): Mod: SE | Performed by: INTERNAL MEDICINE

## 2025-02-24 PROCEDURE — 80053 COMPREHEN METABOLIC PANEL: CPT | Performed by: NURSE PRACTITIONER

## 2025-02-24 PROCEDURE — 2500000004 HC RX 250 GENERAL PHARMACY W/ HCPCS (ALT 636 FOR OP/ED): Mod: SE | Performed by: INTERNAL MEDICINE

## 2025-02-24 PROCEDURE — 2500000001 HC RX 250 WO HCPCS SELF ADMINISTERED DRUGS (ALT 637 FOR MEDICARE OP): Mod: SE | Performed by: INTERNAL MEDICINE

## 2025-02-24 PROCEDURE — 93010 ELECTROCARDIOGRAM REPORT: CPT | Performed by: INTERNAL MEDICINE

## 2025-02-24 RX ORDER — DIPHENHYDRAMINE HCL 25 MG
25 CAPSULE ORAL ONCE
Status: COMPLETED | OUTPATIENT
Start: 2025-02-24 | End: 2025-02-24

## 2025-02-24 RX ORDER — LORAZEPAM 2 MG/ML
1 INJECTION INTRAMUSCULAR AS NEEDED
Status: ACTIVE | OUTPATIENT
Start: 2025-02-24 | End: 2025-02-24

## 2025-02-24 RX ORDER — ACYCLOVIR 400 MG/1
400 TABLET ORAL EVERY 12 HOURS
Status: DISCONTINUED | OUTPATIENT
Start: 2025-02-24 | End: 2025-03-17 | Stop reason: HOSPADM

## 2025-02-24 RX ORDER — FLUCONAZOLE 200 MG/1
400 TABLET ORAL DAILY
Status: DISCONTINUED | OUTPATIENT
Start: 2025-02-25 | End: 2025-03-10

## 2025-02-24 RX ORDER — SODIUM CHLORIDE 9 MG/ML
125 INJECTION, SOLUTION INTRAVENOUS CONTINUOUS
Status: ACTIVE | OUTPATIENT
Start: 2025-02-24 | End: 2025-02-24

## 2025-02-24 RX ADMIN — OXYCODONE 5 MG: 5 TABLET ORAL at 20:06

## 2025-02-24 RX ADMIN — DIPHENHYDRAMINE HYDROCHLORIDE 25 MG: 25 CAPSULE ORAL at 09:59

## 2025-02-24 RX ADMIN — PREDNISONE 10 MG: 10 TABLET ORAL at 09:59

## 2025-02-24 RX ADMIN — ACYCLOVIR 400 MG: 400 TABLET ORAL at 20:06

## 2025-02-24 RX ADMIN — LORAZEPAM 0.5 MG: 0.5 TABLET ORAL at 09:59

## 2025-02-24 RX ADMIN — LORAZEPAM 0.5 MG: 0.5 TABLET ORAL at 20:06

## 2025-02-24 RX ADMIN — DULOXETINE HYDROCHLORIDE 60 MG: 60 CAPSULE, DELAYED RELEASE ORAL at 09:59

## 2025-02-24 RX ADMIN — SODIUM CHLORIDE 125 ML/HR: 9 INJECTION, SOLUTION INTRAVENOUS at 10:00

## 2025-02-24 RX ADMIN — SENNOSIDES AND DOCUSATE SODIUM 1 TABLET: 50; 8.6 TABLET ORAL at 20:06

## 2025-02-24 RX ADMIN — HYDROXYCHLOROQUINE SULFATE 200 MG: 200 TABLET, FILM COATED ORAL at 09:59

## 2025-02-24 RX ADMIN — DULOXETINE HYDROCHLORIDE 60 MG: 60 CAPSULE, DELAYED RELEASE ORAL at 20:06

## 2025-02-24 RX ADMIN — PROCHLORPERAZINE MALEATE 10 MG: 10 TABLET ORAL at 10:09

## 2025-02-24 RX ADMIN — ACYCLOVIR 400 MG: 400 TABLET ORAL at 09:59

## 2025-02-24 RX ADMIN — NICOTINE 7 MG/24 HR DAILY TRANSDERMAL PATCH 1 PATCH: at 09:59

## 2025-02-24 RX ADMIN — OLANZAPINE 5 MG: 5 TABLET, FILM COATED ORAL at 20:06

## 2025-02-24 RX ADMIN — PANTOPRAZOLE SODIUM 40 MG: 40 TABLET, DELAYED RELEASE ORAL at 06:28

## 2025-02-24 RX ADMIN — ATORVASTATIN CALCIUM 40 MG: 40 TABLET, FILM COATED ORAL at 09:59

## 2025-02-24 RX ADMIN — OXYCODONE 5 MG: 5 TABLET ORAL at 09:59

## 2025-02-24 RX ADMIN — HYDROCORTISONE SODIUM SUCCINATE 100 MG: 100 INJECTION, POWDER, FOR SOLUTION INTRAMUSCULAR; INTRAVENOUS at 09:59

## 2025-02-24 RX ADMIN — QUETIAPINE FUMARATE 100 MG: 25 TABLET ORAL at 20:06

## 2025-02-24 RX ADMIN — AMLODIPINE BESYLATE 5 MG: 5 TABLET ORAL at 09:59

## 2025-02-24 ASSESSMENT — COGNITIVE AND FUNCTIONAL STATUS - GENERAL
DAILY ACTIVITIY SCORE: 24
MOBILITY SCORE: 24
DAILY ACTIVITIY SCORE: 24
MOBILITY SCORE: 24

## 2025-02-24 ASSESSMENT — PAIN SCALES - GENERAL
PAINLEVEL_OUTOF10: 0 - NO PAIN
PAINLEVEL_OUTOF10: 0 - NO PAIN

## 2025-02-24 NOTE — NURSING NOTE
Autologous HSCT product #[id number] delivered by [facility] personnel and verified at bedside with LOREN Toledo .Patient premedicated per orders. Patient identification verified against protocol with second RN  VIJAY Sheikh and ASHKAN Parker, using name, MRN, and . Product infused via Alaris pump at a rate of 250 ml/hr through White lumen of triple catheter catheter. +BBR obtained prior to and following infusion. Infusion started at 1100 and completed at 1200 . Pt. received a total of  140ml and a total dose of 5.63 x10^6 CD34. Patient monitored throughout and vitals remained stable throughout infusion. No complications noted. Patient instructed not to ambulate without supervision until cleared by medical team. Patient verbalized understanding of this safety measure. Patient to be monitored 1 hour following infusion. Tolerated infusion well.

## 2025-02-24 NOTE — CARE PLAN
The patient's goals for the shift include      The clinical goals for the shift include Patient will remain HDS througrh end of shift    Over the shift, the patient did make progress toward the following goals. Barriers to progression include recent chemotherapy. Recommendations to address these barriers include monitor  for nausea and vomiting.

## 2025-02-24 NOTE — PROGRESS NOTES
"Sagrario Garber is a 49 y.o. female on day 6 of admission presenting with Peripheral T-cell lymphoma (Multi).    Subjective   Afebrile. Diarrhea since yesterday. Endorses anxiety over her transplant today. Reports nausea which she feels may be caused by her aniety. We discuss the transfusion and expected side effects. Family present in the room. Remainder of ROS is negative.       Objective   Physical Exam  Constitutional:       General: She is not in acute distress.     Appearance: Normal appearance. She is normal weight. She is not ill-appearing.   HENT:      Head: Normocephalic.      Mouth/Throat:      Mouth: Mucous membranes are moist.      Pharynx: Oropharynx is clear.      Comments: Poor dentition  Eyes:      General: No scleral icterus.     Extraocular Movements: Extraocular movements intact.      Conjunctiva/sclera: Conjunctivae normal.   Cardiovascular:      Rate and Rhythm: Normal rate and regular rhythm.      Heart sounds: Normal heart sounds. No murmur heard.     No gallop.   Pulmonary:      Effort: Pulmonary effort is normal.      Breath sounds: Normal breath sounds.   Abdominal:      General: Abdomen is flat. Bowel sounds are normal.      Palpations: Abdomen is soft.   Musculoskeletal:         General: Normal range of motion.   Lymphadenopathy:      Comments: No right inguinal (Right inguinal adenopathy (round LN 4 x 4 cm)) adenopathy.    Skin:     General: Skin is warm.      Coloration: Skin is not jaundiced or pale.      Findings: No erythema or rash.   Neurological:      General: No focal deficit present.      Mental Status: She is alert and oriented to person, place, and time. Mental status is at baseline.           Last Recorded Vitals  Blood pressure 111/75, pulse 78, temperature 36.2 °C (97.2 °F), temperature source Temporal, resp. rate 18, height 1.685 m (5' 6.34\"), weight 77.2 kg (170 lb 3.1 oz), SpO2 96%.  Intake/Output last 3 Shifts:  I/O last 3 completed shifts:  In: 303.2 (3.9 mL/kg) [IV " Piggyback:303.2]  Out: - (0 mL/kg)   Weight: 77.2 kg     Relevant Results  Scheduled medications  acyclovir, 400 mg, oral, q12h  amLODIPine, 5 mg, oral, Daily  atorvastatin, 40 mg, oral, Daily  DULoxetine, 60 mg, oral, BID  [START ON 3/1/2025] filgrastim or biosimilar, 480 mcg, subcutaneous, q24h  [START ON 2/25/2025] fluconazole, 400 mg, oral, Daily  hydroxychloroquine, 200 mg, oral, Daily  nicotine, 1 patch, transdermal, Daily  OLANZapine, 5 mg, oral, Nightly  pantoprazole, 40 mg, oral, Daily before breakfast  predniSONE, 10 mg, oral, Daily  QUEtiapine, 100 mg, oral, Nightly  sennosides-docusate sodium, 1 tablet, oral, BID      Type   Results for orders placed or performed during the hospital encounter of 02/18/25 (from the past 24 hours)   Magnesium   Result Value Ref Range    Magnesium 2.20 1.60 - 2.40 mg/dL   CBC and Auto Differential   Result Value Ref Range    WBC 3.1 (L) 4.4 - 11.3 x10*3/uL    nRBC 0.0 0.0 - 0.0 /100 WBCs    RBC 3.57 (L) 4.00 - 5.20 x10*6/uL    Hemoglobin 11.1 (L) 12.0 - 16.0 g/dL    Hematocrit 33.1 (L) 36.0 - 46.0 %    MCV 93 80 - 100 fL    MCH 31.1 26.0 - 34.0 pg    MCHC 33.5 32.0 - 36.0 g/dL    RDW 13.8 11.5 - 14.5 %    Platelets 191 150 - 450 x10*3/uL    Immature Granulocytes %, Automated 0.0 0.0 - 0.9 %    Immature Granulocytes Absolute, Automated 0.00 0.00 - 0.70 x10*3/uL   Hepatic Function Panel   Result Value Ref Range    Albumin 3.8 3.4 - 5.0 g/dL    Bilirubin, Total 0.5 0.0 - 1.2 mg/dL    Bilirubin, Direct 0.1 0.0 - 0.3 mg/dL    Alkaline Phosphatase 61 33 - 110 U/L    ALT 23 7 - 45 U/L    AST 8 (L) 9 - 39 U/L    Total Protein 5.5 (L) 6.4 - 8.2 g/dL   Phosphorus   Result Value Ref Range    Phosphorus 4.2 2.5 - 4.9 mg/dL   Basic Metabolic Panel   Result Value Ref Range    Glucose 92 74 - 99 mg/dL    Sodium 141 136 - 145 mmol/L    Potassium 4.0 3.5 - 5.3 mmol/L    Chloride 104 98 - 107 mmol/L    Bicarbonate 29 21 - 32 mmol/L    Anion Gap 12 10 - 20 mmol/L    Urea Nitrogen 19 6 - 23  mg/dL    Creatinine 0.54 0.50 - 1.05 mg/dL    eGFR >90 >60 mL/min/1.73m*2    Calcium 8.9 8.6 - 10.6 mg/dL   Manual Differential   Result Value Ref Range    Neutrophils %, Manual 71.0 40.0 - 80.0 %    Lymphocytes %, Manual 28.0 13.0 - 44.0 %    Monocytes %, Manual 0.0 2.0 - 10.0 %    Eosinophils %, Manual 1.0 0.0 - 6.0 %    Basophils %, Manual 0.0 0.0 - 2.0 %    Seg Neutrophils Absolute, Manual 2.20 1.20 - 7.00 x10*3/uL    Lymphocytes Absolute, Manual 0.87 (L) 1.20 - 4.80 x10*3/uL    Monocytes Absolute, Manual 0.00 (L) 0.10 - 1.00 x10*3/uL    Eosinophils Absolute, Manual 0.03 0.00 - 0.70 x10*3/uL    Basophils Absolute, Manual 0.00 0.00 - 0.10 x10*3/uL    Total Cells Counted 100     RBC Morphology No significant RBC morphology present    Fibrinogen   Result Value Ref Range    Fibrinogen 210 200 - 400 mg/dL   Coagulation Screen   Result Value Ref Range    Protime 10.7 9.8 - 12.8 seconds    INR 1.0 0.9 - 1.1    aPTT 27 27 - 38 seconds          This patient has a central line   Reason for the central line remaining today? Hemodynamic monitoring    Assessment/Plan   Assessment & Plan  Peripheral T-cell lymphoma (Multi)    Angioimmunoblastic T-cell lymphoma    Ms. 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 admitting for Auto Transplant prepped with [facility] (T0=2/24/25)     Currently T-0 Auto prepped with [facility] (T0=2/24/25)      ONC: (per chart review; see onc history for complete treatment)    --Angioimmunoblastic T-Cell Lymphoma (CD 30+ AK Negative)   Staging PET s/p Cycle 5: excellent response (see PET dated 12/23/24)   S/P BV-CHP (June 2024) x 6 cycles       Autologous Stem Cell Transplant   Prep: BEAM   Carmustine 300mg/m2 (T-6)   Cytarabine 200mg/m2 (T-5, -4, -3, -2)   Etoposide 200mg/m2 (T-5, -4, -3, -2)   --Due to RA high risk for reactions scheduled Benadryl 25mg with each dose   Melphalan 140mg/m2 (T-1)    Cells Collected  5.68 x 10^6 CD34 cells    Cells Infused (2/24): PENDING      Surveillance Monitoring   IgG: On admission : 445  Coag/Fibrinogen 2x/week (2/18): WNL   LDH/Hapto: Weekly (2/18): WNL       HEME:   --Mild Thrombocytopenia likely secondary to recent chemotherapy, no evidence of bleeding    --No evidence of DIC or hypercoag state   --Transfuse if Hgb<7 and/or Plt<10 (hold DVT ppx when PLT<50k)   --Lovenox 40mg for DVT ppx       ID:   Allergy: Amoxicillin    --No evidence of active infection on admit   --Start ACV on T+0 and Fluconazole T+1     --Plan Levofloxacin prophylaxis when neutropenic   --Plan Cefepime for neutropenic fever due to PCN allergy    --Plan Bactrim 800/160mg qMWF start T+30     FEN/GI:   Admit Wt: 77.1 Kg,  current wt: 77.2 kg  --Hydration per chemotherapy order set    --PPI prophy w/protonix on admit   --Monitor electrolytes, replace as needed   --Dietician consulted on admission   Constipation. Resolved. Continue Senokot. Discontinued Miralax.     CARDIAC:   History of HTN  --Continue home Norvasc 5mg/day and Atorvastatin 40mg/day @ HS    --ECHO (1/22/25): EF 60-65%  --Weekly EKG (2/24) due to QT prolonging meds: (2/19) , (2/24)   --PT consulted on admission      RHEUMATOID:   History of Lupus and Rheumatoid Arthritis    --Currently on Placquenil, Prednisone 10mg/day,    --Follows with Dr Dobbs        --Previously on Saphnelo (interferon alpha antagonist until 7/2023)      PSYCH:   Anxiety/Depression    --Continue home Seroquil, Cymbalta, Ativan  -- Has followed with behavioral health at [facility]  -- Plan to consult inpatient psychiatry Monday 2/24  (for med mgmt and social assistance)  -- consult art therapy     MISC:   Nicotine Dependence    Currently smoker; Nicotine patch ordered on admit   Patient considering smoking cessation    Social Situation, currently homeless, was living in SNF      DISPO:   Full Code confirmed on admit    NOK Mom: Stephanie Smileymalick [phone]    Non-Tunneled CVC (placed 2/11) remove at discharge     Blood Consent Signed on Admission   Primary Onc: Dr. Gunjan Varela       Patient seen, examined and discussed with Malignant Heme attending Dr. Houston Harrington PA-C

## 2025-02-24 NOTE — PROGRESS NOTES
Art Therapy Note    Sagrario Garber     Therapy Session  Referral Type: New referral this admission  Visit Type: Follow-up visit  Session Start Time: 1422  Session End Time: 1427  Intervention Delivery: In-person  Conflict of Service: None  Number of family members present: 0  Number of staff members present: 0              Treatment/Interventions  Areas of Focus: Self-expression, Coping  Art Therapy Interventions: Meaning making intervention, Empathic listening/validating emotions  Interruption: No  Patient Fell Asleep at End of Session: No    Post-assessment  Total Session Time (min): 5 minutes    Narrative  Assessment Detail: ATR made a visit to Pt.tohsia antoine AT session.  Pt open and welcoming of ATR.  Evaluation: PT declined a session but requested more materials to continue making more bracelets for her mother and sister.  ATR agreeable and Pt restocked her supplies with more string anbd chosen charms to go along with her beads she had already in her collection form the first session.  Pt expresed gratitude and thansk for materials and offering sharing how much this helps her pass the time and have something meaningful to do.  Further reporting she likes staying busy and occupied.  Pt seems to be full of internl energy and seems a bit anxious.  Pt open to continued services  Follow-up: ATR will continue to follow PT and offer AT services per her request.    Education Documentation  No documentation found.

## 2025-02-25 LAB
ALBUMIN SERPL BCP-MCNC: 3.6 G/DL (ref 3.4–5)
ALP SERPL-CCNC: 59 U/L (ref 33–110)
ALT SERPL W P-5'-P-CCNC: 21 U/L (ref 7–45)
ANION GAP SERPL CALC-SCNC: 11 MMOL/L (ref 10–20)
AST SERPL W P-5'-P-CCNC: 14 U/L (ref 9–39)
BASOPHILS # BLD AUTO: 0.01 X10*3/UL (ref 0–0.1)
BASOPHILS NFR BLD AUTO: 0.3 %
BILIRUB DIRECT SERPL-MCNC: 0.1 MG/DL (ref 0–0.3)
BILIRUB SERPL-MCNC: 0.5 MG/DL (ref 0–1.2)
BUN SERPL-MCNC: 17 MG/DL (ref 6–23)
C DIF TOX TCDA+TCDB STL QL NAA+PROBE: NOT DETECTED
CALCIUM SERPL-MCNC: 8.5 MG/DL (ref 8.6–10.6)
CHLORIDE SERPL-SCNC: 109 MMOL/L (ref 98–107)
CO2 SERPL-SCNC: 28 MMOL/L (ref 21–32)
CREAT SERPL-MCNC: 0.45 MG/DL (ref 0.5–1.05)
EGFRCR SERPLBLD CKD-EPI 2021: >90 ML/MIN/1.73M*2
EOSINOPHIL # BLD AUTO: 0.06 X10*3/UL (ref 0–0.7)
EOSINOPHIL NFR BLD AUTO: 1.5 %
ERYTHROCYTE [DISTWIDTH] IN BLOOD BY AUTOMATED COUNT: 14 % (ref 11.5–14.5)
GLUCOSE SERPL-MCNC: 93 MG/DL (ref 74–99)
HCT VFR BLD AUTO: 34.1 % (ref 36–46)
HGB BLD-MCNC: 11 G/DL (ref 12–16)
IMM GRANULOCYTES # BLD AUTO: 0.02 X10*3/UL (ref 0–0.7)
IMM GRANULOCYTES NFR BLD AUTO: 0.5 % (ref 0–0.9)
LYMPHOCYTES # BLD AUTO: 0.55 X10*3/UL (ref 1.2–4.8)
LYMPHOCYTES NFR BLD AUTO: 14.1 %
MAGNESIUM SERPL-MCNC: 2.13 MG/DL (ref 1.6–2.4)
MCH RBC QN AUTO: 30.6 PG (ref 26–34)
MCHC RBC AUTO-ENTMCNC: 32.3 G/DL (ref 32–36)
MCV RBC AUTO: 95 FL (ref 80–100)
MONOCYTES # BLD AUTO: 0.01 X10*3/UL (ref 0.1–1)
MONOCYTES NFR BLD AUTO: 0.3 %
NEUTROPHILS # BLD AUTO: 3.25 X10*3/UL (ref 1.2–7.7)
NEUTROPHILS NFR BLD AUTO: 83.3 %
NRBC BLD-RTO: 0 /100 WBCS (ref 0–0)
PHOSPHATE SERPL-MCNC: 4.1 MG/DL (ref 2.5–4.9)
PLATELET # BLD AUTO: 163 X10*3/UL (ref 150–450)
POTASSIUM SERPL-SCNC: 3.9 MMOL/L (ref 3.5–5.3)
PROT SERPL-MCNC: 5.4 G/DL (ref 6.4–8.2)
RBC # BLD AUTO: 3.59 X10*6/UL (ref 4–5.2)
SODIUM SERPL-SCNC: 144 MMOL/L (ref 136–145)
WBC # BLD AUTO: 3.9 X10*3/UL (ref 4.4–11.3)

## 2025-02-25 PROCEDURE — 83735 ASSAY OF MAGNESIUM: CPT | Performed by: NURSE PRACTITIONER

## 2025-02-25 PROCEDURE — 2500000001 HC RX 250 WO HCPCS SELF ADMINISTERED DRUGS (ALT 637 FOR MEDICARE OP): Mod: SE | Performed by: PHYSICIAN ASSISTANT

## 2025-02-25 PROCEDURE — 2500000002 HC RX 250 W HCPCS SELF ADMINISTERED DRUGS (ALT 637 FOR MEDICARE OP, ALT 636 FOR OP/ED): Mod: SE | Performed by: INTERNAL MEDICINE

## 2025-02-25 PROCEDURE — 85025 COMPLETE CBC W/AUTO DIFF WBC: CPT | Performed by: NURSE PRACTITIONER

## 2025-02-25 PROCEDURE — 1170000001 HC PRIVATE ONCOLOGY ROOM DAILY

## 2025-02-25 PROCEDURE — S4991 NICOTINE PATCH NONLEGEND: HCPCS | Mod: SE | Performed by: NURSE PRACTITIONER

## 2025-02-25 PROCEDURE — 2500000004 HC RX 250 GENERAL PHARMACY W/ HCPCS (ALT 636 FOR OP/ED): Mod: SE | Performed by: NURSE PRACTITIONER

## 2025-02-25 PROCEDURE — 80053 COMPREHEN METABOLIC PANEL: CPT | Performed by: NURSE PRACTITIONER

## 2025-02-25 PROCEDURE — 82248 BILIRUBIN DIRECT: CPT | Performed by: NURSE PRACTITIONER

## 2025-02-25 PROCEDURE — 99233 SBSQ HOSP IP/OBS HIGH 50: CPT | Performed by: STUDENT IN AN ORGANIZED HEALTH CARE EDUCATION/TRAINING PROGRAM

## 2025-02-25 PROCEDURE — 2500000002 HC RX 250 W HCPCS SELF ADMINISTERED DRUGS (ALT 637 FOR MEDICARE OP, ALT 636 FOR OP/ED): Mod: SE | Performed by: NURSE PRACTITIONER

## 2025-02-25 PROCEDURE — 2500000001 HC RX 250 WO HCPCS SELF ADMINISTERED DRUGS (ALT 637 FOR MEDICARE OP): Mod: SE | Performed by: NURSE PRACTITIONER

## 2025-02-25 PROCEDURE — 2500000001 HC RX 250 WO HCPCS SELF ADMINISTERED DRUGS (ALT 637 FOR MEDICARE OP): Mod: SE | Performed by: INTERNAL MEDICINE

## 2025-02-25 PROCEDURE — 84100 ASSAY OF PHOSPHORUS: CPT | Performed by: NURSE PRACTITIONER

## 2025-02-25 RX ADMIN — PREDNISONE 10 MG: 10 TABLET ORAL at 09:05

## 2025-02-25 RX ADMIN — LORAZEPAM 0.5 MG: 0.5 TABLET ORAL at 20:53

## 2025-02-25 RX ADMIN — DULOXETINE HYDROCHLORIDE 60 MG: 60 CAPSULE, DELAYED RELEASE ORAL at 20:53

## 2025-02-25 RX ADMIN — SENNOSIDES AND DOCUSATE SODIUM 1 TABLET: 50; 8.6 TABLET ORAL at 20:54

## 2025-02-25 RX ADMIN — PANTOPRAZOLE SODIUM 40 MG: 40 TABLET, DELAYED RELEASE ORAL at 06:07

## 2025-02-25 RX ADMIN — OLANZAPINE 5 MG: 5 TABLET, FILM COATED ORAL at 20:54

## 2025-02-25 RX ADMIN — PROCHLORPERAZINE MALEATE 10 MG: 10 TABLET ORAL at 09:05

## 2025-02-25 RX ADMIN — NICOTINE 7 MG/24 HR DAILY TRANSDERMAL PATCH 1 PATCH: at 09:13

## 2025-02-25 RX ADMIN — SENNOSIDES AND DOCUSATE SODIUM 1 TABLET: 50; 8.6 TABLET ORAL at 09:06

## 2025-02-25 RX ADMIN — QUETIAPINE FUMARATE 100 MG: 25 TABLET ORAL at 20:53

## 2025-02-25 RX ADMIN — DULOXETINE HYDROCHLORIDE 60 MG: 60 CAPSULE, DELAYED RELEASE ORAL at 09:05

## 2025-02-25 RX ADMIN — ATORVASTATIN CALCIUM 40 MG: 40 TABLET, FILM COATED ORAL at 09:07

## 2025-02-25 RX ADMIN — ACYCLOVIR 400 MG: 400 TABLET ORAL at 20:53

## 2025-02-25 RX ADMIN — PROCHLORPERAZINE MALEATE 10 MG: 10 TABLET ORAL at 16:48

## 2025-02-25 RX ADMIN — HYDROMORPHONE HYDROCHLORIDE 2 MG: 2 TABLET ORAL at 16:48

## 2025-02-25 RX ADMIN — LORAZEPAM 0.5 MG: 0.5 TABLET ORAL at 09:06

## 2025-02-25 RX ADMIN — HYDROXYCHLOROQUINE SULFATE 200 MG: 200 TABLET, FILM COATED ORAL at 09:05

## 2025-02-25 RX ADMIN — OXYCODONE 5 MG: 5 TABLET ORAL at 09:07

## 2025-02-25 RX ADMIN — AMLODIPINE BESYLATE 5 MG: 5 TABLET ORAL at 09:05

## 2025-02-25 RX ADMIN — FLUCONAZOLE 400 MG: 200 TABLET ORAL at 09:06

## 2025-02-25 RX ADMIN — ACYCLOVIR 400 MG: 400 TABLET ORAL at 09:06

## 2025-02-25 RX ADMIN — OXYCODONE 5 MG: 5 TABLET ORAL at 20:53

## 2025-02-25 ASSESSMENT — PAIN DESCRIPTION - DESCRIPTORS: DESCRIPTORS: ACHING

## 2025-02-25 ASSESSMENT — COGNITIVE AND FUNCTIONAL STATUS - GENERAL
CLIMB 3 TO 5 STEPS WITH RAILING: A LITTLE
MOBILITY SCORE: 23
DAILY ACTIVITIY SCORE: 24
MOBILITY SCORE: 23
DAILY ACTIVITIY SCORE: 24
CLIMB 3 TO 5 STEPS WITH RAILING: A LITTLE

## 2025-02-25 ASSESSMENT — PAIN SCALES - GENERAL
PAINLEVEL_OUTOF10: 4
PAINLEVEL_OUTOF10: 6
PAINLEVEL_OUTOF10: 4
PAINLEVEL_OUTOF10: 7
PAINLEVEL_OUTOF10: 0 - NO PAIN

## 2025-02-25 ASSESSMENT — PAIN - FUNCTIONAL ASSESSMENT
PAIN_FUNCTIONAL_ASSESSMENT: 0-10

## 2025-02-25 NOTE — PROGRESS NOTES
"Sagrario Garber is a 49 y.o. female on day 7 of admission presenting with Peripheral T-cell lymphoma (Multi).    Subjective   Afebrile. Tolerated her transplant infusion without issue yesterday. Endorsing fatigue today. Remainder of ROS is negative.       Objective   Physical Exam  Constitutional:       General: She is not in acute distress.     Appearance: Normal appearance. She is normal weight. She is not ill-appearing.   HENT:      Head: Normocephalic.      Mouth/Throat:      Mouth: Mucous membranes are moist.      Pharynx: Oropharynx is clear.      Comments: Poor dentition  Eyes:      General: No scleral icterus.     Extraocular Movements: Extraocular movements intact.      Conjunctiva/sclera: Conjunctivae normal.   Cardiovascular:      Rate and Rhythm: Normal rate and regular rhythm.      Heart sounds: Normal heart sounds. No murmur heard.     No gallop.   Pulmonary:      Effort: Pulmonary effort is normal.      Breath sounds: Normal breath sounds.   Abdominal:      General: Abdomen is flat. Bowel sounds are normal.      Palpations: Abdomen is soft.   Musculoskeletal:         General: Normal range of motion.   Lymphadenopathy:      Comments: No right inguinal (Right inguinal adenopathy (round LN 4 x 4 cm)) adenopathy.    Skin:     General: Skin is warm.      Coloration: Skin is not jaundiced or pale.      Findings: No erythema or rash.   Neurological:      General: No focal deficit present.      Mental Status: She is alert and oriented to person, place, and time. Mental status is at baseline.           Last Recorded Vitals  Blood pressure 103/65, pulse 88, temperature 36.6 °C (97.9 °F), temperature source Temporal, resp. rate 17, height 1.685 m (5' 6.34\"), weight 77.2 kg (170 lb 3.1 oz), SpO2 95%.  Intake/Output last 3 Shifts:  I/O last 3 completed shifts:  In: 1167.1 (15.1 mL/kg) [I.V.:1167.1 (15.1 mL/kg)]  Out: - (0 mL/kg)   Weight: 77.2 kg     Relevant Results  Scheduled medications  acyclovir, 400 mg, " oral, q12h  amLODIPine, 5 mg, oral, Daily  atorvastatin, 40 mg, oral, Daily  DULoxetine, 60 mg, oral, BID  [START ON 3/1/2025] filgrastim or biosimilar, 480 mcg, subcutaneous, q24h  fluconazole, 400 mg, oral, Daily  hydroxychloroquine, 200 mg, oral, Daily  nicotine, 1 patch, transdermal, Daily  OLANZapine, 5 mg, oral, Nightly  pantoprazole, 40 mg, oral, Daily before breakfast  predniSONE, 10 mg, oral, Daily  QUEtiapine, 100 mg, oral, Nightly  sennosides-docusate sodium, 1 tablet, oral, BID      Type   Results for orders placed or performed during the hospital encounter of 02/18/25 (from the past 24 hours)   C. difficile, PCR    Specimen: Stool   Result Value Ref Range    C. difficile, PCR Not Detected Not Detected   Magnesium   Result Value Ref Range    Magnesium 2.13 1.60 - 2.40 mg/dL   CBC and Auto Differential   Result Value Ref Range    WBC 3.9 (L) 4.4 - 11.3 x10*3/uL    nRBC 0.0 0.0 - 0.0 /100 WBCs    RBC 3.59 (L) 4.00 - 5.20 x10*6/uL    Hemoglobin 11.0 (L) 12.0 - 16.0 g/dL    Hematocrit 34.1 (L) 36.0 - 46.0 %    MCV 95 80 - 100 fL    MCH 30.6 26.0 - 34.0 pg    MCHC 32.3 32.0 - 36.0 g/dL    RDW 14.0 11.5 - 14.5 %    Platelets 163 150 - 450 x10*3/uL    Neutrophils % 83.3 40.0 - 80.0 %    Immature Granulocytes %, Automated 0.5 0.0 - 0.9 %    Lymphocytes % 14.1 13.0 - 44.0 %    Monocytes % 0.3 2.0 - 10.0 %    Eosinophils % 1.5 0.0 - 6.0 %    Basophils % 0.3 0.0 - 2.0 %    Neutrophils Absolute 3.25 1.20 - 7.70 x10*3/uL    Immature Granulocytes Absolute, Automated 0.02 0.00 - 0.70 x10*3/uL    Lymphocytes Absolute 0.55 (L) 1.20 - 4.80 x10*3/uL    Monocytes Absolute 0.01 (L) 0.10 - 1.00 x10*3/uL    Eosinophils Absolute 0.06 0.00 - 0.70 x10*3/uL    Basophils Absolute 0.01 0.00 - 0.10 x10*3/uL   Hepatic Function Panel   Result Value Ref Range    Albumin 3.6 3.4 - 5.0 g/dL    Bilirubin, Total 0.5 0.0 - 1.2 mg/dL    Bilirubin, Direct 0.1 0.0 - 0.3 mg/dL    Alkaline Phosphatase 59 33 - 110 U/L    ALT 21 7 - 45 U/L    AST  14 9 - 39 U/L    Total Protein 5.4 (L) 6.4 - 8.2 g/dL   Phosphorus   Result Value Ref Range    Phosphorus 4.1 2.5 - 4.9 mg/dL   Basic Metabolic Panel   Result Value Ref Range    Glucose 93 74 - 99 mg/dL    Sodium 144 136 - 145 mmol/L    Potassium 3.9 3.5 - 5.3 mmol/L    Chloride 109 (H) 98 - 107 mmol/L    Bicarbonate 28 21 - 32 mmol/L    Anion Gap 11 10 - 20 mmol/L    Urea Nitrogen 17 6 - 23 mg/dL    Creatinine 0.45 (L) 0.50 - 1.05 mg/dL    eGFR >90 >60 mL/min/1.73m*2    Calcium 8.5 (L) 8.6 - 10.6 mg/dL          This patient has a central line   Reason for the central line remaining today? Hemodynamic monitoring    Assessment/Plan   Assessment & Plan  Peripheral T-cell lymphoma (Multi)    Angioimmunoblastic T-cell lymphoma    Ms. 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 admitting for Auto Transplant prepped with BEAM (T0=2/24/25)     Currently T+1 Auto prepped with BEAM (T0=2/24/25)      ONC: (per chart review; see onc history for complete treatment)    --Angioimmunoblastic T-Cell Lymphoma (CD 30+ AK Negative)   Staging PET s/p Cycle 5: excellent response (see PET dated 12/23/24)   S/P BV-CHP (June 2024) x 6 cycles       Autologous Stem Cell Transplant   Prep: BEAM   Carmustine 300mg/m2 (T-6)   Cytarabine 200mg/m2 (T-5, -4, -3, -2)   Etoposide 200mg/m2 (T-5, -4, -3, -2)   --Due to RA high risk for reactions scheduled Benadryl 25mg with each dose   Melphalan 140mg/m2 (T-1)    Cells Collected 5.68 x 10^6 CD34 cells    Cells Infused (2/24): COMPLETE     Surveillance Monitoring   IgG: On admission : 445  Coag/Fibrinogen 2x/week (2/24): WNL   LDH/Hapto: Weekly (2/24): WNL       HEME:   --Mild Thrombocytopenia likely secondary to recent chemotherapy, no evidence of bleeding    --No evidence of DIC or hypercoag state   --Transfuse if Hgb<7 and/or Plt<10 (hold DVT ppx when PLT<50k)   --Lovenox 40mg for DVT ppx       ID:    Allergy: Amoxicillin    --No evidence of active infection on admit   --Start ACV on T+0 and Fluconazole T+1     --Plan Levofloxacin prophylaxis when neutropenic   --Plan Cefepime for neutropenic fever due to PCN allergy    --Plan Bactrim 800/160mg qMWF start T+30     FEN/GI:   Admit Wt: 77.1 Kg,  current wt: 77.2 kg  --Hydration per chemotherapy order set    --PPI prophy w/protonix on admit   --Monitor electrolytes, replace as needed   --Dietician consulted on admission   Constipation. Resolved. Continue Senokot. Discontinued Miralax.     CARDIAC:   History of HTN  --Continue home Norvasc 5mg/day and Atorvastatin 40mg/day @ HS    --ECHO (1/22/25): EF 60-65%  --Weekly EKG (2/24) due to QT prolonging meds: (2/19) , (2/24)   --PT consulted on admission      RHEUMATOID:   History of Lupus and Rheumatoid Arthritis    --Currently on Placquenil, Prednisone 10mg/day,    --Follows with Dr Dobbs        --Previously on Saphnelo (interferon alpha antagonist until 7/2023)      PSYCH:   Anxiety/Depression    --Continue home Seroquil, Cymbalta, Ativan  -- Has followed with behavioral health at [facility]  -- Plan to consult inpatient psychiatry Monday 2/24  (for med mgmt and social assistance)  -- consult art therapy     MISC:   Nicotine Dependence    Currently smoker; Nicotine patch ordered on admit   Patient considering smoking cessation    Social Situation, currently homeless, was living in SNF      DISPO:   Full Code confirmed on admit    NOK Mom: Stephanie Wilson [phone]   Non-Tunneled CVC (placed 2/11) remove at discharge     Blood Consent Signed on Admission   Primary Onc: Dr. Gunjan Varela       Patient seen, examined and discussed with Malignant Heme attending Dr. Houston Harrington PA-C

## 2025-02-25 NOTE — CARE PLAN
The clinical goals for the shift include pt will remain free from falls/injuries      Problem: Pain - Adult  Goal: Verbalizes/displays adequate comfort level or baseline comfort level  Outcome: Progressing     Problem: Safety - Adult  Goal: Free from fall injury  Outcome: Progressing     Problem: Discharge Planning  Goal: Discharge to home or other facility with appropriate resources  Outcome: Progressing     Problem: Fall/Injury  Goal: Not fall by end of shift  Outcome: Progressing     Problem: Fall/Injury  Goal: Be free from injury by end of the shift  Outcome: Progressing

## 2025-02-26 LAB
ALBUMIN SERPL BCP-MCNC: 3.6 G/DL (ref 3.4–5)
ALP SERPL-CCNC: 56 U/L (ref 33–110)
ALT SERPL W P-5'-P-CCNC: 17 U/L (ref 7–45)
ANION GAP SERPL CALC-SCNC: 13 MMOL/L (ref 10–20)
APTT PPP: 26 SECONDS (ref 26–36)
AST SERPL W P-5'-P-CCNC: 11 U/L (ref 9–39)
BASOPHILS # BLD AUTO: 0.01 X10*3/UL (ref 0–0.1)
BASOPHILS NFR BLD AUTO: 0.3 %
BILIRUB DIRECT SERPL-MCNC: 0.1 MG/DL (ref 0–0.3)
BILIRUB SERPL-MCNC: 0.6 MG/DL (ref 0–1.2)
BUN SERPL-MCNC: 13 MG/DL (ref 6–23)
CALCIUM SERPL-MCNC: 8.8 MG/DL (ref 8.6–10.6)
CHLORIDE SERPL-SCNC: 101 MMOL/L (ref 98–107)
CO2 SERPL-SCNC: 29 MMOL/L (ref 21–32)
CREAT SERPL-MCNC: 0.47 MG/DL (ref 0.5–1.05)
EGFRCR SERPLBLD CKD-EPI 2021: >90 ML/MIN/1.73M*2
EOSINOPHIL # BLD AUTO: 0 X10*3/UL (ref 0–0.7)
EOSINOPHIL NFR BLD AUTO: 0 %
ERYTHROCYTE [DISTWIDTH] IN BLOOD BY AUTOMATED COUNT: 14 % (ref 11.5–14.5)
FIBRINOGEN PPP-MCNC: 245 MG/DL (ref 200–400)
GLUCOSE SERPL-MCNC: 94 MG/DL (ref 74–99)
HCT VFR BLD AUTO: 32.8 % (ref 36–46)
HGB BLD-MCNC: 10.8 G/DL (ref 12–16)
IMM GRANULOCYTES # BLD AUTO: 0.05 X10*3/UL (ref 0–0.7)
IMM GRANULOCYTES NFR BLD AUTO: 1.4 % (ref 0–0.9)
INR PPP: 0.9 (ref 0.9–1.1)
LDH SERPL L TO P-CCNC: 214 U/L (ref 84–246)
LYMPHOCYTES # BLD AUTO: 0.17 X10*3/UL (ref 1.2–4.8)
LYMPHOCYTES NFR BLD AUTO: 4.7 %
MAGNESIUM SERPL-MCNC: 1.99 MG/DL (ref 1.6–2.4)
MCH RBC QN AUTO: 31.1 PG (ref 26–34)
MCHC RBC AUTO-ENTMCNC: 32.9 G/DL (ref 32–36)
MCV RBC AUTO: 95 FL (ref 80–100)
MONOCYTES # BLD AUTO: 0 X10*3/UL (ref 0.1–1)
MONOCYTES NFR BLD AUTO: 0 %
NEUTROPHILS # BLD AUTO: 3.41 X10*3/UL (ref 1.2–7.7)
NEUTROPHILS NFR BLD AUTO: 93.6 %
NRBC BLD-RTO: 0 /100 WBCS (ref 0–0)
PHOSPHATE SERPL-MCNC: 4.2 MG/DL (ref 2.5–4.9)
PLATELET # BLD AUTO: 131 X10*3/UL (ref 150–450)
POTASSIUM SERPL-SCNC: 3.8 MMOL/L (ref 3.5–5.3)
PROT SERPL-MCNC: 5.4 G/DL (ref 6.4–8.2)
PROTHROMBIN TIME: 10.4 SECONDS (ref 9.8–12.4)
RBC # BLD AUTO: 3.47 X10*6/UL (ref 4–5.2)
SODIUM SERPL-SCNC: 139 MMOL/L (ref 136–145)
WBC # BLD AUTO: 3.6 X10*3/UL (ref 4.4–11.3)

## 2025-02-26 PROCEDURE — 2500000001 HC RX 250 WO HCPCS SELF ADMINISTERED DRUGS (ALT 637 FOR MEDICARE OP): Mod: SE | Performed by: NURSE PRACTITIONER

## 2025-02-26 PROCEDURE — 2500000001 HC RX 250 WO HCPCS SELF ADMINISTERED DRUGS (ALT 637 FOR MEDICARE OP): Mod: SE | Performed by: INTERNAL MEDICINE

## 2025-02-26 PROCEDURE — 80053 COMPREHEN METABOLIC PANEL: CPT | Performed by: NURSE PRACTITIONER

## 2025-02-26 PROCEDURE — 85730 THROMBOPLASTIN TIME PARTIAL: CPT

## 2025-02-26 PROCEDURE — 2500000004 HC RX 250 GENERAL PHARMACY W/ HCPCS (ALT 636 FOR OP/ED): Mod: SE | Performed by: NURSE PRACTITIONER

## 2025-02-26 PROCEDURE — 99233 SBSQ HOSP IP/OBS HIGH 50: CPT | Performed by: INTERNAL MEDICINE

## 2025-02-26 PROCEDURE — 84100 ASSAY OF PHOSPHORUS: CPT | Performed by: NURSE PRACTITIONER

## 2025-02-26 PROCEDURE — 2500000001 HC RX 250 WO HCPCS SELF ADMINISTERED DRUGS (ALT 637 FOR MEDICARE OP): Mod: SE | Performed by: PHYSICIAN ASSISTANT

## 2025-02-26 PROCEDURE — 83615 LACTATE (LD) (LDH) ENZYME: CPT

## 2025-02-26 PROCEDURE — 82248 BILIRUBIN DIRECT: CPT | Performed by: NURSE PRACTITIONER

## 2025-02-26 PROCEDURE — 2500000002 HC RX 250 W HCPCS SELF ADMINISTERED DRUGS (ALT 637 FOR MEDICARE OP, ALT 636 FOR OP/ED): Mod: SE | Performed by: INTERNAL MEDICINE

## 2025-02-26 PROCEDURE — 85384 FIBRINOGEN ACTIVITY: CPT

## 2025-02-26 PROCEDURE — 83735 ASSAY OF MAGNESIUM: CPT | Performed by: NURSE PRACTITIONER

## 2025-02-26 PROCEDURE — 85025 COMPLETE CBC W/AUTO DIFF WBC: CPT | Performed by: NURSE PRACTITIONER

## 2025-02-26 PROCEDURE — 2500000004 HC RX 250 GENERAL PHARMACY W/ HCPCS (ALT 636 FOR OP/ED): Mod: SE

## 2025-02-26 PROCEDURE — 2500000002 HC RX 250 W HCPCS SELF ADMINISTERED DRUGS (ALT 637 FOR MEDICARE OP, ALT 636 FOR OP/ED): Mod: SE | Performed by: NURSE PRACTITIONER

## 2025-02-26 PROCEDURE — 1170000001 HC PRIVATE ONCOLOGY ROOM DAILY

## 2025-02-26 RX ORDER — ONDANSETRON 4 MG/1
4 TABLET, ORALLY DISINTEGRATING ORAL EVERY 8 HOURS PRN
Status: DISCONTINUED | OUTPATIENT
Start: 2025-02-26 | End: 2025-02-27

## 2025-02-26 RX ORDER — PROCHLORPERAZINE EDISYLATE 5 MG/ML
10 INJECTION INTRAMUSCULAR; INTRAVENOUS EVERY 6 HOURS PRN
Status: DISCONTINUED | OUTPATIENT
Start: 2025-02-26 | End: 2025-03-14

## 2025-02-26 RX ORDER — ONDANSETRON HYDROCHLORIDE 2 MG/ML
4 INJECTION, SOLUTION INTRAVENOUS EVERY 6 HOURS PRN
Status: DISCONTINUED | OUTPATIENT
Start: 2025-02-26 | End: 2025-02-26

## 2025-02-26 RX ORDER — ONDANSETRON HYDROCHLORIDE 2 MG/ML
4 INJECTION, SOLUTION INTRAVENOUS EVERY 8 HOURS PRN
Status: DISCONTINUED | OUTPATIENT
Start: 2025-02-26 | End: 2025-02-27

## 2025-02-26 RX ORDER — LORAZEPAM 2 MG/ML
0.5 INJECTION INTRAMUSCULAR ONCE
Status: COMPLETED | OUTPATIENT
Start: 2025-02-26 | End: 2025-02-26

## 2025-02-26 RX ADMIN — PROCHLORPERAZINE MALEATE 10 MG: 10 TABLET ORAL at 12:14

## 2025-02-26 RX ADMIN — HYDROXYCHLOROQUINE SULFATE 200 MG: 200 TABLET, FILM COATED ORAL at 08:10

## 2025-02-26 RX ADMIN — ONDANSETRON 4 MG: 4 TABLET, ORALLY DISINTEGRATING ORAL at 17:35

## 2025-02-26 RX ADMIN — SENNOSIDES AND DOCUSATE SODIUM 1 TABLET: 50; 8.6 TABLET ORAL at 08:09

## 2025-02-26 RX ADMIN — OXYCODONE 5 MG: 5 TABLET ORAL at 20:33

## 2025-02-26 RX ADMIN — PREDNISONE 10 MG: 10 TABLET ORAL at 08:09

## 2025-02-26 RX ADMIN — DULOXETINE HYDROCHLORIDE 60 MG: 60 CAPSULE, DELAYED RELEASE ORAL at 08:10

## 2025-02-26 RX ADMIN — ACYCLOVIR 400 MG: 400 TABLET ORAL at 08:09

## 2025-02-26 RX ADMIN — OXYCODONE 5 MG: 5 TABLET ORAL at 12:14

## 2025-02-26 RX ADMIN — ACYCLOVIR 400 MG: 400 TABLET ORAL at 20:34

## 2025-02-26 RX ADMIN — ATORVASTATIN CALCIUM 40 MG: 40 TABLET, FILM COATED ORAL at 08:09

## 2025-02-26 RX ADMIN — LORAZEPAM 0.5 MG: 2 INJECTION INTRAMUSCULAR; INTRAVENOUS at 08:09

## 2025-02-26 RX ADMIN — LORAZEPAM 0.5 MG: 0.5 TABLET ORAL at 20:34

## 2025-02-26 RX ADMIN — FLUCONAZOLE 400 MG: 200 TABLET ORAL at 08:09

## 2025-02-26 RX ADMIN — PANTOPRAZOLE SODIUM 40 MG: 40 TABLET, DELAYED RELEASE ORAL at 05:04

## 2025-02-26 RX ADMIN — OLANZAPINE 5 MG: 5 TABLET, FILM COATED ORAL at 20:34

## 2025-02-26 RX ADMIN — SENNOSIDES AND DOCUSATE SODIUM 1 TABLET: 50; 8.6 TABLET ORAL at 20:34

## 2025-02-26 RX ADMIN — PROCHLORPERAZINE MALEATE 10 MG: 10 TABLET ORAL at 05:04

## 2025-02-26 RX ADMIN — AMLODIPINE BESYLATE 5 MG: 5 TABLET ORAL at 08:09

## 2025-02-26 RX ADMIN — QUETIAPINE FUMARATE 100 MG: 25 TABLET ORAL at 20:34

## 2025-02-26 RX ADMIN — HYDROMORPHONE HYDROCHLORIDE 2 MG: 2 TABLET ORAL at 17:37

## 2025-02-26 RX ADMIN — DULOXETINE HYDROCHLORIDE 60 MG: 60 CAPSULE, DELAYED RELEASE ORAL at 20:34

## 2025-02-26 ASSESSMENT — PAIN SCALES - GENERAL
PAINLEVEL_OUTOF10: 6
PAINLEVEL_OUTOF10: 7
PAINLEVEL_OUTOF10: 7
PAINLEVEL_OUTOF10: 6
PAINLEVEL_OUTOF10: 6
PAINLEVEL_OUTOF10: 8
PAINLEVEL_OUTOF10: 0 - NO PAIN

## 2025-02-26 ASSESSMENT — COGNITIVE AND FUNCTIONAL STATUS - GENERAL
MOBILITY SCORE: 23
CLIMB 3 TO 5 STEPS WITH RAILING: A LITTLE
CLIMB 3 TO 5 STEPS WITH RAILING: A LITTLE
DAILY ACTIVITIY SCORE: 24
MOBILITY SCORE: 23
DAILY ACTIVITIY SCORE: 24

## 2025-02-26 ASSESSMENT — PAIN - FUNCTIONAL ASSESSMENT
PAIN_FUNCTIONAL_ASSESSMENT: 0-10

## 2025-02-26 ASSESSMENT — PAIN SCALES - WONG BAKER
WONGBAKER_NUMERICALRESPONSE: HURTS LITTLE BIT
WONGBAKER_NUMERICALRESPONSE: HURTS LITTLE MORE
WONGBAKER_NUMERICALRESPONSE: NO HURT
WONGBAKER_NUMERICALRESPONSE: HURTS LITTLE MORE

## 2025-02-26 ASSESSMENT — PAIN DESCRIPTION - LOCATION: LOCATION: BACK

## 2025-02-26 NOTE — PROGRESS NOTES
"Sagrario Garber is a 49 y.o. female on day 8 of admission presenting with Peripheral T-cell lymphoma (Multi).    Subjective   Afebrile. Expiereincing nausea this morning which improved with an additional dose of 0.5 mg Ativan. Endorsing fatigue today. Remainder of ROS is negative.       Objective   Physical Exam  Constitutional:       General: She is not in acute distress.     Appearance: Normal appearance. She is normal weight. She is not ill-appearing.   HENT:      Head: Normocephalic.      Mouth/Throat:      Mouth: Mucous membranes are moist.      Pharynx: Oropharynx is clear.      Comments: Poor dentition  Eyes:      General: No scleral icterus.     Extraocular Movements: Extraocular movements intact.      Conjunctiva/sclera: Conjunctivae normal.   Cardiovascular:      Rate and Rhythm: Normal rate and regular rhythm.      Heart sounds: Normal heart sounds. No murmur heard.     No gallop.   Pulmonary:      Effort: Pulmonary effort is normal.      Breath sounds: Normal breath sounds.   Abdominal:      General: Abdomen is flat. Bowel sounds are normal.      Palpations: Abdomen is soft.   Musculoskeletal:         General: Normal range of motion.   Lymphadenopathy:      Comments: No right inguinal (Right inguinal adenopathy (round LN 4 x 4 cm)) adenopathy.    Skin:     General: Skin is warm.      Coloration: Skin is not jaundiced or pale.      Findings: No erythema or rash.   Neurological:      General: No focal deficit present.      Mental Status: She is alert and oriented to person, place, and time. Mental status is at baseline.           Last Recorded Vitals  Blood pressure 104/58, pulse 98, temperature 36.5 °C (97.7 °F), temperature source Temporal, resp. rate 20, height 1.685 m (5' 6.34\"), weight 77.2 kg (170 lb 3.1 oz), SpO2 92%.  Intake/Output last 3 Shifts:  No intake/output data recorded.    Relevant Results  Scheduled medications  acyclovir, 400 mg, oral, q12h  amLODIPine, 5 mg, oral, Daily  atorvastatin, " 40 mg, oral, Daily  DULoxetine, 60 mg, oral, BID  [START ON 3/1/2025] filgrastim or biosimilar, 480 mcg, subcutaneous, q24h  fluconazole, 400 mg, oral, Daily  hydroxychloroquine, 200 mg, oral, Daily  nicotine, 1 patch, transdermal, Daily  OLANZapine, 5 mg, oral, Nightly  pantoprazole, 40 mg, oral, Daily before breakfast  predniSONE, 10 mg, oral, Daily  QUEtiapine, 100 mg, oral, Nightly  sennosides-docusate sodium, 1 tablet, oral, BID      Type   Results for orders placed or performed during the hospital encounter of 02/18/25 (from the past 24 hours)   Magnesium   Result Value Ref Range    Magnesium 1.99 1.60 - 2.40 mg/dL   CBC and Auto Differential   Result Value Ref Range    WBC 3.6 (L) 4.4 - 11.3 x10*3/uL    nRBC 0.0 0.0 - 0.0 /100 WBCs    RBC 3.47 (L) 4.00 - 5.20 x10*6/uL    Hemoglobin 10.8 (L) 12.0 - 16.0 g/dL    Hematocrit 32.8 (L) 36.0 - 46.0 %    MCV 95 80 - 100 fL    MCH 31.1 26.0 - 34.0 pg    MCHC 32.9 32.0 - 36.0 g/dL    RDW 14.0 11.5 - 14.5 %    Platelets 131 (L) 150 - 450 x10*3/uL    Neutrophils % 93.6 40.0 - 80.0 %    Immature Granulocytes %, Automated 1.4 (H) 0.0 - 0.9 %    Lymphocytes % 4.7 13.0 - 44.0 %    Monocytes % 0.0 2.0 - 10.0 %    Eosinophils % 0.0 0.0 - 6.0 %    Basophils % 0.3 0.0 - 2.0 %    Neutrophils Absolute 3.41 1.20 - 7.70 x10*3/uL    Immature Granulocytes Absolute, Automated 0.05 0.00 - 0.70 x10*3/uL    Lymphocytes Absolute 0.17 (L) 1.20 - 4.80 x10*3/uL    Monocytes Absolute 0.00 (L) 0.10 - 1.00 x10*3/uL    Eosinophils Absolute 0.00 0.00 - 0.70 x10*3/uL    Basophils Absolute 0.01 0.00 - 0.10 x10*3/uL   Hepatic Function Panel   Result Value Ref Range    Albumin 3.6 3.4 - 5.0 g/dL    Bilirubin, Total 0.6 0.0 - 1.2 mg/dL    Bilirubin, Direct 0.1 0.0 - 0.3 mg/dL    Alkaline Phosphatase 56 33 - 110 U/L    ALT 17 7 - 45 U/L    AST 11 9 - 39 U/L    Total Protein 5.4 (L) 6.4 - 8.2 g/dL   Phosphorus   Result Value Ref Range    Phosphorus 4.2 2.5 - 4.9 mg/dL   Basic Metabolic Panel   Result  Value Ref Range    Glucose 94 74 - 99 mg/dL    Sodium 139 136 - 145 mmol/L    Potassium 3.8 3.5 - 5.3 mmol/L    Chloride 101 98 - 107 mmol/L    Bicarbonate 29 21 - 32 mmol/L    Anion Gap 13 10 - 20 mmol/L    Urea Nitrogen 13 6 - 23 mg/dL    Creatinine 0.47 (L) 0.50 - 1.05 mg/dL    eGFR >90 >60 mL/min/1.73m*2    Calcium 8.8 8.6 - 10.6 mg/dL   Coagulation Screen   Result Value Ref Range    Protime 10.4 9.8 - 12.4 seconds    INR 0.9 0.9 - 1.1    aPTT 26 26 - 36 seconds   Fibrinogen   Result Value Ref Range    Fibrinogen 245 200 - 400 mg/dL   Lactate Dehydrogenase   Result Value Ref Range     84 - 246 U/L          This patient has a central line   Reason for the central line remaining today? Hemodynamic monitoring    Assessment/Plan   Assessment & Plan  Peripheral T-cell lymphoma (Multi)    Angioimmunoblastic T-cell lymphoma    Ms. 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 admitting for Auto Transplant prepped with BEAM (T0=2/24/25)     Currently T+2 Auto prepped with BEAM (T0=2/24/25)      ONC: (per chart review; see onc history for complete treatment)    --Angioimmunoblastic T-Cell Lymphoma (CD 30+ AK Negative)   Staging PET s/p Cycle 5: excellent response (see PET dated 12/23/24)   S/P BV-CHP (June 2024) x 6 cycles       Autologous Stem Cell Transplant   Prep: BEAM   Carmustine 300mg/m2 (T-6)   Cytarabine 200mg/m2 (T-5, -4, -3, -2)   Etoposide 200mg/m2 (T-5, -4, -3, -2)   --Due to RA high risk for reactions scheduled Benadryl 25mg with each dose   Melphalan 140mg/m2 (T-1)    Cells Collected 5.68 x 10^6 CD34 cells    Cells Infused (2/24): COMPLETE     Surveillance Monitoring   IgG: On admission : 445  Coag/Fibrinogen 2x/week (2/24): WNL   LDH/Hapto: Weekly (2/24): WNL       HEME:   --Mild Thrombocytopenia likely secondary to recent chemotherapy, no evidence of bleeding    --No evidence of DIC or hypercoag state    --Transfuse if Hgb<7 and/or Plt<10 (hold DVT ppx when PLT<50k)   --Lovenox 40mg for DVT ppx       ID:   Allergy: Amoxicillin    --No evidence of active infection on admit   --Start ACV on T+0 and Fluconazole T+1     --Plan Levofloxacin prophylaxis when neutropenic   --Plan Cefepime for neutropenic fever due to PCN allergy    --Plan Bactrim 800/160mg qMWF start T+30     FEN/GI:   Admit Wt: 77.1 Kg,  current wt: 77.2 kg  --Hydration per chemotherapy order set    --PPI prophy w/protonix on admit   --Monitor electrolytes, replace as needed   --Dietician consulted on admission   Constipation. Resolved. Continue Senokot. Discontinued Miralax.     CARDIAC:   History of HTN  --Continue home Norvasc 5mg/day and Atorvastatin 40mg/day @ HS    --ECHO (1/22/25): EF 60-65%  --Weekly EKG (2/24) due to QT prolonging meds: (2/19) , (2/24)   --PT consulted on admission      RHEUMATOID:   History of Lupus and Rheumatoid Arthritis    --Currently on Placquenil, Prednisone 10mg/day,    --Follows with Dr Dobbs        --Previously on Saphnelo (interferon alpha antagonist until 7/2023)      PSYCH:   Anxiety/Depression    --Continue home Seroquil, Cymbalta, Ativan  -- Has followed with behavioral health at [facility]  -- Plan to consult inpatient psychiatry Monday 2/24  (for med mgmt and social assistance)  -- consult art therapy     MISC:   Nicotine Dependence    Currently smoker; Nicotine patch ordered on admit   Patient considering smoking cessation    Social Situation, currently homeless, was living in SNF      DISPO:   Full Code confirmed on admit    NOK Mom: Stephanie Wilson [phone]   Non-Tunneled CVC (placed 2/11) remove at discharge     Blood Consent Signed on Admission   Primary Onc: Dr. Gunjan Varela       Patient seen, examined and discussed with Malignant Heme attending Dr. Houston Harrington PA-C

## 2025-02-26 NOTE — CARE PLAN
Problem: Pain - Adult  Goal: Verbalizes/displays adequate comfort level or baseline comfort level  Outcome: Progressing     Problem: Safety - Adult  Goal: Free from fall injury  Outcome: Progressing     Problem: Discharge Planning  Goal: Discharge to home or other facility with appropriate resources  Outcome: Progressing     Problem: Chronic Conditions and Co-morbidities  Goal: Patient's chronic conditions and co-morbidity symptoms are monitored and maintained or improved  Outcome: Progressing     Problem: Nutrition  Goal: Nutrient intake appropriate for maintaining nutritional needs  Outcome: Progressing     Problem: Fall/Injury  Goal: Not fall by end of shift  Outcome: Progressing  Goal: Be free from injury by end of the shift  Outcome: Progressing  Goal: Verbalize understanding of personal risk factors for fall in the hospital  Outcome: Progressing  Goal: Verbalize understanding of risk factor reduction measures to prevent injury from fall in the home  Outcome: Progressing  Goal: Use assistive devices by end of the shift  Outcome: Progressing  Goal: Pace activities to prevent fatigue by end of the shift  Outcome: Progressing   The patient's goals for the shift include

## 2025-02-26 NOTE — PROGRESS NOTES
Art Therapy Note    Sagrario Garber     Therapy Session  Referral Type: New referral this admission  Visit Type: Follow-up visit  Session Start Time: 1701  Session End Time: 1706  Intervention Delivery: In-person  Number of family members present: 0  Number of staff members present: 0              Treatment/Interventions  Areas of Focus: Coping, Socialization, Self-expression  Art Therapy Interventions: Empathic listening/validating emotions  Interruption: No  Patient Fell Asleep at End of Session: No    Post-assessment  Total Session Time (min): 5 minutes    Narrative  Assessment Detail: ATR made avisit to Pt.;s room to offer an AT session and 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  Evaluation: Pt appreciative of visit.  She reported having success in making bracelets and necklaces and woudlactually love having some more supplies to make more.  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  Pt is open to continued AT offerings to have meaningful work and materiasl to keep her occupied durng her stay.  .  Follow-up: ATR will continue to follow and offer Pt AT services    Education Documentation  No documentation found.

## 2025-02-26 NOTE — CARE PLAN
The patient's goals for the shift include      Problem: Pain - Adult  Goal: Verbalizes/displays adequate comfort level or baseline comfort level  Outcome: Progressing     Problem: Safety - Adult  Goal: Free from fall injury  Outcome: Progressing     Problem: Discharge Planning  Goal: Discharge to home or other facility with appropriate resources  Outcome: Progressing     Problem: Chronic Conditions and Co-morbidities  Goal: Patient's chronic conditions and co-morbidity symptoms are monitored and maintained or improved  Outcome: Progressing     Problem: Nutrition  Goal: Nutrient intake appropriate for maintaining nutritional needs  Outcome: Progressing     Problem: Fall/Injury  Goal: Not fall by end of shift  Outcome: Progressing  Goal: Be free from injury by end of the shift  Outcome: Progressing  Goal: Verbalize understanding of personal risk factors for fall in the hospital  Outcome: Progressing  Goal: Verbalize understanding of risk factor reduction measures to prevent injury from fall in the home  Outcome: Progressing  Goal: Use assistive devices by end of the shift  Outcome: Progressing  Goal: Pace activities to prevent fatigue by end of the shift  Outcome: Progressing     The clinical goals for the shift include Remain HDS

## 2025-02-27 LAB
ALBUMIN SERPL BCP-MCNC: 3.6 G/DL (ref 3.4–5)
ALP SERPL-CCNC: 58 U/L (ref 33–110)
ALT SERPL W P-5'-P-CCNC: 15 U/L (ref 7–45)
ANION GAP SERPL CALC-SCNC: 13 MMOL/L (ref 10–20)
AST SERPL W P-5'-P-CCNC: 10 U/L (ref 9–39)
BASOPHILS # BLD MANUAL: 0.01 X10*3/UL (ref 0–0.1)
BASOPHILS NFR BLD MANUAL: 0.9 %
BILIRUB DIRECT SERPL-MCNC: 0.1 MG/DL (ref 0–0.3)
BILIRUB SERPL-MCNC: 0.6 MG/DL (ref 0–1.2)
BUN SERPL-MCNC: 15 MG/DL (ref 6–23)
CALCIUM SERPL-MCNC: 8.6 MG/DL (ref 8.6–10.6)
CHLORIDE SERPL-SCNC: 103 MMOL/L (ref 98–107)
CO2 SERPL-SCNC: 28 MMOL/L (ref 21–32)
CREAT SERPL-MCNC: 0.55 MG/DL (ref 0.5–1.05)
EGFRCR SERPLBLD CKD-EPI 2021: >90 ML/MIN/1.73M*2
EOSINOPHIL # BLD MANUAL: 0 X10*3/UL (ref 0–0.7)
EOSINOPHIL NFR BLD MANUAL: 0 %
ERYTHROCYTE [DISTWIDTH] IN BLOOD BY AUTOMATED COUNT: 14.1 % (ref 11.5–14.5)
GLUCOSE SERPL-MCNC: 100 MG/DL (ref 74–99)
HCT VFR BLD AUTO: 32.3 % (ref 36–46)
HGB BLD-MCNC: 10.4 G/DL (ref 12–16)
IMM GRANULOCYTES # BLD AUTO: 0.05 X10*3/UL (ref 0–0.7)
IMM GRANULOCYTES NFR BLD AUTO: 3.5 % (ref 0–0.9)
LYMPHOCYTES # BLD MANUAL: 0.1 X10*3/UL (ref 1.2–4.8)
LYMPHOCYTES NFR BLD MANUAL: 6.9 %
MAGNESIUM SERPL-MCNC: 2.02 MG/DL (ref 1.6–2.4)
MCH RBC QN AUTO: 30.9 PG (ref 26–34)
MCHC RBC AUTO-ENTMCNC: 32.2 G/DL (ref 32–36)
MCV RBC AUTO: 96 FL (ref 80–100)
MONOCYTES # BLD MANUAL: 0 X10*3/UL (ref 0.1–1)
MONOCYTES NFR BLD MANUAL: 0 %
NEUTS SEG # BLD MANUAL: 1.29 X10*3/UL (ref 1.2–7)
NEUTS SEG NFR BLD MANUAL: 92.2 %
NRBC BLD-RTO: 0 /100 WBCS (ref 0–0)
PHOSPHATE SERPL-MCNC: 5 MG/DL (ref 2.5–4.9)
PLATELET # BLD AUTO: 85 X10*3/UL (ref 150–450)
POLYCHROMASIA BLD QL SMEAR: ABNORMAL
POTASSIUM SERPL-SCNC: 3.6 MMOL/L (ref 3.5–5.3)
PROT SERPL-MCNC: 5.5 G/DL (ref 6.4–8.2)
RBC # BLD AUTO: 3.37 X10*6/UL (ref 4–5.2)
RBC MORPH BLD: ABNORMAL
SODIUM SERPL-SCNC: 140 MMOL/L (ref 136–145)
TOTAL CELLS COUNTED BLD: 116
WBC # BLD AUTO: 1.4 X10*3/UL (ref 4.4–11.3)

## 2025-02-27 PROCEDURE — 1170000001 HC PRIVATE ONCOLOGY ROOM DAILY

## 2025-02-27 PROCEDURE — 2500000004 HC RX 250 GENERAL PHARMACY W/ HCPCS (ALT 636 FOR OP/ED): Mod: SE

## 2025-02-27 PROCEDURE — 99254 IP/OBS CNSLTJ NEW/EST MOD 60: CPT | Performed by: PSYCHIATRY & NEUROLOGY

## 2025-02-27 PROCEDURE — 84100 ASSAY OF PHOSPHORUS: CPT | Performed by: NURSE PRACTITIONER

## 2025-02-27 PROCEDURE — 2500000001 HC RX 250 WO HCPCS SELF ADMINISTERED DRUGS (ALT 637 FOR MEDICARE OP): Mod: SE | Performed by: NURSE PRACTITIONER

## 2025-02-27 PROCEDURE — 2500000001 HC RX 250 WO HCPCS SELF ADMINISTERED DRUGS (ALT 637 FOR MEDICARE OP): Mod: SE | Performed by: PHYSICIAN ASSISTANT

## 2025-02-27 PROCEDURE — 2500000004 HC RX 250 GENERAL PHARMACY W/ HCPCS (ALT 636 FOR OP/ED): Mod: SE | Performed by: NURSE PRACTITIONER

## 2025-02-27 PROCEDURE — 2500000001 HC RX 250 WO HCPCS SELF ADMINISTERED DRUGS (ALT 637 FOR MEDICARE OP): Mod: SE | Performed by: INTERNAL MEDICINE

## 2025-02-27 PROCEDURE — 83735 ASSAY OF MAGNESIUM: CPT | Performed by: NURSE PRACTITIONER

## 2025-02-27 PROCEDURE — 85007 BL SMEAR W/DIFF WBC COUNT: CPT | Performed by: NURSE PRACTITIONER

## 2025-02-27 PROCEDURE — 99233 SBSQ HOSP IP/OBS HIGH 50: CPT | Performed by: INTERNAL MEDICINE

## 2025-02-27 PROCEDURE — 85027 COMPLETE CBC AUTOMATED: CPT | Performed by: NURSE PRACTITIONER

## 2025-02-27 PROCEDURE — 2500000002 HC RX 250 W HCPCS SELF ADMINISTERED DRUGS (ALT 637 FOR MEDICARE OP, ALT 636 FOR OP/ED): Mod: SE | Performed by: INTERNAL MEDICINE

## 2025-02-27 PROCEDURE — 82248 BILIRUBIN DIRECT: CPT | Performed by: NURSE PRACTITIONER

## 2025-02-27 PROCEDURE — 2500000002 HC RX 250 W HCPCS SELF ADMINISTERED DRUGS (ALT 637 FOR MEDICARE OP, ALT 636 FOR OP/ED): Mod: SE | Performed by: NURSE PRACTITIONER

## 2025-02-27 RX ORDER — ALUMINUM HYDROXIDE, MAGNESIUM HYDROXIDE, AND SIMETHICONE 1200; 120; 1200 MG/30ML; MG/30ML; MG/30ML
10 SUSPENSION ORAL 4 TIMES DAILY PRN
Status: DISCONTINUED | OUTPATIENT
Start: 2025-02-27 | End: 2025-03-17 | Stop reason: HOSPADM

## 2025-02-27 RX ORDER — POTASSIUM CHLORIDE 29.8 MG/ML
40 INJECTION INTRAVENOUS ONCE
Status: COMPLETED | OUTPATIENT
Start: 2025-02-27 | End: 2025-02-27

## 2025-02-27 RX ORDER — ONDANSETRON HYDROCHLORIDE 2 MG/ML
8 INJECTION, SOLUTION INTRAVENOUS 3 TIMES DAILY
Status: COMPLETED | OUTPATIENT
Start: 2025-02-27 | End: 2025-03-05

## 2025-02-27 RX ORDER — GUAIFENESIN 600 MG/1
600 TABLET, EXTENDED RELEASE ORAL 2 TIMES DAILY PRN
Status: DISCONTINUED | OUTPATIENT
Start: 2025-02-27 | End: 2025-03-17 | Stop reason: HOSPADM

## 2025-02-27 RX ADMIN — ACYCLOVIR 400 MG: 400 TABLET ORAL at 19:56

## 2025-02-27 RX ADMIN — ONDANSETRON 4 MG: 4 TABLET, ORALLY DISINTEGRATING ORAL at 08:38

## 2025-02-27 RX ADMIN — OLANZAPINE 5 MG: 5 TABLET, FILM COATED ORAL at 19:55

## 2025-02-27 RX ADMIN — ACYCLOVIR 400 MG: 400 TABLET ORAL at 08:38

## 2025-02-27 RX ADMIN — POTASSIUM CHLORIDE 40 MEQ: 29.8 INJECTION, SOLUTION INTRAVENOUS at 11:42

## 2025-02-27 RX ADMIN — ONDANSETRON 8 MG: 2 INJECTION INTRAMUSCULAR; INTRAVENOUS at 11:42

## 2025-02-27 RX ADMIN — HYDROXYCHLOROQUINE SULFATE 200 MG: 200 TABLET, FILM COATED ORAL at 11:42

## 2025-02-27 RX ADMIN — HYDROMORPHONE HYDROCHLORIDE 2 MG: 2 TABLET ORAL at 14:45

## 2025-02-27 RX ADMIN — ATORVASTATIN CALCIUM 40 MG: 40 TABLET, FILM COATED ORAL at 08:38

## 2025-02-27 RX ADMIN — FLUCONAZOLE 400 MG: 200 TABLET ORAL at 08:38

## 2025-02-27 RX ADMIN — LORAZEPAM 0.5 MG: 0.5 TABLET ORAL at 20:04

## 2025-02-27 RX ADMIN — PANTOPRAZOLE SODIUM 40 MG: 40 TABLET, DELAYED RELEASE ORAL at 08:38

## 2025-02-27 RX ADMIN — PROCHLORPERAZINE EDISYLATE 10 MG: 5 INJECTION INTRAMUSCULAR; INTRAVENOUS at 18:37

## 2025-02-27 RX ADMIN — AMLODIPINE BESYLATE 5 MG: 5 TABLET ORAL at 08:38

## 2025-02-27 RX ADMIN — QUETIAPINE FUMARATE 100 MG: 25 TABLET ORAL at 19:59

## 2025-02-27 RX ADMIN — PREDNISONE 10 MG: 10 TABLET ORAL at 08:38

## 2025-02-27 RX ADMIN — DULOXETINE HYDROCHLORIDE 60 MG: 60 CAPSULE, DELAYED RELEASE ORAL at 20:00

## 2025-02-27 RX ADMIN — LORAZEPAM 0.5 MG: 0.5 TABLET ORAL at 08:41

## 2025-02-27 RX ADMIN — SENNOSIDES AND DOCUSATE SODIUM 1 TABLET: 50; 8.6 TABLET ORAL at 08:38

## 2025-02-27 RX ADMIN — ONDANSETRON 8 MG: 2 INJECTION INTRAMUSCULAR; INTRAVENOUS at 19:55

## 2025-02-27 RX ADMIN — OXYCODONE 5 MG: 5 TABLET ORAL at 15:45

## 2025-02-27 RX ADMIN — ONDANSETRON 8 MG: 2 INJECTION INTRAMUSCULAR; INTRAVENOUS at 14:45

## 2025-02-27 RX ADMIN — OXYCODONE 5 MG: 5 TABLET ORAL at 08:38

## 2025-02-27 RX ADMIN — DULOXETINE HYDROCHLORIDE 60 MG: 60 CAPSULE, DELAYED RELEASE ORAL at 08:38

## 2025-02-27 ASSESSMENT — COGNITIVE AND FUNCTIONAL STATUS - GENERAL
MOBILITY SCORE: 23
CLIMB 3 TO 5 STEPS WITH RAILING: A LITTLE
MOBILITY SCORE: 23
DAILY ACTIVITIY SCORE: 24
CLIMB 3 TO 5 STEPS WITH RAILING: A LITTLE
DAILY ACTIVITIY SCORE: 24

## 2025-02-27 ASSESSMENT — PAIN - FUNCTIONAL ASSESSMENT
PAIN_FUNCTIONAL_ASSESSMENT: 0-10

## 2025-02-27 ASSESSMENT — PAIN SCALES - GENERAL
PAINLEVEL_OUTOF10: 7
PAINLEVEL_OUTOF10: 7
PAINLEVEL_OUTOF10: 6
PAINLEVEL_OUTOF10: 0 - NO PAIN
PAINLEVEL_OUTOF10: 6
PAINLEVEL_OUTOF10: 7
PAINLEVEL_OUTOF10: 6
PAINLEVEL_OUTOF10: 7

## 2025-02-27 ASSESSMENT — ACTIVITIES OF DAILY LIVING (ADL): LACK_OF_TRANSPORTATION: YES

## 2025-02-27 ASSESSMENT — PAIN DESCRIPTION - LOCATION: LOCATION: GENERALIZED

## 2025-02-27 ASSESSMENT — PAIN SCALES - WONG BAKER: WONGBAKER_NUMERICALRESPONSE: HURTS LITTLE MORE

## 2025-02-27 NOTE — CARE PLAN
Problem: Pain - Adult  Goal: Verbalizes/displays adequate comfort level or baseline comfort level  Outcome: Progressing     Problem: Safety - Adult  Goal: Free from fall injury  Outcome: Progressing     Problem: Discharge Planning  Goal: Discharge to home or other facility with appropriate resources  Outcome: Progressing     Problem: Chronic Conditions and Co-morbidities  Goal: Patient's chronic conditions and co-morbidity symptoms are monitored and maintained or improved  Outcome: Progressing     Problem: Nutrition  Goal: Nutrient intake appropriate for maintaining nutritional needs  Outcome: Progressing     Problem: Fall/Injury  Goal: Not fall by end of shift  Outcome: Progressing  Goal: Be free from injury by end of the shift  Outcome: Progressing  Goal: Verbalize understanding of personal risk factors for fall in the hospital  Outcome: Progressing  Goal: Verbalize understanding of risk factor reduction measures to prevent injury from fall in the home  Outcome: Progressing  Goal: Use assistive devices by end of the shift  Outcome: Progressing  Goal: Pace activities to prevent fatigue by end of the shift  Outcome: Progressing   The patient's goals for the shift include      The clinical goals for the shift include Remain HDS

## 2025-02-27 NOTE — PROGRESS NOTES
"Sagrario Garber is a 49 y.o. female on day 9 of admission presenting with Peripheral T-cell lymphoma (Multi).    Subjective   Pt c/o more persistent nausea w/ reflux. Will schedule Zofran w/ PRN Compazine.       Objective   Physical Exam  Constitutional:       General: She is not in acute distress.     Appearance: Normal appearance. She is normal weight. She is not ill-appearing.   HENT:      Head: Normocephalic.      Mouth/Throat:      Mouth: Mucous membranes are moist.      Pharynx: Oropharynx is clear.      Comments: Poor dentition  Eyes:      General: No scleral icterus.     Extraocular Movements: Extraocular movements intact.      Conjunctiva/sclera: Conjunctivae normal.   Cardiovascular:      Rate and Rhythm: Normal rate and regular rhythm.      Heart sounds: Normal heart sounds. No murmur heard.     No gallop.   Pulmonary:      Effort: Pulmonary effort is normal.      Breath sounds: Normal breath sounds.   Abdominal:      General: Abdomen is flat. Bowel sounds are normal.      Palpations: Abdomen is soft.   Musculoskeletal:         General: Normal range of motion.   Lymphadenopathy:      Comments: No right inguinal (Right inguinal adenopathy (round LN 4 x 4 cm)) adenopathy.    Skin:     General: Skin is warm.      Coloration: Skin is not jaundiced or pale.      Findings: No erythema or rash.   Neurological:      General: No focal deficit present.      Mental Status: She is alert and oriented to person, place, and time. Mental status is at baseline.   Psychiatric:         Mood and Affect: Mood normal.         Behavior: Behavior normal.           Last Recorded Vitals  Blood pressure 101/55, pulse 101, temperature 36.6 °C (97.9 °F), temperature source Temporal, resp. rate 16, height 1.685 m (5' 6.34\"), weight 76.2 kg (167 lb 15.9 oz), SpO2 97%.  Intake/Output last 3 Shifts:  No intake/output data recorded.    Relevant Results  Scheduled medications  acyclovir, 400 mg, oral, q12h  amLODIPine, 5 mg, oral, " Daily  atorvastatin, 40 mg, oral, Daily  DULoxetine, 60 mg, oral, BID  [START ON 3/1/2025] filgrastim or biosimilar, 480 mcg, subcutaneous, q24h  fluconazole, 400 mg, oral, Daily  hydroxychloroquine, 200 mg, oral, Daily  nicotine, 1 patch, transdermal, Daily  OLANZapine, 5 mg, oral, Nightly  ondansetron, 8 mg, intravenous, TID  pantoprazole, 40 mg, oral, Daily before breakfast  predniSONE, 10 mg, oral, Daily  QUEtiapine, 100 mg, oral, Nightly  sennosides-docusate sodium, 1 tablet, oral, BID        This patient has a central line   Reason for the central line remaining today? Hemodynamic monitoring    Assessment/Plan   Assessment & Plan  Peripheral T-cell lymphoma (Multi)    Angioimmunoblastic T-cell lymphoma    Ms. 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 admitting for Auto Transplant prepped with BEAM (T0=2/24/25)     T+3   Auto prepped with BEAM (T0=2/24/25)      ONC: (per chart review; see onc history for complete treatment)    # Angioimmunoblastic T-Cell Lymphoma (CD 30+ AK Negative)   - Restaging PET s/p Cycle 5: excellent response (see PET dated 12/23/24)   - S/P BV-CHP (June 2024) x 6 cycles       CHEMO  Prep: BEAM   - Carmustine 300mg/m2 (T-6)   - Cytarabine 200mg/m2 (T-5, -4, -3, -2)   - Etoposide 200mg/m2 (T-5, -4, -3, -2)   --Due to RA high risk for reactions scheduled Benadryl 25mg with each dose   - Melphalan 140mg/m2 (T-1)   - Cells Collected 5.68 x 10^6 CD34 cells       Surveillance Monitoring   IgG: On admission : 445  Coag/Fibrinogen 2x/week (2/24): WNL   LDH/Hapto: Weekly (2/24): WNL       HEME:   # Mild Thrombocytopenia likely secondary to recent chemotherapy, no evidence of bleeding    --No evidence of DIC or hypercoag state   --Transfuse if Hgb<7 and/or Plt<10 (hold DVT ppx when PLT<50k)   --Lovenox 40mg for DVT ppx       ID:   Allergy: Amoxicillin    --No evidence of active infection on admit    --Start ACV on T+0 and Fluconazole T+1     --Plan Levofloxacin prophylaxis when neutropenic   --Plan Cefepime for neutropenic fever due to PCN allergy    --Plan Bactrim 800/160mg qMWF start T+30     FEN/GI:   Admit Wt: 77.1 Kg,  current wt: 76.2 kg (2/27)  #PPI prophy w/protonix on admit   # Chemo induced nausea  - Scheduled Zofran & Zyprexa  - PRN Compazine  # Constipation. Resolved  - Continue Senokot. Discontinued Miralax.     CARDIAC:   # History of HTN  --Continue home Norvasc 5mg/day and Atorvastatin 40mg/day @ HS    --ECHO (1/22/25): EF 60-65%  --Weekly EKG (2/24) due to QT prolonging meds: (2/19) , (2/24)   --PT consulted on admission      RHEUMATOID:   #History of Lupus and Rheumatoid Arthritis    --Currently on Placquenil, Prednisone 10mg/day,    --Follows with Dr Dobbs    --Previously on Saphnelo (interferon alpha antagonist until 7/2023)   - Cont PRN meds for pain, if worsening, consult Supp Onc     PSYCH:   # Anxiety/Depression    --Continue home Seroquil, Cymbalta, Ativan  -- Has followed with behavioral health at [facility] but not established  - Consulted inpatient Psych (2/27), EKG on 2/28     MISC:   # Nicotine Dependence    - Currently smoker; Nicotine patch ordered on admit   - Patient considering smoking cessation    - Social Situation, currently homeless, was living in SNF      DISPO:   - Full Code confirmed on admit    - STELLAK Mom: Stephanie Wilson [phone]   - Non-Tunneled CVC (placed 2/11) remove at discharge     - Primary Onc: Dr. Gunjan Varela   - Currently pt is homeless, mom & sister are both unable to take her in.      Pt seen, examined, and discussed w/ Dr. Sharron Steiner, APRN-CNP

## 2025-02-27 NOTE — PROGRESS NOTES
02/27/25 1500   Discharge Planning   Living Arrangements Other (Comment)  (nursing home)   Support Systems Other (Comment)  (daughter sometimes)   Type of Residence Nursing home/residential care   Home or Post Acute Services Post acute facilities (Rehab/SNF/etc)   Type of Post Acute Facility Services Other (Comment)   Expected Discharge Disposition SNF   Financial Resource Strain   How hard is it for you to pay for the very basics like food, housing, medical care, and heating? Hard   Housing Stability   In the last 12 months, was there a time when you were not able to pay the mortgage or rent on time? Y   In the past 12 months, how many times have you moved where you were living? 4   At any time in the past 12 months, were you homeless or living in a shelter (including now)? N   Transportation Needs   In the past 12 months, has lack of transportation kept you from medical appointments or from getting medications? yes   In the past 12 months, has lack of transportation kept you from meetings, work, or from getting things needed for daily living? Yes     ZAHRA called the [facility] Coordinated Intake at [phone] to complete intake interview to verify homelessness for the patient. ZAHRA shared that patient would like to be admitted to [facility]. ZAHRA was told that if the patient was not being discharged tomorrow that they do not complete an intake. ZAHRA left a VM for Lucina Zhong Community Health Worker at [phone]. Will continue to follow with planning for the patient's care at [facility].  QI Garcia  2/27/25@15:30

## 2025-02-27 NOTE — CONSULTS
Inpatient consult to Psychiatry  Consult performed by: Lisa Servin MD  Consult ordered by: Nikita Keenan, BREONNA-LISSY       Subjective   HISTORY OF PRESENT ILLNESS:  Sagrario Garber is a 49 y.o. 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 Psychiatry was consulted on 2/27 for increased anxiety related to her transplant and medical management.    Per collateral with patient's daughter, patient is estranged from her mother who is not willing to help with the patient's care, financially or otherwise. Daughter says patient has been on SSI for anxiety/depression for around 30 years. Patient has a  who is currently living with an AA sponsor. After the patient's diagnosis of lymphoma, her mental health worsened significantly.    On chart review, patient has had a long history of anxiety and depression. She has seen a psychiatrist and counselor before. Past medications include Seroquel, Geodon, Depakote, Prozac, Lexapro, and Paxil. Patient applied for SSDI in 8/2024.    On interview, patient reported that she has had anxiety and depression for many years (since her 20s). She is really happy with her medication regimen that she is on now and that she started in 11/2024. She said the Seroquel, Lorazepam, and Cymbalta all help with her sleep, anxiety, and depression, respectively. She was worried that her medication regimen would be changed. She identifies that she tries to cope with her stressors by focusing her mind on her art, as it helps her not thing about everything that is going on and focus one one thing. This makes her feel calmer.     Patient denies passive or active suicidal iideation at this time. Denies HI/AH/VH    PSYCHIATRIC REVIEW OF SYSTEMS  Depression: sleep  "disturbance: difficulty falling asleep, low energy, tearful, low mood.  Anxiety: sleep disturbance, ruminative thoughts   Hallie: negative  Psychosis: negative  Delirium: negative   Trauma: negative    PSYCHIATRIC HISTORY  Prior diagnoses: anxiety, depression  Prior hospitalizations: 5; most recent last year in November. All have been in the context of being overly depressed and/or anxious  History of suicide attempts: 1; occurred around age 18. \"I took a bunch of pills with one or two beers. I can't describe why, I was probably just really overwhelmed.\"  History of self-harm: None  History of trauma/abuse/loss: None  History of violence: None    Current psychiatrist: None    Current psychiatric medications: Seroquel 50 mg PO at bedtime, Lorazepam 0.5 mg PO BID PRN, Cymbalta 60 mg PO BID  Past psychiatric medications: per chart review, Geodon, Depakote, Prozac, Lexapro, and Paxil  Past psychiatric treatments: None    Family psychiatric history:      - Suicide: Father in 2015    SUBSTANCE USE HISTORY   She reports that she has been smoking cigarettes. She does not have any smokeless tobacco history on file. She reports that she does not currently use alcohol. She reports that she does not use drugs.    Tobacco: smoking since 15 y/o, 1 pack per day. Since lymphoma diagnosis, having about 8 cigarettes/day, (patient denies interest in NRT at this time, endorsed past topical irritation to patches and difficulty chewing gum)  Alcohol: None (per chart review, none since 2008)  Cannabis: None  Other substances: None    SOCIAL HISTORY  Social History     Socioeconomic History    Marital status: Single   Tobacco Use    Smoking status: Every Day     Types: Cigarettes   Substance and Sexual Activity    Alcohol use: Not Currently    Drug use: Never     Social Drivers of Health     Financial Resource Strain: High Risk (2/20/2025)    Overall Financial Resource Strain (CARDIA)     Difficulty of Paying Living Expenses: Hard   Food " Insecurity: Food Insecurity Present (2/18/2025)    Hunger Vital Sign     Worried About Running Out of Food in the Last Year: Sometimes true     Ran Out of Food in the Last Year: Sometimes true   Transportation Needs: Unmet Transportation Needs (2/20/2025)    PRAPARE - Transportation     Lack of Transportation (Medical): Yes     Lack of Transportation (Non-Medical): Yes   Intimate Partner Violence: Not At Risk (2/18/2025)    Humiliation, Afraid, Rape, and Kick questionnaire     Fear of Current or Ex-Partner: No     Emotionally Abused: No     Physically Abused: No     Sexually Abused: No   Housing Stability: High Risk (2/20/2025)    Housing Stability Vital Sign     Unable to Pay for Housing in the Last Year: Yes     Number of Times Moved in the Last Year: 4     Homeless in the Last Year: No      Current living situation: unhoused; 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, lived with her daughter for some period of time before moving out  Current employment/source of income: unemployed  Current stressors: navigating finding a home and a job, health stressors (cancer diagnosis and treatment),  going through EtOH recovery, lack of social support    Family: daughter and son with severe autism who lives in a group home;  recently through EtOH recovery and living with his AA sponsor; mother and stepfather  Education: below high school; left high school at 14  History of learning difficulty: Denied  Employment: unemployed; was employed as a  at a [employer]  Marital status:    Children: 2  Social support:   Alevism/Spirituality: believes in god, not prescribed to a branch or Christian  Hobbies: rebeca dots artwork, catherine    PAST MEDICAL HISTORY  Past Medical History:   Diagnosis Date    Essential (primary) hypertension 05/24/2017    HTN (hypertension), benign    Essential (primary) hypertension 10/09/2017    HTN (hypertension), benign    Personal history of nicotine  "dependence 05/24/2017    History of nicotine dependence          PAST SURGICAL HISTORY  Past Surgical History:   Procedure Laterality Date    OTHER SURGICAL HISTORY  05/24/2017    Oral Surgery Tooth Extraction Mumford Tooth        FAMILY HISTORY  No family history on file.     ALLERGIES  Amoxicillin, Atenolol, and Prednisone    OARRS REVIEW  OARRS checked: 2/27/25  OARRS comments: last benzodiazapine rx filled 12/18/25    Objective   VITALS      2/26/2025     4:12 PM 2/26/2025     8:39 PM 2/27/2025    12:28 AM 2/27/2025     4:04 AM 2/27/2025     8:00 AM 2/27/2025     8:35 AM 2/27/2025     8:51 AM   Vitals   Systolic 103 96 90 91 81 94    Diastolic 68 59 53 57 61 61    BP Location  Left arm Left arm Left arm Left arm Left arm    Heart Rate 97 91 97 96 100 103    Temp 36.6 °C (97.9 °F) 36.6 °C (97.9 °F) 37.2 °C (99 °F) 37 °C (98.6 °F) 36.5 °C (97.7 °F)     Resp 18 16 16 16 16     Weight (lb)       167.99   BMI       26.84 kg/m2   BSA (m2)       1.89 m2        Mental Status Exam:  General/Appearance: Doing her rebeca dot art. Appears older than stated age, Overweight, and wearing a hospital gown.  Attitude/Behavior: Cooperative, conversant, engaged, and with good eye contact.  Motor Activity: No psychomotor agitation or retardation. No abnormal movements, tremors or tics  Speech: Normal rate, tone and rhythm and coherent speech  Mood: \"okay\" and appropriate to circumstances  Affect: Congruent with mood and topic of conversation  Thought Process: Linear, goal directed, Marion  Thought Content: Appropriate with no SI or HI; focused on her worry about her meds being changed  Perception: No perceptual abnormalities noted and Does not appear to be internally stimulated  Cognition: Alert and oriented x4 to person, place, time, and situation and cognitively intact to conversational testing with respect to attention, fund of knowledge, recent and remote memory, and language with no noted deficits  Insight: Good, in regards " to understanding mental health condition  Judgement: Good  Impulse Control: Unable to assess  Reliability of Information Provided: seems reliable      HOME MEDICATIONS  Medication Documentation Review Audit       Reviewed by Jefferson Lawrence, PharmD (Pharmacist) on 25 at 1653      Medication Order Taking? Sig Documenting Provider Last Dose Status   amLODIPine (Norvasc) 5 mg tablet [id number]  Take 1 tablet (5 mg) by mouth once daily. Neela Donato MD  Active   atorvastatin (Lipitor) 40 mg tablet [id number]  Take 1 tablet (40 mg) by mouth once daily at bedtime. Neela Donato MD  Active   DULoxetine (Cymbalta) 60 mg DR capsule [id number]  Take 1 capsule (60 mg) by mouth 2 times a day. Do not crush or chew. Neela Donato MD  Active   HYDROmorphone (Dilaudid) 2 mg tablet [id number]  Take 1 tablet (2 mg) by mouth 2 times a day as needed for severe pain (7 - 10). Neela Donato MD  Active   hydroxychloroquine (Plaquenil) 200 mg tablet [id number]  Take 1 tablet (200 mg) by mouth once daily. Neela Donato MD  Active   LORazepam (Ativan) 0.5 mg tablet [id number]  Take 1 tablet (0.5 mg) by mouth 2 times a day as needed for anxiety. Historical Provider, MD   25 2359   multivitamin with minerals tablet [id number]  Take 1 tablet by mouth once daily. Historical MD Tanmay  Active   oxyCODONE-acetaminophen (Percocet) 5-325 mg tablet [id number]  Take 2 tablets by mouth every 6 hours if needed (pain). Historical MD Tanmay  Active   predniSONE (Deltasone) 10 mg tablet [id number]  Take 1 tablet (10 mg) by mouth once daily. Historical Provider, MD  Active   QUEtiapine (SEROquel) 50 mg tablet [id number]  Take 1 tablet (50 mg) by mouth once daily at bedtime. Historical Provider, MD  Active                     CURRENT MEDICATIONS  Scheduled medications  acyclovir, 400 mg, oral, q12h  amLODIPine, 5 mg, oral, Daily  atorvastatin, 40 mg, oral, Daily  DULoxetine, 60 mg, oral, BID  [START ON  3/1/2025] filgrastim or biosimilar, 480 mcg, subcutaneous, q24h  fluconazole, 400 mg, oral, Daily  hydroxychloroquine, 200 mg, oral, Daily  nicotine, 1 patch, transdermal, Daily  OLANZapine, 5 mg, oral, Nightly  ondansetron, 8 mg, intravenous, TID  pantoprazole, 40 mg, oral, Daily before breakfast  potassium chloride, 40 mEq, intravenous, Once  predniSONE, 10 mg, oral, Daily  QUEtiapine, 100 mg, oral, Nightly  sennosides-docusate sodium, 1 tablet, oral, BID        Continuous medications       PRN medications  PRN medications: albuterol, alteplase, alum-mag hydroxide-simeth, dextrose, EPINEPHrine HCl, guaiFENesin, HYDROmorphone, LORazepam, methylPREDNISolone sodium succinate (PF), oxyCODONE, prochlorperazine, sodium chloride     LABS  Results for orders placed or performed during the hospital encounter of 02/18/25 (from the past 24 hours)   Magnesium   Result Value Ref Range    Magnesium 2.02 1.60 - 2.40 mg/dL   CBC and Auto Differential   Result Value Ref Range    WBC 1.4 (L) 4.4 - 11.3 x10*3/uL    nRBC 0.0 0.0 - 0.0 /100 WBCs    RBC 3.37 (L) 4.00 - 5.20 x10*6/uL    Hemoglobin 10.4 (L) 12.0 - 16.0 g/dL    Hematocrit 32.3 (L) 36.0 - 46.0 %    MCV 96 80 - 100 fL    MCH 30.9 26.0 - 34.0 pg    MCHC 32.2 32.0 - 36.0 g/dL    RDW 14.1 11.5 - 14.5 %    Platelets 85 (L) 150 - 450 x10*3/uL    Immature Granulocytes %, Automated 3.5 (H) 0.0 - 0.9 %    Immature Granulocytes Absolute, Automated 0.05 0.00 - 0.70 x10*3/uL   Hepatic Function Panel   Result Value Ref Range    Albumin 3.6 3.4 - 5.0 g/dL    Bilirubin, Total 0.6 0.0 - 1.2 mg/dL    Bilirubin, Direct 0.1 0.0 - 0.3 mg/dL    Alkaline Phosphatase 58 33 - 110 U/L    ALT 15 7 - 45 U/L    AST 10 9 - 39 U/L    Total Protein 5.5 (L) 6.4 - 8.2 g/dL   Phosphorus   Result Value Ref Range    Phosphorus 5.0 (H) 2.5 - 4.9 mg/dL   Basic Metabolic Panel   Result Value Ref Range    Glucose 100 (H) 74 - 99 mg/dL    Sodium 140 136 - 145 mmol/L    Potassium 3.6 3.5 - 5.3 mmol/L    Chloride  103 98 - 107 mmol/L    Bicarbonate 28 21 - 32 mmol/L    Anion Gap 13 10 - 20 mmol/L    Urea Nitrogen 15 6 - 23 mg/dL    Creatinine 0.55 0.50 - 1.05 mg/dL    eGFR >90 >60 mL/min/1.73m*2    Calcium 8.6 8.6 - 10.6 mg/dL   Manual Differential   Result Value Ref Range    Neutrophils %, Manual 92.2 40.0 - 80.0 %    Lymphocytes %, Manual 6.9 13.0 - 44.0 %    Monocytes %, Manual 0.0 2.0 - 10.0 %    Eosinophils %, Manual 0.0 0.0 - 6.0 %    Basophils %, Manual 0.9 0.0 - 2.0 %    Seg Neutrophils Absolute, Manual 1.29 1.20 - 7.00 x10*3/uL    Lymphocytes Absolute, Manual 0.10 (L) 1.20 - 4.80 x10*3/uL    Monocytes Absolute, Manual 0.00 (L) 0.10 - 1.00 x10*3/uL    Eosinophils Absolute, Manual 0.00 0.00 - 0.70 x10*3/uL    Basophils Absolute, Manual 0.01 0.00 - 0.10 x10*3/uL    Total Cells Counted 116     RBC Morphology See Below     Polychromasia Mild         IMAGING  No results found.     PSYCHIATRIC RISK ASSESSMENT  Violence Risk Factors:  current psychiatric illness, substance abuse , unemployed, and stress/destabilizers  Acute Risk of Harm to Others is Considered: Low  Suicide Risk Factors: ; /[place] native, prior suicide attempts , family history of completed suicide, lives alone or lack of social support, current psychiatric illness, substance abuse , and severe anxiety, akathisia, or panic  Protective Factors: Shinto affiliation/spirituality and marriage/partnership  Acute Risk of Harm to Self is Considered: Moderate    ASSESSMENT & PLAN  Sagrario Garber is a 49 y.o. female with a past psychiatric history of anxiety, depression, tobacco use and a past medical history of HTN, Lupus and Angioimmunoblastic T-Cell Lymphoma in excellent partial remission who was admitted to [facility] on 2/18 for Auto Transplant prepped with BEAM (T0=2/24/25 ). Psychiatry was consulted on 2/27 for increased anxiety related to her transplant and medical management.    On initial assessment, patient was calm and cooperative  "with the interviewer. She was open to sharing her history and her current situation. Her responses to questions were concrete, and her behaviors and mannerisms align with possibly falling in the range of ASD. Her failure to complete high school and issues with securing work may indicate an ID. Her anxiety and depression symptoms appear stable, and she is finding ways to cope with her recent cancer diagnosis through her art. However, she is finding ways to cope by pushing the thoughts aside rather than addressing them. When identifying her coping and strength during this time of stress, she became very tearful. She states that her current medication regimen is working well for her and she would like to stay on it and not make changes at this time. She feels overwhelmed with how to handle her housing situation, as she says she has never been in a situation like this before. She would like to continue speak with social work to help her with this. Per [facility], patient never established continued psychiatric care after her last admission. She will need to establish care with a new outpatient provider to continue receiving her psychiatric medications.    EKG (2/19): QTc of 445 ms    IMPRESSION  #Anxiety  #Depression  #Tobacco use    RECOMMENDATIONS    Safety:  - Patient does not currently meet criteria for inpatient psychiatric admission. Once patient is deemed medically cleared, please document in note that patient is MEDICALLY CLEARED and contact [facility] for referral at [phone], pager [phone]. Issue Application for Emergency Admission (pink slip) only after patient is accepted to an inpatient psychiatric unit and is ready to be discharged. Search “Application for Emergency Admission” under SmartText.”  - To evaluate decision-making capacity, recommend use of the Capacity Evaluation Tool. Search “ IP Capacity Evaluation under SmartText\" unless the patient has a legal guardian, in which case all decisions per the " legal guardian.  - Patient does not require a 1:1 sitter from a psychiatric perspective at this time.  - Defer to primary team decision for 1:1 sitter.  - As with all hospitalized patients, would recommend delirium precautions, as below.    Work-up:  --Would recommend updated EKG due to use of multiple antipsychotic medications.    Medications:  --Continue home medications: Seroquel 50 mg PO at bedtime, Lorazepam 0.5 mg PO BID PRN, Cymbalta 60 mg PO BID  --DISCONTINUE Olanzapine 5 mg PO at bedtime when able per primary team chemotherapy nausea protocol    Ancillary Services:  - Recommend continued art therapy consult    Follow-up:  - Will revisit patient follow-up details so that she can establish care with a psychiatrist  - Will provide patient with a list of outpatient [facility] health resources.    - Discussed recommendations with primary team.  - Psychiatry will continue to follow.    Please page [phone] with any questions or concerns.      Medication Consent  Medication Consent: n/a; consult service      DELIRIUM GUIDELINES  Non-Pharmacologic:  - Assess visual and hearing impairments and provide aids and communication boards.  - Assess immobility and advocate for early evaluation and intervention by physical therapy, out of bed when medically indicated, and expeditious removal of tethers.  - Promote physiologic sleep and maintenance of sleep/wake cycle by ensuring blinds are open during the day, maintaining dark/quiet room at night with minimal interruptions, and minimizing daytime naps.  - Minimize room and staff changes.  - Engage the patient in cognitively stimulating activities and provide frequent reorientation.   - Minimize use of restraints to situations where necessary to keep patient and staff safe and to prevent from removing lines, tubes, medical devices, dressings, etc.      Pharmacologic:  - Minimize use of deliriogenic medications such as benzodiazepines, anticholinergic medications, and opiates (while  ensuring adequate treatment of pain).  - Assess and treat disruption in bowel and bladder function.   - Assess and treat abnormalities in nutrition and hydration status.     Note authored by Lolis Parra MS3 with revisions provided by Lisa Servin MD PGY-II, staffed by Dr. Pavon. Note not finalized until signed by attending.

## 2025-02-28 ENCOUNTER — APPOINTMENT (OUTPATIENT)
Dept: HEMATOLOGY/ONCOLOGY | Facility: HOSPITAL | Age: 50
End: 2025-02-28
Payer: COMMERCIAL

## 2025-02-28 LAB
ALBUMIN SERPL BCP-MCNC: 3.6 G/DL (ref 3.4–5)
ALP SERPL-CCNC: 55 U/L (ref 33–110)
ALT SERPL W P-5'-P-CCNC: 12 U/L (ref 7–45)
ANION GAP SERPL CALC-SCNC: 13 MMOL/L (ref 10–20)
APTT PPP: 26 SECONDS (ref 26–36)
AST SERPL W P-5'-P-CCNC: 8 U/L (ref 9–39)
BASOPHILS # BLD AUTO: 0.01 X10*3/UL (ref 0–0.1)
BASOPHILS NFR BLD AUTO: 2.4 %
BILIRUB DIRECT SERPL-MCNC: 0.1 MG/DL (ref 0–0.3)
BILIRUB SERPL-MCNC: 0.6 MG/DL (ref 0–1.2)
BUN SERPL-MCNC: 16 MG/DL (ref 6–23)
CALCIUM SERPL-MCNC: 8.5 MG/DL (ref 8.6–10.6)
CHLORIDE SERPL-SCNC: 102 MMOL/L (ref 98–107)
CO2 SERPL-SCNC: 28 MMOL/L (ref 21–32)
CREAT SERPL-MCNC: 0.53 MG/DL (ref 0.5–1.05)
EGFRCR SERPLBLD CKD-EPI 2021: >90 ML/MIN/1.73M*2
EOSINOPHIL # BLD AUTO: 0 X10*3/UL (ref 0–0.7)
EOSINOPHIL NFR BLD AUTO: 0 %
ERYTHROCYTE [DISTWIDTH] IN BLOOD BY AUTOMATED COUNT: 13.8 % (ref 11.5–14.5)
FIBRINOGEN PPP-MCNC: 369 MG/DL (ref 200–400)
GLUCOSE SERPL-MCNC: 112 MG/DL (ref 74–99)
HCT VFR BLD AUTO: 29.9 % (ref 36–46)
HGB BLD-MCNC: 10 G/DL (ref 12–16)
IMM GRANULOCYTES # BLD AUTO: 0.02 X10*3/UL (ref 0–0.7)
IMM GRANULOCYTES NFR BLD AUTO: 4.8 % (ref 0–0.9)
INR PPP: 1 (ref 0.9–1.1)
LDH SERPL L TO P-CCNC: 177 U/L (ref 84–246)
LYMPHOCYTES # BLD AUTO: 0.05 X10*3/UL (ref 1.2–4.8)
LYMPHOCYTES NFR BLD AUTO: 11.9 %
MAGNESIUM SERPL-MCNC: 1.92 MG/DL (ref 1.6–2.4)
MCH RBC QN AUTO: 31.3 PG (ref 26–34)
MCHC RBC AUTO-ENTMCNC: 33.4 G/DL (ref 32–36)
MCV RBC AUTO: 93 FL (ref 80–100)
MONOCYTES # BLD AUTO: 0 X10*3/UL (ref 0.1–1)
MONOCYTES NFR BLD AUTO: 0 %
NEUTROPHILS # BLD AUTO: 0.34 X10*3/UL (ref 1.2–7.7)
NEUTROPHILS NFR BLD AUTO: 80.9 %
NRBC BLD-RTO: 0 /100 WBCS (ref 0–0)
PHOSPHATE SERPL-MCNC: 4.4 MG/DL (ref 2.5–4.9)
PLATELET # BLD AUTO: 54 X10*3/UL (ref 150–450)
POTASSIUM SERPL-SCNC: 3.7 MMOL/L (ref 3.5–5.3)
PROT SERPL-MCNC: 5.3 G/DL (ref 6.4–8.2)
PROTHROMBIN TIME: 10.8 SECONDS (ref 9.8–12.4)
RBC # BLD AUTO: 3.2 X10*6/UL (ref 4–5.2)
SODIUM SERPL-SCNC: 139 MMOL/L (ref 136–145)
WBC # BLD AUTO: 0.4 X10*3/UL (ref 4.4–11.3)

## 2025-02-28 PROCEDURE — 85730 THROMBOPLASTIN TIME PARTIAL: CPT

## 2025-02-28 PROCEDURE — 2500000001 HC RX 250 WO HCPCS SELF ADMINISTERED DRUGS (ALT 637 FOR MEDICARE OP): Mod: SE

## 2025-02-28 PROCEDURE — 80053 COMPREHEN METABOLIC PANEL: CPT | Performed by: NURSE PRACTITIONER

## 2025-02-28 PROCEDURE — 2500000001 HC RX 250 WO HCPCS SELF ADMINISTERED DRUGS (ALT 637 FOR MEDICARE OP): Mod: SE | Performed by: NURSE PRACTITIONER

## 2025-02-28 PROCEDURE — 83615 LACTATE (LD) (LDH) ENZYME: CPT

## 2025-02-28 PROCEDURE — 2500000001 HC RX 250 WO HCPCS SELF ADMINISTERED DRUGS (ALT 637 FOR MEDICARE OP): Mod: SE | Performed by: INTERNAL MEDICINE

## 2025-02-28 PROCEDURE — 83735 ASSAY OF MAGNESIUM: CPT | Performed by: NURSE PRACTITIONER

## 2025-02-28 PROCEDURE — 93005 ELECTROCARDIOGRAM TRACING: CPT

## 2025-02-28 PROCEDURE — 2500000004 HC RX 250 GENERAL PHARMACY W/ HCPCS (ALT 636 FOR OP/ED): Mod: SE

## 2025-02-28 PROCEDURE — 2500000004 HC RX 250 GENERAL PHARMACY W/ HCPCS (ALT 636 FOR OP/ED): Mod: SE | Performed by: NURSE PRACTITIONER

## 2025-02-28 PROCEDURE — 1170000001 HC PRIVATE ONCOLOGY ROOM DAILY

## 2025-02-28 PROCEDURE — 85025 COMPLETE CBC W/AUTO DIFF WBC: CPT | Performed by: NURSE PRACTITIONER

## 2025-02-28 PROCEDURE — 85384 FIBRINOGEN ACTIVITY: CPT

## 2025-02-28 PROCEDURE — 99233 SBSQ HOSP IP/OBS HIGH 50: CPT | Performed by: INTERNAL MEDICINE

## 2025-02-28 PROCEDURE — 84100 ASSAY OF PHOSPHORUS: CPT | Performed by: NURSE PRACTITIONER

## 2025-02-28 PROCEDURE — 93010 ELECTROCARDIOGRAM REPORT: CPT | Performed by: INTERNAL MEDICINE

## 2025-02-28 PROCEDURE — 2500000002 HC RX 250 W HCPCS SELF ADMINISTERED DRUGS (ALT 637 FOR MEDICARE OP, ALT 636 FOR OP/ED): Mod: SE | Performed by: NURSE PRACTITIONER

## 2025-02-28 PROCEDURE — 87506 IADNA-DNA/RNA PROBE TQ 6-11: CPT

## 2025-02-28 RX ORDER — LEVOFLOXACIN 500 MG/1
500 TABLET, FILM COATED ORAL EVERY 24 HOURS
Status: DISCONTINUED | OUTPATIENT
Start: 2025-02-28 | End: 2025-03-10

## 2025-02-28 RX ADMIN — ATORVASTATIN CALCIUM 40 MG: 40 TABLET, FILM COATED ORAL at 08:05

## 2025-02-28 RX ADMIN — HYDROXYCHLOROQUINE SULFATE 200 MG: 200 TABLET, FILM COATED ORAL at 08:04

## 2025-02-28 RX ADMIN — OXYCODONE 5 MG: 5 TABLET ORAL at 06:14

## 2025-02-28 RX ADMIN — ACYCLOVIR 400 MG: 400 TABLET ORAL at 20:52

## 2025-02-28 RX ADMIN — ONDANSETRON 8 MG: 2 INJECTION INTRAMUSCULAR; INTRAVENOUS at 08:05

## 2025-02-28 RX ADMIN — FLUCONAZOLE 400 MG: 200 TABLET ORAL at 08:05

## 2025-02-28 RX ADMIN — AMLODIPINE BESYLATE 5 MG: 5 TABLET ORAL at 08:05

## 2025-02-28 RX ADMIN — ONDANSETRON 8 MG: 2 INJECTION INTRAMUSCULAR; INTRAVENOUS at 20:53

## 2025-02-28 RX ADMIN — DULOXETINE HYDROCHLORIDE 60 MG: 60 CAPSULE, DELAYED RELEASE ORAL at 20:53

## 2025-02-28 RX ADMIN — ONDANSETRON 8 MG: 2 INJECTION INTRAMUSCULAR; INTRAVENOUS at 16:05

## 2025-02-28 RX ADMIN — LEVOFLOXACIN 500 MG: 500 TABLET, FILM COATED ORAL at 11:39

## 2025-02-28 RX ADMIN — HYDROMORPHONE HYDROCHLORIDE 2 MG: 2 TABLET ORAL at 11:46

## 2025-02-28 RX ADMIN — PANTOPRAZOLE SODIUM 40 MG: 40 TABLET, DELAYED RELEASE ORAL at 06:06

## 2025-02-28 RX ADMIN — QUETIAPINE FUMARATE 100 MG: 25 TABLET ORAL at 20:52

## 2025-02-28 RX ADMIN — PROCHLORPERAZINE EDISYLATE 10 MG: 5 INJECTION INTRAMUSCULAR; INTRAVENOUS at 11:40

## 2025-02-28 RX ADMIN — PREDNISONE 10 MG: 10 TABLET ORAL at 08:05

## 2025-02-28 RX ADMIN — LORAZEPAM 0.5 MG: 0.5 TABLET ORAL at 23:08

## 2025-02-28 RX ADMIN — ACYCLOVIR 400 MG: 400 TABLET ORAL at 08:05

## 2025-02-28 RX ADMIN — DULOXETINE HYDROCHLORIDE 60 MG: 60 CAPSULE, DELAYED RELEASE ORAL at 08:05

## 2025-02-28 RX ADMIN — OXYCODONE 5 MG: 5 TABLET ORAL at 16:17

## 2025-02-28 RX ADMIN — LORAZEPAM 0.5 MG: 0.5 TABLET ORAL at 08:05

## 2025-02-28 ASSESSMENT — PAIN SCALES - GENERAL
PAINLEVEL_OUTOF10: 4
PAINLEVEL_OUTOF10: 4
PAINLEVEL_OUTOF10: 7
PAINLEVEL_OUTOF10: 4
PAINLEVEL_OUTOF10: 0 - NO PAIN
PAINLEVEL_OUTOF10: 6

## 2025-02-28 ASSESSMENT — COGNITIVE AND FUNCTIONAL STATUS - GENERAL
MOBILITY SCORE: 24
DAILY ACTIVITIY SCORE: 24

## 2025-02-28 ASSESSMENT — PAIN SCALES - WONG BAKER: WONGBAKER_NUMERICALRESPONSE: HURTS LITTLE BIT

## 2025-02-28 NOTE — PROGRESS NOTES
"Sagrario Garber is a 49 y.o. female on day 10 of admission presenting with Peripheral T-cell lymphoma (Multi).    Subjective   Afebrile, endorses an episode of diarrhea over night and few episodes this morning. Will send stool panel and then initiated antidiarrheals.     Objective   Physical Exam  Constitutional:       General: She is not in acute distress.     Appearance: Normal appearance. She is normal weight. She is not ill-appearing.   HENT:      Head: Normocephalic.      Mouth/Throat:      Mouth: Mucous membranes are moist.      Pharynx: Oropharynx is clear.      Comments: Poor dentition  Eyes:      General: No scleral icterus.     Extraocular Movements: Extraocular movements intact.      Conjunctiva/sclera: Conjunctivae normal.   Cardiovascular:      Rate and Rhythm: Normal rate and regular rhythm.      Heart sounds: Normal heart sounds. No murmur heard.     No gallop.   Pulmonary:      Effort: Pulmonary effort is normal.      Breath sounds: Normal breath sounds.   Abdominal:      General: Abdomen is flat. Bowel sounds are normal.      Palpations: Abdomen is soft.   Musculoskeletal:         General: Normal range of motion.   Lymphadenopathy:      Comments: No right inguinal (Right inguinal adenopathy (round LN 4 x 4 cm)) adenopathy.    Skin:     General: Skin is warm.      Coloration: Skin is not jaundiced or pale.      Findings: No erythema or rash.   Neurological:      General: No focal deficit present.      Mental Status: She is alert and oriented to person, place, and time. Mental status is at baseline.   Psychiatric:         Mood and Affect: Mood normal.         Behavior: Behavior normal.           Last Recorded Vitals  Blood pressure 110/69, pulse 96, temperature 36 °C (96.8 °F), temperature source Temporal, resp. rate 18, height 1.685 m (5' 6.34\"), weight 74.3 kg (163 lb 12.8 oz), SpO2 93%.  Intake/Output last 3 Shifts:  I/O last 3 completed shifts:  In: 480 (6.3 mL/kg) [P.O.:480]  Out: - (0 mL/kg) "   Weight: 76.2 kg     Relevant Results  Scheduled medications  acyclovir, 400 mg, oral, q12h  amLODIPine, 5 mg, oral, Daily  atorvastatin, 40 mg, oral, Daily  DULoxetine, 60 mg, oral, BID  [START ON 3/1/2025] filgrastim or biosimilar, 480 mcg, subcutaneous, q24h  fluconazole, 400 mg, oral, Daily  hydroxychloroquine, 200 mg, oral, Daily  levoFLOXacin, 500 mg, oral, q24h  nicotine, 1 patch, transdermal, Daily  ondansetron, 8 mg, intravenous, TID  pantoprazole, 40 mg, oral, Daily before breakfast  predniSONE, 10 mg, oral, Daily  QUEtiapine, 100 mg, oral, Nightly  sennosides-docusate sodium, 1 tablet, oral, BID        This patient has a central line   Reason for the central line remaining today? Hemodynamic monitoring    Assessment/Plan   Assessment & Plan  Peripheral T-cell lymphoma (Multi)    Angioimmunoblastic T-cell lymphoma    Ms. 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 admitting for Auto Transplant prepped with BEAM (T0=2/24/25)     T+4   Auto prepped with BEAM (T0=2/24/25)      ONC: (per chart review; see onc history for complete treatment)    # Angioimmunoblastic T-Cell Lymphoma (CD 30+ AK Negative)   - Restaging PET s/p Cycle 5: excellent response (see PET dated 12/23/24)   - S/P BV-CHP (June 2024) x 6 cycles       CHEMO  Prep: BEAM   - Carmustine 300mg/m2 (T-6)   - Cytarabine 200mg/m2 (T-5, -4, -3, -2)   - Etoposide 200mg/m2 (T-5, -4, -3, -2)   --Due to RA high risk for reactions scheduled Benadryl 25mg with each dose   - Melphalan 140mg/m2 (T-1)   - Cells Collected 5.68 x 10^6 CD34 cells       Surveillance Monitoring   IgG: On admission : 445  Coag/Fibrinogen 2x/week (2/24): WNL   LDH/Hapto: Weekly (2/24): WNL       HEME:   # Mild Thrombocytopenia likely secondary to recent chemotherapy, no evidence of bleeding    --No evidence of DIC or hypercoag state   --Transfuse if Hgb<7 and/or Plt<10 (hold DVT ppx when  PLT<50k)   --Lovenox 40mg for DVT ppx       ID:   Allergy: Amoxicillin    --No evidence of active infection on admit   --Start ACV on T+0 and Fluconazole T+1     --Plan Levofloxacin prophylaxis when neutropenic   --Plan Cefepime for neutropenic fever due to PCN allergy    --Plan Bactrim 800/160mg qMWF start T+30     FEN/GI:   Admit Wt: 77.1 Kg,  current wt: 74.3 kg (2/27)  #PPI prophy w/protonix on admit   # Chemo induced nausea  - Scheduled Zofran & Zyprexa  - PRN Compazine  # Constipation. Resolved  - Continue Senokot. Discontinued Miralax.  # Diarrhea  :: likely 2/2 chemo  - Send stool path panel, repeat c. Diff panel (2/28)  - Initiate imodium if path negative     CARDIAC:   # History of HTN  --Continue home Norvasc 5mg/day and Atorvastatin 40mg/day @ HS    --ECHO (1/22/25): EF 60-65%  --Weekly EKG (2/24) due to QT prolonging meds: (2/19) , (2/24)   --PT consulted on admission      RHEUMATOID:   #History of Lupus and Rheumatoid Arthritis    --Currently on Placquenil, Prednisone 10mg/day,    --Follows with Dr Dobbs    --Previously on Saphnelo (interferon alpha antagonist until 7/2023)   - Cont PRN meds for pain, if worsening, consult Supp Onc     PSYCH:   # Anxiety/Depression    --Continue home Seroquil, Cymbalta, Ativan  -- Has followed with behavioral health at [facility] but not established  - Consulted inpatient Psych (2/27), EKG on 2/28     MISC:   # Nicotine Dependence    - Currently smoker; Nicotine patch ordered on admit   - Patient considering smoking cessation    - Social Situation, currently homeless, was living in SNF      DISPO:   - Full Code confirmed on admit    - NOK Mom: Stephanie Wilson [phone]   - Non-Tunneled CVC (placed 2/11) remove at discharge     - Primary Onc: Dr. Gunjan Varela   - Currently pt is homeless, mom & sister are both unable to take her in.      Pt seen, examined, and discussed w/ Dr. Sharron Goel, APRN-CNP

## 2025-02-28 NOTE — SIGNIFICANT EVENT
Rapid Response Nurse Note: RADAR alert: 6    Pager time: [phone]  Arrival time: 344  Event end time: 350  Location: [facility]  [x] Triage by phone or secure messaging    Rapid response initiated by:  [] Rapid response RN [] Family [] Nursing Supervisor [] Physician   [x] RADAR auto page [] Sepsis auto-page [] RN [] RT   [] NP/PA [] Other:     Primary reason for call:   [] BAT [] New CPAP/BiPAP [] Bleeding [] Change in mental status   [] Chest pain [] Code blue [] FiO2 >/= 50% [] HR </= 40 bpm   [] HR >/= 130 bpm [] Hyperglycemia [] Hypoglycemia [x] RADAR    [] RR </= 8 bpm [] RR >/= 30 bpm [] SBP </= 90 mmHg [] SpO2 < 90%   [] Seizure [] Sepsis [] Shortness of breath  [] Staff concern: see comments     Initial VS and/or RADAR VS: T 37 °C; HR 93; RR 16; BP 90/56; SPO2 93%.    Providers present at bedside (if applicable): bedside RN    Name of ICU Provider contacted (if applicable): NA    Interventions:  [x] None [] ABG/VBG [] Assist w/ICU transfer [] BAT paged    [] Bag mask [] Blood [] Cardioversion [] Code Blue   [] Code blue for intubation [] Code status changed [] Chest x-ray [] EKG   [] IV fluid/bolus [] KUB x-ray [] Labs/cultures [] Medication   [] Nebulizer treatment [] NIPPV (CPAP/BiPAP) [] Oxygen [] Oral airway   [] Peripheral IV [] Palliative care consult [] CT/MRI [] Sepsis protocol    [] Suctioned [] Other:     Outcome:  [] Coded and  [] Code blue for intubation [] Coded and transferred to ICU []  on division   [x] Remained on division (no change) [] Remained on division + additional monitoring [] Remained in ED [] Transferred to ED   [] Transferred to ICU [] Transferred to inpatient status [] Transferred for interventions (procedure) [] Transferred to ICU stepdown    [] Transferred to surgery [] Transferred to telemetry [] Sepsis protocol [] STEMI protocol   [] Stroke protocol [x] Bedside nurse instructed to page rapid response for any concerns or acute change in condition/VS     Additional  Comments: Radar=6 paged; called & spoke with bedside RN who states that these VS's are baseline for this pt & that the primary team has previously & currently is aware of VS's/soft BP; No acute concerns per RN @ this time

## 2025-02-28 NOTE — CARE PLAN
Problem: Pain - Adult  Goal: Verbalizes/displays adequate comfort level or baseline comfort level  Outcome: Progressing     Problem: Safety - Adult  Goal: Free from fall injury  Outcome: Progressing     Problem: Discharge Planning  Goal: Discharge to home or other facility with appropriate resources  Outcome: Progressing     Problem: Chronic Conditions and Co-morbidities  Goal: Patient's chronic conditions and co-morbidity symptoms are monitored and maintained or improved  Outcome: Progressing     Problem: Nutrition  Goal: Nutrient intake appropriate for maintaining nutritional needs  Outcome: Progressing     Problem: Fall/Injury  Goal: Not fall by end of shift  Outcome: Progressing  Goal: Be free from injury by end of the shift  Outcome: Progressing  Goal: Verbalize understanding of personal risk factors for fall in the hospital  Outcome: Progressing  Goal: Verbalize understanding of risk factor reduction measures to prevent injury from fall in the home  Outcome: Progressing  Goal: Use assistive devices by end of the shift  Outcome: Progressing  Goal: Pace activities to prevent fatigue by end of the shift  Outcome: Progressing

## 2025-02-28 NOTE — PROGRESS NOTES
"PSYCHIATRY CONSULT-LIAISON PROGRESS NOTE    SUBJECTIVE    Patient reports good quality of sleep and anxiety symptoms. She continues to have mildly distressing anxiety symptoms related to her housing discharge planning but was willing to engage in problem solving regarding her disposition. Patient spoke with the undersigned about her plan to go to [facility] potentially and was willing to ask her sister if she would be willing to house her during the interim.     Patient denies suicidal ideations, denies irritability. Patient oriented and alert.     The undersigned reached out to art therapy for continued support/materials as patient very engaged in rebeca dots as a distraction exercise.   ==========    OBJECTIVE    VITALS      2/27/2025    12:00 PM 2/27/2025     3:46 PM 2/27/2025     8:54 PM 2/27/2025    11:55 PM 2/28/2025     3:40 AM 2/28/2025     8:00 AM 2/28/2025    11:48 AM   Vitals   Systolic 96 101 84 92 90 103 110   Diastolic 62 55 50 58 56 68 69   BP Location Left arm    Left arm Right arm Right arm   Heart Rate 89 101 94 89 93 103 96   Temp 36.6 °C (97.9 °F) 36.6 °C (97.9 °F) 36.6 °C (97.9 °F) 36 °C (96.8 °F) 37 °C (98.6 °F) 35.8 °C (96.4 °F) 36 °C (96.8 °F)   Resp 16 16 16 16 16 18 18   Weight (lb)      163.8    BMI      26.17 kg/m2    BSA (m2)      1.86 m2         MENTAL STATUS EXAM  General/Appearance: Mild Psychological Distress, appears older than stated age, in hospital gown, body habitus: average, grooming/hygiene is good, combed hair, IV in place  Attitude/Behavior: cooperative, appropriate eye contact  Motor Activity: restless, gait: not assessed  Mood: \"anxious\"  Affect: Quality-mood congruent, Intensity-normal, Range-full  Speech: normal rate/tone/volume/prosody/syntax  Thought Process: linear, organized  Thought Content: denies SI/HI, Delusional thinking: none elicited  Thought Perception: denies AVH  Cognition: alert & oriented x 4, no deficits in attention/concentration noted, good fund of " knowledge, recent and remote memory intact  Insight: fair  Judgment: fair      CURRENT MEDICATIONS  Scheduled medications  acyclovir, 400 mg, oral, q12h  amLODIPine, 5 mg, oral, Daily  atorvastatin, 40 mg, oral, Daily  DULoxetine, 60 mg, oral, BID  [START ON 3/1/2025] filgrastim or biosimilar, 480 mcg, subcutaneous, q24h  fluconazole, 400 mg, oral, Daily  hydroxychloroquine, 200 mg, oral, Daily  levoFLOXacin, 500 mg, oral, q24h  nicotine, 1 patch, transdermal, Daily  ondansetron, 8 mg, intravenous, TID  pantoprazole, 40 mg, oral, Daily before breakfast  predniSONE, 10 mg, oral, Daily  QUEtiapine, 100 mg, oral, Nightly  sennosides-docusate sodium, 1 tablet, oral, BID        Continuous medications       PRN medications  PRN medications: albuterol, alteplase, alum-mag hydroxide-simeth, dextrose, EPINEPHrine HCl, guaiFENesin, HYDROmorphone, LORazepam, methylPREDNISolone sodium succinate (PF), oxyCODONE, prochlorperazine, sodium chloride     LABS  Results for orders placed or performed during the hospital encounter of 02/18/25 (from the past 24 hours)   Magnesium   Result Value Ref Range    Magnesium 1.92 1.60 - 2.40 mg/dL   CBC and Auto Differential   Result Value Ref Range    WBC 0.4 (LL) 4.4 - 11.3 x10*3/uL    nRBC 0.0 0.0 - 0.0 /100 WBCs    RBC 3.20 (L) 4.00 - 5.20 x10*6/uL    Hemoglobin 10.0 (L) 12.0 - 16.0 g/dL    Hematocrit 29.9 (L) 36.0 - 46.0 %    MCV 93 80 - 100 fL    MCH 31.3 26.0 - 34.0 pg    MCHC 33.4 32.0 - 36.0 g/dL    RDW 13.8 11.5 - 14.5 %    Platelets 54 (L) 150 - 450 x10*3/uL    Neutrophils % 80.9 40.0 - 80.0 %    Immature Granulocytes %, Automated 4.8 (H) 0.0 - 0.9 %    Lymphocytes % 11.9 13.0 - 44.0 %    Monocytes % 0.0 2.0 - 10.0 %    Eosinophils % 0.0 0.0 - 6.0 %    Basophils % 2.4 0.0 - 2.0 %    Neutrophils Absolute 0.34 (L) 1.20 - 7.70 x10*3/uL    Immature Granulocytes Absolute, Automated 0.02 0.00 - 0.70 x10*3/uL    Lymphocytes Absolute 0.05 (L) 1.20 - 4.80 x10*3/uL    Monocytes Absolute 0.00  (L) 0.10 - 1.00 x10*3/uL    Eosinophils Absolute 0.00 0.00 - 0.70 x10*3/uL    Basophils Absolute 0.01 0.00 - 0.10 x10*3/uL   Hepatic Function Panel   Result Value Ref Range    Albumin 3.6 3.4 - 5.0 g/dL    Bilirubin, Total 0.6 0.0 - 1.2 mg/dL    Bilirubin, Direct 0.1 0.0 - 0.3 mg/dL    Alkaline Phosphatase 55 33 - 110 U/L    ALT 12 7 - 45 U/L    AST 8 (L) 9 - 39 U/L    Total Protein 5.3 (L) 6.4 - 8.2 g/dL   Coagulation Screen   Result Value Ref Range    Protime 10.8 9.8 - 12.4 seconds    INR 1.0 0.9 - 1.1    aPTT 26 26 - 36 seconds   Fibrinogen   Result Value Ref Range    Fibrinogen 369 200 - 400 mg/dL   Lactate Dehydrogenase   Result Value Ref Range     84 - 246 U/L   Phosphorus   Result Value Ref Range    Phosphorus 4.4 2.5 - 4.9 mg/dL   Basic Metabolic Panel   Result Value Ref Range    Glucose 112 (H) 74 - 99 mg/dL    Sodium 139 136 - 145 mmol/L    Potassium 3.7 3.5 - 5.3 mmol/L    Chloride 102 98 - 107 mmol/L    Bicarbonate 28 21 - 32 mmol/L    Anion Gap 13 10 - 20 mmol/L    Urea Nitrogen 16 6 - 23 mg/dL    Creatinine 0.53 0.50 - 1.05 mg/dL    eGFR >90 >60 mL/min/1.73m*2    Calcium 8.5 (L) 8.6 - 10.6 mg/dL        IMAGING  No results found.     PSYCHIATRIC RISK ASSESSMENT  Acute Risk of Harm to Others is Considered: Low  Acute Risk of Harm to Self is Considered: Low    ASSESSMENT AND PLAN  ASSESSMENT & PLAN  Sagrario Garber is a 49 y.o. 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 Psychiatry was consulted on 2/27 for increased anxiety related to her transplant and medical management.     On initial assessment, patient was calm and cooperative with the interviewer. She was open to sharing her history and her current situation. Her responses to questions were concrete, and her  behaviors and mannerisms align with possibly falling in the range of ASD. Her failure to complete high school and issues with securing work may indicate an ID. Her anxiety and depression symptoms appear stable, and she is finding ways to cope with her recent cancer diagnosis through her art. However, she is finding ways to cope by pushing the thoughts aside rather than addressing them. When identifying her coping and strength during this time of stress, she became very tearful. She states that her current medication regimen is working well for her and she would like to stay on it and not make changes at this time. She feels overwhelmed with how to handle her housing situation, as she says she has never been in a situation like this before. She would like to continue speak with social work to help her with this. Per [facility], patient never established continued psychiatric care after her last admission. She will need to establish care with a new outpatient provider to continue receiving her psychiatric medications.    UPDATE: 2/28: -- Since arriving in the hospital patient has been taking Seroquel 100 mg, this is an increase from her home 50 mg. Patient says this increase dose is improving her overall sleep and anxiety levels. Would continue at this currently increased dosing as patient faces a variety of anxiety provoking stressors.      IMPRESSION  #Anxiety  #Depression  #Tobacco use     RECOMMENDATIONS     Safety:  - Patient does not currently meet criteria for inpatient psychiatric admission. Once patient is deemed medically cleared, please document in note that patient is MEDICALLY CLEARED and contact [facility] for referral at [phone], pager [phone]. Issue Application for Emergency Admission (pink slip) only after patient is accepted to an inpatient psychiatric unit and is ready to be discharged. Search “Application for Emergency Admission” under SmartText.”  - To evaluate decision-making capacity, recommend  "use of the Capacity Evaluation Tool. Search “ IP Capacity Evaluation under SmartText\" unless the patient has a legal guardian, in which case all decisions per the legal guardian.  - Patient does not require a 1:1 sitter from a psychiatric perspective at this time.  - Defer to primary team decision for 1:1 sitter.  - As with all hospitalized patients, would recommend delirium precautions, as below.    Work-Up  -- Please upload most recent EKG     Medications:  -- Continue Seroquel 100 mg QHS  -- Continue Lorazepam 0.5 mg PO BID PRN  -- Continue Cymbalta 60 mg PO BID     Ancillary Services:  - Recommend continued art therapy consult/materials     Follow-up:  - Will revisit patient follow-up details so that she can establish care with a psychiatrist  - Will provide patient with a list of outpatient [facility] health resources.     - Discussed recommendations with primary team.  - Psychiatry will continue to follow.     Please page [phone] with any questions or concerns.       Medication Consent  Medication Consent: n/a; consult service     DELIRIUM GUIDELINES  Non-Pharmacologic:  - Assess visual and hearing impairments and provide aids and communication boards.  - Assess immobility and advocate for early evaluation and intervention by physical therapy, out of bed when medically indicated, and expeditious removal of tethers.  - Promote physiologic sleep and maintenance of sleep/wake cycle by ensuring blinds are open during the day, maintaining dark/quiet room at night with minimal interruptions, and minimizing daytime naps.  - Minimize room and staff changes.  - Engage the patient in cognitively stimulating activities and provide frequent reorientation.   - Minimize use of restraints to situations where necessary to keep patient and staff safe and to prevent from removing lines, tubes, medical devices, dressings, etc.      Pharmacologic:  - Minimize use of deliriogenic medications such as benzodiazepines, anticholinergic " medications, and opiates (while ensuring adequate treatment of pain).  - Assess and treat disruption in bowel and bladder function.   - Assess and treat abnormalities in nutrition and hydration status.

## 2025-03-01 LAB
ALBUMIN SERPL BCP-MCNC: 3.6 G/DL (ref 3.4–5)
ALP SERPL-CCNC: 57 U/L (ref 33–110)
ALT SERPL W P-5'-P-CCNC: 10 U/L (ref 7–45)
ANION GAP SERPL CALC-SCNC: 12 MMOL/L (ref 10–20)
AST SERPL W P-5'-P-CCNC: 8 U/L (ref 9–39)
BASOPHILS # BLD AUTO: 0 X10*3/UL (ref 0–0.1)
BASOPHILS NFR BLD AUTO: 0 %
BILIRUB DIRECT SERPL-MCNC: 0.1 MG/DL (ref 0–0.3)
BILIRUB SERPL-MCNC: 0.5 MG/DL (ref 0–1.2)
BUN SERPL-MCNC: 14 MG/DL (ref 6–23)
CALCIUM SERPL-MCNC: 8.7 MG/DL (ref 8.6–10.6)
CHLORIDE SERPL-SCNC: 102 MMOL/L (ref 98–107)
CO2 SERPL-SCNC: 29 MMOL/L (ref 21–32)
CREAT SERPL-MCNC: 0.69 MG/DL (ref 0.5–1.05)
EGFRCR SERPLBLD CKD-EPI 2021: >90 ML/MIN/1.73M*2
EOSINOPHIL # BLD AUTO: 0 X10*3/UL (ref 0–0.7)
EOSINOPHIL NFR BLD AUTO: 0 %
ERYTHROCYTE [DISTWIDTH] IN BLOOD BY AUTOMATED COUNT: 13.6 % (ref 11.5–14.5)
GLUCOSE SERPL-MCNC: 115 MG/DL (ref 74–99)
HCT VFR BLD AUTO: 27.7 % (ref 36–46)
HGB BLD-MCNC: 9.2 G/DL (ref 12–16)
IMM GRANULOCYTES # BLD AUTO: 0 X10*3/UL (ref 0–0.7)
IMM GRANULOCYTES NFR BLD AUTO: 0 % (ref 0–0.9)
LYMPHOCYTES # BLD AUTO: 0.06 X10*3/UL (ref 1.2–4.8)
LYMPHOCYTES NFR BLD AUTO: 75 %
MAGNESIUM SERPL-MCNC: 1.88 MG/DL (ref 1.6–2.4)
MCH RBC QN AUTO: 30.7 PG (ref 26–34)
MCHC RBC AUTO-ENTMCNC: 33.2 G/DL (ref 32–36)
MCV RBC AUTO: 92 FL (ref 80–100)
MONOCYTES # BLD AUTO: 0 X10*3/UL (ref 0.1–1)
MONOCYTES NFR BLD AUTO: 0 %
NEUTROPHILS # BLD AUTO: 0.02 X10*3/UL (ref 1.2–7.7)
NEUTROPHILS NFR BLD AUTO: 25 %
NRBC BLD-RTO: 0 /100 WBCS (ref 0–0)
PHOSPHATE SERPL-MCNC: 4.8 MG/DL (ref 2.5–4.9)
PLATELET # BLD AUTO: 24 X10*3/UL (ref 150–450)
POTASSIUM SERPL-SCNC: 3.7 MMOL/L (ref 3.5–5.3)
PROT SERPL-MCNC: 5.4 G/DL (ref 6.4–8.2)
RBC # BLD AUTO: 3 X10*6/UL (ref 4–5.2)
SODIUM SERPL-SCNC: 139 MMOL/L (ref 136–145)
WBC # BLD AUTO: 0.1 X10*3/UL (ref 4.4–11.3)

## 2025-03-01 PROCEDURE — 2500000002 HC RX 250 W HCPCS SELF ADMINISTERED DRUGS (ALT 637 FOR MEDICARE OP, ALT 636 FOR OP/ED): Mod: SE | Performed by: NURSE PRACTITIONER

## 2025-03-01 PROCEDURE — 82248 BILIRUBIN DIRECT: CPT | Performed by: NURSE PRACTITIONER

## 2025-03-01 PROCEDURE — 1170000001 HC PRIVATE ONCOLOGY ROOM DAILY

## 2025-03-01 PROCEDURE — 83735 ASSAY OF MAGNESIUM: CPT | Performed by: NURSE PRACTITIONER

## 2025-03-01 PROCEDURE — 2500000001 HC RX 250 WO HCPCS SELF ADMINISTERED DRUGS (ALT 637 FOR MEDICARE OP): Mod: SE

## 2025-03-01 PROCEDURE — 2500000004 HC RX 250 GENERAL PHARMACY W/ HCPCS (ALT 636 FOR OP/ED): Mod: SE | Performed by: NURSE PRACTITIONER

## 2025-03-01 PROCEDURE — 99233 SBSQ HOSP IP/OBS HIGH 50: CPT | Performed by: INTERNAL MEDICINE

## 2025-03-01 PROCEDURE — 2500000001 HC RX 250 WO HCPCS SELF ADMINISTERED DRUGS (ALT 637 FOR MEDICARE OP): Mod: SE | Performed by: NURSE PRACTITIONER

## 2025-03-01 PROCEDURE — 80053 COMPREHEN METABOLIC PANEL: CPT | Performed by: NURSE PRACTITIONER

## 2025-03-01 PROCEDURE — 84100 ASSAY OF PHOSPHORUS: CPT | Performed by: NURSE PRACTITIONER

## 2025-03-01 PROCEDURE — 85025 COMPLETE CBC W/AUTO DIFF WBC: CPT | Performed by: NURSE PRACTITIONER

## 2025-03-01 PROCEDURE — 2500000001 HC RX 250 WO HCPCS SELF ADMINISTERED DRUGS (ALT 637 FOR MEDICARE OP): Mod: SE | Performed by: INTERNAL MEDICINE

## 2025-03-01 PROCEDURE — 2500000004 HC RX 250 GENERAL PHARMACY W/ HCPCS (ALT 636 FOR OP/ED): Mod: JZ,SE | Performed by: INTERNAL MEDICINE

## 2025-03-01 RX ORDER — LOPERAMIDE HYDROCHLORIDE 2 MG/1
2 CAPSULE ORAL 4 TIMES DAILY PRN
Status: DISCONTINUED | OUTPATIENT
Start: 2025-03-01 | End: 2025-03-09

## 2025-03-01 RX ADMIN — FLUCONAZOLE 400 MG: 200 TABLET ORAL at 09:30

## 2025-03-01 RX ADMIN — ACYCLOVIR 400 MG: 400 TABLET ORAL at 09:31

## 2025-03-01 RX ADMIN — OXYCODONE 5 MG: 5 TABLET ORAL at 09:37

## 2025-03-01 RX ADMIN — HYDROXYCHLOROQUINE SULFATE 200 MG: 200 TABLET, FILM COATED ORAL at 09:31

## 2025-03-01 RX ADMIN — LEVOFLOXACIN 500 MG: 500 TABLET, FILM COATED ORAL at 09:30

## 2025-03-01 RX ADMIN — HYDROMORPHONE HYDROCHLORIDE 2 MG: 2 TABLET ORAL at 14:21

## 2025-03-01 RX ADMIN — ACYCLOVIR 400 MG: 400 TABLET ORAL at 20:25

## 2025-03-01 RX ADMIN — DULOXETINE HYDROCHLORIDE 60 MG: 60 CAPSULE, DELAYED RELEASE ORAL at 09:31

## 2025-03-01 RX ADMIN — OXYCODONE 5 MG: 5 TABLET ORAL at 20:24

## 2025-03-01 RX ADMIN — QUETIAPINE FUMARATE 100 MG: 25 TABLET ORAL at 20:25

## 2025-03-01 RX ADMIN — ONDANSETRON 8 MG: 2 INJECTION INTRAMUSCULAR; INTRAVENOUS at 14:13

## 2025-03-01 RX ADMIN — PREDNISONE 10 MG: 10 TABLET ORAL at 09:31

## 2025-03-01 RX ADMIN — AMLODIPINE BESYLATE 5 MG: 5 TABLET ORAL at 09:30

## 2025-03-01 RX ADMIN — FILGRASTIM-SNDZ 480 MCG: 480 INJECTION, SOLUTION INTRAVENOUS; SUBCUTANEOUS at 14:13

## 2025-03-01 RX ADMIN — LORAZEPAM 0.5 MG: 0.5 TABLET ORAL at 20:25

## 2025-03-01 RX ADMIN — DULOXETINE HYDROCHLORIDE 60 MG: 60 CAPSULE, DELAYED RELEASE ORAL at 20:25

## 2025-03-01 RX ADMIN — ONDANSETRON 8 MG: 2 INJECTION INTRAMUSCULAR; INTRAVENOUS at 09:30

## 2025-03-01 RX ADMIN — ONDANSETRON 8 MG: 2 INJECTION INTRAMUSCULAR; INTRAVENOUS at 20:19

## 2025-03-01 RX ADMIN — PANTOPRAZOLE SODIUM 40 MG: 40 TABLET, DELAYED RELEASE ORAL at 06:07

## 2025-03-01 RX ADMIN — LORAZEPAM 0.5 MG: 0.5 TABLET ORAL at 09:37

## 2025-03-01 RX ADMIN — ATORVASTATIN CALCIUM 40 MG: 40 TABLET, FILM COATED ORAL at 09:31

## 2025-03-01 ASSESSMENT — PAIN - FUNCTIONAL ASSESSMENT
PAIN_FUNCTIONAL_ASSESSMENT: 0-10

## 2025-03-01 ASSESSMENT — COGNITIVE AND FUNCTIONAL STATUS - GENERAL
DAILY ACTIVITIY SCORE: 24
MOBILITY SCORE: 24
DAILY ACTIVITIY SCORE: 24
MOBILITY SCORE: 24

## 2025-03-01 ASSESSMENT — PAIN SCALES - GENERAL
PAINLEVEL_OUTOF10: 5 - MODERATE PAIN
PAINLEVEL_OUTOF10: 7
PAINLEVEL_OUTOF10: 6
PAINLEVEL_OUTOF10: 7

## 2025-03-01 ASSESSMENT — PAIN DESCRIPTION - LOCATION
LOCATION: GENERALIZED
LOCATION: GENERALIZED

## 2025-03-01 ASSESSMENT — PAIN DESCRIPTION - DESCRIPTORS: DESCRIPTORS: ACHING

## 2025-03-01 NOTE — DOCUMENTATION CLARIFICATION NOTE
"    PATIENT:               BEVERLY PRASAD  ACCT #:                  [id number]  MRN:                       [id number]  :                       1975  ADMIT DATE:       2025 1:32 PM  DISCH DATE:  RESPONDING PROVIDER #:        [id number]          PROVIDER RESPONSE TEXT:    Pancytopenia due to chemotherapy and chronic disease    CDI QUERY TEXT:    Clarification    Instruction:    Based on your assessment of the patient and the clinical information, please provide the requested documentation by clicking on the appropriate radio button and enter any additional information if prompted.    Question: Is there a diagnosis indicative of the above lab values    When answering this query, please exercise your independent professional judgment. The fact that a question is being asked, does not imply that any particular answer is desired or expected.    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    Clinical Indicators:  -WBC: : 3.9      Hgb: 11.0        Platelets:  157  : 3.2               11.0                         179  : 3.6               11.4                         198  : 3.1               11.1                         191  : 3.6               10.8                         131  : 1.4               10.4                           85  : 0.4               10.0                           54    -H&P noted \"Mild Thrombocytopenia likely secondary to recent chemotherapy  -on carmustine, Cytosa, Toposar    Treatment: monitoring of labs    Risk Factors: T-cell lymphoma  Options provided:  -- Pancytopenia due to chemotherapy and chronic disease  -- Other - I will add my own diagnosis  -- Refer to Clinical Documentation Reviewer    Query created by: Oksana Gerard on 2025 1:27 PM      Electronically signed by:  NORMAN BAKER 2025 7:04 " PM

## 2025-03-01 NOTE — PROGRESS NOTES
"Sagrario Garber is a 49 y.o. female on day 11 of admission presenting with Peripheral T-cell lymphoma (Multi).    Subjective   Afebrile. No acute issues overnight. C/o diarrhea overnight, denies abd pain or N/V. ROS otherwise unremarkable.    Objective   Physical Exam  Constitutional:       General: She is not in acute distress.     Appearance: Normal appearance. She is normal weight. She is not ill-appearing.   HENT:      Head: Normocephalic.      Mouth/Throat:      Mouth: Mucous membranes are moist.      Pharynx: Oropharynx is clear.      Comments: Poor dentition  Eyes:      General: No scleral icterus.     Extraocular Movements: Extraocular movements intact.      Conjunctiva/sclera: Conjunctivae normal.   Cardiovascular:      Rate and Rhythm: Normal rate and regular rhythm.      Heart sounds: Normal heart sounds. No murmur heard.     No gallop.   Pulmonary:      Effort: Pulmonary effort is normal.      Breath sounds: Normal breath sounds.   Abdominal:      General: Abdomen is flat. Bowel sounds are normal.      Palpations: Abdomen is soft.   Musculoskeletal:         General: Normal range of motion.   Lymphadenopathy:      Comments: No right inguinal (Right inguinal adenopathy (round LN 4 x 4 cm)) adenopathy.    Skin:     General: Skin is warm.      Coloration: Skin is not jaundiced or pale.      Findings: No erythema or rash.   Neurological:      General: No focal deficit present.      Mental Status: She is alert and oriented to person, place, and time. Mental status is at baseline.   Psychiatric:         Mood and Affect: Mood normal.         Behavior: Behavior normal.           Last Recorded Vitals  Blood pressure 102/70, pulse 106, temperature 37.2 °C (99 °F), temperature source Temporal, resp. rate 16, height 1.685 m (5' 6.34\"), weight 74.3 kg (163 lb 12.8 oz), SpO2 97%.  Intake/Output last 3 Shifts:  I/O last 3 completed shifts:  In: 240 (3.2 mL/kg) [P.O.:240]  Out: - (0 mL/kg)   Weight: 74.3 kg     Relevant " Results  Scheduled medications  acyclovir, 400 mg, oral, q12h  amLODIPine, 5 mg, oral, Daily  atorvastatin, 40 mg, oral, Daily  DULoxetine, 60 mg, oral, BID  filgrastim or biosimilar, 480 mcg, subcutaneous, q24h  fluconazole, 400 mg, oral, Daily  hydroxychloroquine, 200 mg, oral, Daily  levoFLOXacin, 500 mg, oral, q24h  nicotine, 1 patch, transdermal, Daily  ondansetron, 8 mg, intravenous, TID  pantoprazole, 40 mg, oral, Daily before breakfast  predniSONE, 10 mg, oral, Daily  QUEtiapine, 100 mg, oral, Nightly  sennosides-docusate sodium, 1 tablet, oral, BID        This patient has a central line   Reason for the central line remaining today? Hemodynamic monitoring    Assessment/Plan   Assessment & Plan  Peripheral T-cell lymphoma (Multi)    Angioimmunoblastic T-cell lymphoma    Ms. 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 admitting for Auto Transplant prepped with BEAM (T0=2/24/25)     T+5   Auto prepped with BEAM (T0=2/24/25)      ONC: (per chart review; see onc history for complete treatment)    # Angioimmunoblastic T-Cell Lymphoma (CD 30+ AK Negative)   - Restaging PET s/p Cycle 5: excellent response (see PET dated 12/23/24)   - S/P BV-CHP (June 2024) x 6 cycles       CHEMO  Prep: BEAM   - Carmustine 300mg/m2 (T-6)   - Cytarabine 200mg/m2 (T-5, -4, -3, -2)   - Etoposide 200mg/m2 (T-5, -4, -3, -2)   --Due to RA high risk for reactions scheduled Benadryl 25mg with each dose   - Melphalan 140mg/m2 (T-1)   - Cells Collected 5.68 x 10^6 CD34 cells       Surveillance Monitoring   IgG: On admission : 445  Coag/Fibrinogen 2x/week (2/24): WNL   LDH/Hapto: Weekly (2/24): WNL       HEME:   # Pancytopenia secondary to chemotherapy, no evidence of bleeding    --No evidence of DIC or hypercoag state   --Transfuse if Hgb<7 and/or Plt<10 (hold DVT ppx when PLT<50k)   --Lovenox 40mg for DVT ppx --HELD for thrombocytopenia      ID:    Allergy: Amoxicillin    --No evidence of active infection on admit   --Start ACV on T+0 and Fluconazole T+1     -- Levofloxacin for neutropenic prophylaxis   --Plan Cefepime for neutropenic fever due to PCN allergy    --Plan Bactrim 800/160mg qMWF start T+30     FEN/GI:   Admit Wt: 77.1 Kg,  current wt: 74.3 kg (2/27)  #PPI prophy w/protonix on admit   # Chemo induced nausea  - Scheduled Zofran & Zyprexa  - PRN Compazine  # Diarrhea  :: likely 2/2 chemo  - Send stool path panel (p), repeat c. Diff panel (2/28)  - Imodium prn available     CARDIAC:   # History of HTN  --Continue home Norvasc 5mg/day and Atorvastatin 40mg/day @ HS    --ECHO (1/22/25): EF 60-65%  --Weekly EKG (2/24) due to QT prolonging meds: (2/19) , (2/24)   --PT consulted on admission      RHEUMATOID:   #History of Lupus and Rheumatoid Arthritis    --Currently on Placquenil, Prednisone 10mg/day,    --Follows with Dr Dobbs    --Previously on Saphnelo (interferon alpha antagonist until 7/2023)   - Cont PRN meds for pain, if worsening, consult Supp Onc     PSYCH:   # Anxiety/Depression    --Continue home Seroquil, Cymbalta, Ativan  -- Has followed with behavioral health at [facility] but not established  - Consulted inpatient Psych (2/27), EKG on 2/28     MISC:   # Nicotine Dependence    - Currently smoker; Nicotine patch ordered on admit   - Patient considering smoking cessation    - Social Situation, currently homeless, was living in SNF      DISPO:   - Full Code confirmed on admit    - NOK Mom: Stephanie Wilson [phone]   - Non-Tunneled CVC (placed 2/11) remove at discharge     - Primary Onc: Dr. Gunjan Varela   - Currently pt is homeless, mom & sister are both unable to take her in.      Pt seen, examined, and discussed w/ Dr. Sharron Sharma PA-C

## 2025-03-01 NOTE — CARE PLAN
Problem: Pain - Adult  Goal: Verbalizes/displays adequate comfort level or baseline comfort level  Outcome: Progressing     Problem: Safety - Adult  Goal: Free from fall injury  Outcome: Progressing     Problem: Discharge Planning  Goal: Discharge to home or other facility with appropriate resources  Outcome: Progressing     Problem: Fall/Injury  Goal: Not fall by end of shift  Outcome: Progressing  Goal: Be free from injury by end of the shift  Outcome: Progressing  Goal: Verbalize understanding of personal risk factors for fall in the hospital  Outcome: Progressing  Goal: Verbalize understanding of risk factor reduction measures to prevent injury from fall in the home  Outcome: Progressing  Goal: Use assistive devices by end of the shift  Outcome: Progressing  Goal: Pace activities to prevent fatigue by end of the shift  Outcome: Progressing

## 2025-03-01 NOTE — CARE PLAN
Problem: Pain - Adult  Goal: Verbalizes/displays adequate comfort level or baseline comfort level  Outcome: Progressing     Problem: Safety - Adult  Goal: Free from fall injury  Outcome: Progressing     Problem: Discharge Planning  Goal: Discharge to home or other facility with appropriate resources  Outcome: Progressing     Problem: Chronic Conditions and Co-morbidities  Goal: Patient's chronic conditions and co-morbidity symptoms are monitored and maintained or improved  Outcome: Progressing     Problem: Nutrition  Goal: Nutrient intake appropriate for maintaining nutritional needs  Outcome: Progressing     Problem: Fall/Injury  Goal: Not fall by end of shift  Outcome: Progressing  Goal: Be free from injury by end of the shift  Outcome: Progressing  Goal: Verbalize understanding of personal risk factors for fall in the hospital  Outcome: Progressing  Goal: Verbalize understanding of risk factor reduction measures to prevent injury from fall in the home  Outcome: Progressing  Goal: Use assistive devices by end of the shift  Outcome: Progressing  Goal: Pace activities to prevent fatigue by end of the shift  Outcome: Progressing         The clinical goals for the shift include Patient will remain safe throughout shift

## 2025-03-01 NOTE — SIGNIFICANT EVENT
Rapid Response Nurse Note: RADAR alert: 6    Pager time:   Arrival time:   Event end time:   Location: [place] [address]  [x] Triage by phone or secure messaging    Rapid response initiated by:  [] Rapid response RN [] Family [] Nursing Supervisor [] Physician   [x] RADAR auto page [] Sepsis auto-page [] RN [] RT   [] NP/PA [] Other:     Primary reason for call:   [] BAT [] New CPAP/BiPAP [] Bleeding [] Change in mental status   [] Chest pain [] Code blue [] FiO2 >/= 50% [] HR </= 40 bpm   [] HR >/= 130 bpm [] Hyperglycemia [] Hypoglycemia [x] RADAR    [] RR </= 8 bpm [] RR >/= 30 bpm [] SBP </= 90 mmHg [] SpO2 < 90%   [] Seizure [] Sepsis [] Shortness of breath  [] Staff concern: see comments     Initial VS and/or RADAR VS: T 35.7 °C; ; RR 18; /69; SPO2 93%.    Providers present at bedside (if applicable):     Name of ICU Provider contacted (if applicable):     Interventions:  [x] None [] ABG/VBG [] Assist w/ICU transfer [] BAT paged    [] Bag mask [] Blood [] Cardioversion [] Code Blue   [] Code blue for intubation [] Code status changed [] Chest x-ray [] EKG   [] IV fluid/bolus [] KUB x-ray [] Labs/cultures [] Medication   [] Nebulizer treatment [] NIPPV (CPAP/BiPAP) [] Oxygen [] Oral airway   [] Peripheral IV [] Palliative care consult [] CT/MRI [] Sepsis protocol    [] Suctioned [] Other:     Outcome:  [] Coded and  [] Code blue for intubation [] Coded and transferred to ICU []  on division   [x] Remained on division (no change) [] Remained on division + additional monitoring [] Remained in ED [] Transferred to ED   [] Transferred to ICU [] Transferred to inpatient status [] Transferred for interventions (procedure) [] Transferred to ICU stepdown    [] Transferred to surgery [] Transferred to telemetry [] Sepsis protocol [] STEMI protocol   [] Stroke protocol [x] Bedside nurse instructed to page rapid response for any concerns or acute change in condition/VS     Additional Comments:      Radar auto-page received for a radar score of 6 with the above listed vital signs.  Vital signs were confirmed and reviewed with the primary RN.  She is at her current baseline and the primary RN has no present concerns.  There are no indications for interventions by Rapid Response at this time.  RN to contact Rapid Response with any future concerns or signs of clinical decompensation.

## 2025-03-02 VITALS
HEIGHT: 66 IN | TEMPERATURE: 97.7 F | DIASTOLIC BLOOD PRESSURE: 65 MMHG | BODY MASS INDEX: 26.33 KG/M2 | OXYGEN SATURATION: 95 % | WEIGHT: 163.8 LBS | SYSTOLIC BLOOD PRESSURE: 99 MMHG | RESPIRATION RATE: 18 BRPM | HEART RATE: 90 BPM

## 2025-03-02 LAB
ABO GROUP (TYPE) IN BLOOD: NORMAL
ALBUMIN SERPL BCP-MCNC: 3.5 G/DL (ref 3.4–5)
ALP SERPL-CCNC: 59 U/L (ref 33–110)
ALT SERPL W P-5'-P-CCNC: 9 U/L (ref 7–45)
ANION GAP SERPL CALC-SCNC: 10 MMOL/L (ref 10–20)
ANTIBODY SCREEN: NORMAL
AST SERPL W P-5'-P-CCNC: 8 U/L (ref 9–39)
BASOPHILS # BLD AUTO: 0 X10*3/UL (ref 0–0.1)
BASOPHILS NFR BLD AUTO: 0 %
BILIRUB DIRECT SERPL-MCNC: 0.1 MG/DL (ref 0–0.3)
BILIRUB SERPL-MCNC: 0.5 MG/DL (ref 0–1.2)
BLOOD EXPIRATION DATE: NORMAL
BUN SERPL-MCNC: 12 MG/DL (ref 6–23)
C COLI+JEJ+UPSA DNA STL QL NAA+PROBE: NOT DETECTED
CALCIUM SERPL-MCNC: 8.8 MG/DL (ref 8.6–10.6)
CHLORIDE SERPL-SCNC: 100 MMOL/L (ref 98–107)
CO2 SERPL-SCNC: 29 MMOL/L (ref 21–32)
CREAT SERPL-MCNC: 0.6 MG/DL (ref 0.5–1.05)
DISPENSE STATUS: NORMAL
EC STX1 GENE STL QL NAA+PROBE: NOT DETECTED
EC STX2 GENE STL QL NAA+PROBE: NOT DETECTED
EGFRCR SERPLBLD CKD-EPI 2021: >90 ML/MIN/1.73M*2
EOSINOPHIL # BLD AUTO: 0 X10*3/UL (ref 0–0.7)
EOSINOPHIL NFR BLD AUTO: 0 %
ERYTHROCYTE [DISTWIDTH] IN BLOOD BY AUTOMATED COUNT: 13.2 % (ref 11.5–14.5)
GLUCOSE SERPL-MCNC: 97 MG/DL (ref 74–99)
HCT VFR BLD AUTO: 27.3 % (ref 36–46)
HGB BLD-MCNC: 9.3 G/DL (ref 12–16)
IMM GRANULOCYTES # BLD AUTO: 0 X10*3/UL (ref 0–0.7)
IMM GRANULOCYTES NFR BLD AUTO: 0 % (ref 0–0.9)
LYMPHOCYTES # BLD AUTO: 0.03 X10*3/UL (ref 1.2–4.8)
LYMPHOCYTES NFR BLD AUTO: 100 %
MAGNESIUM SERPL-MCNC: 1.93 MG/DL (ref 1.6–2.4)
MCH RBC QN AUTO: 31.2 PG (ref 26–34)
MCHC RBC AUTO-ENTMCNC: 34.1 G/DL (ref 32–36)
MCV RBC AUTO: 92 FL (ref 80–100)
MONOCYTES # BLD AUTO: 0 X10*3/UL (ref 0.1–1)
MONOCYTES NFR BLD AUTO: 0 %
NEUTROPHILS # BLD AUTO: 0 X10*3/UL (ref 1.2–7.7)
NEUTROPHILS NFR BLD AUTO: 0 %
NOROVIRUS GI + GII RNA STL NAA+PROBE: NOT DETECTED
NRBC BLD-RTO: 0 /100 WBCS (ref 0–0)
PHOSPHATE SERPL-MCNC: 4.5 MG/DL (ref 2.5–4.9)
PLATELET # BLD AUTO: 9 X10*3/UL (ref 150–450)
POTASSIUM SERPL-SCNC: 3.3 MMOL/L (ref 3.5–5.3)
PRODUCT BLOOD TYPE: 7300
PRODUCT CODE: NORMAL
PROT SERPL-MCNC: 5.6 G/DL (ref 6.4–8.2)
RBC # BLD AUTO: 2.98 X10*6/UL (ref 4–5.2)
RH FACTOR (ANTIGEN D): NORMAL
RV RNA STL NAA+PROBE: NOT DETECTED
SALMONELLA DNA STL QL NAA+PROBE: NOT DETECTED
SHIGELLA DNA SPEC QL NAA+PROBE: NOT DETECTED
SODIUM SERPL-SCNC: 136 MMOL/L (ref 136–145)
UNIT ABO: NORMAL
UNIT NUMBER: NORMAL
UNIT RH: NORMAL
UNIT VOLUME: 292
V CHOLERAE DNA STL QL NAA+PROBE: NOT DETECTED
WBC # BLD AUTO: <0.1 X10*3/UL (ref 4.4–11.3)
Y ENTEROCOL DNA STL QL NAA+PROBE: NOT DETECTED

## 2025-03-02 PROCEDURE — P9073 PLATELETS PHERESIS PATH REDU: HCPCS

## 2025-03-02 PROCEDURE — 2500000004 HC RX 250 GENERAL PHARMACY W/ HCPCS (ALT 636 FOR OP/ED): Mod: JZ,SE | Performed by: INTERNAL MEDICINE

## 2025-03-02 PROCEDURE — 99233 SBSQ HOSP IP/OBS HIGH 50: CPT | Performed by: INTERNAL MEDICINE

## 2025-03-02 PROCEDURE — 85025 COMPLETE CBC W/AUTO DIFF WBC: CPT | Performed by: NURSE PRACTITIONER

## 2025-03-02 PROCEDURE — 2500000001 HC RX 250 WO HCPCS SELF ADMINISTERED DRUGS (ALT 637 FOR MEDICARE OP): Mod: SE | Performed by: NURSE PRACTITIONER

## 2025-03-02 PROCEDURE — 2500000001 HC RX 250 WO HCPCS SELF ADMINISTERED DRUGS (ALT 637 FOR MEDICARE OP): Mod: SE | Performed by: INTERNAL MEDICINE

## 2025-03-02 PROCEDURE — 83735 ASSAY OF MAGNESIUM: CPT | Performed by: NURSE PRACTITIONER

## 2025-03-02 PROCEDURE — 2500000004 HC RX 250 GENERAL PHARMACY W/ HCPCS (ALT 636 FOR OP/ED): Mod: SE | Performed by: PHYSICIAN ASSISTANT

## 2025-03-02 PROCEDURE — 1170000001 HC PRIVATE ONCOLOGY ROOM DAILY

## 2025-03-02 PROCEDURE — 2500000004 HC RX 250 GENERAL PHARMACY W/ HCPCS (ALT 636 FOR OP/ED): Mod: SE | Performed by: NURSE PRACTITIONER

## 2025-03-02 PROCEDURE — 86900 BLOOD TYPING SEROLOGIC ABO: CPT

## 2025-03-02 PROCEDURE — 36430 TRANSFUSION BLD/BLD COMPNT: CPT

## 2025-03-02 PROCEDURE — 84100 ASSAY OF PHOSPHORUS: CPT | Performed by: NURSE PRACTITIONER

## 2025-03-02 PROCEDURE — 2500000002 HC RX 250 W HCPCS SELF ADMINISTERED DRUGS (ALT 637 FOR MEDICARE OP, ALT 636 FOR OP/ED): Mod: SE | Performed by: NURSE PRACTITIONER

## 2025-03-02 PROCEDURE — 82248 BILIRUBIN DIRECT: CPT | Performed by: NURSE PRACTITIONER

## 2025-03-02 PROCEDURE — 2500000001 HC RX 250 WO HCPCS SELF ADMINISTERED DRUGS (ALT 637 FOR MEDICARE OP): Mod: SE | Performed by: PHYSICIAN ASSISTANT

## 2025-03-02 PROCEDURE — 2500000002 HC RX 250 W HCPCS SELF ADMINISTERED DRUGS (ALT 637 FOR MEDICARE OP, ALT 636 FOR OP/ED): Mod: SE

## 2025-03-02 PROCEDURE — 2500000001 HC RX 250 WO HCPCS SELF ADMINISTERED DRUGS (ALT 637 FOR MEDICARE OP): Mod: SE

## 2025-03-02 RX ORDER — DIPHENHYDRAMINE HCL 25 MG
25 CAPSULE ORAL DAILY PRN
Status: DISCONTINUED | OUTPATIENT
Start: 2025-03-02 | End: 2025-03-05

## 2025-03-02 RX ORDER — ACETAMINOPHEN 325 MG/1
650 TABLET ORAL DAILY PRN
Status: DISCONTINUED | OUTPATIENT
Start: 2025-03-02 | End: 2025-03-17 | Stop reason: HOSPADM

## 2025-03-02 RX ORDER — POTASSIUM CHLORIDE 20 MEQ/1
40 TABLET, EXTENDED RELEASE ORAL ONCE
Status: COMPLETED | OUTPATIENT
Start: 2025-03-02 | End: 2025-03-02

## 2025-03-02 RX ADMIN — POTASSIUM CHLORIDE 40 MEQ: 1500 TABLET, EXTENDED RELEASE ORAL at 08:33

## 2025-03-02 RX ADMIN — DULOXETINE HYDROCHLORIDE 60 MG: 60 CAPSULE, DELAYED RELEASE ORAL at 08:33

## 2025-03-02 RX ADMIN — ONDANSETRON 8 MG: 2 INJECTION INTRAMUSCULAR; INTRAVENOUS at 08:33

## 2025-03-02 RX ADMIN — AMLODIPINE BESYLATE 5 MG: 5 TABLET ORAL at 08:33

## 2025-03-02 RX ADMIN — ACETAMINOPHEN 650 MG: 325 TABLET ORAL at 10:28

## 2025-03-02 RX ADMIN — FLUCONAZOLE 400 MG: 200 TABLET ORAL at 08:33

## 2025-03-02 RX ADMIN — LORAZEPAM 0.5 MG: 0.5 TABLET ORAL at 21:01

## 2025-03-02 RX ADMIN — FILGRASTIM-SNDZ 480 MCG: 480 INJECTION, SOLUTION INTRAVENOUS; SUBCUTANEOUS at 13:07

## 2025-03-02 RX ADMIN — PREDNISONE 10 MG: 10 TABLET ORAL at 08:33

## 2025-03-02 RX ADMIN — LORAZEPAM 0.5 MG: 0.5 TABLET ORAL at 08:36

## 2025-03-02 RX ADMIN — METHYLPREDNISOLONE SODIUM SUCCINATE 20 MG: 40 INJECTION, POWDER, FOR SOLUTION INTRAMUSCULAR; INTRAVENOUS at 15:12

## 2025-03-02 RX ADMIN — ACYCLOVIR 400 MG: 400 TABLET ORAL at 20:55

## 2025-03-02 RX ADMIN — ONDANSETRON 8 MG: 2 INJECTION INTRAMUSCULAR; INTRAVENOUS at 15:11

## 2025-03-02 RX ADMIN — LEVOFLOXACIN 500 MG: 500 TABLET, FILM COATED ORAL at 10:24

## 2025-03-02 RX ADMIN — QUETIAPINE FUMARATE 100 MG: 25 TABLET ORAL at 20:55

## 2025-03-02 RX ADMIN — HYDROMORPHONE HYDROCHLORIDE 2 MG: 2 TABLET ORAL at 08:36

## 2025-03-02 RX ADMIN — HYDROXYCHLOROQUINE SULFATE 200 MG: 200 TABLET, FILM COATED ORAL at 08:36

## 2025-03-02 RX ADMIN — DIPHENHYDRAMINE HYDROCHLORIDE 25 MG: 25 CAPSULE ORAL at 10:28

## 2025-03-02 RX ADMIN — ACYCLOVIR 400 MG: 400 TABLET ORAL at 08:33

## 2025-03-02 RX ADMIN — ONDANSETRON 8 MG: 2 INJECTION INTRAMUSCULAR; INTRAVENOUS at 20:55

## 2025-03-02 RX ADMIN — DULOXETINE HYDROCHLORIDE 60 MG: 60 CAPSULE, DELAYED RELEASE ORAL at 20:55

## 2025-03-02 RX ADMIN — ATORVASTATIN CALCIUM 40 MG: 40 TABLET, FILM COATED ORAL at 08:33

## 2025-03-02 RX ADMIN — PANTOPRAZOLE SODIUM 40 MG: 40 TABLET, DELAYED RELEASE ORAL at 06:35

## 2025-03-02 ASSESSMENT — COGNITIVE AND FUNCTIONAL STATUS - GENERAL
MOBILITY SCORE: 24
DAILY ACTIVITIY SCORE: 24

## 2025-03-02 ASSESSMENT — PAIN SCALES - GENERAL
PAINLEVEL_OUTOF10: 0 - NO PAIN
PAINLEVEL_OUTOF10: 7
PAINLEVEL_OUTOF10: 5 - MODERATE PAIN

## 2025-03-02 NOTE — PROGRESS NOTES
"Sagrario Garber is a 49 y.o. female on day 12 of admission presenting with Peripheral T-cell lymphoma (Multi).    Subjective   Afebrile. No acute issues overnight. C/o intermittent diarrhea, denies abd pain or N/V. Notes some oral mucositis, but able to eat and drink. No throat pain. ROS otherwise unremarkable.    Objective   Physical Exam  Constitutional:       General: She is not in acute distress.     Appearance: Normal appearance. She is normal weight. She is not ill-appearing.   HENT:      Head: Normocephalic.      Mouth/Throat:      Mouth: Mucous membranes are moist.      Pharynx: Oropharynx is clear.      Comments: Poor dentition  Eyes:      General: No scleral icterus.     Extraocular Movements: Extraocular movements intact.      Conjunctiva/sclera: Conjunctivae normal.   Cardiovascular:      Rate and Rhythm: Normal rate and regular rhythm.      Heart sounds: Normal heart sounds. No murmur heard.     No gallop.   Pulmonary:      Effort: Pulmonary effort is normal.      Breath sounds: Normal breath sounds.   Abdominal:      General: Abdomen is flat. Bowel sounds are normal.      Palpations: Abdomen is soft.   Musculoskeletal:         General: Normal range of motion.   Lymphadenopathy:      Comments: No right inguinal (Right inguinal adenopathy (round LN 4 x 4 cm)) adenopathy.    Skin:     General: Skin is warm.      Coloration: Skin is not jaundiced or pale.      Findings: No erythema or rash.   Neurological:      General: No focal deficit present.      Mental Status: She is alert and oriented to person, place, and time. Mental status is at baseline.   Psychiatric:         Mood and Affect: Mood normal.         Behavior: Behavior normal.           Last Recorded Vitals  Blood pressure 96/64, pulse 100, temperature 37 °C (98.6 °F), temperature source Temporal, resp. rate 18, height 1.685 m (5' 6.34\"), weight 74.3 kg (163 lb 12.8 oz), SpO2 94%.  Intake/Output last 3 Shifts:  I/O last 3 completed shifts:  In: 480 " (6.5 mL/kg) [P.O.:480]  Out: - (0 mL/kg)   Weight: 74.3 kg     Relevant Results  Scheduled medications  acyclovir, 400 mg, oral, q12h  amLODIPine, 5 mg, oral, Daily  atorvastatin, 40 mg, oral, Daily  DULoxetine, 60 mg, oral, BID  filgrastim or biosimilar, 480 mcg, subcutaneous, q24h  fluconazole, 400 mg, oral, Daily  hydroxychloroquine, 200 mg, oral, Daily  levoFLOXacin, 500 mg, oral, q24h  nicotine, 1 patch, transdermal, Daily  ondansetron, 8 mg, intravenous, TID  pantoprazole, 40 mg, oral, Daily before breakfast  predniSONE, 10 mg, oral, Daily  QUEtiapine, 100 mg, oral, Nightly        This patient has a central line   Reason for the central line remaining today? Hemodynamic monitoring    Assessment/Plan   Assessment & Plan  Peripheral T-cell lymphoma (Multi)    Angioimmunoblastic T-cell lymphoma    Ms. 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 admitting for Auto Transplant prepped with BEAM (T0=2/24/25)     T+6   Auto prepped with BEAM (T0=2/24/25)      ONC: (per chart review; see onc history for complete treatment)    # Angioimmunoblastic T-Cell Lymphoma (CD 30+ AK Negative)   - Restaging PET s/p Cycle 5: excellent response (see PET dated 12/23/24)   - S/P BV-CHP (June 2024) x 6 cycles       CHEMO  Prep: BEAM   - Carmustine 300mg/m2 (T-6)   - Cytarabine 200mg/m2 (T-5, -4, -3, -2)   - Etoposide 200mg/m2 (T-5, -4, -3, -2)   --Due to RA high risk for reactions scheduled Benadryl 25mg with each dose   - Melphalan 140mg/m2 (T-1)   - Cells Collected 5.68 x 10^6 CD34 cells       Surveillance Monitoring   IgG: On admission : 445  Coag/Fibrinogen 2x/week (2/24): WNL   LDH/Hapto: Weekly (2/24): WNL       HEME:   # Pancytopenia secondary to chemotherapy, no evidence of bleeding    --No evidence of DIC or hypercoag state   --Transfuse if Hgb<7 and/or Plt<10 (hold DVT ppx when PLT<50k)   --Lovenox 40mg for DVT ppx --HELD for  thrombocytopenia      ID:   Allergy: Amoxicillin    --No evidence of active infection on admit   --Start ACV on T+0 and Fluconazole T+1     -- Levofloxacin for neutropenic prophylaxis   --Plan Cefepime for neutropenic fever due to PCN allergy    --Plan Bactrim 800/160mg qMWF start T+30     FEN/GI:   Admit Wt: 77.1 Kg,  current wt: 74.3 kg (2/27)  #PPI prophy w/protonix on admit   # Chemo induced nausea  - Scheduled Zofran & Zyprexa  - PRN Compazine  # Diarrhea  :: likely 2/2 chemo  - Stool path panel neg, repeat c. Diff panel (2/28)  - Imodium prn available  # Oral mucositis  - Ordered BMX prn     CARDIAC:   # History of HTN  --Continue home Norvasc 5mg/day and Atorvastatin 40mg/day @ HS    --ECHO (1/22/25): EF 60-65%  --Weekly EKG (2/24) due to QT prolonging meds: (2/19) , (2/24)   --PT consulted on admission      RHEUMATOID:   #History of Lupus and Rheumatoid Arthritis    --Currently on Placquenil, Prednisone 10mg/day,    --Follows with Dr Dobbs    --Previously on Saphnelo (interferon alpha antagonist until 7/2023)   - Cont PRN meds for pain, if worsening, consult Supp Onc     PSYCH:   # Anxiety/Depression    --Continue home Seroquil, Cymbalta, Ativan  -- Has followed with behavioral health at [facility] but not established  - Consulted inpatient Psych (2/27), EKG on 2/28     MISC:   # Nicotine Dependence    - Currently smoker; Nicotine patch ordered on admit   - Patient considering smoking cessation    - Social Situation, currently homeless, was living in SNF      DISPO:   - Full Code confirmed on admit    - NOK Mom: Stephanie Wilson [phone]   - Non-Tunneled CVC (placed 2/11) remove at discharge     - Primary Onc: Dr. Gunjan Varela   - Currently pt is homeless, mom & sister are both unable to take her in.      Pt seen, examined, and discussed w/ Dr. Sharron Sharma PA-C

## 2025-03-02 NOTE — CARE PLAN
The patient's goals for the shift include      The clinical goals for the shift include Patient will remain HDS through end of shift    Over the shift, the patient did not make progress toward the following goals. Barriers to progression include development of hives. Recommendations to address these barriers include medicate per orders.

## 2025-03-02 NOTE — CARE PLAN
The patient's goals for the shift include      Problem: Pain - Adult  Goal: Verbalizes/displays adequate comfort level or baseline comfort level  Outcome: Progressing     Problem: Safety - Adult  Goal: Free from fall injury  Outcome: Progressing     Problem: Discharge Planning  Goal: Discharge to home or other facility with appropriate resources  Outcome: Progressing     Problem: Chronic Conditions and Co-morbidities  Goal: Patient's chronic conditions and co-morbidity symptoms are monitored and maintained or improved  Outcome: Progressing     Problem: Nutrition  Goal: Nutrient intake appropriate for maintaining nutritional needs  Outcome: Progressing     Problem: Fall/Injury  Goal: Not fall by end of shift  Outcome: Progressing  Goal: Be free from injury by end of the shift  Outcome: Progressing  Goal: Verbalize understanding of personal risk factors for fall in the hospital  Outcome: Progressing  Goal: Verbalize understanding of risk factor reduction measures to prevent injury from fall in the home  Outcome: Progressing  Goal: Use assistive devices by end of the shift  Outcome: Progressing  Goal: Pace activities to prevent fatigue by end of the shift  Outcome: Progressing     Problem: Pain  Goal: Takes deep breaths with improved pain control throughout the shift  Outcome: Progressing  Goal: Turns in bed with improved pain control throughout the shift  Outcome: Progressing  Goal: Walks with improved pain control throughout the shift  Outcome: Progressing  Goal: Performs ADL's with improved pain control throughout shift  Outcome: Progressing  Goal: Participates in PT with improved pain control throughout the shift  Outcome: Progressing  Goal: Free from opioid side effects throughout the shift  Outcome: Progressing  Goal: Free from acute confusion related to pain meds throughout the shift  Outcome: Progressing     Problem: Skin  Goal: Decreased wound size/increased tissue granulation at next dressing change  Outcome:  Progressing  Goal: Participates in plan/prevention/treatment measures  Outcome: Progressing  Goal: Prevent/manage excess moisture  Outcome: Progressing  Goal: Prevent/minimize sheer/friction injuries  Outcome: Progressing  Goal: Promote/optimize nutrition  Outcome: Progressing  Goal: Promote skin healing  Outcome: Progressing     The clinical goals for the shift include Remain HDS

## 2025-03-03 LAB
ALBUMIN SERPL BCP-MCNC: 3.7 G/DL (ref 3.4–5)
ALP SERPL-CCNC: 61 U/L (ref 33–110)
ALT SERPL W P-5'-P-CCNC: 8 U/L (ref 7–45)
ANION GAP SERPL CALC-SCNC: 12 MMOL/L (ref 10–20)
APTT PPP: 25 SECONDS (ref 26–36)
AST SERPL W P-5'-P-CCNC: 8 U/L (ref 9–39)
BASOPHILS # BLD AUTO: 0 X10*3/UL (ref 0–0.1)
BASOPHILS NFR BLD AUTO: 0 %
BILIRUB DIRECT SERPL-MCNC: 0.1 MG/DL (ref 0–0.3)
BILIRUB SERPL-MCNC: 0.4 MG/DL (ref 0–1.2)
BUN SERPL-MCNC: 11 MG/DL (ref 6–23)
CALCIUM SERPL-MCNC: 8.7 MG/DL (ref 8.6–10.6)
CHLORIDE SERPL-SCNC: 102 MMOL/L (ref 98–107)
CO2 SERPL-SCNC: 28 MMOL/L (ref 21–32)
CREAT SERPL-MCNC: 0.52 MG/DL (ref 0.5–1.05)
EGFRCR SERPLBLD CKD-EPI 2021: >90 ML/MIN/1.73M*2
EOSINOPHIL # BLD AUTO: 0 X10*3/UL (ref 0–0.7)
EOSINOPHIL NFR BLD AUTO: 0 %
ERYTHROCYTE [DISTWIDTH] IN BLOOD BY AUTOMATED COUNT: 12.8 % (ref 11.5–14.5)
FIBRINOGEN PPP-MCNC: 363 MG/DL (ref 200–400)
GLUCOSE SERPL-MCNC: 103 MG/DL (ref 74–99)
HCT VFR BLD AUTO: 25.3 % (ref 36–46)
HGB BLD-MCNC: 8.8 G/DL (ref 12–16)
IMM GRANULOCYTES # BLD AUTO: 0 X10*3/UL (ref 0–0.7)
IMM GRANULOCYTES NFR BLD AUTO: 0 % (ref 0–0.9)
INR PPP: 1 (ref 0.9–1.1)
LDH SERPL L TO P-CCNC: 171 U/L (ref 84–246)
LYMPHOCYTES # BLD AUTO: 0.03 X10*3/UL (ref 1.2–4.8)
LYMPHOCYTES NFR BLD AUTO: 60 %
MAGNESIUM SERPL-MCNC: 2.08 MG/DL (ref 1.6–2.4)
MCH RBC QN AUTO: 31.3 PG (ref 26–34)
MCHC RBC AUTO-ENTMCNC: 34.8 G/DL (ref 32–36)
MCV RBC AUTO: 90 FL (ref 80–100)
MONOCYTES # BLD AUTO: 0 X10*3/UL (ref 0.1–1)
MONOCYTES NFR BLD AUTO: 0 %
NEUTROPHILS # BLD AUTO: 0.02 X10*3/UL (ref 1.2–7.7)
NEUTROPHILS NFR BLD AUTO: 40 %
NRBC BLD-RTO: 0 /100 WBCS (ref 0–0)
PHOSPHATE SERPL-MCNC: 3.4 MG/DL (ref 2.5–4.9)
PLATELET # BLD AUTO: 25 X10*3/UL (ref 150–450)
POTASSIUM SERPL-SCNC: 3.7 MMOL/L (ref 3.5–5.3)
PROT SERPL-MCNC: 5.8 G/DL (ref 6.4–8.2)
PROTHROMBIN TIME: 11 SECONDS (ref 9.8–12.4)
RBC # BLD AUTO: 2.81 X10*6/UL (ref 4–5.2)
SODIUM SERPL-SCNC: 138 MMOL/L (ref 136–145)
WBC # BLD AUTO: 0.1 X10*3/UL (ref 4.4–11.3)

## 2025-03-03 PROCEDURE — 83615 LACTATE (LD) (LDH) ENZYME: CPT

## 2025-03-03 PROCEDURE — 83735 ASSAY OF MAGNESIUM: CPT | Performed by: NURSE PRACTITIONER

## 2025-03-03 PROCEDURE — 85730 THROMBOPLASTIN TIME PARTIAL: CPT

## 2025-03-03 PROCEDURE — 2500000004 HC RX 250 GENERAL PHARMACY W/ HCPCS (ALT 636 FOR OP/ED): Mod: JZ,SE | Performed by: INTERNAL MEDICINE

## 2025-03-03 PROCEDURE — 2500000004 HC RX 250 GENERAL PHARMACY W/ HCPCS (ALT 636 FOR OP/ED): Mod: SE | Performed by: NURSE PRACTITIONER

## 2025-03-03 PROCEDURE — 85384 FIBRINOGEN ACTIVITY: CPT

## 2025-03-03 PROCEDURE — 2500000004 HC RX 250 GENERAL PHARMACY W/ HCPCS (ALT 636 FOR OP/ED): Mod: SE

## 2025-03-03 PROCEDURE — 80053 COMPREHEN METABOLIC PANEL: CPT | Performed by: NURSE PRACTITIONER

## 2025-03-03 PROCEDURE — 85025 COMPLETE CBC W/AUTO DIFF WBC: CPT | Performed by: NURSE PRACTITIONER

## 2025-03-03 PROCEDURE — 2500000001 HC RX 250 WO HCPCS SELF ADMINISTERED DRUGS (ALT 637 FOR MEDICARE OP): Mod: SE

## 2025-03-03 PROCEDURE — 2500000001 HC RX 250 WO HCPCS SELF ADMINISTERED DRUGS (ALT 637 FOR MEDICARE OP): Mod: SE | Performed by: NURSE PRACTITIONER

## 2025-03-03 PROCEDURE — 99232 SBSQ HOSP IP/OBS MODERATE 35: CPT | Performed by: PSYCHIATRY & NEUROLOGY

## 2025-03-03 PROCEDURE — 82248 BILIRUBIN DIRECT: CPT | Performed by: NURSE PRACTITIONER

## 2025-03-03 PROCEDURE — 2500000001 HC RX 250 WO HCPCS SELF ADMINISTERED DRUGS (ALT 637 FOR MEDICARE OP): Mod: SE | Performed by: INTERNAL MEDICINE

## 2025-03-03 PROCEDURE — 99233 SBSQ HOSP IP/OBS HIGH 50: CPT | Performed by: INTERNAL MEDICINE

## 2025-03-03 PROCEDURE — 2500000002 HC RX 250 W HCPCS SELF ADMINISTERED DRUGS (ALT 637 FOR MEDICARE OP, ALT 636 FOR OP/ED): Mod: SE | Performed by: NURSE PRACTITIONER

## 2025-03-03 PROCEDURE — 84100 ASSAY OF PHOSPHORUS: CPT | Performed by: NURSE PRACTITIONER

## 2025-03-03 PROCEDURE — 1170000001 HC PRIVATE ONCOLOGY ROOM DAILY

## 2025-03-03 RX ADMIN — ACYCLOVIR 400 MG: 400 TABLET ORAL at 08:50

## 2025-03-03 RX ADMIN — LORAZEPAM 0.5 MG: 0.5 TABLET ORAL at 20:35

## 2025-03-03 RX ADMIN — ATORVASTATIN CALCIUM 40 MG: 40 TABLET, FILM COATED ORAL at 08:50

## 2025-03-03 RX ADMIN — ONDANSETRON 8 MG: 2 INJECTION INTRAMUSCULAR; INTRAVENOUS at 08:45

## 2025-03-03 RX ADMIN — ACYCLOVIR 400 MG: 400 TABLET ORAL at 20:35

## 2025-03-03 RX ADMIN — OXYCODONE 5 MG: 5 TABLET ORAL at 08:49

## 2025-03-03 RX ADMIN — HYDROMORPHONE HYDROCHLORIDE 2 MG: 2 TABLET ORAL at 14:48

## 2025-03-03 RX ADMIN — DULOXETINE HYDROCHLORIDE 60 MG: 60 CAPSULE, DELAYED RELEASE ORAL at 08:49

## 2025-03-03 RX ADMIN — DULOXETINE HYDROCHLORIDE 60 MG: 60 CAPSULE, DELAYED RELEASE ORAL at 20:35

## 2025-03-03 RX ADMIN — ONDANSETRON 8 MG: 2 INJECTION INTRAMUSCULAR; INTRAVENOUS at 14:42

## 2025-03-03 RX ADMIN — HYDROXYCHLOROQUINE SULFATE 200 MG: 200 TABLET, FILM COATED ORAL at 08:53

## 2025-03-03 RX ADMIN — ONDANSETRON 8 MG: 2 INJECTION INTRAMUSCULAR; INTRAVENOUS at 20:35

## 2025-03-03 RX ADMIN — QUETIAPINE FUMARATE 100 MG: 25 TABLET ORAL at 20:35

## 2025-03-03 RX ADMIN — FLUCONAZOLE 400 MG: 200 TABLET ORAL at 08:50

## 2025-03-03 RX ADMIN — PROCHLORPERAZINE EDISYLATE 10 MG: 5 INJECTION INTRAMUSCULAR; INTRAVENOUS at 13:02

## 2025-03-03 RX ADMIN — OXYCODONE 5 MG: 5 TABLET ORAL at 20:35

## 2025-03-03 RX ADMIN — LORAZEPAM 0.5 MG: 0.5 TABLET ORAL at 08:50

## 2025-03-03 RX ADMIN — LEVOFLOXACIN 500 MG: 500 TABLET, FILM COATED ORAL at 08:52

## 2025-03-03 RX ADMIN — PREDNISONE 10 MG: 10 TABLET ORAL at 08:50

## 2025-03-03 RX ADMIN — AMLODIPINE BESYLATE 5 MG: 5 TABLET ORAL at 08:49

## 2025-03-03 RX ADMIN — FILGRASTIM-SNDZ 480 MCG: 480 INJECTION, SOLUTION INTRAVENOUS; SUBCUTANEOUS at 13:02

## 2025-03-03 RX ADMIN — PANTOPRAZOLE SODIUM 40 MG: 40 TABLET, DELAYED RELEASE ORAL at 06:03

## 2025-03-03 ASSESSMENT — PAIN DESCRIPTION - LOCATION
LOCATION: GENERALIZED

## 2025-03-03 ASSESSMENT — COGNITIVE AND FUNCTIONAL STATUS - GENERAL
DAILY ACTIVITIY SCORE: 24
MOBILITY SCORE: 24

## 2025-03-03 ASSESSMENT — PAIN SCALES - GENERAL
PAINLEVEL_OUTOF10: 0 - NO PAIN
PAINLEVEL_OUTOF10: 7
PAINLEVEL_OUTOF10: 0 - NO PAIN
PAINLEVEL_OUTOF10: 6
PAINLEVEL_OUTOF10: 0 - NO PAIN
PAINLEVEL_OUTOF10: 6

## 2025-03-03 NOTE — PROGRESS NOTES
"Sagrario Garber is a 49 y.o. female on day 13 of admission presenting with Peripheral T-cell lymphoma (Multi).    Subjective   Notes improvement in diarrhea & nausea & appetite. ANC starting to improve. Plan start discharge on Wed to [facility], will need to discuss transportation to appointments. Still having     Objective   Physical Exam  Constitutional:       General: She is not in acute distress.     Appearance: Normal appearance. She is normal weight. She is not ill-appearing.   HENT:      Head: Normocephalic.      Mouth/Throat:      Mouth: Mucous membranes are moist.      Pharynx: Oropharynx is clear.      Comments: Poor dentition  Eyes:      General: No scleral icterus.     Extraocular Movements: Extraocular movements intact.      Conjunctiva/sclera: Conjunctivae normal.   Cardiovascular:      Rate and Rhythm: Normal rate and regular rhythm.      Heart sounds: Normal heart sounds. No murmur heard.     No gallop.   Pulmonary:      Effort: Pulmonary effort is normal.      Breath sounds: Normal breath sounds.   Abdominal:      General: Abdomen is flat. Bowel sounds are normal.      Palpations: Abdomen is soft.   Musculoskeletal:         General: Normal range of motion.   Lymphadenopathy:      Comments: No right inguinal (Right inguinal adenopathy (round LN 4 x 4 cm)) adenopathy.    Skin:     General: Skin is warm.      Coloration: Skin is not jaundiced or pale.      Findings: No erythema or rash.   Neurological:      General: No focal deficit present.      Mental Status: She is alert and oriented to person, place, and time. Mental status is at baseline.   Psychiatric:         Mood and Affect: Mood normal.         Behavior: Behavior normal.           Last Recorded Vitals  Blood pressure 103/68, pulse 96, temperature 36.8 °C (98.2 °F), temperature source Temporal, resp. rate 18, height 1.685 m (5' 6.34\"), weight 73.6 kg (162 lb 4.1 oz), SpO2 96%.  Intake/Output last 3 Shifts:  I/O last 3 completed shifts:  In: " 590 (7.9 mL/kg) [P.O.:240; Blood:350]  Out: - (0 mL/kg)   Weight: 74.3 kg     Relevant Results  Scheduled medications  acyclovir, 400 mg, oral, q12h  amLODIPine, 5 mg, oral, Daily  atorvastatin, 40 mg, oral, Daily  DULoxetine, 60 mg, oral, BID  filgrastim or biosimilar, 480 mcg, subcutaneous, q24h  fluconazole, 400 mg, oral, Daily  hydroxychloroquine, 200 mg, oral, Daily  levoFLOXacin, 500 mg, oral, q24h  ondansetron, 8 mg, intravenous, TID  pantoprazole, 40 mg, oral, Daily before breakfast  predniSONE, 10 mg, oral, Daily  QUEtiapine, 100 mg, oral, Nightly        This patient has a central line   Reason for the central line remaining today? Hemodynamic monitoring    Assessment/Plan   Assessment & Plan  Peripheral T-cell lymphoma (Multi)    Angioimmunoblastic T-cell lymphoma    Ms. 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 admitting for Auto Transplant prepped with BEAM (T0=2/24/25)     T+7   Auto (T0=2/24/25)      ONC: (per chart review; see onc history for complete treatment)    # Angioimmunoblastic T-Cell Lymphoma (CD 30+ AK Negative)   - Restaging PET s/p Cycle 5: excellent response (see PET dated 12/23/24)   - S/P BV-CHP (June 2024) x 6 cycles       CHEMO  Prep: BEAM   - Carmustine 300mg/m2 (T-6)   - Cytarabine 200mg/m2 (T-5, -4, -3, -2)   - Etoposide 200mg/m2 (T-5, -4, -3, -2)   --Due to RA high risk for reactions scheduled Benadryl 25mg with each dose   - Melphalan 140mg/m2 (T-1)   - Cells Collected 5.68 x 10^6 CD34 cells       Surveillance Monitoring   IgG: On admission : 445  Coag/Fibrinogen 2x/week (2/24): WNL   LDH/Hapto: Weekly (2/24): WNL       HEME:   # Pancytopenia secondary to chemotherapy, no evidence of bleeding    - No evidence of DIC or hypercoag state   - Transfuse if Hgb<7 and/or Plt<10 (hold DVT ppx when PLT<50k)   # DVT prophy: Holding Lovenox 40mg for thrombocytopenia  ID:   Allergy: Amoxicillin     --No evidence of active infection on admit   --Start ACV on T+0 and Fluconazole T+1     -- Levofloxacin for neutropenic prophylaxis   --Plan Cefepime for neutropenic fever due to PCN allergy    --Plan Bactrim 800/160mg qMWF start T+30     FEN/GI:   Admit Wt: 77.1 Kg,  current wt: 73.6 kg (3/3)  #PPI prophy w/protonix on admit   # Chemo induced nausea, improving  - Scheduled Zofran & Zyprexa  - PRN Compazine  # Diarrhea, improving  :: likely 2/2 chemo  - Stool path panel neg, repeat c. Diff panel (2/28)  - Imodium prn available  # Oral mucositis  - Ordered BMX prn     CARDIAC:   # History of HTN  --Continue home Norvasc 5mg/day and Atorvastatin 40mg/day @ HS    --ECHO (1/22/25): EF 60-65%  --Weekly EKG (2/24) due to QT prolonging meds: (2/19) , (2/24)   --PT consulted on admission      RHEUMATOID:   # History of Lupus and Rheumatoid Arthritis    --Currently on Placquenil, Prednisone 10mg/day,    --Follows with Dr Dobbs    --Previously on Saphnelo (interferon alpha antagonist until 7/2023)   - Cont PRN meds for pain, if worsening, consult Supp Onc     PSYCH:   # Anxiety/Depression    --Continue home Seroquil, Cymbalta, Ativan  -- Has followed with behavioral health at [facility] but not established  - Consulted inpatient Psych (2/27), EKG on 2/28     MISC:   # Nicotine Dependence    - Currently smoker; Nicotine patch ordered on admit   - Patient considering smoking cessation    - Social Situation, currently homeless, was living in SNF      DISPO:   - Full Code confirmed on admit    - NOK Mom: Stephanie Wilson [phone]   - Non-Tunneled CVC (placed 2/11) remove at discharge     - Mediport (not accessed)  - Primary Onc: Dr. Gunjan Varela   - Currently pt is homeless, mom & sister are both unable to take her in  - Plan discharge to [facility] shelter, start application when ANC>500.     Pt seen, examined, and discussed w/ Dr. Heath Keenan, APRN-CNP

## 2025-03-03 NOTE — CARE PLAN
The clinical goals for the shift include pt will remain HDS      Problem: Pain - Adult  Goal: Verbalizes/displays adequate comfort level or baseline comfort level  Outcome: Progressing     Problem: Safety - Adult  Goal: Free from fall injury  Outcome: Progressing     Problem: Discharge Planning  Goal: Discharge to home or other facility with appropriate resources  Outcome: Progressing     Problem: Chronic Conditions and Co-morbidities  Goal: Patient's chronic conditions and co-morbidity symptoms are monitored and maintained or improved  Outcome: Progressing     Problem: Nutrition  Goal: Nutrient intake appropriate for maintaining nutritional needs  Outcome: Progressing     Problem: Fall/Injury  Goal: Not fall by end of shift  Outcome: Progressing  Goal: Be free from injury by end of the shift  Outcome: Progressing  Goal: Verbalize understanding of personal risk factors for fall in the hospital  Outcome: Progressing  Goal: Verbalize understanding of risk factor reduction measures to prevent injury from fall in the home  Outcome: Progressing  Goal: Use assistive devices by end of the shift  Outcome: Progressing  Goal: Pace activities to prevent fatigue by end of the shift  Outcome: Progressing     Problem: Pain  Goal: Takes deep breaths with improved pain control throughout the shift  Outcome: Progressing  Goal: Turns in bed with improved pain control throughout the shift  Outcome: Progressing  Goal: Walks with improved pain control throughout the shift  Outcome: Progressing  Goal: Performs ADL's with improved pain control throughout shift  Outcome: Progressing  Goal: Participates in PT with improved pain control throughout the shift  Outcome: Progressing  Goal: Free from opioid side effects throughout the shift  Outcome: Progressing  Goal: Free from acute confusion related to pain meds throughout the shift  Outcome: Progressing     Problem: Skin  Goal: Decreased wound size/increased tissue granulation at next  dressing change  Outcome: Progressing  Goal: Participates in plan/prevention/treatment measures  Outcome: Progressing  Goal: Prevent/manage excess moisture  Outcome: Progressing  Goal: Prevent/minimize sheer/friction injuries  Outcome: Progressing  Goal: Promote/optimize nutrition  Outcome: Progressing  Goal: Promote skin healing  Outcome: Progressing

## 2025-03-03 NOTE — CARE PLAN
The patient's goals for the shift include      The clinical goals for the shift include Patient will remain HDS through end of shift

## 2025-03-03 NOTE — PROGRESS NOTES
"PSYCHIATRY CONSULT-LIAISON PROGRESS NOTE    SUBJECTIVE    Patient reports that she slept well and that her anxiety is at her baseline. She talked with the undersigned about working with social work to try to go to [facility], though she has not heard any updates over the weekend. Patient tries to avoid thinking too much about her housing discharge planning as it gives her some distressing anxiety symptoms  good quality of sleep and anxiety symptoms.     Patient denies suicidal or homicidal ideations, denies AVH. Patient does not have any concerns about her care at this time and feels that her psychiatric medications are optimized.    Consistently using lorazepam 0.5mg BID from PRN schedule    OBJECTIVE    VITALS      3/2/2025     1:26 PM 3/2/2025     3:17 PM 3/2/2025     3:33 PM 3/2/2025     7:00 PM 3/2/2025    11:00 PM 3/3/2025     3:00 AM 3/3/2025     8:49 AM   Vitals   Systolic 96 98 101 97 99 95 111   Diastolic 65 65 65 62 65 62 71   BP Location  Left arm  Left arm Left arm Left arm Right arm   Heart Rate 88 89 89 94 90 87 103   Temp 36.6 °C (97.9 °F) 36.3 °C (97.3 °F) 36.4 °C (97.5 °F) 36.3 °C (97.3 °F) 36.5 °C (97.7 °F) 36.8 °C (98.2 °F) 36.8 °C (98.2 °F)   Resp 18 18 18 18 18 18 18   Weight (lb)      --         MENTAL STATUS EXAM   General/Appearance: NAD, appears older than stated age, in hospital gown, body habitus: average, grooming/hygiene is good, combed hair, IV in place  Attitude/Behavior: cooperative, appropriate eye contact  Motor Activity: no psychomotor agitation/retardation, gait: not assessed  Mood: \"ok\"  Affect: Quality-mood congruent, Intensity-normal, Range-full  Speech: normal rate/tone/volume/prosody/syntax  Thought Process: linear, organized  Thought Content: denies SI/HI, Delusional thinking: none elicited  Thought Perception: denies AVH  Cognition: alert & oriented x 4, no deficits in attention/concentration noted, good fund of knowledge, recent and remote memory intact  Insight: " fair  Judgment: fair      CURRENT MEDICATIONS  Scheduled medications  acyclovir, 400 mg, oral, q12h  amLODIPine, 5 mg, oral, Daily  atorvastatin, 40 mg, oral, Daily  DULoxetine, 60 mg, oral, BID  filgrastim or biosimilar, 480 mcg, subcutaneous, q24h  fluconazole, 400 mg, oral, Daily  hydroxychloroquine, 200 mg, oral, Daily  levoFLOXacin, 500 mg, oral, q24h  ondansetron, 8 mg, intravenous, TID  pantoprazole, 40 mg, oral, Daily before breakfast  predniSONE, 10 mg, oral, Daily  QUEtiapine, 100 mg, oral, Nightly        Continuous medications       PRN medications  PRN medications: acetaminophen, albuterol, alteplase, alum-mag hydroxide-simeth, dextrose, diphenhydrAMINE, EPINEPHrine HCl, guaiFENesin, HYDROmorphone, lidocaine-diphenhydraMINE-Maalox 1:1:1, loperamide, LORazepam, methylPREDNISolone sodium succinate (PF), oxyCODONE, prochlorperazine, sodium chloride     LABS  Results for orders placed or performed during the hospital encounter of 02/18/25 (from the past 24 hours)   Magnesium   Result Value Ref Range    Magnesium 2.08 1.60 - 2.40 mg/dL   CBC and Auto Differential   Result Value Ref Range    WBC 0.1 (LL) 4.4 - 11.3 x10*3/uL    nRBC 0.0 0.0 - 0.0 /100 WBCs    RBC 2.81 (L) 4.00 - 5.20 x10*6/uL    Hemoglobin 8.8 (L) 12.0 - 16.0 g/dL    Hematocrit 25.3 (L) 36.0 - 46.0 %    MCV 90 80 - 100 fL    MCH 31.3 26.0 - 34.0 pg    MCHC 34.8 32.0 - 36.0 g/dL    RDW 12.8 11.5 - 14.5 %    Platelets 25 (LL) 150 - 450 x10*3/uL    Neutrophils % 40.0 40.0 - 80.0 %    Immature Granulocytes %, Automated 0.0 0.0 - 0.9 %    Lymphocytes % 60.0 13.0 - 44.0 %    Monocytes % 0.0 2.0 - 10.0 %    Eosinophils % 0.0 0.0 - 6.0 %    Basophils % 0.0 0.0 - 2.0 %    Neutrophils Absolute 0.02 (L) 1.20 - 7.70 x10*3/uL    Immature Granulocytes Absolute, Automated 0.00 0.00 - 0.70 x10*3/uL    Lymphocytes Absolute 0.03 (L) 1.20 - 4.80 x10*3/uL    Monocytes Absolute 0.00 (L) 0.10 - 1.00 x10*3/uL    Eosinophils Absolute 0.00 0.00 - 0.70 x10*3/uL     Basophils Absolute 0.00 0.00 - 0.10 x10*3/uL   Hepatic Function Panel   Result Value Ref Range    Albumin 3.7 3.4 - 5.0 g/dL    Bilirubin, Total 0.4 0.0 - 1.2 mg/dL    Bilirubin, Direct 0.1 0.0 - 0.3 mg/dL    Alkaline Phosphatase 61 33 - 110 U/L    ALT 8 7 - 45 U/L    AST 8 (L) 9 - 39 U/L    Total Protein 5.8 (L) 6.4 - 8.2 g/dL   Coagulation Screen   Result Value Ref Range    Protime 11.0 9.8 - 12.4 seconds    INR 1.0 0.9 - 1.1    aPTT 25 (L) 26 - 36 seconds   Fibrinogen   Result Value Ref Range    Fibrinogen 363 200 - 400 mg/dL   Lactate Dehydrogenase   Result Value Ref Range     84 - 246 U/L   Phosphorus   Result Value Ref Range    Phosphorus 3.4 2.5 - 4.9 mg/dL   Basic Metabolic Panel   Result Value Ref Range    Glucose 103 (H) 74 - 99 mg/dL    Sodium 138 136 - 145 mmol/L    Potassium 3.7 3.5 - 5.3 mmol/L    Chloride 102 98 - 107 mmol/L    Bicarbonate 28 21 - 32 mmol/L    Anion Gap 12 10 - 20 mmol/L    Urea Nitrogen 11 6 - 23 mg/dL    Creatinine 0.52 0.50 - 1.05 mg/dL    eGFR >90 >60 mL/min/1.73m*2    Calcium 8.7 8.6 - 10.6 mg/dL        IMAGING  No results found.     PSYCHIATRIC RISK ASSESSMENT  Acute Risk of Harm to Others is Considered: Low  Acute Risk of Harm to Self is Considered: Low      ASSESSMENT & PLAN  Sagrario Garber is a 49 y.o. 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 Psychiatry was consulted on 2/27 for increased anxiety related to her transplant and medical management.     On initial assessment, patient was calm and cooperative with the interviewer. She was open to sharing her history and her current situation. Her responses to questions were concrete, and her behaviors and mannerisms align with possibly falling in the range of ASD. Her failure to complete high  "school and issues with securing work may indicate an ID. Her anxiety and depression symptoms appear stable, and she is finding ways to cope with her recent cancer diagnosis through her art. However, she is finding ways to cope by pushing the thoughts aside rather than addressing them. When identifying her coping and strength during this time of stress, she became very tearful. She states that her current medication regimen is working well for her and she would like to stay on it and not make changes at this time. She feels overwhelmed with how to handle her housing situation, as she says she has never been in a situation like this before. She would like to continue speak with social work to help her with this. Per [facility], patient never established continued psychiatric care after her last admission. She will need to establish care with a new outpatient provider to continue receiving her psychiatric medications.    2/28: Since arriving in the hospital patient has been taking Seroquel 100 mg, this is an increase from her home 50 mg. Patient says this increase dose is improving her overall sleep and anxiety levels. Would continue at this currently increased dosing as patient faces a variety of anxiety provoking stressors.     UPDATE 3/3: Patient remains stable on her current psychiatric medication regimen, continue as prescribed. Will continue to follow, though not daily.     IMPRESSION  #other specified anxiety, likely JUAN MANUEL exacerbated by adjustment disorder  #other spec Depression  #Tobacco use Disorder     RECOMMENDATIONS     Safety:  - Patient does not currently meet criteria for inpatient psychiatric admission.   - To evaluate decision-making capacity, recommend use of the Capacity Evaluation Tool. Search “ IP Capacity Evaluation under SmartText\"   - Patient does not require a 1:1 sitter from a psychiatric perspective at this time.  - Defer to primary team decision for 1:1 sitter.  - As with all " hospitalized patients, would recommend delirium precautions, as below.    Work-Up  -- most recent EKG 2/27 QTC 407ms HR 77    Medications:  -- Continue Seroquel 100 mg QHS  -- Continue Lorazepam 0.5 mg PO BID PRN anxiety  -- Continue Cymbalta 60 mg PO BID     Ancillary Services:  - Recommend continued art therapy consult/materials     Follow-up:  - Will revisit patient follow-up details so that she can establish care with a psychiatrist  - Will provide patient with a list of outpatient [facility] resources.     - Discussed recommendations with primary team.  - Psychiatry will continue to follow.     Please page [phone] with any questions or concerns.       Medication Consent  Medication Consent: n/a; consult service     DELIRIUM GUIDELINES  Non-Pharmacologic:  - Assess visual and hearing impairments and provide aids and communication boards.  - Assess immobility and advocate for early evaluation and intervention by physical therapy, out of bed when medically indicated, and expeditious removal of tethers.  - Promote physiologic sleep and maintenance of sleep/wake cycle by ensuring blinds are open during the day, maintaining dark/quiet room at night with minimal interruptions, and minimizing daytime naps.  - Minimize room and staff changes.  - Engage the patient in cognitively stimulating activities and provide frequent reorientation.   - Minimize use of restraints to situations where necessary to keep patient and staff safe and to prevent from removing lines, tubes, medical devices, dressings, etc.      Pharmacologic:  - Minimize use of deliriogenic medications such as benzodiazepines, anticholinergic medications, and opiates (while ensuring adequate treatment of pain).  - Assess and treat disruption in bowel and bladder function.   - Assess and treat abnormalities in nutrition and hydration status.     Discussed patient with Dr. Aston Parra, MS  MS3

## 2025-03-04 LAB
ALBUMIN SERPL BCP-MCNC: 3.5 G/DL (ref 3.4–5)
ALP SERPL-CCNC: 59 U/L (ref 33–110)
ALT SERPL W P-5'-P-CCNC: 7 U/L (ref 7–45)
ANION GAP SERPL CALC-SCNC: 11 MMOL/L (ref 10–20)
AST SERPL W P-5'-P-CCNC: 7 U/L (ref 9–39)
ATRIAL RATE: 77 BPM
BASOPHILS # BLD AUTO: 0 X10*3/UL (ref 0–0.1)
BASOPHILS # BLD AUTO: ABNORMAL 10*3/UL
BASOPHILS NFR BLD AUTO: 0 %
BASOPHILS NFR BLD AUTO: ABNORMAL %
BILIRUB DIRECT SERPL-MCNC: 0.1 MG/DL (ref 0–0.3)
BILIRUB SERPL-MCNC: 0.4 MG/DL (ref 0–1.2)
BUN SERPL-MCNC: 10 MG/DL (ref 6–23)
CALCIUM SERPL-MCNC: 8.3 MG/DL (ref 8.6–10.6)
CHLORIDE SERPL-SCNC: 100 MMOL/L (ref 98–107)
CO2 SERPL-SCNC: 29 MMOL/L (ref 21–32)
CREAT SERPL-MCNC: 0.61 MG/DL (ref 0.5–1.05)
EGFRCR SERPLBLD CKD-EPI 2021: >90 ML/MIN/1.73M*2
EOSINOPHIL # BLD AUTO: 0 X10*3/UL (ref 0–0.7)
EOSINOPHIL # BLD AUTO: ABNORMAL 10*3/UL
EOSINOPHIL NFR BLD AUTO: 0 %
EOSINOPHIL NFR BLD AUTO: ABNORMAL %
ERYTHROCYTE [DISTWIDTH] IN BLOOD BY AUTOMATED COUNT: 12.8 % (ref 11.5–14.5)
ERYTHROCYTE [DISTWIDTH] IN BLOOD BY AUTOMATED COUNT: 12.9 % (ref 11.5–14.5)
GLUCOSE SERPL-MCNC: 87 MG/DL (ref 74–99)
HCT VFR BLD AUTO: 23.9 % (ref 36–46)
HCT VFR BLD AUTO: 25.5 % (ref 36–46)
HGB BLD-MCNC: 8.1 G/DL (ref 12–16)
HGB BLD-MCNC: 8.4 G/DL (ref 12–16)
IMM GRANULOCYTES # BLD AUTO: 0 X10*3/UL (ref 0–0.7)
IMM GRANULOCYTES # BLD AUTO: ABNORMAL 10*3/UL
IMM GRANULOCYTES NFR BLD AUTO: 0 % (ref 0–0.9)
IMM GRANULOCYTES NFR BLD AUTO: ABNORMAL %
LYMPHOCYTES # BLD AUTO: 0.03 X10*3/UL (ref 1.2–4.8)
LYMPHOCYTES # BLD AUTO: ABNORMAL 10*3/UL
LYMPHOCYTES NFR BLD AUTO: 50 %
LYMPHOCYTES NFR BLD AUTO: ABNORMAL %
MAGNESIUM SERPL-MCNC: 1.85 MG/DL (ref 1.6–2.4)
MCH RBC QN AUTO: 30.8 PG (ref 26–34)
MCH RBC QN AUTO: 31 PG (ref 26–34)
MCHC RBC AUTO-ENTMCNC: 32.9 G/DL (ref 32–36)
MCHC RBC AUTO-ENTMCNC: 33.9 G/DL (ref 32–36)
MCV RBC AUTO: 91 FL (ref 80–100)
MCV RBC AUTO: 94 FL (ref 80–100)
MONOCYTES # BLD AUTO: 0.02 X10*3/UL (ref 0.1–1)
MONOCYTES # BLD AUTO: ABNORMAL 10*3/UL
MONOCYTES NFR BLD AUTO: 33.3 %
MONOCYTES NFR BLD AUTO: ABNORMAL %
NEUTROPHILS # BLD AUTO: 0.01 X10*3/UL (ref 1.2–7.7)
NEUTROPHILS # BLD AUTO: ABNORMAL 10*3/UL
NEUTROPHILS NFR BLD AUTO: 16.7 %
NEUTROPHILS NFR BLD AUTO: ABNORMAL %
NRBC BLD-RTO: 0 /100 WBCS (ref 0–0)
NRBC BLD-RTO: 0 /100 WBCS (ref 0–0)
P AXIS: 72 DEGREES
P OFFSET: 203 MS
P ONSET: 152 MS
PHOSPHATE SERPL-MCNC: 3 MG/DL (ref 2.5–4.9)
PLATELET # BLD AUTO: 13 X10*3/UL (ref 150–450)
PLATELET # BLD AUTO: 14 X10*3/UL (ref 150–450)
POTASSIUM SERPL-SCNC: 3.5 MMOL/L (ref 3.5–5.3)
PR INTERVAL: 128 MS
PROT SERPL-MCNC: 5.3 G/DL (ref 6.4–8.2)
Q ONSET: 216 MS
QRS COUNT: 13 BEATS
QRS DURATION: 88 MS
QT INTERVAL: 362 MS
QTC CALCULATION(BAZETT): 409 MS
QTC FREDERICIA: 393 MS
R AXIS: 34 DEGREES
RBC # BLD AUTO: 2.63 X10*6/UL (ref 4–5.2)
RBC # BLD AUTO: 2.71 X10*6/UL (ref 4–5.2)
SODIUM SERPL-SCNC: 136 MMOL/L (ref 136–145)
T AXIS: 43 DEGREES
T OFFSET: 397 MS
VENTRICULAR RATE: 77 BPM
WBC # BLD AUTO: 0.1 X10*3/UL (ref 4.4–11.3)
WBC # BLD AUTO: 0.1 X10*3/UL (ref 4.4–11.3)

## 2025-03-04 PROCEDURE — 1170000001 HC PRIVATE ONCOLOGY ROOM DAILY

## 2025-03-04 PROCEDURE — 2500000001 HC RX 250 WO HCPCS SELF ADMINISTERED DRUGS (ALT 637 FOR MEDICARE OP): Mod: SE | Performed by: NURSE PRACTITIONER

## 2025-03-04 PROCEDURE — 80051 ELECTROLYTE PANEL: CPT | Performed by: NURSE PRACTITIONER

## 2025-03-04 PROCEDURE — 84100 ASSAY OF PHOSPHORUS: CPT | Performed by: NURSE PRACTITIONER

## 2025-03-04 PROCEDURE — 2500000004 HC RX 250 GENERAL PHARMACY W/ HCPCS (ALT 636 FOR OP/ED): Mod: SE

## 2025-03-04 PROCEDURE — 83735 ASSAY OF MAGNESIUM: CPT | Performed by: NURSE PRACTITIONER

## 2025-03-04 PROCEDURE — 80076 HEPATIC FUNCTION PANEL: CPT | Performed by: NURSE PRACTITIONER

## 2025-03-04 PROCEDURE — 85027 COMPLETE CBC AUTOMATED: CPT | Performed by: NURSE PRACTITIONER

## 2025-03-04 PROCEDURE — 85025 COMPLETE CBC W/AUTO DIFF WBC: CPT | Performed by: NURSE PRACTITIONER

## 2025-03-04 PROCEDURE — 2500000002 HC RX 250 W HCPCS SELF ADMINISTERED DRUGS (ALT 637 FOR MEDICARE OP, ALT 636 FOR OP/ED): Mod: SE | Performed by: NURSE PRACTITIONER

## 2025-03-04 PROCEDURE — 2500000001 HC RX 250 WO HCPCS SELF ADMINISTERED DRUGS (ALT 637 FOR MEDICARE OP): Mod: SE

## 2025-03-04 PROCEDURE — 2500000004 HC RX 250 GENERAL PHARMACY W/ HCPCS (ALT 636 FOR OP/ED): Mod: SE | Performed by: NURSE PRACTITIONER

## 2025-03-04 PROCEDURE — 99233 SBSQ HOSP IP/OBS HIGH 50: CPT | Performed by: INTERNAL MEDICINE

## 2025-03-04 PROCEDURE — 2500000004 HC RX 250 GENERAL PHARMACY W/ HCPCS (ALT 636 FOR OP/ED): Mod: JZ,SE | Performed by: INTERNAL MEDICINE

## 2025-03-04 PROCEDURE — 2500000001 HC RX 250 WO HCPCS SELF ADMINISTERED DRUGS (ALT 637 FOR MEDICARE OP): Mod: SE | Performed by: INTERNAL MEDICINE

## 2025-03-04 RX ADMIN — ONDANSETRON 8 MG: 2 INJECTION INTRAMUSCULAR; INTRAVENOUS at 14:59

## 2025-03-04 RX ADMIN — FILGRASTIM-SNDZ 480 MCG: 480 INJECTION, SOLUTION INTRAVENOUS; SUBCUTANEOUS at 14:58

## 2025-03-04 RX ADMIN — OXYCODONE 5 MG: 5 TABLET ORAL at 09:34

## 2025-03-04 RX ADMIN — OXYCODONE 5 MG: 5 TABLET ORAL at 20:38

## 2025-03-04 RX ADMIN — FLUCONAZOLE 400 MG: 200 TABLET ORAL at 09:33

## 2025-03-04 RX ADMIN — PANTOPRAZOLE SODIUM 40 MG: 40 TABLET, DELAYED RELEASE ORAL at 06:09

## 2025-03-04 RX ADMIN — LORAZEPAM 0.5 MG: 0.5 TABLET ORAL at 20:38

## 2025-03-04 RX ADMIN — LORAZEPAM 0.5 MG: 0.5 TABLET ORAL at 09:33

## 2025-03-04 RX ADMIN — PREDNISONE 10 MG: 10 TABLET ORAL at 09:33

## 2025-03-04 RX ADMIN — AMLODIPINE BESYLATE 5 MG: 5 TABLET ORAL at 09:33

## 2025-03-04 RX ADMIN — DULOXETINE HYDROCHLORIDE 60 MG: 60 CAPSULE, DELAYED RELEASE ORAL at 20:38

## 2025-03-04 RX ADMIN — ATORVASTATIN CALCIUM 40 MG: 40 TABLET, FILM COATED ORAL at 09:33

## 2025-03-04 RX ADMIN — ONDANSETRON 8 MG: 2 INJECTION INTRAMUSCULAR; INTRAVENOUS at 20:38

## 2025-03-04 RX ADMIN — DULOXETINE HYDROCHLORIDE 60 MG: 60 CAPSULE, DELAYED RELEASE ORAL at 09:33

## 2025-03-04 RX ADMIN — LEVOFLOXACIN 500 MG: 500 TABLET, FILM COATED ORAL at 09:33

## 2025-03-04 RX ADMIN — ACYCLOVIR 400 MG: 400 TABLET ORAL at 20:38

## 2025-03-04 RX ADMIN — QUETIAPINE FUMARATE 100 MG: 25 TABLET ORAL at 20:38

## 2025-03-04 RX ADMIN — HYDROXYCHLOROQUINE SULFATE 200 MG: 200 TABLET, FILM COATED ORAL at 09:33

## 2025-03-04 RX ADMIN — ACYCLOVIR 400 MG: 400 TABLET ORAL at 09:33

## 2025-03-04 RX ADMIN — SODIUM CHLORIDE 500 ML: 9 INJECTION, SOLUTION INTRAVENOUS at 20:49

## 2025-03-04 RX ADMIN — ONDANSETRON 8 MG: 2 INJECTION INTRAMUSCULAR; INTRAVENOUS at 09:33

## 2025-03-04 ASSESSMENT — COGNITIVE AND FUNCTIONAL STATUS - GENERAL
DAILY ACTIVITIY SCORE: 24
MOBILITY SCORE: 24
MOBILITY SCORE: 24
DAILY ACTIVITIY SCORE: 24

## 2025-03-04 ASSESSMENT — PAIN SCALES - GENERAL
PAINLEVEL_OUTOF10: 6
PAINLEVEL_OUTOF10: 0 - NO PAIN
PAINLEVEL_OUTOF10: 4
PAINLEVEL_OUTOF10: 7
PAINLEVEL_OUTOF10: 5 - MODERATE PAIN

## 2025-03-04 ASSESSMENT — PAIN - FUNCTIONAL ASSESSMENT
PAIN_FUNCTIONAL_ASSESSMENT: 0-10
PAIN_FUNCTIONAL_ASSESSMENT: 0-10

## 2025-03-04 NOTE — PROGRESS NOTES
Art Therapy Note    Sagrario Garber     Therapy Session  Referral Type: New referral this admission  Visit Type: Follow-up visit  Session Start Time: 1305  Session End Time: 1310  Intervention Delivery: In-person  Conflict of Service: None  Number of family members present: 0              Treatment/Interventions  Areas of Focus: Self-expression, Socialization, Normalization, Coping  Art Therapy Interventions:  (selected art materials)    Post-assessment  Total Session Time (min): 5 minutes    Narrative  Assessment Detail: ATR made a visit to Pt.;s room to offer an AT session.  Pt sitting up in bed and welcomed the visit.  Pt simply asked if she aquire and gather more beading supplies to keep making bracelets and necklaces.  Pt always grateful for time spent and supplies provided. Pt open to continued AT services. ATR will continue to offer Pt services durign this admission.    Education Documentation  No documentation found.

## 2025-03-04 NOTE — CARE PLAN
Problem: Pain - Adult  Goal: Verbalizes/displays adequate comfort level or baseline comfort level  Outcome: Progressing     Problem: Safety - Adult  Goal: Free from fall injury  Outcome: Progressing     Problem: Discharge Planning  Goal: Discharge to home or other facility with appropriate resources  Outcome: Progressing     Problem: Chronic Conditions and Co-morbidities  Goal: Patient's chronic conditions and co-morbidity symptoms are monitored and maintained or improved  Outcome: Progressing     Problem: Nutrition  Goal: Nutrient intake appropriate for maintaining nutritional needs  Outcome: Progressing     Problem: Fall/Injury  Goal: Not fall by end of shift  Outcome: Progressing  Goal: Be free from injury by end of the shift  Outcome: Progressing  Goal: Verbalize understanding of personal risk factors for fall in the hospital  Outcome: Progressing  Goal: Verbalize understanding of risk factor reduction measures to prevent injury from fall in the home  Outcome: Progressing  Goal: Use assistive devices by end of the shift  Outcome: Progressing  Goal: Pace activities to prevent fatigue by end of the shift  Outcome: Progressing     Problem: Pain  Goal: Takes deep breaths with improved pain control throughout the shift  Outcome: Progressing  Goal: Turns in bed with improved pain control throughout the shift  Outcome: Progressing  Goal: Walks with improved pain control throughout the shift  Outcome: Progressing  Goal: Performs ADL's with improved pain control throughout shift  Outcome: Progressing  Goal: Participates in PT with improved pain control throughout the shift  Outcome: Progressing  Goal: Free from opioid side effects throughout the shift  Outcome: Progressing  Goal: Free from acute confusion related to pain meds throughout the shift  Outcome: Progressing     Problem: Skin  Goal: Decreased wound size/increased tissue granulation at next dressing change  Outcome: Progressing  Goal: Participates in  plan/prevention/treatment measures  Outcome: Progressing  Goal: Prevent/manage excess moisture  Outcome: Progressing  Goal: Prevent/minimize sheer/friction injuries  Outcome: Progressing  Goal: Promote/optimize nutrition  Outcome: Progressing  Goal: Promote skin healing  Outcome: Progressing

## 2025-03-04 NOTE — PROGRESS NOTES
"Sagrario Garber is a 49 y.o. female on day 14 of admission presenting with Peripheral T-cell lymphoma (Multi).    Subjective   No complaints today, waiting for count improvement. Will start application to [facility] (shelter) when ANC>500.     Objective   Physical Exam  Constitutional:       General: She is not in acute distress.     Appearance: Normal appearance. She is normal weight. She is not ill-appearing.   HENT:      Head: Normocephalic.      Mouth/Throat:      Mouth: Mucous membranes are moist.      Pharynx: Oropharynx is clear.      Comments: Poor dentition  Eyes:      General: No scleral icterus.     Extraocular Movements: Extraocular movements intact.      Conjunctiva/sclera: Conjunctivae normal.   Cardiovascular:      Rate and Rhythm: Normal rate and regular rhythm.      Heart sounds: Normal heart sounds. No murmur heard.     No gallop.   Pulmonary:      Effort: Pulmonary effort is normal.      Breath sounds: Normal breath sounds.   Abdominal:      General: Abdomen is flat. Bowel sounds are normal.      Palpations: Abdomen is soft.   Musculoskeletal:         General: Normal range of motion.   Lymphadenopathy:      Comments: No right inguinal (Right inguinal adenopathy (round LN 4 x 4 cm)) adenopathy.    Skin:     General: Skin is warm.      Coloration: Skin is not jaundiced or pale.      Findings: No erythema or rash.   Neurological:      General: No focal deficit present.      Mental Status: She is alert and oriented to person, place, and time. Mental status is at baseline.   Psychiatric:         Mood and Affect: Mood normal.         Behavior: Behavior normal.           Last Recorded Vitals  Blood pressure 96/61, pulse 97, temperature 36.7 °C (98 °F), temperature source Temporal, resp. rate 16, height 1.685 m (5' 6.34\"), weight 73.6 kg (162 lb 4.1 oz), SpO2 97%.  Intake/Output last 3 Shifts:  No intake/output data recorded.    Relevant Results  Scheduled medications  acyclovir, 400 mg, oral, " q12h  amLODIPine, 5 mg, oral, Daily  atorvastatin, 40 mg, oral, Daily  DULoxetine, 60 mg, oral, BID  filgrastim or biosimilar, 480 mcg, subcutaneous, q24h  fluconazole, 400 mg, oral, Daily  hydroxychloroquine, 200 mg, oral, Daily  levoFLOXacin, 500 mg, oral, q24h  ondansetron, 8 mg, intravenous, TID  pantoprazole, 40 mg, oral, Daily before breakfast  predniSONE, 10 mg, oral, Daily  QUEtiapine, 100 mg, oral, Nightly        This patient has a central line   Reason for the central line remaining today? Hemodynamic monitoring    Assessment/Plan   Assessment & Plan  Peripheral T-cell lymphoma (Multi)    Angioimmunoblastic T-cell lymphoma    Ms. 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 admitting for Auto Transplant prepped with BEAM (T0=2/24/25)     T+8   Auto (T0=2/24/25)      ONC: (per chart review; see onc history for complete treatment)    # Angioimmunoblastic T-Cell Lymphoma (CD 30+ AK Negative)   - Restaging PET s/p Cycle 5: excellent response (see PET dated 12/23/24)   - S/P BV-CHP (June 2024) x 6 cycles       CHEMO  Prep: BEAM   - Carmustine 300mg/m2 (T-6)   - Cytarabine 200mg/m2 (T-5, -4, -3, -2)   - Etoposide 200mg/m2 (T-5, -4, -3, -2)   --Due to RA high risk for reactions scheduled Benadryl 25mg with each dose   - Melphalan 140mg/m2 (T-1)   - Cells Collected 5.68 x 10^6 CD34 cells       Surveillance Monitoring   IgG: On admission : 445  Coag/Fibrinogen 2x/week (2/24): WNL   LDH/Hapto: Weekly (2/24): WNL       HEME:   # Pancytopenia secondary to chemotherapy, no evidence of bleeding    - No evidence of DIC or hypercoag state   - Transfuse if Hgb<7 and/or Plt<10 (hold DVT ppx when PLT<50k)   # DVT prophy: Holding Lovenox 40mg for thrombocytopenia  ID:   Allergy: Amoxicillin    --No evidence of active infection on admit   --Start ACV on T+0 and Fluconazole T+1     -- Levofloxacin for neutropenic prophylaxis   --Plan  Cefepime for neutropenic fever due to PCN allergy    --Plan Bactrim 800/160mg qMWF start T+30     FEN/GI:   Admit Wt: 77.1 Kg,  current wt: 73.6 kg (3/3)  #PPI prophy w/protonix on admit   # Chemo induced nausea, improving  - Scheduled Zofran & Zyprexa  - PRN Compazine  # Diarrhea, improving  :: likely 2/2 chemo  - Stool path panel neg, repeat c. Diff panel (2/28)  - Imodium prn available  # Oral mucositis  - Ordered BMX prn     CARDIAC:   # History of HTN  --Continue home Norvasc 5mg/day and Atorvastatin 40mg/day @ HS    --ECHO (1/22/25): EF 60-65%  --Weekly EKG (2/24) due to QT prolonging meds: (2/19) , (2/24)   --PT consulted on admission      RHEUMATOID:   # History of Lupus and Rheumatoid Arthritis    --Currently on Placquenil, Prednisone 10mg/day,    --Follows with Dr Dobbs    --Previously on Saphnelo (interferon alpha antagonist until 7/2023)   - Cont PRN meds for pain, if worsening, consult Supp Onc     PSYCH:   # Anxiety/Depression    --Continue home Seroquil, Cymbalta, Ativan  -- Has followed with behavioral health at [facility] but not established  - Consulted inpatient Psych (2/27), EKG on 2/28     MISC:   # Nicotine Dependence    - Currently smoker; Nicotine patch ordered on admit   - Patient considering smoking cessation    - Social Situation, currently homeless, was living in SNF      DISPO:   - Full Code confirmed on admit    - STELLAK Mom: Stephanie Wilson [phone]   - Non-Tunneled CVC (placed 2/11) remove at discharge     - Mediport (not accessed)  - Primary Onc: Dr. Gunjan Varela   - Currently pt is homeless, mom & sister are both unable to take her in.  - Plan discharge to [facility] home (shelter), start application when ANC>500.     Pt seen, examined, and discussed w/ Dr. Myers-Wilma Keenan, APRN-CNP

## 2025-03-05 LAB
ALBUMIN SERPL BCP-MCNC: 3.3 G/DL (ref 3.4–5)
ALP SERPL-CCNC: 56 U/L (ref 33–110)
ALT SERPL W P-5'-P-CCNC: 7 U/L (ref 7–45)
ANION GAP SERPL CALC-SCNC: 12 MMOL/L (ref 10–20)
APTT PPP: 26 SECONDS (ref 26–36)
AST SERPL W P-5'-P-CCNC: 7 U/L (ref 9–39)
BASOPHILS # BLD AUTO: 0 X10*3/UL (ref 0–0.1)
BASOPHILS NFR BLD AUTO: 0 %
BILIRUB DIRECT SERPL-MCNC: 0.1 MG/DL (ref 0–0.3)
BILIRUB SERPL-MCNC: 0.4 MG/DL (ref 0–1.2)
BLOOD EXPIRATION DATE: NORMAL
BUN SERPL-MCNC: 6 MG/DL (ref 6–23)
CALCIUM SERPL-MCNC: 8.3 MG/DL (ref 8.6–10.6)
CHLORIDE SERPL-SCNC: 101 MMOL/L (ref 98–107)
CO2 SERPL-SCNC: 28 MMOL/L (ref 21–32)
CREAT SERPL-MCNC: 0.56 MG/DL (ref 0.5–1.05)
DISPENSE STATUS: NORMAL
EGFRCR SERPLBLD CKD-EPI 2021: >90 ML/MIN/1.73M*2
EOSINOPHIL # BLD AUTO: 0 X10*3/UL (ref 0–0.7)
EOSINOPHIL NFR BLD AUTO: 0 %
ERYTHROCYTE [DISTWIDTH] IN BLOOD BY AUTOMATED COUNT: 12.5 % (ref 11.5–14.5)
FIBRINOGEN PPP-MCNC: 461 MG/DL (ref 200–400)
GLUCOSE SERPL-MCNC: 95 MG/DL (ref 74–99)
HCT VFR BLD AUTO: 23.4 % (ref 36–46)
HGB BLD-MCNC: 7.7 G/DL (ref 12–16)
IMM GRANULOCYTES # BLD AUTO: 0 X10*3/UL (ref 0–0.7)
IMM GRANULOCYTES NFR BLD AUTO: 0 % (ref 0–0.9)
INR PPP: 1 (ref 0.9–1.1)
LDH SERPL L TO P-CCNC: 131 U/L (ref 84–246)
LYMPHOCYTES # BLD AUTO: 0.06 X10*3/UL (ref 1.2–4.8)
LYMPHOCYTES NFR BLD AUTO: 66.7 %
MAGNESIUM SERPL-MCNC: 1.83 MG/DL (ref 1.6–2.4)
MCH RBC QN AUTO: 30.8 PG (ref 26–34)
MCHC RBC AUTO-ENTMCNC: 32.9 G/DL (ref 32–36)
MCV RBC AUTO: 94 FL (ref 80–100)
MONOCYTES # BLD AUTO: 0.03 X10*3/UL (ref 0.1–1)
MONOCYTES NFR BLD AUTO: 33.3 %
NEUTROPHILS # BLD AUTO: 0 X10*3/UL (ref 1.2–7.7)
NEUTROPHILS NFR BLD AUTO: 0 %
NRBC BLD-RTO: 0 /100 WBCS (ref 0–0)
PHOSPHATE SERPL-MCNC: 3.2 MG/DL (ref 2.5–4.9)
PLATELET # BLD AUTO: 9 X10*3/UL (ref 150–450)
POTASSIUM SERPL-SCNC: 3.3 MMOL/L (ref 3.5–5.3)
PRODUCT BLOOD TYPE: 7300
PRODUCT CODE: NORMAL
PROT SERPL-MCNC: 5.1 G/DL (ref 6.4–8.2)
PROTHROMBIN TIME: 11.3 SECONDS (ref 9.8–12.4)
RBC # BLD AUTO: 2.5 X10*6/UL (ref 4–5.2)
SODIUM SERPL-SCNC: 138 MMOL/L (ref 136–145)
UNIT ABO: NORMAL
UNIT NUMBER: NORMAL
UNIT RH: NORMAL
UNIT VOLUME: 185
WBC # BLD AUTO: 0.1 X10*3/UL (ref 4.4–11.3)

## 2025-03-05 PROCEDURE — 84100 ASSAY OF PHOSPHORUS: CPT | Performed by: NURSE PRACTITIONER

## 2025-03-05 PROCEDURE — 2500000004 HC RX 250 GENERAL PHARMACY W/ HCPCS (ALT 636 FOR OP/ED): Mod: SE

## 2025-03-05 PROCEDURE — P9037 PLATE PHERES LEUKOREDU IRRAD: HCPCS

## 2025-03-05 PROCEDURE — 36430 TRANSFUSION BLD/BLD COMPNT: CPT

## 2025-03-05 PROCEDURE — 80053 COMPREHEN METABOLIC PANEL: CPT | Performed by: NURSE PRACTITIONER

## 2025-03-05 PROCEDURE — 2500000002 HC RX 250 W HCPCS SELF ADMINISTERED DRUGS (ALT 637 FOR MEDICARE OP, ALT 636 FOR OP/ED): Mod: SE | Performed by: NURSE PRACTITIONER

## 2025-03-05 PROCEDURE — 85610 PROTHROMBIN TIME: CPT

## 2025-03-05 PROCEDURE — 83615 LACTATE (LD) (LDH) ENZYME: CPT

## 2025-03-05 PROCEDURE — 2500000004 HC RX 250 GENERAL PHARMACY W/ HCPCS (ALT 636 FOR OP/ED): Mod: JZ,SE | Performed by: INTERNAL MEDICINE

## 2025-03-05 PROCEDURE — 1170000001 HC PRIVATE ONCOLOGY ROOM DAILY

## 2025-03-05 PROCEDURE — 85025 COMPLETE CBC W/AUTO DIFF WBC: CPT | Performed by: NURSE PRACTITIONER

## 2025-03-05 PROCEDURE — 2500000004 HC RX 250 GENERAL PHARMACY W/ HCPCS (ALT 636 FOR OP/ED): Mod: SE | Performed by: NURSE PRACTITIONER

## 2025-03-05 PROCEDURE — 2500000001 HC RX 250 WO HCPCS SELF ADMINISTERED DRUGS (ALT 637 FOR MEDICARE OP): Mod: SE | Performed by: PHYSICIAN ASSISTANT

## 2025-03-05 PROCEDURE — 2500000001 HC RX 250 WO HCPCS SELF ADMINISTERED DRUGS (ALT 637 FOR MEDICARE OP): Mod: SE | Performed by: NURSE PRACTITIONER

## 2025-03-05 PROCEDURE — 82248 BILIRUBIN DIRECT: CPT | Performed by: NURSE PRACTITIONER

## 2025-03-05 PROCEDURE — 99233 SBSQ HOSP IP/OBS HIGH 50: CPT | Performed by: INTERNAL MEDICINE

## 2025-03-05 PROCEDURE — 83735 ASSAY OF MAGNESIUM: CPT | Performed by: NURSE PRACTITIONER

## 2025-03-05 PROCEDURE — 2500000001 HC RX 250 WO HCPCS SELF ADMINISTERED DRUGS (ALT 637 FOR MEDICARE OP): Mod: SE | Performed by: INTERNAL MEDICINE

## 2025-03-05 PROCEDURE — 2500000001 HC RX 250 WO HCPCS SELF ADMINISTERED DRUGS (ALT 637 FOR MEDICARE OP): Mod: SE

## 2025-03-05 PROCEDURE — 85384 FIBRINOGEN ACTIVITY: CPT

## 2025-03-05 RX ORDER — DIPHENHYDRAMINE HCL 50 MG
50 CAPSULE ORAL DAILY PRN
Status: DISCONTINUED | OUTPATIENT
Start: 2025-03-05 | End: 2025-03-09

## 2025-03-05 RX ORDER — POTASSIUM CHLORIDE 29.8 MG/ML
40 INJECTION INTRAVENOUS ONCE
Status: COMPLETED | OUTPATIENT
Start: 2025-03-05 | End: 2025-03-05

## 2025-03-05 RX ADMIN — DULOXETINE HYDROCHLORIDE 60 MG: 60 CAPSULE, DELAYED RELEASE ORAL at 21:01

## 2025-03-05 RX ADMIN — LORAZEPAM 0.5 MG: 0.5 TABLET ORAL at 08:31

## 2025-03-05 RX ADMIN — ONDANSETRON 8 MG: 2 INJECTION INTRAMUSCULAR; INTRAVENOUS at 08:31

## 2025-03-05 RX ADMIN — AMLODIPINE BESYLATE 5 MG: 5 TABLET ORAL at 08:31

## 2025-03-05 RX ADMIN — DULOXETINE HYDROCHLORIDE 60 MG: 60 CAPSULE, DELAYED RELEASE ORAL at 08:31

## 2025-03-05 RX ADMIN — HYDROXYCHLOROQUINE SULFATE 200 MG: 200 TABLET, FILM COATED ORAL at 08:31

## 2025-03-05 RX ADMIN — ACYCLOVIR 400 MG: 400 TABLET ORAL at 08:31

## 2025-03-05 RX ADMIN — DIPHENHYDRAMINE HYDROCHLORIDE 50 MG: 50 CAPSULE ORAL at 11:29

## 2025-03-05 RX ADMIN — LEVOFLOXACIN 500 MG: 500 TABLET, FILM COATED ORAL at 10:44

## 2025-03-05 RX ADMIN — POTASSIUM CHLORIDE 40 MEQ: 29.8 INJECTION, SOLUTION INTRAVENOUS at 08:30

## 2025-03-05 RX ADMIN — ACETAMINOPHEN 650 MG: 325 TABLET ORAL at 11:29

## 2025-03-05 RX ADMIN — ATORVASTATIN CALCIUM 40 MG: 40 TABLET, FILM COATED ORAL at 08:31

## 2025-03-05 RX ADMIN — LORAZEPAM 0.5 MG: 0.5 TABLET ORAL at 21:01

## 2025-03-05 RX ADMIN — OXYCODONE 5 MG: 5 TABLET ORAL at 08:31

## 2025-03-05 RX ADMIN — QUETIAPINE FUMARATE 100 MG: 25 TABLET ORAL at 21:01

## 2025-03-05 RX ADMIN — PREDNISONE 10 MG: 10 TABLET ORAL at 08:31

## 2025-03-05 RX ADMIN — PANTOPRAZOLE SODIUM 40 MG: 40 TABLET, DELAYED RELEASE ORAL at 04:56

## 2025-03-05 RX ADMIN — ACYCLOVIR 400 MG: 400 TABLET ORAL at 21:01

## 2025-03-05 RX ADMIN — ONDANSETRON 8 MG: 2 INJECTION INTRAMUSCULAR; INTRAVENOUS at 21:01

## 2025-03-05 RX ADMIN — ONDANSETRON 8 MG: 2 INJECTION INTRAMUSCULAR; INTRAVENOUS at 15:20

## 2025-03-05 RX ADMIN — OXYCODONE 5 MG: 5 TABLET ORAL at 15:20

## 2025-03-05 RX ADMIN — FLUCONAZOLE 400 MG: 200 TABLET ORAL at 08:31

## 2025-03-05 RX ADMIN — FILGRASTIM-SNDZ 480 MCG: 480 INJECTION, SOLUTION INTRAVENOUS; SUBCUTANEOUS at 13:37

## 2025-03-05 ASSESSMENT — PAIN - FUNCTIONAL ASSESSMENT
PAIN_FUNCTIONAL_ASSESSMENT: 0-10

## 2025-03-05 ASSESSMENT — PAIN DESCRIPTION - LOCATION
LOCATION: GENERALIZED
LOCATION: GENERALIZED

## 2025-03-05 ASSESSMENT — PAIN SCALES - GENERAL
PAINLEVEL_OUTOF10: 6
PAINLEVEL_OUTOF10: 4
PAINLEVEL_OUTOF10: 6

## 2025-03-05 NOTE — PROGRESS NOTES
Art Therapy Note    Sagrario Garber     Therapy Session  Referral Type: New referral this admission  Visit Type: Follow-up visit  Session Start Time: 1340  Session End Time: 1348  Intervention Delivery: In-person  Conflict of Service: None  Number of family members present: 2  Family Present for Session: Parent/Guardian, Sibling(s)  Family Participation: Interactive              Treatment/Interventions  Areas of Focus: Self-expression, Anxiety reduction, Socialization, Coping, Relaxation  Art Therapy Interventions: Empathic listening/validating emotions (chose new supplies and materials)  Interruption: No  Patient Fell Asleep at End of Session: No    Post-assessment  Total Session Time (min): 8 minutes    Narrative  Assessment Detail: ATR saw Pt two times today.  One brioef encounter inthe morning where Pt saw ATr when rounding the floor and returned some materials.  Pt wanting to keep walking and asked for later session in the afternoon.  ATR follow up with orienting ATR in the afternoon where she was sitting in bed visiting with her mother and sister present in the room on the couch. They all welcomed the visit.  Plan: ATR to suggest Pt make her own set of beads with shaq. to expand and deepen her connection to self while decreasing anxiety and finding relaxation and peace.  Evaluation: ATR provided introductiosn and PT.s family seemed surprised ATR knew of them.  ATR brought Pt some new beads and charms to to choose form for new racelets and necklaces.  Pt independently chose more beading supplies and charms and was thrilled the options.  Pt and family expressed much appreciation for the visit and services as well.Pt open to continue dservice while in patient  Follow-up: ATR will continue to provide AT services per Pt request as a way to provide meaningful work.    Education Documentation  No documentation found.

## 2025-03-05 NOTE — CARE PLAN
The patient's goals for the shift include      The clinical goals for the shift include Patient will remain HDS through end of shift    Over the shift, the patient did make progress toward the following goals. Barriers to progression include nausea. Recommendations to address these barriers include ordered medications.

## 2025-03-05 NOTE — CARE PLAN
Problem: Pain - Adult  Goal: Verbalizes/displays adequate comfort level or baseline comfort level  Outcome: Progressing     Problem: Safety - Adult  Goal: Free from fall injury  Outcome: Progressing     Problem: Discharge Planning  Goal: Discharge to home or other facility with appropriate resources  Outcome: Progressing     Problem: Chronic Conditions and Co-morbidities  Goal: Patient's chronic conditions and co-morbidity symptoms are monitored and maintained or improved  Outcome: Progressing     Problem: Nutrition  Goal: Nutrient intake appropriate for maintaining nutritional needs  Outcome: Progressing     Problem: Fall/Injury  Goal: Not fall by end of shift  Outcome: Progressing  Goal: Be free from injury by end of the shift  Outcome: Progressing  Goal: Verbalize understanding of personal risk factors for fall in the hospital  Outcome: Progressing  Goal: Verbalize understanding of risk factor reduction measures to prevent injury from fall in the home  Outcome: Progressing  Goal: Use assistive devices by end of the shift  Outcome: Progressing  Goal: Pace activities to prevent fatigue by end of the shift  Outcome: Progressing     Problem: Pain  Goal: Takes deep breaths with improved pain control throughout the shift  Outcome: Progressing  Goal: Turns in bed with improved pain control throughout the shift  Outcome: Progressing  Goal: Walks with improved pain control throughout the shift  Outcome: Progressing  Goal: Performs ADL's with improved pain control throughout shift  Outcome: Progressing  Goal: Participates in PT with improved pain control throughout the shift  Outcome: Progressing  Goal: Free from opioid side effects throughout the shift  Outcome: Progressing  Goal: Free from acute confusion related to pain meds throughout the shift  Outcome: Progressing     Problem: Skin  Goal: Decreased wound size/increased tissue granulation at next dressing change  Outcome: Progressing  Goal: Participates in  plan/prevention/treatment measures  Outcome: Progressing  Goal: Prevent/manage excess moisture  Outcome: Progressing  Goal: Prevent/minimize sheer/friction injuries  Outcome: Progressing  Goal: Promote/optimize nutrition  Outcome: Progressing  Goal: Promote skin healing  Outcome: Progressing       The clinical goals for the shift include Patient will remain HDS and VSS throughout shift

## 2025-03-05 NOTE — PROGRESS NOTES
"Sagrario Garber is a 49 y.o. female on day 15 of admission presenting with Peripheral T-cell lymphoma (Multi).    Subjective    Patient states she is feeling well .  Still has a sore throat but it isnt preventing her from eating.  Otherwise she offers no new complaints.      Objective   Physical Exam  Constitutional:       General: She is not in acute distress.     Appearance: Normal appearance. She is normal weight. She is not ill-appearing.   HENT:      Head: Normocephalic.      Mouth/Throat:      Mouth: Mucous membranes are moist.      Pharynx: Oropharynx is clear.      Comments: Poor dentition  Eyes:      General: No scleral icterus.     Extraocular Movements: Extraocular movements intact.      Conjunctiva/sclera: Conjunctivae normal.   Cardiovascular:      Rate and Rhythm: Normal rate and regular rhythm.      Heart sounds: Normal heart sounds. No murmur heard.     No gallop.   Pulmonary:      Effort: Pulmonary effort is normal.      Breath sounds: Normal breath sounds.   Abdominal:      General: Abdomen is flat. Bowel sounds are normal.      Palpations: Abdomen is soft.   Musculoskeletal:         General: Normal range of motion.   Lymphadenopathy:      Comments: No right inguinal (Right inguinal adenopathy (round LN 4 x 4 cm)) adenopathy.    Skin:     General: Skin is warm.      Coloration: Skin is not jaundiced or pale.      Findings: No erythema or rash.   Neurological:      General: No focal deficit present.      Mental Status: She is alert and oriented to person, place, and time. Mental status is at baseline.   Psychiatric:         Mood and Affect: Mood normal.         Behavior: Behavior normal.           Last Recorded Vitals  Blood pressure 98/67, pulse 88, temperature 36.2 °C (97.2 °F), temperature source Temporal, resp. rate 16, height 1.685 m (5' 6.34\"), weight 73.6 kg (162 lb 4.1 oz), SpO2 98%.  Intake/Output last 3 Shifts:  I/O last 3 completed shifts:  In: 500 (6.8 mL/kg) [IV Piggyback:500]  Out: - " (0 mL/kg)   Weight: 73.6 kg     Relevant Results  Scheduled medications  acyclovir, 400 mg, oral, q12h  amLODIPine, 5 mg, oral, Daily  atorvastatin, 40 mg, oral, Daily  DULoxetine, 60 mg, oral, BID  filgrastim or biosimilar, 480 mcg, subcutaneous, q24h  fluconazole, 400 mg, oral, Daily  hydroxychloroquine, 200 mg, oral, Daily  levoFLOXacin, 500 mg, oral, q24h  ondansetron, 8 mg, intravenous, TID  pantoprazole, 40 mg, oral, Daily before breakfast  predniSONE, 10 mg, oral, Daily  QUEtiapine, 100 mg, oral, Nightly        This patient has a central line   Reason for the central line remaining today? Hemodynamic monitoring    Assessment/Plan   Assessment & Plan  Peripheral T-cell lymphoma (Multi)    Angioimmunoblastic T-cell lymphoma    Ms. 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 admitting for Auto Transplant prepped with BEAM (T0=2/24/25)     T+9  Auto (T0=2/24/25)      ONC: (per chart review; see onc history for complete treatment)    # Angioimmunoblastic T-Cell Lymphoma (CD 30+ AK Negative)   - Restaging PET s/p Cycle 5: excellent response (see PET dated 12/23/24)   - S/P BV-CHP (June 2024) x 6 cycles       CHEMO  Prep: BEAM   - Carmustine 300mg/m2 (T-6)   - Cytarabine 200mg/m2 (T-5, -4, -3, -2)   - Etoposide 200mg/m2 (T-5, -4, -3, -2)   --Due to RA high risk for reactions scheduled Benadryl 25mg with each dose   - Melphalan 140mg/m2 (T-1)   - Cells Collected 5.68 x 10^6 CD34 cells       Surveillance Monitoring   IgG: On admission : 445  Coag/Fibrinogen 2x/week (2/24): WNL   LDH/Hapto: Weekly (2/24): WNL       HEME:   # Pancytopenia secondary to chemotherapy, no evidence of bleeding    - No evidence of DIC or hypercoag state   - Transfuse if Hgb<7 and/or Plt<10 (hold DVT ppx when PLT<50k)   -PLT given on 3/5   # DVT prophy: Holding Lovenox 40mg for thrombocytopenia  ID:   Allergy: Amoxicillin    --No evidence of active  infection on admit   --Start ACV on T+0 and Fluconazole T+1     -- Levofloxacin for neutropenic prophylaxis   --Plan Cefepime for neutropenic fever due to PCN allergy    --Plan Bactrim 800/160mg qMWF start T+30     FEN/GI:   Admit Wt: 77.1 Kg,  current wt: 73.6 kg (3/3)  #PPI prophy w/protonix on admit   # Chemo induced nausea, improving  - Scheduled Zofran & Zyprexa  - PRN Compazine  # Diarrhea, improving  :: likely 2/2 chemo  - Stool path panel neg, repeat c. Diff panel (2/28)  - Imodium prn available  # Oral mucositis  - Ordered BMX prn     CARDIAC:   # History of HTN  --Continue home Norvasc 5mg/day and Atorvastatin 40mg/day @ HS    --ECHO (1/22/25): EF 60-65%  --Weekly EKG (2/24) due to QT prolonging meds: (2/19) , (2/24)   --PT consulted on admission      RHEUMATOID:   # History of Lupus and Rheumatoid Arthritis    --Currently on Placquenil, Prednisone 10mg/day,    --Follows with Dr Dobbs    --Previously on Saphnelo (interferon alpha antagonist until 7/2023)   - Cont PRN meds for pain, if worsening, consult Supp Onc     PSYCH:   # Anxiety/Depression    --Continue home Seroquil, Cymbalta, Ativan  -- Has followed with behavioral health at [facility] but not established  - Consulted inpatient Psych (2/27), EKG on 2/28     MISC:   # Nicotine Dependence    - Currently smoker; Nicotine patch ordered on admit   - Patient considering smoking cessation    - Social Situation, currently homeless, was living in SNF      DISPO:   - Full Code confirmed on admit    - NOK Mom: Stephanie Wilson [phone]   - Non-Tunneled CVC (placed 2/11) remove at discharge     - Mediport (not accessed)  - Primary Onc: Dr. Gunjan Varela   - Currently pt is homeless, mom & sister are both unable to take her in.  - Plan discharge to [facility]'s home (shelter), start application when ANC>500.     Pt seen, examined, and discussed w/ Dr. Heath Greene, APRN-CNP

## 2025-03-06 LAB
ALBUMIN SERPL BCP-MCNC: 3.2 G/DL (ref 3.4–5)
ALP SERPL-CCNC: 59 U/L (ref 33–110)
ALT SERPL W P-5'-P-CCNC: 9 U/L (ref 7–45)
ANION GAP SERPL CALC-SCNC: 10 MMOL/L (ref 10–20)
AST SERPL W P-5'-P-CCNC: 6 U/L (ref 9–39)
ATRIAL RATE: 105 BPM
BASOPHILS # BLD MANUAL: 0 X10*3/UL (ref 0–0.1)
BASOPHILS NFR BLD MANUAL: 0 %
BILIRUB DIRECT SERPL-MCNC: 0.1 MG/DL (ref 0–0.3)
BILIRUB SERPL-MCNC: 0.4 MG/DL (ref 0–1.2)
BUN SERPL-MCNC: 6 MG/DL (ref 6–23)
CALCIUM SERPL-MCNC: 8.3 MG/DL (ref 8.6–10.6)
CHLORIDE SERPL-SCNC: 99 MMOL/L (ref 98–107)
CO2 SERPL-SCNC: 31 MMOL/L (ref 21–32)
CREAT SERPL-MCNC: 0.6 MG/DL (ref 0.5–1.05)
EGFRCR SERPLBLD CKD-EPI 2021: >90 ML/MIN/1.73M*2
EOSINOPHIL # BLD MANUAL: 0 X10*3/UL (ref 0–0.7)
EOSINOPHIL NFR BLD MANUAL: 0 %
ERYTHROCYTE [DISTWIDTH] IN BLOOD BY AUTOMATED COUNT: 12.4 % (ref 11.5–14.5)
GLUCOSE SERPL-MCNC: 99 MG/DL (ref 74–99)
HCT VFR BLD AUTO: 22.7 % (ref 36–46)
HGB BLD-MCNC: 7.7 G/DL (ref 12–16)
IMM GRANULOCYTES # BLD AUTO: 0 X10*3/UL (ref 0–0.7)
IMM GRANULOCYTES NFR BLD AUTO: 0 % (ref 0–0.9)
LYMPHOCYTES # BLD MANUAL: 0.14 X10*3/UL (ref 1.2–4.8)
LYMPHOCYTES NFR BLD MANUAL: 70.6 %
MAGNESIUM SERPL-MCNC: 1.69 MG/DL (ref 1.6–2.4)
MCH RBC QN AUTO: 30.8 PG (ref 26–34)
MCHC RBC AUTO-ENTMCNC: 33.9 G/DL (ref 32–36)
MCV RBC AUTO: 91 FL (ref 80–100)
MONOCYTES # BLD MANUAL: 0.01 X10*3/UL (ref 0.1–1)
MONOCYTES NFR BLD MANUAL: 5.9 %
NEUTROPHILS # BLD MANUAL: 0.02 X10*3/UL (ref 1.2–7.7)
NEUTS BAND # BLD MANUAL: 0.02 X10*3/UL (ref 0–0.7)
NEUTS BAND NFR BLD MANUAL: 11.7 %
NEUTS SEG # BLD MANUAL: 0 X10*3/UL (ref 1.2–7)
NEUTS SEG NFR BLD MANUAL: 0 %
NRBC BLD-RTO: 0 /100 WBCS (ref 0–0)
P AXIS: 73 DEGREES
P OFFSET: 203 MS
P ONSET: 157 MS
PHOSPHATE SERPL-MCNC: 2.8 MG/DL (ref 2.5–4.9)
PLATELET # BLD AUTO: 22 X10*3/UL (ref 150–450)
POTASSIUM SERPL-SCNC: 3.5 MMOL/L (ref 3.5–5.3)
PR INTERVAL: 124 MS
PROT SERPL-MCNC: 5.3 G/DL (ref 6.4–8.2)
Q ONSET: 219 MS
QRS COUNT: 18 BEATS
QRS DURATION: 82 MS
QT INTERVAL: 322 MS
QTC CALCULATION(BAZETT): 425 MS
QTC FREDERICIA: 388 MS
R AXIS: 47 DEGREES
RBC # BLD AUTO: 2.5 X10*6/UL (ref 4–5.2)
RBC MORPH BLD: ABNORMAL
SODIUM SERPL-SCNC: 136 MMOL/L (ref 136–145)
T AXIS: 43 DEGREES
T OFFSET: 380 MS
TOTAL CELLS COUNTED BLD: 17
VARIANT LYMPHS # BLD MANUAL: 0.02 X10*3/UL (ref 0–0.5)
VARIANT LYMPHS NFR BLD: 11.8 %
VENTRICULAR RATE: 105 BPM
WBC # BLD AUTO: 0.2 X10*3/UL (ref 4.4–11.3)

## 2025-03-06 PROCEDURE — 2500000004 HC RX 250 GENERAL PHARMACY W/ HCPCS (ALT 636 FOR OP/ED): Mod: SE | Performed by: NURSE PRACTITIONER

## 2025-03-06 PROCEDURE — 99233 SBSQ HOSP IP/OBS HIGH 50: CPT | Performed by: INTERNAL MEDICINE

## 2025-03-06 PROCEDURE — 2500000002 HC RX 250 W HCPCS SELF ADMINISTERED DRUGS (ALT 637 FOR MEDICARE OP, ALT 636 FOR OP/ED): Mod: SE | Performed by: NURSE PRACTITIONER

## 2025-03-06 PROCEDURE — 85007 BL SMEAR W/DIFF WBC COUNT: CPT | Performed by: NURSE PRACTITIONER

## 2025-03-06 PROCEDURE — 1170000001 HC PRIVATE ONCOLOGY ROOM DAILY

## 2025-03-06 PROCEDURE — 2500000004 HC RX 250 GENERAL PHARMACY W/ HCPCS (ALT 636 FOR OP/ED): Mod: SE

## 2025-03-06 PROCEDURE — 84100 ASSAY OF PHOSPHORUS: CPT | Performed by: NURSE PRACTITIONER

## 2025-03-06 PROCEDURE — 2500000001 HC RX 250 WO HCPCS SELF ADMINISTERED DRUGS (ALT 637 FOR MEDICARE OP): Mod: SE | Performed by: NURSE PRACTITIONER

## 2025-03-06 PROCEDURE — 85027 COMPLETE CBC AUTOMATED: CPT | Performed by: NURSE PRACTITIONER

## 2025-03-06 PROCEDURE — 2500000005 HC RX 250 GENERAL PHARMACY W/O HCPCS: Mod: SE | Performed by: NURSE PRACTITIONER

## 2025-03-06 PROCEDURE — 2500000001 HC RX 250 WO HCPCS SELF ADMINISTERED DRUGS (ALT 637 FOR MEDICARE OP): Mod: SE | Performed by: INTERNAL MEDICINE

## 2025-03-06 PROCEDURE — 2500000004 HC RX 250 GENERAL PHARMACY W/ HCPCS (ALT 636 FOR OP/ED): Mod: JZ,SE | Performed by: INTERNAL MEDICINE

## 2025-03-06 PROCEDURE — 80076 HEPATIC FUNCTION PANEL: CPT | Performed by: NURSE PRACTITIONER

## 2025-03-06 PROCEDURE — 2500000001 HC RX 250 WO HCPCS SELF ADMINISTERED DRUGS (ALT 637 FOR MEDICARE OP): Mod: SE

## 2025-03-06 PROCEDURE — 83735 ASSAY OF MAGNESIUM: CPT | Performed by: NURSE PRACTITIONER

## 2025-03-06 RX ORDER — SODIUM CHLORIDE 9 MG/ML
75 INJECTION, SOLUTION INTRAVENOUS CONTINUOUS
Status: ACTIVE | OUTPATIENT
Start: 2025-03-06 | End: 2025-03-08

## 2025-03-06 RX ORDER — MAGNESIUM SULFATE HEPTAHYDRATE 40 MG/ML
2 INJECTION, SOLUTION INTRAVENOUS ONCE
Status: COMPLETED | OUTPATIENT
Start: 2025-03-06 | End: 2025-03-06

## 2025-03-06 RX ORDER — POTASSIUM CHLORIDE 29.8 MG/ML
40 INJECTION INTRAVENOUS ONCE
Status: COMPLETED | OUTPATIENT
Start: 2025-03-06 | End: 2025-03-06

## 2025-03-06 RX ADMIN — PANTOPRAZOLE SODIUM 40 MG: 40 TABLET, DELAYED RELEASE ORAL at 08:51

## 2025-03-06 RX ADMIN — LIDOCAINE HYDROCHLORIDE 10 ML: 20 SOLUTION ORAL; TOPICAL at 20:21

## 2025-03-06 RX ADMIN — SODIUM CHLORIDE 250 ML: 9 INJECTION, SOLUTION INTRAVENOUS at 23:51

## 2025-03-06 RX ADMIN — SODIUM CHLORIDE 75 ML/HR: 9 INJECTION, SOLUTION INTRAVENOUS at 08:47

## 2025-03-06 RX ADMIN — QUETIAPINE FUMARATE 100 MG: 25 TABLET ORAL at 20:19

## 2025-03-06 RX ADMIN — DULOXETINE HYDROCHLORIDE 60 MG: 60 CAPSULE, DELAYED RELEASE ORAL at 08:47

## 2025-03-06 RX ADMIN — AMLODIPINE BESYLATE 5 MG: 5 TABLET ORAL at 08:47

## 2025-03-06 RX ADMIN — SODIUM CHLORIDE 1000 ML: 9 INJECTION, SOLUTION INTRAVENOUS at 16:56

## 2025-03-06 RX ADMIN — LEVOFLOXACIN 500 MG: 500 TABLET, FILM COATED ORAL at 09:59

## 2025-03-06 RX ADMIN — ATORVASTATIN CALCIUM 40 MG: 40 TABLET, FILM COATED ORAL at 08:47

## 2025-03-06 RX ADMIN — MAGNESIUM SULFATE HEPTAHYDRATE 2 G: 40 INJECTION, SOLUTION INTRAVENOUS at 08:47

## 2025-03-06 RX ADMIN — FILGRASTIM-SNDZ 480 MCG: 480 INJECTION, SOLUTION INTRAVENOUS; SUBCUTANEOUS at 13:18

## 2025-03-06 RX ADMIN — ACYCLOVIR 400 MG: 400 TABLET ORAL at 20:19

## 2025-03-06 RX ADMIN — LORAZEPAM 0.5 MG: 0.5 TABLET ORAL at 20:19

## 2025-03-06 RX ADMIN — SODIUM CHLORIDE 75 ML/HR: 9 INJECTION, SOLUTION INTRAVENOUS at 23:52

## 2025-03-06 RX ADMIN — ACYCLOVIR 400 MG: 400 TABLET ORAL at 08:47

## 2025-03-06 RX ADMIN — OXYCODONE 5 MG: 5 TABLET ORAL at 20:19

## 2025-03-06 RX ADMIN — OXYCODONE 5 MG: 5 TABLET ORAL at 08:47

## 2025-03-06 RX ADMIN — HYDROXYCHLOROQUINE SULFATE 200 MG: 200 TABLET, FILM COATED ORAL at 08:47

## 2025-03-06 RX ADMIN — FLUCONAZOLE 400 MG: 200 TABLET ORAL at 08:47

## 2025-03-06 RX ADMIN — PREDNISONE 10 MG: 10 TABLET ORAL at 08:47

## 2025-03-06 RX ADMIN — LORAZEPAM 0.5 MG: 0.5 TABLET ORAL at 08:47

## 2025-03-06 RX ADMIN — POTASSIUM CHLORIDE 40 MEQ: 29.8 INJECTION, SOLUTION INTRAVENOUS at 14:00

## 2025-03-06 RX ADMIN — DULOXETINE HYDROCHLORIDE 60 MG: 60 CAPSULE, DELAYED RELEASE ORAL at 20:19

## 2025-03-06 RX ADMIN — LIDOCAINE HYDROCHLORIDE 10 ML: 20 SOLUTION ORAL; TOPICAL at 14:00

## 2025-03-06 ASSESSMENT — PAIN SCALES - GENERAL
PAINLEVEL_OUTOF10: 7
PAINLEVEL_OUTOF10: 0 - NO PAIN
PAINLEVEL_OUTOF10: 6
PAINLEVEL_OUTOF10: 4
PAINLEVEL_OUTOF10: 6

## 2025-03-06 ASSESSMENT — COGNITIVE AND FUNCTIONAL STATUS - GENERAL
MOBILITY SCORE: 24
DAILY ACTIVITIY SCORE: 24

## 2025-03-06 ASSESSMENT — PAIN - FUNCTIONAL ASSESSMENT
PAIN_FUNCTIONAL_ASSESSMENT: 0-10

## 2025-03-06 ASSESSMENT — PAIN SCALES - PAIN ASSESSMENT IN ADVANCED DEMENTIA (PAINAD): TOTALSCORE: MEDICATION (SEE MAR)

## 2025-03-06 NOTE — SIGNIFICANT EVENT
Rapid Response Nurse Note: ISRAEL score of 6    Pager time:   Arrival time: 1640  Event end time:   Location: [place] [address]    Rapid response initiated by:  [] Rapid response RN [] Family [] Nursing Supervisor [] Physician   [x] RADAR auto page [] Sepsis auto-page [] RN [] RT   [] NP/PA [] Other:     Primary reason for call:   [] BAT [] New CPAP/BiPAP [] Bleeding [] Change in mental status   [] Chest pain [] Code blue [] FiO2 >/= 50% [] HR </= 40 bpm   [] HR >/= 130 bpm [] Hyperglycemia [] Hypoglycemia [x] RADAR    [] RR </= 8 bpm [] RR >/= 30 bpm [] SBP </= 90 mmHg [] SpO2 < 90%   [] Seizure [] Sepsis [] Shortness of breath  [] Staff concern: see comments     Interventions:  [x] None [] ABG/VBG [] Assist w/ICU transfer [] BAT paged    [] Bag mask [] Blood [] Cardioversion [] Code Blue   [] Code blue for intubation [] Code status changed [] Chest x-ray [] EKG   [] IV fluid/bolus [] KUB x-ray [] Labs/cultures [] Medication   [] Nebulizer treatment [] NIPPV (CPAP/BiPAP) [] Oxygen [] Oral airway   [] Peripheral IV [] Palliative care consult [] CT/MRI [] Sepsis protocol    [] Suctioned [] Other:     Outcome:  [] Coded and  [] Code blue for intubation [] Coded and transferred to ICU []  on division   [x] Remained on division (no change) [] Remained on division + additional monitoring [] Remained in ED [] Transferred to ED   [] Transferred to ICU [] Transferred to inpatient status [] Transferred for interventions (procedure) [] Transferred to ICU stepdown    [] Transferred to surgery [] Transferred to telemetry [] Sepsis protocol [] STEMI protocol   [] Stroke protocol       Additional Comments:      Notified nurse of ISRAEL kong received: 36.7 98 16 80/53 93.    250 ml fluid bolus ordered for bp.  Patient sitting upright in bed; asymptomatic.  No assistance needed per nurse at this time.  Encouraged to call a rapid response as needed if patient status changes.

## 2025-03-06 NOTE — CARE PLAN
The patient's goals for the shift include      The clinical goals for the shift include Patient will remain HDS through end of shift    Over the shift, the patient did make progress toward the following goals. Barriers to progression include not enough fluid intake. Recommendations to address these barriers include encourage patient to drink more fluid.

## 2025-03-06 NOTE — PROGRESS NOTES
Art Therapy Note    Sagrario Garber     Therapy Session  Referral Type: New referral this admission  Visit Type: Follow-up visit  Session Start Time: 1413  Intervention Delivery: In-person  Conflict of Service: Asleep  Number of family members present: 0              Treatment/Interventions            Narrative  Assessment Detail: AT time of visit Pt was found all curled up in bed sleeping soundly and seeming very comfortably.  ATR will follow up with Pt another time to offer services.    Education Documentation  No documentation found.

## 2025-03-06 NOTE — PROGRESS NOTES
"Sagrario Garber is a 49 y.o. female on day 16 of admission presenting with Peripheral T-cell lymphoma (Multi).    Subjective     Pt feeling tired today, some throat pain with swallowing. Appetite lower, plan to start IVF. Soft BP, will get Orthos.    Objective   Physical Exam  Constitutional:       General: She is not in acute distress.     Appearance: Normal appearance. She is normal weight. She is not ill-appearing.   HENT:      Head: Normocephalic.      Mouth/Throat:      Mouth: Mucous membranes are moist.      Pharynx: Oropharynx is clear.      Comments: Poor dentition  Eyes:      General: No scleral icterus.     Extraocular Movements: Extraocular movements intact.      Conjunctiva/sclera: Conjunctivae normal.   Cardiovascular:      Rate and Rhythm: Normal rate and regular rhythm.      Heart sounds: Normal heart sounds. No murmur heard.     No gallop.   Pulmonary:      Effort: Pulmonary effort is normal.      Breath sounds: Normal breath sounds.   Abdominal:      General: Abdomen is flat. Bowel sounds are normal.      Palpations: Abdomen is soft.   Musculoskeletal:         General: Normal range of motion.   Lymphadenopathy:      Comments: No right inguinal (Right inguinal adenopathy (round LN 4 x 4 cm)) adenopathy.    Skin:     General: Skin is warm.      Coloration: Skin is not jaundiced or pale.      Findings: No erythema or rash.   Neurological:      General: No focal deficit present.      Mental Status: She is alert and oriented to person, place, and time. Mental status is at baseline.   Psychiatric:         Mood and Affect: Mood normal.         Behavior: Behavior normal.           Last Recorded Vitals  Blood pressure 93/62, pulse 96, temperature 36 °C (96.8 °F), temperature source Temporal, resp. rate 16, height 1.685 m (5' 6.34\"), weight 73.6 kg (162 lb 4.1 oz), SpO2 94%.  Intake/Output last 3 Shifts:  I/O last 3 completed shifts:  In: 2425 (32.9 mL/kg) [P.O.:1640; Blood:185; IV Piggyback:600]  Out: - (0 " mL/kg)   Weight: 73.6 kg     Relevant Results  Scheduled medications  acyclovir, 400 mg, oral, q12h  amLODIPine, 5 mg, oral, Daily  atorvastatin, 40 mg, oral, Daily  DULoxetine, 60 mg, oral, BID  filgrastim or biosimilar, 480 mcg, subcutaneous, q24h  fluconazole, 400 mg, oral, Daily  hydroxychloroquine, 200 mg, oral, Daily  levoFLOXacin, 500 mg, oral, q24h  lidocaine-diphenhydraMINE-Maalox 1:1:1, 10 mL, Swish & Swallow, TID  pantoprazole, 40 mg, oral, Daily before breakfast  predniSONE, 10 mg, oral, Daily  QUEtiapine, 100 mg, oral, Nightly        This patient has a central line   Reason for the central line remaining today? Hemodynamic monitoring    Assessment/Plan   Assessment & Plan  Peripheral T-cell lymphoma (Multi)    Angioimmunoblastic T-cell lymphoma    Ms. 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 admitting for Auto Transplant prepped with BEAM (T0=2/24/25)     T+10  Auto (T0=2/24/25)      ONC: (per chart review; see onc history for complete treatment)    # Angioimmunoblastic T-Cell Lymphoma (CD 30+ AK Negative)   - Restaging PET s/p Cycle 5: excellent response (see PET dated 12/23/24)   - S/P BV-CHP (June 2024) x 6 cycles       CHEMO  Prep: BEAM   - Carmustine 300mg/m2 (T-6)   - Cytarabine 200mg/m2 (T-5, -4, -3, -2)   - Etoposide 200mg/m2 (T-5, -4, -3, -2)   --Due to RA high risk for reactions scheduled Benadryl 25mg with each dose   - Melphalan 140mg/m2 (T-1)   - Cells Collected 5.68 x 10^6 CD34 cells       Surveillance Monitoring   IgG: On admission : 445  Coag/Fibrinogen 2x/week (2/24): WNL   LDH/Hapto: Weekly (2/24): WNL       HEME:   # Pancytopenia secondary to chemotherapy, no evidence of bleeding    - No evidence of DIC or hypercoag state   - Transfuse if Hgb<7 and/or Plt<10  # DVT prophy: Holding Lovenox 40mg for thrombocytopenia    ID:   Allergy: Amoxicillin    --No evidence of active infection on admit    --Start ACV on T+0 and Fluconazole T+1     -- Levofloxacin for neutropenic prophylaxis   --Plan Cefepime for neutropenic fever due to PCN allergy    --Plan Bactrim 800/160mg qMWF start T+30     FEN/GI:   Admit Wt: 77.1 Kg,  Current wt: 73.6 kg (3/3)  #PPI prophy w/protonix on admit   # Chemo induced nausea, improving  - Scheduled Zofran & Zyprexa  - PRN Compazine  # Diarrhea, improving  :: likely 2/2 chemo  - Stool path panel neg, repeat c. Diff panel (2/28)  - Imodium prn available  # Oral mucositis, throat pain  - BMX TID     CARDIAC:   # History of HTN  - Continue home Norvasc 5mg/day and Atorvastatin 40mg/day @ HS    - ECHO (1/22/25): EF 60-65%  - Weekly EKG (2/24) due to QT prolonging meds: (2/19) , (2/24)   - PT consulted on admission      RHEUMATOID:   # History of Lupus and Rheumatoid Arthritis    - Currently on Placquenil, Prednisone 10mg/day,    - Follows with Dr Dobbs    - Previously on Saphnelo (interferon alpha antagonist until 7/2023)   - Cont PRN meds for pain, if worsening, consult Supp Onc     PSYCH:   # Anxiety/Depression    - Continue home Seroquil, Cymbalta, Ativan  - Has followed with behavioral health at [facility] but not established  - Consulted inpatient Psych (2/27), EKG on 2/28     MISC:   # Nicotine Dependence    - Currently smoker; Nicotine patch ordered on admit   - Patient considering smoking cessation    - Social Situation, currently homeless, was living in SNF      DISPO:   - Full Code confirmed on admit    - NOK Mom: Stephanie Wilson [phone]   - Non-Tunneled CVC (placed 2/11) remove at discharge     - Mediport (not accessed)  - Primary Onc: Dr. Gunjan Varela   - Currently pt is homeless, prev at SNF, mom & sister are both unable to take her in.  - Plan discharge to [facility]'s home (shelter), start application when ANC>500.     Pt seen, examined, and discussed w/ Dr. Myers-Wilma Keenan, BREONNA-CNP

## 2025-03-07 LAB
ALBUMIN SERPL BCP-MCNC: 2.9 G/DL (ref 3.4–5)
ALP SERPL-CCNC: 57 U/L (ref 33–110)
ALT SERPL W P-5'-P-CCNC: 14 U/L (ref 7–45)
ANION GAP SERPL CALC-SCNC: 11 MMOL/L (ref 10–20)
APTT PPP: 27 SECONDS (ref 26–36)
AST SERPL W P-5'-P-CCNC: 11 U/L (ref 9–39)
BASOPHILS # BLD AUTO: 0 X10*3/UL (ref 0–0.1)
BASOPHILS NFR BLD AUTO: 0 %
BILIRUB DIRECT SERPL-MCNC: 0.1 MG/DL (ref 0–0.3)
BILIRUB SERPL-MCNC: 0.3 MG/DL (ref 0–1.2)
BUN SERPL-MCNC: 6 MG/DL (ref 6–23)
CALCIUM SERPL-MCNC: 8 MG/DL (ref 8.6–10.6)
CHLORIDE SERPL-SCNC: 104 MMOL/L (ref 98–107)
CO2 SERPL-SCNC: 27 MMOL/L (ref 21–32)
CREAT SERPL-MCNC: 0.54 MG/DL (ref 0.5–1.05)
EGFRCR SERPLBLD CKD-EPI 2021: >90 ML/MIN/1.73M*2
EOSINOPHIL # BLD AUTO: 0 X10*3/UL (ref 0–0.7)
EOSINOPHIL NFR BLD AUTO: 0 %
ERYTHROCYTE [DISTWIDTH] IN BLOOD BY AUTOMATED COUNT: 12.6 % (ref 11.5–14.5)
FIBRINOGEN PPP-MCNC: 502 MG/DL (ref 200–400)
GLUCOSE SERPL-MCNC: 84 MG/DL (ref 74–99)
HCT VFR BLD AUTO: 21.7 % (ref 36–46)
HGB BLD-MCNC: 7.3 G/DL (ref 12–16)
IMM GRANULOCYTES # BLD AUTO: 0.01 X10*3/UL (ref 0–0.7)
IMM GRANULOCYTES NFR BLD AUTO: 3.3 % (ref 0–0.9)
INR PPP: 1.1 (ref 0.9–1.1)
LDH SERPL L TO P-CCNC: 120 U/L (ref 84–246)
LYMPHOCYTES # BLD AUTO: 0.12 X10*3/UL (ref 1.2–4.8)
LYMPHOCYTES NFR BLD AUTO: 40 %
MAGNESIUM SERPL-MCNC: 1.72 MG/DL (ref 1.6–2.4)
MCH RBC QN AUTO: 31.6 PG (ref 26–34)
MCHC RBC AUTO-ENTMCNC: 33.6 G/DL (ref 32–36)
MCV RBC AUTO: 94 FL (ref 80–100)
MONOCYTES # BLD AUTO: 0.06 X10*3/UL (ref 0.1–1)
MONOCYTES NFR BLD AUTO: 20 %
NEUTROPHILS # BLD AUTO: 0.11 X10*3/UL (ref 1.2–7.7)
NEUTROPHILS NFR BLD AUTO: 36.7 %
NRBC BLD-RTO: 0 /100 WBCS (ref 0–0)
PHOSPHATE SERPL-MCNC: 4.1 MG/DL (ref 2.5–4.9)
PLATELET # BLD AUTO: 17 X10*3/UL (ref 150–450)
POTASSIUM SERPL-SCNC: 3.6 MMOL/L (ref 3.5–5.3)
PROT SERPL-MCNC: 4.8 G/DL (ref 6.4–8.2)
PROTHROMBIN TIME: 12.1 SECONDS (ref 9.8–12.4)
RBC # BLD AUTO: 2.31 X10*6/UL (ref 4–5.2)
SODIUM SERPL-SCNC: 138 MMOL/L (ref 136–145)
WBC # BLD AUTO: 0.3 X10*3/UL (ref 4.4–11.3)

## 2025-03-07 PROCEDURE — 1170000001 HC PRIVATE ONCOLOGY ROOM DAILY

## 2025-03-07 PROCEDURE — 2500000005 HC RX 250 GENERAL PHARMACY W/O HCPCS: Mod: SE | Performed by: NURSE PRACTITIONER

## 2025-03-07 PROCEDURE — 83615 LACTATE (LD) (LDH) ENZYME: CPT

## 2025-03-07 PROCEDURE — 2500000004 HC RX 250 GENERAL PHARMACY W/ HCPCS (ALT 636 FOR OP/ED): Mod: JZ,SE | Performed by: INTERNAL MEDICINE

## 2025-03-07 PROCEDURE — 84100 ASSAY OF PHOSPHORUS: CPT | Performed by: NURSE PRACTITIONER

## 2025-03-07 PROCEDURE — 83735 ASSAY OF MAGNESIUM: CPT | Performed by: NURSE PRACTITIONER

## 2025-03-07 PROCEDURE — 2500000004 HC RX 250 GENERAL PHARMACY W/ HCPCS (ALT 636 FOR OP/ED): Mod: SE

## 2025-03-07 PROCEDURE — 85025 COMPLETE CBC W/AUTO DIFF WBC: CPT | Performed by: NURSE PRACTITIONER

## 2025-03-07 PROCEDURE — 99233 SBSQ HOSP IP/OBS HIGH 50: CPT | Performed by: INTERNAL MEDICINE

## 2025-03-07 PROCEDURE — 2500000004 HC RX 250 GENERAL PHARMACY W/ HCPCS (ALT 636 FOR OP/ED): Mod: SE | Performed by: NURSE PRACTITIONER

## 2025-03-07 PROCEDURE — 2500000002 HC RX 250 W HCPCS SELF ADMINISTERED DRUGS (ALT 637 FOR MEDICARE OP, ALT 636 FOR OP/ED): Mod: SE | Performed by: NURSE PRACTITIONER

## 2025-03-07 PROCEDURE — 82248 BILIRUBIN DIRECT: CPT | Performed by: NURSE PRACTITIONER

## 2025-03-07 PROCEDURE — 2500000001 HC RX 250 WO HCPCS SELF ADMINISTERED DRUGS (ALT 637 FOR MEDICARE OP): Mod: SE | Performed by: NURSE PRACTITIONER

## 2025-03-07 PROCEDURE — 2500000001 HC RX 250 WO HCPCS SELF ADMINISTERED DRUGS (ALT 637 FOR MEDICARE OP): Mod: SE | Performed by: INTERNAL MEDICINE

## 2025-03-07 PROCEDURE — 2500000001 HC RX 250 WO HCPCS SELF ADMINISTERED DRUGS (ALT 637 FOR MEDICARE OP): Mod: SE

## 2025-03-07 PROCEDURE — 85384 FIBRINOGEN ACTIVITY: CPT

## 2025-03-07 PROCEDURE — 85610 PROTHROMBIN TIME: CPT

## 2025-03-07 RX ADMIN — QUETIAPINE FUMARATE 100 MG: 25 TABLET ORAL at 20:19

## 2025-03-07 RX ADMIN — PANTOPRAZOLE SODIUM 40 MG: 40 TABLET, DELAYED RELEASE ORAL at 06:44

## 2025-03-07 RX ADMIN — DULOXETINE HYDROCHLORIDE 60 MG: 60 CAPSULE, DELAYED RELEASE ORAL at 08:58

## 2025-03-07 RX ADMIN — PROCHLORPERAZINE EDISYLATE 10 MG: 5 INJECTION INTRAMUSCULAR; INTRAVENOUS at 09:03

## 2025-03-07 RX ADMIN — PREDNISONE 10 MG: 10 TABLET ORAL at 08:58

## 2025-03-07 RX ADMIN — OXYCODONE 5 MG: 5 TABLET ORAL at 07:34

## 2025-03-07 RX ADMIN — LEVOFLOXACIN 500 MG: 500 TABLET, FILM COATED ORAL at 08:59

## 2025-03-07 RX ADMIN — LORAZEPAM 0.5 MG: 0.5 TABLET ORAL at 07:34

## 2025-03-07 RX ADMIN — LIDOCAINE HYDROCHLORIDE 10 ML: 20 SOLUTION ORAL; TOPICAL at 08:58

## 2025-03-07 RX ADMIN — SODIUM CHLORIDE 75 ML/HR: 9 INJECTION, SOLUTION INTRAVENOUS at 20:20

## 2025-03-07 RX ADMIN — FILGRASTIM-SNDZ 480 MCG: 480 INJECTION, SOLUTION INTRAVENOUS; SUBCUTANEOUS at 13:46

## 2025-03-07 RX ADMIN — ACYCLOVIR 400 MG: 400 TABLET ORAL at 20:19

## 2025-03-07 RX ADMIN — ATORVASTATIN CALCIUM 40 MG: 40 TABLET, FILM COATED ORAL at 08:58

## 2025-03-07 RX ADMIN — OXYCODONE 5 MG: 5 TABLET ORAL at 16:32

## 2025-03-07 RX ADMIN — ACYCLOVIR 400 MG: 400 TABLET ORAL at 08:58

## 2025-03-07 RX ADMIN — SODIUM CHLORIDE 250 ML: 9 INJECTION, SOLUTION INTRAVENOUS at 01:22

## 2025-03-07 RX ADMIN — AMLODIPINE BESYLATE 5 MG: 5 TABLET ORAL at 08:58

## 2025-03-07 RX ADMIN — FLUCONAZOLE 400 MG: 200 TABLET ORAL at 08:58

## 2025-03-07 RX ADMIN — DULOXETINE HYDROCHLORIDE 60 MG: 60 CAPSULE, DELAYED RELEASE ORAL at 20:19

## 2025-03-07 RX ADMIN — LORAZEPAM 0.5 MG: 0.5 TABLET ORAL at 20:19

## 2025-03-07 RX ADMIN — HYDROXYCHLOROQUINE SULFATE 200 MG: 200 TABLET, FILM COATED ORAL at 08:58

## 2025-03-07 RX ADMIN — SODIUM CHLORIDE 75 ML/HR: 9 INJECTION, SOLUTION INTRAVENOUS at 06:46

## 2025-03-07 ASSESSMENT — COGNITIVE AND FUNCTIONAL STATUS - GENERAL
DAILY ACTIVITIY SCORE: 24
MOBILITY SCORE: 24

## 2025-03-07 ASSESSMENT — PAIN DESCRIPTION - LOCATION
LOCATION: GENERALIZED
LOCATION: GENERALIZED

## 2025-03-07 ASSESSMENT — PAIN SCALES - GENERAL
PAINLEVEL_OUTOF10: 0 - NO PAIN
PAINLEVEL_OUTOF10: 0 - NO PAIN
PAINLEVEL_OUTOF10: 6
PAINLEVEL_OUTOF10: 0 - NO PAIN
PAINLEVEL_OUTOF10: 6

## 2025-03-07 NOTE — SIGNIFICANT EVENT
Rapid Response Nurse Note: RADAR alert: 6    Pager time:   Arrival time:   Event end time:   Location: [facility]  [] Triage by phone or secure messaging    Rapid response initiated by:  [] Rapid response RN [] Family [] Nursing Supervisor [] Physician   [x] RADAR auto page [] Sepsis auto-page [] RN [] RT   [] NP/PA [] Other:     Primary reason for call:   [] BAT [] New CPAP/BiPAP [] Bleeding [] Change in mental status   [] Chest pain [] Code blue [] FiO2 >/= 50% [] HR </= 40 bpm   [] HR >/= 130 bpm [] Hyperglycemia [] Hypoglycemia [x] RADAR    [] RR </= 8 bpm [] RR >/= 30 bpm [] SBP </= 90 mmHg [] SpO2 < 90%   [] Seizure [] Sepsis [] Shortness of breath  [] Staff concern: see comments     Initial VS and/or RADAR VS: T 36.3 °C; HR 95; RR 16; BP 89/54; SPO2 93%.    Providers present at bedside (if applicable):     Name of ICU Provider contacted (if applicable):     Interventions:  [x] None [] ABG/VBG [] Assist w/ICU transfer [] BAT paged    [] Bag mask [] Blood [] Cardioversion [] Code Blue   [] Code blue for intubation [] Code status changed [] Chest x-ray [] EKG   [] IV fluid/bolus [] KUB x-ray [] Labs/cultures [] Medication   [] Nebulizer treatment [] NIPPV (CPAP/BiPAP) [] Oxygen [] Oral airway   [] Peripheral IV [] Palliative care consult [] CT/MRI [] Sepsis protocol    [] Suctioned [] Other:     Outcome:  [] Coded and  [] Code blue for intubation [] Coded and transferred to ICU []  on division   [x] Remained on division (no change) [] Remained on division + additional monitoring [] Remained in ED [] Transferred to ED   [] Transferred to ICU [] Transferred to inpatient status [] Transferred for interventions (procedure) [] Transferred to ICU stepdown    [] Transferred to surgery [] Transferred to telemetry [] Sepsis protocol [] STEMI protocol   [] Stroke protocol [x] Bedside nurse instructed to page rapid response for any concerns or acute change in condition/VS     Additional Comments:  Reviewed above RADAR VS with bedside RN.  VS within patient's current trends.  Patient denied pain, shortness of breath, dizziness or lightheadedness. Team ordered 250CC bolus os normal saline over 1 hour.  No interventions by rapid response team indicated at this time.  Staff to page rapid response for any concerns or acute change in condition/VS.

## 2025-03-07 NOTE — PROGRESS NOTES
"Sagrario Garber is a 49 y.o. female on day 17 of admission presenting with Peripheral T-cell lymphoma (Multi).    Subjective   Afebrile. No acute issues overnight. Mother and sister at bedside. C/o fatigue, decreased appetite and nausea. Denies vomiting or abd pain. ROS otherwise unremarkable.    Objective   Physical Exam  Constitutional:       General: She is not in acute distress.     Appearance: Normal appearance. She is normal weight. She is not ill-appearing.   HENT:      Head: Normocephalic.      Mouth/Throat:      Mouth: Mucous membranes are moist.      Pharynx: Oropharynx is clear.      Comments: Poor dentition  Eyes:      General: No scleral icterus.     Extraocular Movements: Extraocular movements intact.      Conjunctiva/sclera: Conjunctivae normal.   Cardiovascular:      Rate and Rhythm: Normal rate and regular rhythm.      Heart sounds: Normal heart sounds. No murmur heard.     No gallop.   Pulmonary:      Effort: Pulmonary effort is normal.      Breath sounds: Normal breath sounds.   Abdominal:      General: Abdomen is flat. Bowel sounds are normal.      Palpations: Abdomen is soft.   Musculoskeletal:         General: Normal range of motion.   Lymphadenopathy:      Comments: No right inguinal (Right inguinal adenopathy (round LN 4 x 4 cm)) adenopathy.    Skin:     General: Skin is warm.      Coloration: Skin is not jaundiced or pale.      Findings: No erythema or rash.   Neurological:      General: No focal deficit present.      Mental Status: She is alert and oriented to person, place, and time. Mental status is at baseline.   Psychiatric:         Mood and Affect: Mood normal.         Behavior: Behavior normal.           Last Recorded Vitals  Blood pressure 91/53, pulse 90, temperature 37.4 °C (99.3 °F), temperature source Temporal, resp. rate 18, height 1.685 m (5' 6.34\"), weight 73.6 kg (162 lb 4.1 oz), SpO2 95%.  Intake/Output last 3 Shifts:  I/O last 3 completed shifts:  In: 2978.5 (40.5 mL/kg) " [P.O.:240; I.V.:1721.8 (23.4 mL/kg); IV Piggyback:1016.7]  Out: - (0 mL/kg)   Weight: 73.6 kg     Relevant Results  Scheduled medications  acyclovir, 400 mg, oral, q12h  amLODIPine, 5 mg, oral, Daily  atorvastatin, 40 mg, oral, Daily  DULoxetine, 60 mg, oral, BID  filgrastim or biosimilar, 480 mcg, subcutaneous, q24h  fluconazole, 400 mg, oral, Daily  hydroxychloroquine, 200 mg, oral, Daily  levoFLOXacin, 500 mg, oral, q24h  lidocaine-diphenhydraMINE-Maalox 1:1:1, 10 mL, Swish & Swallow, TID  pantoprazole, 40 mg, oral, Daily before breakfast  predniSONE, 10 mg, oral, Daily  QUEtiapine, 100 mg, oral, Nightly        This patient has a central line   Reason for the central line remaining today? Hemodynamic monitoring    Assessment/Plan   Assessment & Plan  Peripheral T-cell lymphoma (Multi)    Angioimmunoblastic T-cell lymphoma    Ms. 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 admitting for Auto Transplant prepped with BEAM (T0=2/24/25)     T+11  Auto (T0=2/24/25)      ONC: (per chart review; see onc history for complete treatment)    # Angioimmunoblastic T-Cell Lymphoma (CD 30+ AK Negative)   - Restaging PET s/p Cycle 5: excellent response (see PET dated 12/23/24)   - S/P BV-CHP (June 2024) x 6 cycles       CHEMO  Prep: BEAM   - Carmustine 300mg/m2 (T-6)   - Cytarabine 200mg/m2 (T-5, -4, -3, -2)   - Etoposide 200mg/m2 (T-5, -4, -3, -2)   --Due to RA high risk for reactions scheduled Benadryl 25mg with each dose   - Melphalan 140mg/m2 (T-1)   - Cells Collected 5.68 x 10^6 CD34 cells       Surveillance Monitoring   IgG: On admission : 445  Coag/Fibrinogen 2x/week (2/24): WNL   LDH/Hapto: Weekly (2/24): WNL       HEME:   # Pancytopenia secondary to chemotherapy, no evidence of bleeding    - No evidence of DIC or hypercoag state   - Transfuse if Hgb<7 and/or Plt<10  # DVT prophy: Holding Lovenox 40mg for thrombocytopenia    ID:    Allergy: Amoxicillin    --No evidence of active infection on admit   --Start ACV on T+0 and Fluconazole T+1     -- Levofloxacin for neutropenic prophylaxis   --Plan Cefepime for neutropenic fever due to PCN allergy    --Plan Bactrim 800/160mg qMWF start T+30     FEN/GI:   Admit Wt: 77.1 Kg,  Current wt: 73.6 kg (3/3)  #PPI prophy w/protonix on admit   # Chemo induced nausea, improving  - Scheduled Zofran & Zyprexa  - PRN Compazine  # Diarrhea, improving  :: likely 2/2 chemo  - Stool path panel neg, repeat c. Diff panel (2/28)  - Imodium prn available  # Oral mucositis, throat pain  - BMX TID     CARDIAC:   # History of HTN  - Continue home Norvasc 5mg/day and Atorvastatin 40mg/day @ HS    - ECHO (1/22/25): EF 60-65%  - Weekly EKG (2/24) due to QT prolonging meds: (2/19) , (2/24)   - PT consulted on admission      RHEUMATOID:   # History of Lupus and Rheumatoid Arthritis    - Currently on Placquenil, Prednisone 10mg/day,    - Follows with Dr Dobbs    - Previously on Saphnelo (interferon alpha antagonist until 7/2023)   - Cont PRN meds for pain, if worsening, consult Supp Onc     PSYCH:   # Anxiety/Depression    - Continue home Seroquil, Cymbalta, Ativan  - Has followed with behavioral health at [facility] but not established  - Consulted inpatient Psych (2/27), EKG on 2/28     MISC:   # Nicotine Dependence    - Currently smoker; Nicotine patch ordered on admit   - Patient considering smoking cessation    - Social Situation, currently homeless, was living in SNF      DISPO:   - Full Code confirmed on admit    - NOK Mom: Stephanie Wilson [phone]   - Non-Tunneled CVC (placed 2/11) remove at discharge     - Mediport (not accessed)  - Primary Onc: Dr. Gunjan Varela   - Currently pt is homeless, prev at SNF, mom & sister are both unable to take her in.  - Plan discharge to [facility] home (shelter), start application when ANC>500.     Pt seen, examined, and discussed w/ Dr. Heath MENDOZA  GRZEGORZ Lawrence

## 2025-03-07 NOTE — PROGRESS NOTES
"PSYCHIATRY CONSULT-LIAISON PROGRESS NOTE    SUBJECTIVE    Patient was seen at bedside. Patient reports that she is feeling okay but that she is tired and has a bit of a sore throat. She states her anxiety has been stable and that her medications still work well for her.     Patient denies suicidal or homicidal ideations, denies AVH. Patient does not have any concerns about her care at this time and feels that her psychiatric medications are optimized. Consistently using lorazepam 0.5mg BID from PRN schedule    Patient was given outpatient resources for medication management and mental health services. She understood she needs to follow with a provider in order to get her psychiatric medications. Patient does not feel like she needs to see psychiatry in the hospital anymore and is open to letting her primary team know if she has any worsening anxiety or mood symptoms.      OBJECTIVE    VITALS      3/6/2025     4:31 PM 3/6/2025     4:32 PM 3/6/2025     7:00 PM 3/6/2025    11:37 PM 3/7/2025     1:13 AM 3/7/2025     4:00 AM 3/7/2025     8:21 AM   Vitals   Systolic 90 80 97 89 90 101 102   Diastolic 59 53 63 54 53 66 66   BP Location Left arm Left arm Left arm Left arm  Left arm Left arm   Heart Rate 90 98 90 95 96 93 88   Temp   37.2 °C (99 °F) 36.3 °C (97.3 °F)  36.8 °C (98.2 °F) 36.6 °C (97.9 °F)   Resp 16 16 16 16  16 16        MENTAL STATUS EXAM    General/Appearance: NAD, appears older than stated age, in hospital gown, body habitus: average, grooming/hygiene is good, combed hair, IV in place  Attitude/Behavior: cooperative, appropriate eye contact  Motor Activity: no psychomotor agitation/retardation, gait: not assessed  Mood: \"ok\"  Affect: Quality-mood congruent, Intensity-normal, Range-full  Speech: normal rate/tone/volume/prosody/syntax  Thought Process: linear, organized  Thought Content: denies SI/HI, Delusional thinking: none elicited  Thought Perception: denies AVH  Cognition: alert & oriented x 4, no " deficits in attention/concentration noted, good fund of knowledge, recent and remote memory intact  Insight: fair  Judgment: fair      CURRENT MEDICATIONS  Scheduled medications  acyclovir, 400 mg, oral, q12h  amLODIPine, 5 mg, oral, Daily  atorvastatin, 40 mg, oral, Daily  DULoxetine, 60 mg, oral, BID  filgrastim or biosimilar, 480 mcg, subcutaneous, q24h  fluconazole, 400 mg, oral, Daily  hydroxychloroquine, 200 mg, oral, Daily  levoFLOXacin, 500 mg, oral, q24h  lidocaine-diphenhydraMINE-Maalox 1:1:1, 10 mL, Swish & Swallow, TID  pantoprazole, 40 mg, oral, Daily before breakfast  predniSONE, 10 mg, oral, Daily  QUEtiapine, 100 mg, oral, Nightly        Continuous medications  sodium chloride 0.9%, 75 mL/hr, Last Rate: 75 mL/hr (03/07/25 0646)        PRN medications  PRN medications: acetaminophen, albuterol, alteplase, alum-mag hydroxide-simeth, dextrose, diphenhydrAMINE, EPINEPHrine HCl, guaiFENesin, HYDROmorphone, loperamide, LORazepam, methylPREDNISolone sodium succinate (PF), oxyCODONE, prochlorperazine, sodium chloride     LABS  Results for orders placed or performed during the hospital encounter of 02/18/25 (from the past 24 hours)   Magnesium   Result Value Ref Range    Magnesium 1.72 1.60 - 2.40 mg/dL   CBC and Auto Differential   Result Value Ref Range    WBC 0.3 (LL) 4.4 - 11.3 x10*3/uL    nRBC 0.0 0.0 - 0.0 /100 WBCs    RBC 2.31 (L) 4.00 - 5.20 x10*6/uL    Hemoglobin 7.3 (L) 12.0 - 16.0 g/dL    Hematocrit 21.7 (L) 36.0 - 46.0 %    MCV 94 80 - 100 fL    MCH 31.6 26.0 - 34.0 pg    MCHC 33.6 32.0 - 36.0 g/dL    RDW 12.6 11.5 - 14.5 %    Platelets 17 (LL) 150 - 450 x10*3/uL    Neutrophils % 36.7 40.0 - 80.0 %    Immature Granulocytes %, Automated 3.3 (H) 0.0 - 0.9 %    Lymphocytes % 40.0 13.0 - 44.0 %    Monocytes % 20.0 2.0 - 10.0 %    Eosinophils % 0.0 0.0 - 6.0 %    Basophils % 0.0 0.0 - 2.0 %    Neutrophils Absolute 0.11 (L) 1.20 - 7.70 x10*3/uL    Immature Granulocytes Absolute, Automated 0.01 0.00 -  0.70 x10*3/uL    Lymphocytes Absolute 0.12 (L) 1.20 - 4.80 x10*3/uL    Monocytes Absolute 0.06 (L) 0.10 - 1.00 x10*3/uL    Eosinophils Absolute 0.00 0.00 - 0.70 x10*3/uL    Basophils Absolute 0.00 0.00 - 0.10 x10*3/uL   Hepatic Function Panel   Result Value Ref Range    Albumin 2.9 (L) 3.4 - 5.0 g/dL    Bilirubin, Total 0.3 0.0 - 1.2 mg/dL    Bilirubin, Direct 0.1 0.0 - 0.3 mg/dL    Alkaline Phosphatase 57 33 - 110 U/L    ALT 14 7 - 45 U/L    AST 11 9 - 39 U/L    Total Protein 4.8 (L) 6.4 - 8.2 g/dL   Coagulation Screen   Result Value Ref Range    Protime 12.1 9.8 - 12.4 seconds    INR 1.1 0.9 - 1.1    aPTT 27 26 - 36 seconds   Fibrinogen   Result Value Ref Range    Fibrinogen 502 (H) 200 - 400 mg/dL   Lactate Dehydrogenase   Result Value Ref Range     84 - 246 U/L   Phosphorus   Result Value Ref Range    Phosphorus 4.1 2.5 - 4.9 mg/dL   Basic Metabolic Panel   Result Value Ref Range    Glucose 84 74 - 99 mg/dL    Sodium 138 136 - 145 mmol/L    Potassium 3.6 3.5 - 5.3 mmol/L    Chloride 104 98 - 107 mmol/L    Bicarbonate 27 21 - 32 mmol/L    Anion Gap 11 10 - 20 mmol/L    Urea Nitrogen 6 6 - 23 mg/dL    Creatinine 0.54 0.50 - 1.05 mg/dL    eGFR >90 >60 mL/min/1.73m*2    Calcium 8.0 (L) 8.6 - 10.6 mg/dL        IMAGING  No results found.     PSYCHIATRIC RISK ASSESSMENT  Acute Risk of Harm to Others is Considered: Low  Acute Risk of Harm to Self is Considered: Low      ASSESSMENT & PLAN  Sagrario Garber is a 49 y.o. 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 Psychiatry was consulted on 2/27 for increased anxiety related to her transplant and medical management.     On initial assessment, patient was calm and cooperative with the interviewer. She was open to sharing her history and her  current situation. Her responses to questions were concrete, and her behaviors and mannerisms align with possibly falling in the range of ASD. Her failure to complete high school and issues with securing work may indicate an ID. Her anxiety and depression symptoms appear stable, and she is finding ways to cope with her recent cancer diagnosis through her art. However, she is finding ways to cope by pushing the thoughts aside rather than addressing them. When identifying her coping and strength during this time of stress, she became very tearful. She states that her current medication regimen is working well for her and she would like to stay on it and not make changes at this time. She feels overwhelmed with how to handle her housing situation, as she says she has never been in a situation like this before. She would like to continue speak with social work to help her with this. Per [facility], patient never established continued psychiatric care after her last admission. She will need to establish care with a new outpatient provider to continue receiving her psychiatric medications.    2/28: Since arriving in the hospital patient has been taking Seroquel 100 mg, this is an increase from her home 50 mg. Patient says this increase dose is improving her overall sleep and anxiety levels. Would continue at this currently increased dosing as patient faces a variety of anxiety provoking stressors.     3/3: Patient remains stable on her current psychiatric medication regimen, continue as prescribed. Will continue to follow, though not daily.    UPDATE 3/7: Patient remains stable on her current psychiatric medication regimen, continue as prescribed. Provided team and patient with information for the  [place] [facility] for outpatient psychiatry follow-up. Will sign-off at this time.     IMPRESSION  #other specified anxiety, likely JUAN MANUEL exacerbated by adjustment disorder  #other spec Depression  #Tobacco use Disorder    "  RECOMMENDATIONS     Safety:  - Patient does not currently meet criteria for inpatient psychiatric admission.   - To evaluate decision-making capacity, recommend use of the Capacity Evaluation Tool. Search “[facility] Capacity Evaluation under SmartText\"   - Patient does not require a 1:1 sitter from a psychiatric perspective at this time.  - Defer to primary team decision for 1:1 sitter.  - As with all hospitalized patients, would recommend delirium precautions, as below.    Work-Up  -- most recent EKG 2/27 QTC 407ms HR 77    Medications:  -- Continue Seroquel 100 mg QHS  -- Continue Lorazepam 0.5 mg PO BID PRN anxiety  -- Continue Cymbalta 60 mg PO BID     Ancillary Services:  - Recommend continued art therapy consult/materials     Follow-up:  - Provided patient with a list of outpatient [facility] health resources.     - Discussed recommendations with primary team.  - Psychiatry will sign-off.     Thank you for consulting the  Psychiatry team to provide care for this patient. Please page [phone] with any questions or concerns.       Medication Consent  Medication Consent: n/a; consult service     DELIRIUM GUIDELINES  Non-Pharmacologic:  - Assess visual and hearing impairments and provide aids and communication boards.  - Assess immobility and advocate for early evaluation and intervention by physical therapy, out of bed when medically indicated, and expeditious removal of tethers.  - Promote physiologic sleep and maintenance of sleep/wake cycle by ensuring blinds are open during the day, maintaining dark/quiet room at night with minimal interruptions, and minimizing daytime naps.  - Minimize room and staff changes.  - Engage the patient in cognitively stimulating activities and provide frequent reorientation.   - Minimize use of restraints to situations where necessary to keep patient and staff safe and to prevent from removing lines, tubes, medical devices, dressings, etc.      Pharmacologic:  - Minimize use of " deliriogenic medications such as benzodiazepines, anticholinergic medications, and opiates (while ensuring adequate treatment of pain).  - Assess and treat disruption in bowel and bladder function.   - Assess and treat abnormalities in nutrition and hydration status.   Note authored by Lolis Parra MS3 with revisions by Lisa Servin PGY-II, staffed with [facility] attending Dr. Barakat.

## 2025-03-07 NOTE — SIGNIFICANT EVENT
Resident re-visited pt, to discuss benzodiazepine risk profile and desire to have established follow-up. Resident discussed risk of benzodiazepine withdrawal and habituation/dependency risk of medication. Patient and provider called [facility] clinic to establish care, message left on VM line with patient call-back number. Patient expressed understanding of importance in establishing follow-up care. Resident provided information again to patient and re-iterated that team would follow-up with patient to ensure she is established.

## 2025-03-08 LAB
ABO GROUP (TYPE) IN BLOOD: NORMAL
ALBUMIN SERPL BCP-MCNC: 3.1 G/DL (ref 3.4–5)
ALP SERPL-CCNC: 70 U/L (ref 33–110)
ALT SERPL W P-5'-P-CCNC: 16 U/L (ref 7–45)
ANION GAP SERPL CALC-SCNC: 13 MMOL/L (ref 10–20)
ANTIBODY SCREEN: NORMAL
AST SERPL W P-5'-P-CCNC: 10 U/L (ref 9–39)
BASOPHILS # BLD MANUAL: 0 X10*3/UL (ref 0–0.1)
BASOPHILS NFR BLD MANUAL: 0 %
BILIRUB DIRECT SERPL-MCNC: 0.1 MG/DL (ref 0–0.3)
BILIRUB SERPL-MCNC: 0.3 MG/DL (ref 0–1.2)
BUN SERPL-MCNC: 4 MG/DL (ref 6–23)
CALCIUM SERPL-MCNC: 8.3 MG/DL (ref 8.6–10.6)
CHLORIDE SERPL-SCNC: 104 MMOL/L (ref 98–107)
CO2 SERPL-SCNC: 27 MMOL/L (ref 21–32)
CREAT SERPL-MCNC: 0.53 MG/DL (ref 0.5–1.05)
EGFRCR SERPLBLD CKD-EPI 2021: >90 ML/MIN/1.73M*2
EOSINOPHIL # BLD MANUAL: 0 X10*3/UL (ref 0–0.7)
EOSINOPHIL NFR BLD MANUAL: 0 %
ERYTHROCYTE [DISTWIDTH] IN BLOOD BY AUTOMATED COUNT: 12.9 % (ref 11.5–14.5)
GLUCOSE SERPL-MCNC: 85 MG/DL (ref 74–99)
HCT VFR BLD AUTO: 22.1 % (ref 36–46)
HGB BLD-MCNC: 7.4 G/DL (ref 12–16)
IMM GRANULOCYTES # BLD AUTO: 0.01 X10*3/UL (ref 0–0.7)
IMM GRANULOCYTES NFR BLD AUTO: 1.1 % (ref 0–0.9)
LYMPHOCYTES # BLD MANUAL: 0.17 X10*3/UL (ref 1.2–4.8)
LYMPHOCYTES NFR BLD MANUAL: 19.4 %
MAGNESIUM SERPL-MCNC: 1.67 MG/DL (ref 1.6–2.4)
MCH RBC QN AUTO: 30.7 PG (ref 26–34)
MCHC RBC AUTO-ENTMCNC: 33.5 G/DL (ref 32–36)
MCV RBC AUTO: 92 FL (ref 80–100)
MONOCYTES # BLD MANUAL: 0.07 X10*3/UL (ref 0.1–1)
MONOCYTES NFR BLD MANUAL: 7.5 %
NEUTROPHILS # BLD MANUAL: 0.64 X10*3/UL (ref 1.2–7.7)
NEUTS BAND # BLD MANUAL: 0.03 X10*3/UL (ref 0–0.7)
NEUTS BAND NFR BLD MANUAL: 3.2 %
NEUTS SEG # BLD MANUAL: 0.61 X10*3/UL (ref 1.2–7)
NEUTS SEG NFR BLD MANUAL: 67.7 %
NRBC BLD-RTO: 0 /100 WBCS (ref 0–0)
OVALOCYTES BLD QL SMEAR: ABNORMAL
PHOSPHATE SERPL-MCNC: 4.5 MG/DL (ref 2.5–4.9)
PLATELET # BLD AUTO: 16 X10*3/UL (ref 150–450)
POTASSIUM SERPL-SCNC: 3.7 MMOL/L (ref 3.5–5.3)
PROT SERPL-MCNC: 5 G/DL (ref 6.4–8.2)
RBC # BLD AUTO: 2.41 X10*6/UL (ref 4–5.2)
RBC MORPH BLD: ABNORMAL
RH FACTOR (ANTIGEN D): NORMAL
SODIUM SERPL-SCNC: 140 MMOL/L (ref 136–145)
TOTAL CELLS COUNTED BLD: 93
VARIANT LYMPHS # BLD MANUAL: 0.02 X10*3/UL (ref 0–0.5)
VARIANT LYMPHS NFR BLD: 2.2 %
WBC # BLD AUTO: 0.9 X10*3/UL (ref 4.4–11.3)

## 2025-03-08 PROCEDURE — 83735 ASSAY OF MAGNESIUM: CPT | Performed by: NURSE PRACTITIONER

## 2025-03-08 PROCEDURE — 85027 COMPLETE CBC AUTOMATED: CPT | Performed by: NURSE PRACTITIONER

## 2025-03-08 PROCEDURE — 2500000001 HC RX 250 WO HCPCS SELF ADMINISTERED DRUGS (ALT 637 FOR MEDICARE OP): Mod: SE | Performed by: INTERNAL MEDICINE

## 2025-03-08 PROCEDURE — 2500000001 HC RX 250 WO HCPCS SELF ADMINISTERED DRUGS (ALT 637 FOR MEDICARE OP): Mod: SE | Performed by: NURSE PRACTITIONER

## 2025-03-08 PROCEDURE — 99233 SBSQ HOSP IP/OBS HIGH 50: CPT | Performed by: INTERNAL MEDICINE

## 2025-03-08 PROCEDURE — 2500000002 HC RX 250 W HCPCS SELF ADMINISTERED DRUGS (ALT 637 FOR MEDICARE OP, ALT 636 FOR OP/ED): Mod: SE | Performed by: NURSE PRACTITIONER

## 2025-03-08 PROCEDURE — 85007 BL SMEAR W/DIFF WBC COUNT: CPT | Performed by: NURSE PRACTITIONER

## 2025-03-08 PROCEDURE — 82248 BILIRUBIN DIRECT: CPT | Performed by: NURSE PRACTITIONER

## 2025-03-08 PROCEDURE — 2500000001 HC RX 250 WO HCPCS SELF ADMINISTERED DRUGS (ALT 637 FOR MEDICARE OP): Mod: SE

## 2025-03-08 PROCEDURE — 1170000001 HC PRIVATE ONCOLOGY ROOM DAILY

## 2025-03-08 PROCEDURE — 80048 BASIC METABOLIC PNL TOTAL CA: CPT | Performed by: NURSE PRACTITIONER

## 2025-03-08 PROCEDURE — 2500000004 HC RX 250 GENERAL PHARMACY W/ HCPCS (ALT 636 FOR OP/ED): Mod: JZ,SE | Performed by: INTERNAL MEDICINE

## 2025-03-08 PROCEDURE — 84100 ASSAY OF PHOSPHORUS: CPT | Performed by: NURSE PRACTITIONER

## 2025-03-08 PROCEDURE — 2500000004 HC RX 250 GENERAL PHARMACY W/ HCPCS (ALT 636 FOR OP/ED): Mod: SE | Performed by: NURSE PRACTITIONER

## 2025-03-08 PROCEDURE — 86900 BLOOD TYPING SEROLOGIC ABO: CPT

## 2025-03-08 RX ORDER — SODIUM CHLORIDE 9 MG/ML
75 INJECTION, SOLUTION INTRAVENOUS CONTINUOUS
Status: DISCONTINUED | OUTPATIENT
Start: 2025-03-09 | End: 2025-03-09

## 2025-03-08 RX ADMIN — LEVOFLOXACIN 500 MG: 500 TABLET, FILM COATED ORAL at 09:16

## 2025-03-08 RX ADMIN — AMLODIPINE BESYLATE 5 MG: 5 TABLET ORAL at 08:59

## 2025-03-08 RX ADMIN — FILGRASTIM-SNDZ 480 MCG: 480 INJECTION, SOLUTION INTRAVENOUS; SUBCUTANEOUS at 13:50

## 2025-03-08 RX ADMIN — PREDNISONE 10 MG: 10 TABLET ORAL at 08:59

## 2025-03-08 RX ADMIN — ATORVASTATIN CALCIUM 40 MG: 40 TABLET, FILM COATED ORAL at 08:59

## 2025-03-08 RX ADMIN — DULOXETINE HYDROCHLORIDE 60 MG: 60 CAPSULE, DELAYED RELEASE ORAL at 08:59

## 2025-03-08 RX ADMIN — OXYCODONE 5 MG: 5 TABLET ORAL at 08:59

## 2025-03-08 RX ADMIN — LORAZEPAM 0.5 MG: 0.5 TABLET ORAL at 20:09

## 2025-03-08 RX ADMIN — PANTOPRAZOLE SODIUM 40 MG: 40 TABLET, DELAYED RELEASE ORAL at 06:44

## 2025-03-08 RX ADMIN — OXYCODONE 5 MG: 5 TABLET ORAL at 20:09

## 2025-03-08 RX ADMIN — HYDROXYCHLOROQUINE SULFATE 200 MG: 200 TABLET, FILM COATED ORAL at 08:59

## 2025-03-08 RX ADMIN — LORAZEPAM 0.5 MG: 0.5 TABLET ORAL at 08:59

## 2025-03-08 RX ADMIN — ACYCLOVIR 400 MG: 400 TABLET ORAL at 20:09

## 2025-03-08 RX ADMIN — ACYCLOVIR 400 MG: 400 TABLET ORAL at 08:59

## 2025-03-08 RX ADMIN — DULOXETINE HYDROCHLORIDE 60 MG: 60 CAPSULE, DELAYED RELEASE ORAL at 20:09

## 2025-03-08 RX ADMIN — QUETIAPINE FUMARATE 100 MG: 25 TABLET ORAL at 20:09

## 2025-03-08 RX ADMIN — FLUCONAZOLE 400 MG: 200 TABLET ORAL at 08:59

## 2025-03-08 ASSESSMENT — PAIN SCALES - GENERAL
PAINLEVEL_OUTOF10: 6
PAINLEVEL_OUTOF10: 4
PAINLEVEL_OUTOF10: 8
PAINLEVEL_OUTOF10: 6

## 2025-03-08 ASSESSMENT — COGNITIVE AND FUNCTIONAL STATUS - GENERAL
DAILY ACTIVITIY SCORE: 24
DAILY ACTIVITIY SCORE: 24
MOBILITY SCORE: 24
MOBILITY SCORE: 24

## 2025-03-08 ASSESSMENT — PAIN - FUNCTIONAL ASSESSMENT
PAIN_FUNCTIONAL_ASSESSMENT: 0-10
PAIN_FUNCTIONAL_ASSESSMENT: 0-10

## 2025-03-08 ASSESSMENT — PAIN DESCRIPTION - LOCATION: LOCATION: GENERALIZED

## 2025-03-08 NOTE — PROGRESS NOTES
"Sagrario Garber is a 49 y.o. female on day 18 of admission presenting with Peripheral T-cell lymphoma (Multi).    Subjective   Reports fatigue, ore throat, decreased appetite and nausea. Denies vomiting or abd pain. ROS otherwise unremarkable.    Objective   Physical Exam  Constitutional:       General: She is not in acute distress.     Appearance: Normal appearance. She is normal weight. She is not ill-appearing.   HENT:      Head: Normocephalic.      Mouth/Throat:      Mouth: Mucous membranes are moist.      Pharynx: Oropharynx is clear.      Comments: Poor dentition  Eyes:      General: No scleral icterus.     Extraocular Movements: Extraocular movements intact.      Conjunctiva/sclera: Conjunctivae normal.   Cardiovascular:      Rate and Rhythm: Normal rate and regular rhythm.      Heart sounds: Normal heart sounds. No murmur heard.     No gallop.   Pulmonary:      Effort: Pulmonary effort is normal.      Breath sounds: Normal breath sounds.   Abdominal:      General: Abdomen is flat. Bowel sounds are normal.      Palpations: Abdomen is soft.   Musculoskeletal:         General: Normal range of motion.   Skin:     General: Skin is warm.      Coloration: Skin is not jaundiced or pale.      Findings: No erythema or rash.   Neurological:      General: No focal deficit present.      Mental Status: She is alert and oriented to person, place, and time. Mental status is at baseline.   Psychiatric:         Mood and Affect: Mood normal.         Behavior: Behavior normal.     Last Recorded Vitals  Blood pressure 102/56, pulse 102, temperature 37.4 °C (99.3 °F), temperature source Temporal, resp. rate 16, height 1.685 m (5' 6.34\"), weight 73.6 kg (162 lb 4.1 oz), SpO2 93%.  Intake/Output last 3 Shifts:  I/O last 3 completed shifts:  In: 2749.5 (37.4 mL/kg) [P.O.:240; I.V.:2009.5 (27.3 mL/kg); IV Piggyback:500]  Out: - (0 mL/kg)   Weight: 73.6 kg   This patient has a central line   Reason for the central line remaining " today? Hemodynamic monitoring    Assessment/Plan   Assessment & Plan  Peripheral T-cell lymphoma (Multi)    Angioimmunoblastic T-cell lymphoma    Ms. 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 Received a total dose of 5.63 x10^6 CD34      Surveillance Monitoring   IgG: On admission : 445  Coag/Fibrinogen 2x/week: WNL   LDH/Hapto: Weekly: WNL       HEME:   # Pancytopenia secondary to chemotherapy, no evidence of bleeding    - Transfuse if Hgb<7 and/or Plt<10  # DVT prophy: Holding Lovenox 40mg for thrombocytopenia    ID:   Allergy: Amoxicillin     -Prophylaxis: Acyclovir, fluconazole, Levofloxacin    -Plan Cefepime for neutropenic fever due to PCN allergy    -Plan Bactrim 800/160mg qMWF start T+30     FEN/GI:   Admit Wt: 77.1 Kg,  Current wt: 73.6 kg (3/3)  #PPI prophy w/protonix on admit   # Chemo induced nausea, improving  - PRN Compazine  # chemotherapy induced diarrhea, improving  - Stool path panel and c. Diff panel (2/24) neg  - Imodium prn available  # Oral mucositis, throat pain  - BMX TID     CARDIAC:   # History of HTN  - Continue home Norvasc 5mg/day and Atorvastatin 40mg/day @ HS    - ECHO (1/22/25): EF 60-65%  - Weekly EKG (2/24) due to QT prolonging meds: (2/19) , (2/24)   - PT consulted on admission      RHEUMATOID:   # History of Lupus and Rheumatoid Arthritis    - Currently on Placquenil, Prednisone 10mg/day,    - Follows with Dr Dobbs    - Previously on Saphnelo (interferon alpha antagonist until 7/2023)   - Cont  PRN meds for pain, if worsening, consult Supp Onc     PSYCH:   # Anxiety/Depression    - Continue home Seroquil, Cymbalta, Ativan  - Has followed with behavioral health at [facility] but not established  - Consulted inpatient Psych (2/27), EKG on 2/28     MISC:   # Nicotine Dependence    - Currently smoker; Nicotine patch ordered on admit   - Patient considering smoking cessation    - Social Situation, currently homeless, was living in SNF      DISPO:   - Full Code confirmed on admit    - BALDE[place] Mom: Stephanie Wilson [phone]   - Non-Tunneled CVC (placed 2/11) remove at discharge     - Mediport (not accessed)  - Primary Onc: Dr. Gunjan Varela   - Currently pt is homeless, prev at SNF, mom & sister are both unable to take her in.  - Plan discharge to [facility] (shelter), start application Monday 3/10     Pt seen, examined and discussed w/ Dr. Heath Cortez

## 2025-03-08 NOTE — CARE PLAN
The patient's goals for the shift include      Problem: Pain - Adult  Goal: Verbalizes/displays adequate comfort level or baseline comfort level  Outcome: Progressing     Problem: Safety - Adult  Goal: Free from fall injury  Outcome: Progressing     Problem: Discharge Planning  Goal: Discharge to home or other facility with appropriate resources  Outcome: Progressing     Problem: Chronic Conditions and Co-morbidities  Goal: Patient's chronic conditions and co-morbidity symptoms are monitored and maintained or improved  Outcome: Progressing     Problem: Fall/Injury  Goal: Not fall by end of shift  Outcome: Progressing  Goal: Be free from injury by end of the shift  Outcome: Progressing  Goal: Verbalize understanding of personal risk factors for fall in the hospital  Outcome: Progressing  Goal: Verbalize understanding of risk factor reduction measures to prevent injury from fall in the home  Outcome: Progressing  Goal: Use assistive devices by end of the shift  Outcome: Progressing  Goal: Pace activities to prevent fatigue by end of the shift  Outcome: Progressing     Problem: Pain  Goal: Takes deep breaths with improved pain control throughout the shift  Outcome: Progressing  Goal: Turns in bed with improved pain control throughout the shift  Outcome: Progressing  Goal: Walks with improved pain control throughout the shift  Outcome: Progressing  Goal: Performs ADL's with improved pain control throughout shift  Outcome: Progressing  Goal: Participates in PT with improved pain control throughout the shift  Outcome: Progressing  Goal: Free from opioid side effects throughout the shift  Outcome: Progressing  Goal: Free from acute confusion related to pain meds throughout the shift  Outcome: Progressing     Problem: Skin  Goal: Decreased wound size/increased tissue granulation at next dressing change  Outcome: Progressing  Goal: Participates in plan/prevention/treatment measures  Outcome: Progressing  Goal: Prevent/manage  excess moisture  Outcome: Progressing  Goal: Prevent/minimize sheer/friction injuries  Outcome: Progressing  Goal: Promote/optimize nutrition  Outcome: Progressing  Goal: Promote skin healing  Outcome: Progressing     The clinical goals for the shift include Remain HDS

## 2025-03-08 NOTE — CARE PLAN
The patient's goals for the shift include      The clinical goals for the shift include Patient will remain HDS through end of shift    Over the shift, the patient did make progress toward the following goals. Barriers to progression include fatigue. Recommendations to address these barriers include encourage ambulation.

## 2025-03-09 VITALS
SYSTOLIC BLOOD PRESSURE: 95 MMHG | DIASTOLIC BLOOD PRESSURE: 62 MMHG | TEMPERATURE: 97.9 F | OXYGEN SATURATION: 94 % | RESPIRATION RATE: 16 BRPM | WEIGHT: 162.26 LBS | HEIGHT: 66 IN | HEART RATE: 88 BPM | BODY MASS INDEX: 26.08 KG/M2

## 2025-03-09 LAB
ALBUMIN SERPL BCP-MCNC: 3 G/DL (ref 3.4–5)
ALP SERPL-CCNC: 68 U/L (ref 33–110)
ALT SERPL W P-5'-P-CCNC: 15 U/L (ref 7–45)
ANION GAP SERPL CALC-SCNC: 10 MMOL/L (ref 10–20)
AST SERPL W P-5'-P-CCNC: 9 U/L (ref 9–39)
BASOPHILS # BLD MANUAL: 0 X10*3/UL (ref 0–0.1)
BASOPHILS NFR BLD MANUAL: 0 %
BILIRUB DIRECT SERPL-MCNC: 0.1 MG/DL (ref 0–0.3)
BILIRUB SERPL-MCNC: 0.3 MG/DL (ref 0–1.2)
BUN SERPL-MCNC: 3 MG/DL (ref 6–23)
CALCIUM SERPL-MCNC: 8.2 MG/DL (ref 8.6–10.6)
CHLORIDE SERPL-SCNC: 106 MMOL/L (ref 98–107)
CO2 SERPL-SCNC: 28 MMOL/L (ref 21–32)
CREAT SERPL-MCNC: 0.51 MG/DL (ref 0.5–1.05)
EGFRCR SERPLBLD CKD-EPI 2021: >90 ML/MIN/1.73M*2
EOSINOPHIL # BLD MANUAL: 0 X10*3/UL (ref 0–0.7)
EOSINOPHIL NFR BLD MANUAL: 0 %
ERYTHROCYTE [DISTWIDTH] IN BLOOD BY AUTOMATED COUNT: 13 % (ref 11.5–14.5)
GLUCOSE SERPL-MCNC: 86 MG/DL (ref 74–99)
HCT VFR BLD AUTO: 21.6 % (ref 36–46)
HGB BLD-MCNC: 7.3 G/DL (ref 12–16)
IMM GRANULOCYTES # BLD AUTO: 0.12 X10*3/UL (ref 0–0.7)
IMM GRANULOCYTES NFR BLD AUTO: 7.3 % (ref 0–0.9)
LYMPHOCYTES # BLD MANUAL: 0.39 X10*3/UL (ref 1.2–4.8)
LYMPHOCYTES NFR BLD MANUAL: 24.1 %
MAGNESIUM SERPL-MCNC: 1.66 MG/DL (ref 1.6–2.4)
MCH RBC QN AUTO: 31.6 PG (ref 26–34)
MCHC RBC AUTO-ENTMCNC: 33.8 G/DL (ref 32–36)
MCV RBC AUTO: 94 FL (ref 80–100)
MONOCYTES # BLD MANUAL: 0.33 X10*3/UL (ref 0.1–1)
MONOCYTES NFR BLD MANUAL: 20.7 %
NEUTROPHILS # BLD MANUAL: 0.66 X10*3/UL (ref 1.2–7.7)
NEUTS BAND # BLD MANUAL: 0.11 X10*3/UL (ref 0–0.7)
NEUTS BAND NFR BLD MANUAL: 6.9 %
NEUTS SEG # BLD MANUAL: 0.55 X10*3/UL (ref 1.2–7)
NEUTS SEG NFR BLD MANUAL: 34.5 %
NRBC BLD-RTO: 4.9 /100 WBCS (ref 0–0)
PHOSPHATE SERPL-MCNC: 4.4 MG/DL (ref 2.5–4.9)
PLASMA CELLS # BLD MANUAL: 0.06 X10*3/UL
PLASMA CELLS NFR BLD MANUAL: 3.5 %
PLATELET # BLD AUTO: 12 X10*3/UL (ref 150–450)
POTASSIUM SERPL-SCNC: 3.3 MMOL/L (ref 3.5–5.3)
PROT SERPL-MCNC: 4.8 G/DL (ref 6.4–8.2)
RBC # BLD AUTO: 2.31 X10*6/UL (ref 4–5.2)
RBC MORPH BLD: ABNORMAL
SODIUM SERPL-SCNC: 141 MMOL/L (ref 136–145)
TOTAL CELLS COUNTED BLD: 58
VARIANT LYMPHS # BLD MANUAL: 0.16 X10*3/UL (ref 0–0.5)
VARIANT LYMPHS NFR BLD: 10.3 %
WBC # BLD AUTO: 1.6 X10*3/UL (ref 4.4–11.3)

## 2025-03-09 PROCEDURE — 80076 HEPATIC FUNCTION PANEL: CPT | Performed by: NURSE PRACTITIONER

## 2025-03-09 PROCEDURE — 2500000002 HC RX 250 W HCPCS SELF ADMINISTERED DRUGS (ALT 637 FOR MEDICARE OP, ALT 636 FOR OP/ED): Mod: SE | Performed by: NURSE PRACTITIONER

## 2025-03-09 PROCEDURE — 2500000001 HC RX 250 WO HCPCS SELF ADMINISTERED DRUGS (ALT 637 FOR MEDICARE OP): Mod: SE

## 2025-03-09 PROCEDURE — 2500000002 HC RX 250 W HCPCS SELF ADMINISTERED DRUGS (ALT 637 FOR MEDICARE OP, ALT 636 FOR OP/ED): Mod: SE

## 2025-03-09 PROCEDURE — 85027 COMPLETE CBC AUTOMATED: CPT | Performed by: NURSE PRACTITIONER

## 2025-03-09 PROCEDURE — 2500000001 HC RX 250 WO HCPCS SELF ADMINISTERED DRUGS (ALT 637 FOR MEDICARE OP): Mod: SE | Performed by: NURSE PRACTITIONER

## 2025-03-09 PROCEDURE — 2500000004 HC RX 250 GENERAL PHARMACY W/ HCPCS (ALT 636 FOR OP/ED): Mod: SE

## 2025-03-09 PROCEDURE — 99233 SBSQ HOSP IP/OBS HIGH 50: CPT | Performed by: INTERNAL MEDICINE

## 2025-03-09 PROCEDURE — 85060 BLOOD SMEAR INTERPRETATION: CPT | Performed by: PATHOLOGY

## 2025-03-09 PROCEDURE — 2500000004 HC RX 250 GENERAL PHARMACY W/ HCPCS (ALT 636 FOR OP/ED): Mod: SE | Performed by: NURSE PRACTITIONER

## 2025-03-09 PROCEDURE — 85007 BL SMEAR W/DIFF WBC COUNT: CPT | Performed by: NURSE PRACTITIONER

## 2025-03-09 PROCEDURE — 84100 ASSAY OF PHOSPHORUS: CPT | Performed by: NURSE PRACTITIONER

## 2025-03-09 PROCEDURE — 1170000001 HC PRIVATE ONCOLOGY ROOM DAILY

## 2025-03-09 PROCEDURE — 2500000004 HC RX 250 GENERAL PHARMACY W/ HCPCS (ALT 636 FOR OP/ED): Mod: JZ,SE | Performed by: INTERNAL MEDICINE

## 2025-03-09 PROCEDURE — 83735 ASSAY OF MAGNESIUM: CPT | Performed by: NURSE PRACTITIONER

## 2025-03-09 PROCEDURE — 2500000001 HC RX 250 WO HCPCS SELF ADMINISTERED DRUGS (ALT 637 FOR MEDICARE OP): Mod: SE | Performed by: INTERNAL MEDICINE

## 2025-03-09 RX ORDER — POTASSIUM CHLORIDE 20 MEQ/1
40 TABLET, EXTENDED RELEASE ORAL ONCE
Status: COMPLETED | OUTPATIENT
Start: 2025-03-09 | End: 2025-03-09

## 2025-03-09 RX ORDER — AMOXICILLIN 250 MG
2 CAPSULE ORAL ONCE
Status: COMPLETED | OUTPATIENT
Start: 2025-03-09 | End: 2025-03-09

## 2025-03-09 RX ORDER — POTASSIUM CHLORIDE 29.8 MG/ML
40 INJECTION INTRAVENOUS ONCE
Status: COMPLETED | OUTPATIENT
Start: 2025-03-09 | End: 2025-03-09

## 2025-03-09 RX ORDER — MAGNESIUM SULFATE HEPTAHYDRATE 40 MG/ML
2 INJECTION, SOLUTION INTRAVENOUS ONCE
Status: COMPLETED | OUTPATIENT
Start: 2025-03-09 | End: 2025-03-09

## 2025-03-09 RX ADMIN — POTASSIUM CHLORIDE 40 MEQ: 29.8 INJECTION, SOLUTION INTRAVENOUS at 09:08

## 2025-03-09 RX ADMIN — PREDNISONE 10 MG: 10 TABLET ORAL at 09:07

## 2025-03-09 RX ADMIN — DULOXETINE HYDROCHLORIDE 60 MG: 60 CAPSULE, DELAYED RELEASE ORAL at 20:27

## 2025-03-09 RX ADMIN — ACYCLOVIR 400 MG: 400 TABLET ORAL at 20:27

## 2025-03-09 RX ADMIN — LORAZEPAM 0.5 MG: 0.5 TABLET ORAL at 20:27

## 2025-03-09 RX ADMIN — POTASSIUM CHLORIDE 40 MEQ: 1500 TABLET, EXTENDED RELEASE ORAL at 06:24

## 2025-03-09 RX ADMIN — AMLODIPINE BESYLATE 5 MG: 5 TABLET ORAL at 09:07

## 2025-03-09 RX ADMIN — DULOXETINE HYDROCHLORIDE 60 MG: 60 CAPSULE, DELAYED RELEASE ORAL at 09:07

## 2025-03-09 RX ADMIN — FLUCONAZOLE 400 MG: 200 TABLET ORAL at 09:07

## 2025-03-09 RX ADMIN — ACYCLOVIR 400 MG: 400 TABLET ORAL at 09:07

## 2025-03-09 RX ADMIN — QUETIAPINE FUMARATE 100 MG: 25 TABLET ORAL at 20:27

## 2025-03-09 RX ADMIN — FILGRASTIM-SNDZ 480 MCG: 480 INJECTION, SOLUTION INTRAVENOUS; SUBCUTANEOUS at 12:39

## 2025-03-09 RX ADMIN — MAGNESIUM SULFATE HEPTAHYDRATE 2 G: 40 INJECTION, SOLUTION INTRAVENOUS at 09:08

## 2025-03-09 RX ADMIN — LORAZEPAM 0.5 MG: 0.5 TABLET ORAL at 11:21

## 2025-03-09 RX ADMIN — LEVOFLOXACIN 500 MG: 500 TABLET, FILM COATED ORAL at 09:15

## 2025-03-09 RX ADMIN — SODIUM CHLORIDE 75 ML/HR: 9 INJECTION, SOLUTION INTRAVENOUS at 00:30

## 2025-03-09 RX ADMIN — ATORVASTATIN CALCIUM 40 MG: 40 TABLET, FILM COATED ORAL at 09:07

## 2025-03-09 RX ADMIN — PANTOPRAZOLE SODIUM 40 MG: 40 TABLET, DELAYED RELEASE ORAL at 06:24

## 2025-03-09 RX ADMIN — HYDROXYCHLOROQUINE SULFATE 200 MG: 200 TABLET, FILM COATED ORAL at 09:12

## 2025-03-09 RX ADMIN — SENNOSIDES AND DOCUSATE SODIUM 2 TABLET: 50; 8.6 TABLET ORAL at 11:21

## 2025-03-09 RX ADMIN — OXYCODONE 5 MG: 5 TABLET ORAL at 11:21

## 2025-03-09 RX ADMIN — OXYCODONE 5 MG: 5 TABLET ORAL at 20:27

## 2025-03-09 ASSESSMENT — COGNITIVE AND FUNCTIONAL STATUS - GENERAL
DAILY ACTIVITIY SCORE: 24
MOBILITY SCORE: 24
DAILY ACTIVITIY SCORE: 24
MOBILITY SCORE: 24

## 2025-03-09 ASSESSMENT — PAIN SCALES - GENERAL
PAINLEVEL_OUTOF10: 6
PAINLEVEL_OUTOF10: 4
PAINLEVEL_OUTOF10: 4

## 2025-03-09 ASSESSMENT — PAIN DESCRIPTION - LOCATION: LOCATION: GENERALIZED

## 2025-03-09 ASSESSMENT — PAIN - FUNCTIONAL ASSESSMENT: PAIN_FUNCTIONAL_ASSESSMENT: 0-10

## 2025-03-09 NOTE — PROGRESS NOTES
"Sagrario Garber is a 49 y.o. female on day 19 of admission presenting with Peripheral T-cell lymphoma (Multi).    Subjective   Reports fatigue, sore throat, decreased appetite and nausea. Would like a break from IV fluid today. Encouraged oral intake. Also reports constipation, last bowel movement a couple of days ago. Denies vomiting or abd pain. ROS otherwise unremarkable.    Objective   Physical Exam  Constitutional:       General: She is not in acute distress.     Appearance: Normal appearance. She is normal weight. She is not ill-appearing.   HENT:      Head: Normocephalic.      Mouth/Throat:      Mouth: Mucous membranes are moist.      Pharynx: Oropharynx is clear.      Comments: Poor dentition  Eyes:      General: No scleral icterus.     Extraocular Movements: Extraocular movements intact.      Conjunctiva/sclera: Conjunctivae normal.   Cardiovascular:      Rate and Rhythm: Normal rate and regular rhythm.      Heart sounds: Normal heart sounds. No murmur heard.     No gallop.   Pulmonary:      Effort: Pulmonary effort is normal.      Breath sounds: Normal breath sounds.   Abdominal:      General: Abdomen is flat. Bowel sounds are normal.      Palpations: Abdomen is soft.   Musculoskeletal:         General: Normal range of motion.   Skin:     General: Skin is warm.      Coloration: Skin is not jaundiced or pale.      Findings: No erythema or rash.   Neurological:      General: No focal deficit present.      Mental Status: She is alert and oriented to person, place, and time. Mental status is at baseline.   Psychiatric:         Mood and Affect: Mood normal.         Behavior: Behavior normal.     Last Recorded Vitals  Blood pressure 99/61, pulse 94, temperature 36.5 °C (97.7 °F), temperature source Temporal, resp. rate 16, height 1.685 m (5' 6.34\"), weight 73.6 kg (162 lb 4.1 oz), SpO2 92%.  Intake/Output last 3 Shifts:  I/O last 3 completed shifts:  In: 3020 (41 mL/kg) [I.V.:3020 (41 mL/kg)]  Out: - (0 mL/kg) "   Weight: 73.6 kg   This patient has a central line   Reason for the central line remaining today? Hemodynamic monitoring    Assessment/Plan   Assessment & Plan  Peripheral T-cell lymphoma (Multi)    Angioimmunoblastic T-cell lymphoma    Ms. 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 Received a total dose of 5.63 x10^6 CD34      Surveillance Monitoring   IgG: On admission : 445  Coag/Fibrinogen 2x/week: WNL   LDH/Hapto: Weekly: WNL       HEME:   # Pancytopenia secondary to chemotherapy, no evidence of bleeding    - Transfuse if Hgb<7 and/or Plt<10  # DVT prophy: Holding Lovenox 40mg for thrombocytopenia    ID:   Allergy: Amoxicillin     -Prophylaxis: Acyclovir, fluconazole, Levofloxacin    -Plan Cefepime for neutropenic fever due to PCN allergy    -Plan Bactrim 800/160mg qMWF start T+30     FEN/GI:   Admit Wt: 77.1 Kg,  Current wt: 73.6 kg (3/3)  #PPI prophy w/protonix on admit   # Chemo induced nausea, improving  - PRN Compazine  #constipation: elisa colace x2 tabs once  # Oral mucositis, throat pain  - BMX TID  #hypokalemia: 40meq kcl 3/9  #hypomagnesemia: 2g mag 3/9     CARDIAC:   # History of HTN  - Continue home Norvasc 5mg/day and Atorvastatin 40mg/day @ HS    - ECHO (1/22/25): EF 60-65%  - Weekly EKG (2/24) due to QT prolonging meds: (2/19) , (2/24)   - PT following     RHEUMATOID:   # History of Lupus and Rheumatoid Arthritis    - Currently on Placquenil, Prednisone 10mg/day,    - Follows with Dr Dobbs    - Previously on Saphnelo  (interferon alpha antagonist until 7/2023)   - Cont PRN meds for pain, if worsening, consult Supp Onc     PSYCH:   # Anxiety/Depression    - Continue home Seroquil, Cymbalta, Ativan  - Has followed with behavioral health at [facility] but not established  - Consulted inpatient Psych (2/27), EKG on 2/28     MISC:   # Nicotine Dependence    - Currently smoker; Nicotine patch ordered on admit   - Patient considering smoking cessation    - Social Situation, currently homeless, was living in SNF      DISPO:   - Full Code confirmed on admit    - STELLAK Mom: Stephanie Wilson [phone]   - Non-Tunneled CVC (placed 2/11) remove at discharge     - Mediport (not accessed)  - Primary Onc: Dr. Gunjan Varela   - Currently pt is homeless, prev at SNF, mom & sister are both unable to take her in  - Plan discharge to [facility]'s home (shelter), start application Monday 3/10     Pt seen, examined and discussed w/ Dr. Heath Cortez

## 2025-03-09 NOTE — CARE PLAN
The patient's goals for the shift include      The clinical goals for the shift include Patient will remain HDS through end of shift    Over the shift, the patient did  make progress toward the following goals. Barriers to progression include patient not ambulating enough. Recommendations to address these barriers include encourage ambublation.

## 2025-03-10 LAB
ALBUMIN SERPL BCP-MCNC: 3.4 G/DL (ref 3.4–5)
ALP SERPL-CCNC: 85 U/L (ref 33–110)
ALT SERPL W P-5'-P-CCNC: 15 U/L (ref 7–45)
ANION GAP SERPL CALC-SCNC: 12 MMOL/L (ref 10–20)
APTT PPP: 29 SECONDS (ref 26–36)
AST SERPL W P-5'-P-CCNC: 9 U/L (ref 9–39)
BASOPHILS # BLD MANUAL: 0 X10*3/UL (ref 0–0.1)
BASOPHILS NFR BLD MANUAL: 0 %
BILIRUB DIRECT SERPL-MCNC: 0.1 MG/DL (ref 0–0.3)
BILIRUB SERPL-MCNC: 0.5 MG/DL (ref 0–1.2)
BUN SERPL-MCNC: 6 MG/DL (ref 6–23)
CALCIUM SERPL-MCNC: 8.6 MG/DL (ref 8.6–10.6)
CHLORIDE SERPL-SCNC: 102 MMOL/L (ref 98–107)
CO2 SERPL-SCNC: 30 MMOL/L (ref 21–32)
CREAT SERPL-MCNC: 0.6 MG/DL (ref 0.5–1.05)
EGFRCR SERPLBLD CKD-EPI 2021: >90 ML/MIN/1.73M*2
EOSINOPHIL # BLD MANUAL: 0.03 X10*3/UL (ref 0–0.7)
EOSINOPHIL NFR BLD MANUAL: 0.9 %
ERYTHROCYTE [DISTWIDTH] IN BLOOD BY AUTOMATED COUNT: 13.2 % (ref 11.5–14.5)
FIBRINOGEN PPP-MCNC: 413 MG/DL (ref 200–400)
GLUCOSE SERPL-MCNC: 105 MG/DL (ref 74–99)
HCT VFR BLD AUTO: 22.9 % (ref 36–46)
HGB BLD-MCNC: 7.4 G/DL (ref 12–16)
IMM GRANULOCYTES # BLD AUTO: 0.2 X10*3/UL (ref 0–0.7)
IMM GRANULOCYTES NFR BLD AUTO: 6.6 % (ref 0–0.9)
INR PPP: 1.1 (ref 0.9–1.1)
LDH SERPL L TO P-CCNC: 139 U/L (ref 84–246)
LYMPHOCYTES # BLD MANUAL: 0.31 X10*3/UL (ref 1.2–4.8)
LYMPHOCYTES NFR BLD MANUAL: 10.2 %
MAGNESIUM SERPL-MCNC: 1.8 MG/DL (ref 1.6–2.4)
MCH RBC QN AUTO: 30.6 PG (ref 26–34)
MCHC RBC AUTO-ENTMCNC: 32.3 G/DL (ref 32–36)
MCV RBC AUTO: 95 FL (ref 80–100)
MONOCYTES # BLD MANUAL: 0.18 X10*3/UL (ref 0.1–1)
MONOCYTES NFR BLD MANUAL: 5.9 %
NEUTS SEG # BLD MANUAL: 2.47 X10*3/UL (ref 1.2–7)
NEUTS SEG NFR BLD MANUAL: 82.2 %
NRBC BLD-RTO: 0 /100 WBCS (ref 0–0)
OVALOCYTES BLD QL SMEAR: ABNORMAL
PATH REVIEW-CBC DIFFERENTIAL: NORMAL
PHOSPHATE SERPL-MCNC: 4 MG/DL (ref 2.5–4.9)
PLATELET # BLD AUTO: 19 X10*3/UL (ref 150–450)
POLYCHROMASIA BLD QL SMEAR: ABNORMAL
POTASSIUM SERPL-SCNC: 3.6 MMOL/L (ref 3.5–5.3)
PROT SERPL-MCNC: 5.7 G/DL (ref 6.4–8.2)
PROTHROMBIN TIME: 11.9 SECONDS (ref 9.8–12.4)
RBC # BLD AUTO: 2.42 X10*6/UL (ref 4–5.2)
RBC MORPH BLD: ABNORMAL
SODIUM SERPL-SCNC: 140 MMOL/L (ref 136–145)
TOTAL CELLS COUNTED BLD: 118
VARIANT LYMPHS # BLD MANUAL: 0.02 X10*3/UL (ref 0–0.5)
VARIANT LYMPHS NFR BLD: 0.8 %
WBC # BLD AUTO: 3 X10*3/UL (ref 4.4–11.3)

## 2025-03-10 PROCEDURE — 83735 ASSAY OF MAGNESIUM: CPT | Performed by: NURSE PRACTITIONER

## 2025-03-10 PROCEDURE — 1170000001 HC PRIVATE ONCOLOGY ROOM DAILY

## 2025-03-10 PROCEDURE — 99232 SBSQ HOSP IP/OBS MODERATE 35: CPT | Performed by: STUDENT IN AN ORGANIZED HEALTH CARE EDUCATION/TRAINING PROGRAM

## 2025-03-10 PROCEDURE — 2500000001 HC RX 250 WO HCPCS SELF ADMINISTERED DRUGS (ALT 637 FOR MEDICARE OP): Mod: SE | Performed by: NURSE PRACTITIONER

## 2025-03-10 PROCEDURE — 85027 COMPLETE CBC AUTOMATED: CPT | Performed by: NURSE PRACTITIONER

## 2025-03-10 PROCEDURE — 85730 THROMBOPLASTIN TIME PARTIAL: CPT

## 2025-03-10 PROCEDURE — 84100 ASSAY OF PHOSPHORUS: CPT | Performed by: NURSE PRACTITIONER

## 2025-03-10 PROCEDURE — 83615 LACTATE (LD) (LDH) ENZYME: CPT

## 2025-03-10 PROCEDURE — 2500000004 HC RX 250 GENERAL PHARMACY W/ HCPCS (ALT 636 FOR OP/ED): Mod: SE | Performed by: NURSE PRACTITIONER

## 2025-03-10 PROCEDURE — 80053 COMPREHEN METABOLIC PANEL: CPT | Performed by: NURSE PRACTITIONER

## 2025-03-10 PROCEDURE — 2500000004 HC RX 250 GENERAL PHARMACY W/ HCPCS (ALT 636 FOR OP/ED): Mod: SE

## 2025-03-10 PROCEDURE — 85007 BL SMEAR W/DIFF WBC COUNT: CPT | Performed by: NURSE PRACTITIONER

## 2025-03-10 PROCEDURE — 85384 FIBRINOGEN ACTIVITY: CPT

## 2025-03-10 PROCEDURE — 2500000001 HC RX 250 WO HCPCS SELF ADMINISTERED DRUGS (ALT 637 FOR MEDICARE OP): Mod: SE | Performed by: INTERNAL MEDICINE

## 2025-03-10 PROCEDURE — 2500000002 HC RX 250 W HCPCS SELF ADMINISTERED DRUGS (ALT 637 FOR MEDICARE OP, ALT 636 FOR OP/ED): Mod: SE | Performed by: NURSE PRACTITIONER

## 2025-03-10 RX ORDER — ACYCLOVIR 400 MG/1
400 TABLET ORAL EVERY 12 HOURS
Qty: 60 TABLET | Refills: 2 | Status: SHIPPED | OUTPATIENT
Start: 2025-03-10 | End: 2025-03-11

## 2025-03-10 RX ORDER — BISACODYL 5 MG
10 TABLET, DELAYED RELEASE (ENTERIC COATED) ORAL ONCE
Status: COMPLETED | OUTPATIENT
Start: 2025-03-10 | End: 2025-03-10

## 2025-03-10 RX ORDER — POLYETHYLENE GLYCOL 3350 17 G/17G
17 POWDER, FOR SOLUTION ORAL DAILY
Status: DISCONTINUED | OUTPATIENT
Start: 2025-03-10 | End: 2025-03-17 | Stop reason: HOSPADM

## 2025-03-10 RX ADMIN — LORAZEPAM 0.5 MG: 0.5 TABLET ORAL at 08:55

## 2025-03-10 RX ADMIN — ATORVASTATIN CALCIUM 40 MG: 40 TABLET, FILM COATED ORAL at 08:55

## 2025-03-10 RX ADMIN — QUETIAPINE FUMARATE 100 MG: 25 TABLET ORAL at 21:12

## 2025-03-10 RX ADMIN — PROCHLORPERAZINE EDISYLATE 10 MG: 5 INJECTION INTRAMUSCULAR; INTRAVENOUS at 21:18

## 2025-03-10 RX ADMIN — DULOXETINE HYDROCHLORIDE 60 MG: 60 CAPSULE, DELAYED RELEASE ORAL at 21:12

## 2025-03-10 RX ADMIN — OXYCODONE 5 MG: 5 TABLET ORAL at 21:12

## 2025-03-10 RX ADMIN — ACYCLOVIR 400 MG: 400 TABLET ORAL at 21:12

## 2025-03-10 RX ADMIN — BISACODYL 10 MG: 5 TABLET, COATED ORAL at 16:08

## 2025-03-10 RX ADMIN — HYDROXYCHLOROQUINE SULFATE 200 MG: 200 TABLET, FILM COATED ORAL at 08:55

## 2025-03-10 RX ADMIN — ACYCLOVIR 400 MG: 400 TABLET ORAL at 08:55

## 2025-03-10 RX ADMIN — POLYETHYLENE GLYCOL 3350 17 G: 17 POWDER, FOR SOLUTION ORAL at 16:08

## 2025-03-10 RX ADMIN — PANTOPRAZOLE SODIUM 40 MG: 40 TABLET, DELAYED RELEASE ORAL at 08:55

## 2025-03-10 RX ADMIN — DULOXETINE HYDROCHLORIDE 60 MG: 60 CAPSULE, DELAYED RELEASE ORAL at 08:55

## 2025-03-10 RX ADMIN — OXYCODONE 5 MG: 5 TABLET ORAL at 08:55

## 2025-03-10 RX ADMIN — AMLODIPINE BESYLATE 5 MG: 5 TABLET ORAL at 08:55

## 2025-03-10 RX ADMIN — PREDNISONE 10 MG: 10 TABLET ORAL at 08:55

## 2025-03-10 RX ADMIN — LORAZEPAM 0.5 MG: 0.5 TABLET ORAL at 21:12

## 2025-03-10 ASSESSMENT — COGNITIVE AND FUNCTIONAL STATUS - GENERAL
DAILY ACTIVITIY SCORE: 24
MOBILITY SCORE: 24

## 2025-03-10 ASSESSMENT — PAIN SCALES - GENERAL
PAINLEVEL_OUTOF10: 6
PAINLEVEL_OUTOF10: 6
PAINLEVEL_OUTOF10: 3
PAINLEVEL_OUTOF10: 4

## 2025-03-10 ASSESSMENT — PAIN - FUNCTIONAL ASSESSMENT
PAIN_FUNCTIONAL_ASSESSMENT: 0-10
PAIN_FUNCTIONAL_ASSESSMENT: 0-10

## 2025-03-10 NOTE — CARE PLAN
The patient's goals for the shift include      The clinical goals for the shift include patient will remain HDS through end of shift 3/10 0700    Problem: Pain - Adult  Goal: Verbalizes/displays adequate comfort level or baseline comfort level  Outcome: Progressing     Problem: Safety - Adult  Goal: Free from fall injury  Outcome: Progressing     Problem: Discharge Planning  Goal: Discharge to home or other facility with appropriate resources  Outcome: Progressing     Problem: Chronic Conditions and Co-morbidities  Goal: Patient's chronic conditions and co-morbidity symptoms are monitored and maintained or improved  Outcome: Progressing     Problem: Nutrition  Goal: Nutrient intake appropriate for maintaining nutritional needs  Outcome: Progressing     Problem: Fall/Injury  Goal: Not fall by end of shift  Outcome: Progressing  Goal: Be free from injury by end of the shift  Outcome: Progressing  Goal: Verbalize understanding of personal risk factors for fall in the hospital  Outcome: Progressing  Goal: Verbalize understanding of risk factor reduction measures to prevent injury from fall in the home  Outcome: Progressing  Goal: Use assistive devices by end of the shift  Outcome: Progressing  Goal: Pace activities to prevent fatigue by end of the shift  Outcome: Progressing     Problem: Pain  Goal: Takes deep breaths with improved pain control throughout the shift  Outcome: Progressing  Goal: Turns in bed with improved pain control throughout the shift  Outcome: Progressing  Goal: Walks with improved pain control throughout the shift  Outcome: Progressing  Goal: Performs ADL's with improved pain control throughout shift  Outcome: Progressing  Goal: Participates in PT with improved pain control throughout the shift  Outcome: Progressing  Goal: Free from opioid side effects throughout the shift  Outcome: Progressing  Goal: Free from acute confusion related to pain meds throughout the shift  Outcome: Progressing      Problem: Skin  Goal: Decreased wound size/increased tissue granulation at next dressing change  Outcome: Progressing  Goal: Participates in plan/prevention/treatment measures  Outcome: Progressing  Goal: Prevent/manage excess moisture  Outcome: Progressing  Goal: Prevent/minimize sheer/friction injuries  Outcome: Progressing  Goal: Promote/optimize nutrition  Outcome: Progressing  Goal: Promote skin healing  Outcome: Progressing

## 2025-03-10 NOTE — DISCHARGE INSTRUCTIONS
The patient was provided written and verbal instruction that if they were to develop a fever of 100.4 degrees Fahrenheit or have uncontrolled nausea, vomiting, diarrhea, or pain they should present to the emergency room and call their oncologist's office regardless of the time of day.  The patient verbalized understanding.    Please also avoid enemas/suppositories, flossing and shaving with a razor following chemo because this can expose you to harmful bacteria. Meticulous hand and oral hygiene are the best ways to prevent infections during this time. You should avoid alcohol based mouth washes at this can cause break down in your mouth as well. A salt and baking soda mouthwash is good for keeping the mouth clean. Please also avoid anyone who is sick or large crowds of people if possible. If traveling in large crowds, please wear a mask for your own protection.

## 2025-03-10 NOTE — CARE PLAN
The patient's goals for the shift include      The clinical goals for the shift include Patient will remain HDS through end of shift    Over the shift, the patient did make progress toward the following goals. Barriers to progression include continued fatigue. Recommendations to address these barriers include rest.

## 2025-03-10 NOTE — SIGNIFICANT EVENT
.Rapid Response Nurse Note: RADAR alert: 7    Pager time: [phone]  Arrival time: 1158  Event end time: 1210  Location: [place] [address]  [] Triage by phone or secure messaging    Rapid response initiated by:  [] Rapid response RN [] Family [] Nursing Supervisor [] Physician   [x] RADAR auto page [] Sepsis auto-page [] RN [] RT   [] NP/PA [] Other:     Primary reason for call:   [] BAT [] New CPAP/BiPAP [] Bleeding [] Change in mental status   [] Chest pain [] Code blue [] FiO2 >/= 50% [] HR </= 40 bpm   [] HR >/= 130 bpm [] Hyperglycemia [] Hypoglycemia [x] RADAR    [] RR </= 8 bpm [] RR >/= 30 bpm [] SBP </= 90 mmHg [] SpO2 < 90%   [] Seizure [] Sepsis [] Shortness of breath  [] Staff concern: see comments     Initial VS and/or RADAR VS: T 36.7 °C; ; RR 18; BP 90/58; SPO2 91%.      Interventions:  [x] None [] ABG/VBG [] Assist w/ICU transfer [] BAT paged    [] Bag mask [] Blood [] Cardioversion [] Code Blue   [] Code blue for intubation [] Code status changed [] Chest x-ray [] EKG   [] IV fluid/bolus [] KUB x-ray [] Labs/cultures [] Medication   [] Nebulizer treatment [] NIPPV (CPAP/BiPAP) [] Oxygen [] Oral airway   [] Peripheral IV [] Palliative care consult [] CT/MRI [] Sepsis protocol    [] Suctioned [] Other:     Outcome:  [] Coded and  [] Code blue for intubation [] Coded and transferred to ICU []  on division   [x] Remained on division (no change) [] Remained on division + additional monitoring [] Remained in ED [] Transferred to ED   [] Transferred to ICU [] Transferred to inpatient status [] Transferred for interventions (procedure) [] Transferred to ICU stepdown    [] Transferred to surgery [] Transferred to telemetry [] Sepsis protocol [] STEMI protocol   [] Stroke protocol [x] Bedside nurse instructed to page rapid response for any concerns or acute change in condition/VS     Additional Comments: Reviewed above RADAR VS with Eva GALVIN.  VS within patient's current trends.  Patient denied pain,  shortness of breath, dizziness or lightheadedness.  No interventions by rapid response team indicated at this time.  Staff to page rapid response for any concerns or acute change in condition/VS.

## 2025-03-10 NOTE — PROGRESS NOTES
"Sagrario Garber is a 49 y.o. female on day 20 of admission presenting with Peripheral T-cell lymphoma (Multi).    Subjective   Reports fatigue, sore throat, decreased appetite. Encouraged oral intake. Constipated. Denies vomiting or abd pain. Stopping filgrastim today. ROS otherwise unremarkable.    Objective   Physical Exam  Constitutional:       General: She is not in acute distress.     Appearance: Normal appearance. She is normal weight. She is not ill-appearing.   HENT:      Head: Normocephalic.      Mouth/Throat:      Mouth: Mucous membranes are moist.      Pharynx: Oropharynx is clear.      Comments: Poor dentition  Eyes:      General: No scleral icterus.     Extraocular Movements: Extraocular movements intact.      Conjunctiva/sclera: Conjunctivae normal.   Cardiovascular:      Rate and Rhythm: Normal rate and regular rhythm.      Heart sounds: Normal heart sounds. No murmur heard.     No gallop.   Pulmonary:      Effort: Pulmonary effort is normal.      Breath sounds: Normal breath sounds.   Abdominal:      General: Abdomen is flat. Bowel sounds are normal.      Palpations: Abdomen is soft.   Musculoskeletal:         General: Normal range of motion.   Skin:     General: Skin is warm.      Coloration: Skin is not jaundiced or pale.      Findings: No erythema or rash.   Neurological:      General: No focal deficit present.      Mental Status: She is alert and oriented to person, place, and time. Mental status is at baseline.   Psychiatric:         Mood and Affect: Mood normal.         Behavior: Behavior normal.     Last Recorded Vitals  Blood pressure 96/58, pulse 99, temperature 36.1 °C (97 °F), temperature source Temporal, resp. rate 16, height 1.685 m (5' 6.34\"), weight 73.6 kg (162 lb 4.1 oz), SpO2 94%.  Intake/Output last 3 Shifts:  I/O last 3 completed shifts:  In: 2229 (30.3 mL/kg) [P.O.:120; I.V.:2009 (27.3 mL/kg); IV Piggyback:100]  Out: 0 (0 mL/kg)   Weight: 73.6 kg   This patient has a central " line   Reason for the central line remaining today? Hemodynamic monitoring    Assessment/Plan   Assessment & Plan  Peripheral T-cell lymphoma (Multi)    Angioimmunoblastic T-cell lymphoma    Ms. 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 admitted for Auto Transplant prepped with BEAM (T0=2/24/25)     T+14 Auto (T0=2/24/25)      ONC: (per chart review; see onc history for complete treatment)    # Angioimmunoblastic T-Cell Lymphoma (CD 30+ AK Negative)   - Restaging PET 12/23/24 s/p Cycle 5: excellent response  - S/P BV-CHP (June 2024) x 6 cycles    - Autologous PBSCT prepped with BEAM T0=2/24/2025. Received a total dose of 5.63 x10^6 CD34      Surveillance Monitoring   IgG: On admission : 445  Coag/Fibrinogen 2x/week: WNL   LDH/Hapto: Weekly: WNL       HEME:   # Pancytopenia secondary to chemotherapy, no evidence of bleeding    - Transfuse if Hgb<7 and/or Plt<10  - stopped filgrastim 3/10 since neutrophils >500  # DVT prophy: Holding Lovenox 40mg for thrombocytopenia    ID:   Allergy: Amoxicillin     -Prophylaxis: Acyclovir. Stopped fluconazole and Levofloxacin since no longer neutropenic  -Plan Cefepime for neutropenic fever due to PCN allergy    -Plan Bactrim 800/160mg qMWF start T+30     FEN/GI:   Admit Wt: 77.1 Kg,  Current wt: 73.6 kg (3/3)  #PPI prophy w/protonix on admit   # Chemo induced nausea, improved  - PRN Compazine  #constipation: elisa colace x2 tabs once   - Miralax daily   - bisacodyl x1  #Oral mucositis, throat pain  - BMX TID     CARDIAC:   # History of HTN  - Continue home Norvasc 5mg/day and Atorvastatin 40mg/day @ HS    - ECHO (1/22/25): EF 60-65%  - PT following     RHEUMATOID:   # History of Lupus and Rheumatoid Arthritis    - Currently on Placquenil, Prednisone 10mg/day,    - Follows with Dr Dobbs    - Previously on Saphnelo (interferon alpha antagonist until 7/2023)   - Cont PRN meds for pain, if  worsening, consult Supp Onc     PSYCH:   # Anxiety/Depression    - Continue home Seroquil, Cymbalta, Ativan  - Has followed with behavioral health at [facility] but not established  - Consulted inpatient Psych (2/27), EKG on 2/28     MISC:   # Nicotine Dependence  - Patient considering smoking cessation     DISPO:   - Full Code confirmed on admit    - STELLAK Mom: Stephanie Wilson [phone]   - Non-Tunneled CVC (placed 2/11) remove at discharge     - Mediport (not accessed)  - Primary Onc: Dr. Gunjan Varela   - Currently pt is homeless, prev at SNF, mom & sister are both unable to take her in  - Plan discharge to [facility]'s home (shelter), application started Monday 3/10  - follow up with post transplant and infusion scheduled: 3/14, 3/17, 3/19, 3/21     Pt seen, examined and discussed w/ Dr. Padron

## 2025-03-10 NOTE — PROGRESS NOTES
03/10/25 1200   Discharge Planning   Living Arrangements Alone   Support Systems Children;Parent;Family members   Type of Residence Homeless  (was at [facility] prior to admission but no longer has skilled need)   Who is requesting discharge planning? Patient   Home or Post Acute Services Community services   Expected Discharge Disposition [place]  ([facility] referral submitted 3/10)   Does the patient need discharge transport arranged? Yes   RoundTrip coordination needed? Yes   Has discharge transport been arranged? No     Per provider, pt is medically ready for discharge in 1-2 days. Pt was at [facility] prior to hospital admission for Auto SCT, but no longer has skilled need (ambulating independently and independent with ADLs, no skilled nursing needs). Pt was homeless prior to SNF and reports she cannot stay with any family members. Pt is open to referral being made to [facility], application, release and medical records faxed over today, awaiting response. LSW to cont to follow.

## 2025-03-11 LAB
ALBUMIN SERPL BCP-MCNC: 3.3 G/DL (ref 3.4–5)
ALP SERPL-CCNC: 85 U/L (ref 33–110)
ALT SERPL W P-5'-P-CCNC: 15 U/L (ref 7–45)
ANION GAP SERPL CALC-SCNC: 14 MMOL/L (ref 10–20)
AST SERPL W P-5'-P-CCNC: 9 U/L (ref 9–39)
BASOPHILS # BLD MANUAL: 0 X10*3/UL (ref 0–0.1)
BASOPHILS NFR BLD MANUAL: 0 %
BILIRUB DIRECT SERPL-MCNC: 0.1 MG/DL (ref 0–0.3)
BILIRUB SERPL-MCNC: 0.4 MG/DL (ref 0–1.2)
BUN SERPL-MCNC: 7 MG/DL (ref 6–23)
CALCIUM SERPL-MCNC: 8.4 MG/DL (ref 8.6–10.6)
CHLORIDE SERPL-SCNC: 101 MMOL/L (ref 98–107)
CO2 SERPL-SCNC: 29 MMOL/L (ref 21–32)
CREAT SERPL-MCNC: 0.52 MG/DL (ref 0.5–1.05)
EGFRCR SERPLBLD CKD-EPI 2021: >90 ML/MIN/1.73M*2
EOSINOPHIL # BLD MANUAL: 0 X10*3/UL (ref 0–0.7)
EOSINOPHIL NFR BLD MANUAL: 0 %
ERYTHROCYTE [DISTWIDTH] IN BLOOD BY AUTOMATED COUNT: 13.7 % (ref 11.5–14.5)
GLUCOSE SERPL-MCNC: 86 MG/DL (ref 74–99)
HCT VFR BLD AUTO: 23 % (ref 36–46)
HGB BLD-MCNC: 7.8 G/DL (ref 12–16)
IMM GRANULOCYTES # BLD AUTO: 0.16 X10*3/UL (ref 0–0.7)
IMM GRANULOCYTES NFR BLD AUTO: 4.9 % (ref 0–0.9)
LYMPHOCYTES # BLD MANUAL: 0.45 X10*3/UL (ref 1.2–4.8)
LYMPHOCYTES NFR BLD MANUAL: 13.6 %
MAGNESIUM SERPL-MCNC: 1.83 MG/DL (ref 1.6–2.4)
MCH RBC QN AUTO: 32.1 PG (ref 26–34)
MCHC RBC AUTO-ENTMCNC: 33.9 G/DL (ref 32–36)
MCV RBC AUTO: 95 FL (ref 80–100)
MONOCYTES # BLD MANUAL: 0.53 X10*3/UL (ref 0.1–1)
MONOCYTES NFR BLD MANUAL: 16.1 %
MYELOCYTES # BLD MANUAL: 0.03 X10*3/UL
MYELOCYTES NFR BLD MANUAL: 0.8 %
NEUTROPHILS # BLD MANUAL: 2.19 X10*3/UL (ref 1.2–7.7)
NEUTS BAND # BLD MANUAL: 0.06 X10*3/UL (ref 0–0.7)
NEUTS BAND NFR BLD MANUAL: 1.7 %
NEUTS SEG # BLD MANUAL: 2.13 X10*3/UL (ref 1.2–7)
NEUTS SEG NFR BLD MANUAL: 64.4 %
NRBC BLD-RTO: 0 /100 WBCS (ref 0–0)
PHOSPHATE SERPL-MCNC: 4.5 MG/DL (ref 2.5–4.9)
PLATELET # BLD AUTO: 20 X10*3/UL (ref 150–450)
POTASSIUM SERPL-SCNC: 3.7 MMOL/L (ref 3.5–5.3)
PROT SERPL-MCNC: 5.3 G/DL (ref 6.4–8.2)
RBC # BLD AUTO: 2.43 X10*6/UL (ref 4–5.2)
RBC MORPH BLD: ABNORMAL
SODIUM SERPL-SCNC: 140 MMOL/L (ref 136–145)
TOTAL CELLS COUNTED BLD: 118
VARIANT LYMPHS # BLD MANUAL: 0.11 X10*3/UL (ref 0–0.5)
VARIANT LYMPHS NFR BLD: 3.4 %
WBC # BLD AUTO: 3.3 X10*3/UL (ref 4.4–11.3)

## 2025-03-11 PROCEDURE — 84100 ASSAY OF PHOSPHORUS: CPT | Performed by: NURSE PRACTITIONER

## 2025-03-11 PROCEDURE — 80048 BASIC METABOLIC PNL TOTAL CA: CPT | Performed by: NURSE PRACTITIONER

## 2025-03-11 PROCEDURE — 85007 BL SMEAR W/DIFF WBC COUNT: CPT | Performed by: NURSE PRACTITIONER

## 2025-03-11 PROCEDURE — 2500000004 HC RX 250 GENERAL PHARMACY W/ HCPCS (ALT 636 FOR OP/ED): Mod: SE

## 2025-03-11 PROCEDURE — 2500000004 HC RX 250 GENERAL PHARMACY W/ HCPCS (ALT 636 FOR OP/ED): Mod: SE | Performed by: NURSE PRACTITIONER

## 2025-03-11 PROCEDURE — 1170000001 HC PRIVATE ONCOLOGY ROOM DAILY

## 2025-03-11 PROCEDURE — 85027 COMPLETE CBC AUTOMATED: CPT | Performed by: NURSE PRACTITIONER

## 2025-03-11 PROCEDURE — 2500000001 HC RX 250 WO HCPCS SELF ADMINISTERED DRUGS (ALT 637 FOR MEDICARE OP): Mod: SE | Performed by: NURSE PRACTITIONER

## 2025-03-11 PROCEDURE — 2500000002 HC RX 250 W HCPCS SELF ADMINISTERED DRUGS (ALT 637 FOR MEDICARE OP, ALT 636 FOR OP/ED): Mod: SE | Performed by: NURSE PRACTITIONER

## 2025-03-11 PROCEDURE — RXMED WILLOW AMBULATORY MEDICATION CHARGE

## 2025-03-11 PROCEDURE — 2500000001 HC RX 250 WO HCPCS SELF ADMINISTERED DRUGS (ALT 637 FOR MEDICARE OP): Mod: SE | Performed by: INTERNAL MEDICINE

## 2025-03-11 PROCEDURE — 82248 BILIRUBIN DIRECT: CPT | Performed by: NURSE PRACTITIONER

## 2025-03-11 PROCEDURE — 2500000001 HC RX 250 WO HCPCS SELF ADMINISTERED DRUGS (ALT 637 FOR MEDICARE OP): Mod: SE

## 2025-03-11 PROCEDURE — 83735 ASSAY OF MAGNESIUM: CPT | Performed by: NURSE PRACTITIONER

## 2025-03-11 RX ORDER — HYDROMORPHONE HYDROCHLORIDE 2 MG/1
2 TABLET ORAL 2 TIMES DAILY PRN
Qty: 20 TABLET | Refills: 0 | Status: SHIPPED | OUTPATIENT
Start: 2025-03-11

## 2025-03-11 RX ORDER — ACYCLOVIR 400 MG/1
400 TABLET ORAL EVERY 12 HOURS
Qty: 60 TABLET | Refills: 2 | Status: SHIPPED | OUTPATIENT
Start: 2025-03-11

## 2025-03-11 RX ORDER — ONDANSETRON HYDROCHLORIDE 8 MG/1
8 TABLET, FILM COATED ORAL EVERY 8 HOURS PRN
Qty: 30 TABLET | Refills: 0 | Status: SHIPPED | OUTPATIENT
Start: 2025-03-11

## 2025-03-11 RX ORDER — PREDNISONE 10 MG/1
10 TABLET ORAL DAILY
Qty: 30 TABLET | Refills: 0 | Status: SHIPPED | OUTPATIENT
Start: 2025-03-11

## 2025-03-11 RX ORDER — POLYETHYLENE GLYCOL 3350 17 G/17G
17 POWDER, FOR SOLUTION ORAL DAILY
Qty: 14 PACKET | Refills: 0 | Status: SHIPPED | OUTPATIENT
Start: 2025-03-11 | End: 2025-03-11 | Stop reason: HOSPADM

## 2025-03-11 RX ORDER — AMOXICILLIN 250 MG
1 CAPSULE ORAL NIGHTLY PRN
Qty: 30 TABLET | Refills: 0 | Status: SHIPPED | OUTPATIENT
Start: 2025-03-11

## 2025-03-11 RX ORDER — QUETIAPINE FUMARATE 100 MG/1
100 TABLET, FILM COATED ORAL NIGHTLY
Qty: 30 TABLET | Refills: 0 | Status: SHIPPED | OUTPATIENT
Start: 2025-03-11 | End: 2025-03-26 | Stop reason: SDUPTHER

## 2025-03-11 RX ORDER — POLYETHYLENE GLYCOL 3350 17 G/17G
17 POWDER, FOR SOLUTION ORAL DAILY PRN
Qty: 510 G | Refills: 0 | Status: SHIPPED | OUTPATIENT
Start: 2025-03-11

## 2025-03-11 RX ORDER — PROCHLORPERAZINE MALEATE 10 MG
10 TABLET ORAL EVERY 6 HOURS PRN
Qty: 30 TABLET | Refills: 0 | Status: SHIPPED | OUTPATIENT
Start: 2025-03-11

## 2025-03-11 RX ORDER — DULOXETIN HYDROCHLORIDE 60 MG/1
60 CAPSULE, DELAYED RELEASE ORAL 2 TIMES DAILY
Qty: 60 CAPSULE | Refills: 0 | Status: SHIPPED | OUTPATIENT
Start: 2025-03-11 | End: 2025-03-26 | Stop reason: SDUPTHER

## 2025-03-11 RX ORDER — ATORVASTATIN CALCIUM 40 MG/1
40 TABLET, FILM COATED ORAL NIGHTLY
Qty: 30 TABLET | Refills: 0 | Status: SHIPPED | OUTPATIENT
Start: 2025-03-11

## 2025-03-11 RX ORDER — HYDROXYCHLOROQUINE SULFATE 200 MG/1
200 TABLET, FILM COATED ORAL DAILY
Qty: 30 TABLET | Refills: 0 | Status: SHIPPED | OUTPATIENT
Start: 2025-03-11

## 2025-03-11 RX ORDER — QUETIAPINE FUMARATE 50 MG/1
50 TABLET, FILM COATED ORAL NIGHTLY
Qty: 30 TABLET | Refills: 0 | Status: SHIPPED | OUTPATIENT
Start: 2025-03-11 | End: 2025-03-11 | Stop reason: HOSPADM

## 2025-03-11 RX ORDER — LORAZEPAM 0.5 MG/1
0.5 TABLET ORAL 2 TIMES DAILY PRN
Qty: 20 TABLET | Refills: 0 | Status: SHIPPED | OUTPATIENT
Start: 2025-03-11 | End: 2025-03-26 | Stop reason: SDUPTHER

## 2025-03-11 RX ORDER — AMOXICILLIN 250 MG
1 CAPSULE ORAL NIGHTLY PRN
Status: DISCONTINUED | OUTPATIENT
Start: 2025-03-11 | End: 2025-03-13

## 2025-03-11 RX ADMIN — HYDROMORPHONE HYDROCHLORIDE 2 MG: 2 TABLET ORAL at 13:41

## 2025-03-11 RX ADMIN — QUETIAPINE FUMARATE 100 MG: 25 TABLET ORAL at 20:25

## 2025-03-11 RX ADMIN — HYDROXYCHLOROQUINE SULFATE 200 MG: 200 TABLET, FILM COATED ORAL at 08:56

## 2025-03-11 RX ADMIN — ACYCLOVIR 400 MG: 400 TABLET ORAL at 20:25

## 2025-03-11 RX ADMIN — PANTOPRAZOLE SODIUM 40 MG: 40 TABLET, DELAYED RELEASE ORAL at 04:57

## 2025-03-11 RX ADMIN — OXYCODONE 5 MG: 5 TABLET ORAL at 20:25

## 2025-03-11 RX ADMIN — POLYETHYLENE GLYCOL 3350 17 G: 17 POWDER, FOR SOLUTION ORAL at 08:56

## 2025-03-11 RX ADMIN — SODIUM CHLORIDE, POTASSIUM CHLORIDE, SODIUM LACTATE AND CALCIUM CHLORIDE 1000 ML: 600; 310; 30; 20 INJECTION, SOLUTION INTRAVENOUS at 15:59

## 2025-03-11 RX ADMIN — LORAZEPAM 0.5 MG: 0.5 TABLET ORAL at 08:57

## 2025-03-11 RX ADMIN — PROCHLORPERAZINE EDISYLATE 10 MG: 5 INJECTION INTRAMUSCULAR; INTRAVENOUS at 08:57

## 2025-03-11 RX ADMIN — DULOXETINE HYDROCHLORIDE 60 MG: 60 CAPSULE, DELAYED RELEASE ORAL at 20:25

## 2025-03-11 RX ADMIN — OXYCODONE 5 MG: 5 TABLET ORAL at 08:57

## 2025-03-11 RX ADMIN — ACYCLOVIR 400 MG: 400 TABLET ORAL at 08:56

## 2025-03-11 RX ADMIN — DULOXETINE HYDROCHLORIDE 60 MG: 60 CAPSULE, DELAYED RELEASE ORAL at 08:56

## 2025-03-11 RX ADMIN — LORAZEPAM 0.5 MG: 0.5 TABLET ORAL at 20:25

## 2025-03-11 RX ADMIN — PROCHLORPERAZINE EDISYLATE 10 MG: 5 INJECTION INTRAMUSCULAR; INTRAVENOUS at 20:25

## 2025-03-11 RX ADMIN — PREDNISONE 10 MG: 10 TABLET ORAL at 08:56

## 2025-03-11 RX ADMIN — SODIUM CHLORIDE, POTASSIUM CHLORIDE, SODIUM LACTATE AND CALCIUM CHLORIDE 1000 ML: 600; 310; 30; 20 INJECTION, SOLUTION INTRAVENOUS at 08:52

## 2025-03-11 RX ADMIN — SENNOSIDES AND DOCUSATE SODIUM 1 TABLET: 50; 8.6 TABLET ORAL at 20:38

## 2025-03-11 RX ADMIN — ATORVASTATIN CALCIUM 40 MG: 40 TABLET, FILM COATED ORAL at 08:56

## 2025-03-11 ASSESSMENT — PAIN SCALES - GENERAL
PAINLEVEL_OUTOF10: 5 - MODERATE PAIN
PAINLEVEL_OUTOF10: 5 - MODERATE PAIN
PAINLEVEL_OUTOF10: 7
PAINLEVEL_OUTOF10: 2
PAINLEVEL_OUTOF10: 6
PAINLEVEL_OUTOF10: 7

## 2025-03-11 ASSESSMENT — COGNITIVE AND FUNCTIONAL STATUS - GENERAL
MOBILITY SCORE: 24
DAILY ACTIVITIY SCORE: 24

## 2025-03-11 ASSESSMENT — PAIN - FUNCTIONAL ASSESSMENT
PAIN_FUNCTIONAL_ASSESSMENT: 0-10

## 2025-03-11 ASSESSMENT — PAIN DESCRIPTION - DESCRIPTORS
DESCRIPTORS: ACHING;SORE
DESCRIPTORS: ACHING;SORE

## 2025-03-11 NOTE — PROGRESS NOTES
03/10/25 1200   Discharge Planning   Living Arrangements Alone   Support Systems Children;Parent;Family members   Type of Residence Homeless  (was at [facility] prior to admission but no longer has skilled need)   Who is requesting discharge planning? Patient   Home or Post Acute Services Community services   Expected Discharge Disposition [place]  ([facility] referral submitted 3/10)   Does the patient need discharge transport arranged? Yes   RoundTrip coordination needed? Yes   Has discharge transport been arranged? No     3/10- Per provider, pt is medically ready for discharge in 1-2 days. Pt was at [facility] prior to hospital admission for Auto SCT, but no longer has skilled need (ambulating independently and independent with ADLs, no skilled nursing needs). Pt was homeless prior to SNF and reports she cannot stay with any family members. Pt is open to referral being made to [facility], application, release and medical records faxed over today, awaiting response. LSW to cont to follow.    3/11- LSW called [facility] and spoke with Genna to follow up on referral. Genna reports she did not receive referral and asked that it be emailed to her instead. LSW emailed referral. Genna reports they do have openings at this time and she will review referral for acceptance and call LSW back. LSW to cont to follow.

## 2025-03-11 NOTE — SIGNIFICANT EVENT
Rapid Response Nurse Note: RADAR alert: Score = 6    Pager time:   Arrival time:   Event end time:   Location:[place] [address]  [] Triage by phone or secure messaging    Rapid response initiated by:  [] Rapid response RN [] Family [] Nursing Supervisor [] Physician   [x] RADAR auto page [] Sepsis auto-page [] RN [] RT   [] NP/PA [] Other:     Primary reason for call:   [] BAT [] New CPAP/BiPAP [] Bleeding [] Change in mental status   [] Chest pain [] Code blue [] FiO2 >/= 50% [] HR </= 40 bpm   [] HR >/= 130 bpm [] Hyperglycemia [] Hypoglycemia [x] RADAR    [] RR </= 8 bpm [] RR >/= 30 bpm [] SBP </= 90 mmHg [] SpO2 < 90%   [] Seizure [] Sepsis [] Shortness of breath  [] Staff concern: see comments     Initial VS and/or RADAR VS: T 36.1 °C; ; RR 18; BP 88/53; SPO2 93%.      Interventions:  [x] None [] ABG/VBG [] Assist w/ICU transfer [] BAT paged    [] Bag mask [] Blood [] Cardioversion [] Code Blue   [] Code blue for intubation [] Code status changed [] Chest x-ray [] EKG   [] IV fluid/bolus [] KUB x-ray [] Labs/cultures [] Medication   [] Nebulizer treatment [] NIPPV (CPAP/BiPAP) [] Oxygen [] Oral airway   [] Peripheral IV [] Palliative care consult [] CT/MRI [] Sepsis protocol    [] Suctioned [] Other:     Outcome:  [] Coded and  [] Code blue for intubation [] Coded and transferred to ICU []  on division   [x] Remained on division (no change) [] Remained on division + additional monitoring [] Remained in ED [] Transferred to ED   [] Transferred to ICU [] Transferred to inpatient status [] Transferred for interventions (procedure) [] Transferred to ICU stepdown    [] Transferred to surgery [] Transferred to telemetry [] Sepsis protocol [] STEMI protocol   [] Stroke protocol [x] Bedside nurse instructed to page rapid response for any concerns or acute change in condition/VS     Additional Comments: RADAR auto page received for above vitals. Per bedside RN, no acute changes at this time;  pt remains asymptomatic with BP and is receiving slow IVF bolus. No rapid response interventions requested at this time. Please page rapid response for any concerns for deterioration or assistance needed.

## 2025-03-11 NOTE — SIGNIFICANT EVENT
Pt approaching discharge, please order ambulatory referral to onco-psychiatry to connect with outpatient provider. [facility] will continue to follow as needed while admitted.     Page [phone] with further questions/concerns    Елена Clancy MD  [facility] Attending

## 2025-03-11 NOTE — PROGRESS NOTES
"Sagrario Garber is a 49 y.o. female on day 21 of admission presenting with Peripheral T-cell lymphoma (Multi).    Subjective   Ortho positive today, 1L LR, not symptomatic. Encouraged pt to drink more water, less caffeinated beverages. Pt's application to [facility] home pending approval, room is available, planning for Wed discharge if possible.    Objective   Physical Exam  Constitutional:       General: She is not in acute distress.     Appearance: Normal appearance. She is normal weight. She is not ill-appearing.   HENT:      Head: Normocephalic.      Mouth/Throat:      Mouth: Mucous membranes are moist.      Pharynx: Oropharynx is clear.      Comments: Poor dentition  Eyes:      General: No scleral icterus.     Extraocular Movements: Extraocular movements intact.      Conjunctiva/sclera: Conjunctivae normal.   Cardiovascular:      Rate and Rhythm: Normal rate and regular rhythm.      Heart sounds: Normal heart sounds. No murmur heard.     No gallop.   Pulmonary:      Effort: Pulmonary effort is normal.      Breath sounds: Normal breath sounds.   Abdominal:      General: Abdomen is flat. Bowel sounds are normal.      Palpations: Abdomen is soft.   Musculoskeletal:         General: Normal range of motion.   Skin:     General: Skin is warm.      Coloration: Skin is not jaundiced or pale.      Findings: No erythema or rash.   Neurological:      General: No focal deficit present.      Mental Status: She is alert and oriented to person, place, and time. Mental status is at baseline.   Psychiatric:         Mood and Affect: Mood normal.         Behavior: Behavior normal.     Last Recorded Vitals  Blood pressure (!) 75/48, pulse (!) 121, temperature 36.6 °C (97.9 °F), temperature source Temporal, resp. rate 18, height 1.685 m (5' 6.34\"), weight 73.7 kg (162 lb 7.7 oz), SpO2 94%.  Intake/Output last 3 Shifts:  I/O last 3 completed shifts:  In: 120 (1.6 mL/kg) [P.O.:120]  Out: 0 (0 mL/kg)   Weight: 73.6 kg   This patient has " a central line   Reason for the central line remaining today? Hemodynamic monitoring    Assessment/Plan   Assessment & Plan  Peripheral T-cell lymphoma (Multi)    Angioimmunoblastic T-cell lymphoma    Ms. 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 admitted for Auto Transplant prepped with BEAM (T0=2/24/25). Counts recovered. Pt ortho positive, encouraging non caffeinated PO fluids, plan discharge to [facility]'s home, application pending.    T+15   Auto (T0=2/24/25)      ONC: (per chart review; see onc history for complete treatment)    # Angioimmunoblastic T-Cell Lymphoma (CD 30+ AK Negative)   - Restaging PET 12/23/24 s/p Cycle 5: excellent response  - S/P BV-CHP (June 2024) x 6 cycles    - Autologous PBSCT prepped with BEAM T0=2/24/2025. Received a total dose of 5.63 x10^6 CD34      Surveillance Monitoring   - IgG: On admission : 445  - Coag/Fibrinogen 2x/week: WNL   - LDH/Hapto: Weekly: WNL       HEME:   # Pancytopenia secondary to chemotherapy, no evidence of bleeding    - Transfuse if Hgb<7 and/or Plt<10  - stopped filgrastim 3/10 since neutrophils >500  # DVT prophy: Holding Lovenox 40mg for thrombocytopenia    ID:   Allergy: Amoxicillin     - Prophylaxis: Acyclovir.   - Stopped fluconazole and Levofloxacin since no longer neutropenic  - Plan Cefepime for neutropenic fever due to PCN allergy    - Plan Bactrim 800/160mg qMWF start T+30     FEN/GI:   Admit Wt: 77.1 Kg,  Current wt: 73.7 kg (3/11)  # PPI prophy w/protonix on admit   # Chemo induced nausea, improved  - PRN Compazine, Zofran  # constipation: elisa colace x2 tabs once   - Miralax daily   - bisacodyl x1  # Oral mucositis, throat pain, improving  - BMX TID     CARDIAC:   # History of HTN  - Continue home Norvasc 5mg/day and Atorvastatin 40mg/day @ HS    - ECHO (1/22/25): EF 60-65%  - PT following     RHEUMATOID:   # History of Lupus and Rheumatoid Arthritis    -  Currently on Placquenil, Prednisone 10mg/day,    - Follows with Dr Dobbs    - Previously on Saphnelo (interferon alpha antagonist until 7/2023)   - Cont PRN meds for pain, if worsening, consult Supp Onc     PSYCH:   # Anxiety/Depression    - Continue home Seroquil, Cymbalta, Ativan  - Has followed with behavioral health at [facility] but not established  - Consulted inpatient Psych (2/27), EKG on 2/28     MISC:   # Nicotine Dependence  - Patient considering smoking cessation     DISPO:   - Full Code confirmed on admit    - STELLAK Mom: Stephanie Wilson [phone]   - Non-Tunneled CVC (placed 2/11) remove at discharge     - Mediport (not accessed)  - Primary Onc: Dr. Gunjan Varela  - Currently pt is homeless, prev at SNF, mom & sister are both unable to take her in.  - Plan discharge to [facility] (shelter), application pending, room is available.  - follow up with post transplant and infusion scheduled: 3/14, 3/17, 3/19, 3/21 ([facility] home can assist with setting up transport thru insurance.  - Also requested follow up with Rheumatology & Psych.     Pt seen, examined and discussed w/ Dr. Nereida Keenan, APRN-CNP

## 2025-03-11 NOTE — SIGNIFICANT EVENT
Rapid Response Nurse Note: ISRAEL score of 6    Pager time: 08:16  Arrival time: 08:17  Event end time: 08:35  Location: [facility]  [x] Triage by secure messaging    Rapid response initiated by:  [] Rapid response RN [] Family [] Nursing Supervisor [] Physician   [x] RADAR auto page [] Sepsis auto-page [] RN [] RT   [] NP/PA [] Other:     Primary reason for call:   [] BAT [] New CPAP/BiPAP [] Bleeding [] Change in mental status   [] Chest pain [] Code blue [] FiO2 >/= 50% [] HR </= 40 bpm   [] HR >/= 130 bpm [] Hyperglycemia [] Hypoglycemia [x] RADAR    [] RR </= 8 bpm [] RR >/= 30 bpm [] SBP </= 90 mmHg [] SpO2 < 90%   [] Seizure [] Sepsis [] Shortness of breath  [] Staff concern: see comments     Interventions:  [x] None [] ABG/VBG [] Assist w/ICU transfer [] BAT paged    [] Bag mask [] Blood [] Cardioversion [] Code Blue   [] Code blue for intubation [] Code status changed [] Chest x-ray [] EKG   [] IV fluid/bolus [] KUB x-ray [] Labs/cultures [] Medication   [] Nebulizer treatment [] NIPPV (CPAP/BiPAP) [] Oxygen [] Oral airway   [] Peripheral IV [] Palliative care consult [] CT/MRI [] Sepsis protocol    [] Suctioned [] Other:     Outcome:  [] Coded and  [] Code blue for intubation [] Coded and transferred to ICU []  on division   [x] Remained on division (no change) [] Remained on division + additional monitoring [] Remained in ED [] Transferred to ED   [] Transferred to ICU [] Transferred to inpatient status [] Transferred for interventions (procedure) [] Transferred to ICU stepdown    [] Transferred to surgery [] Transferred to telemetry [] Sepsis protocol [] STEMI protocol   [] Stroke protocol       Additional Comments:      Notified nurse of ISRAEL kong received: 36.6 121 18 75/48 94.  No concerns at this time per nurse; patient receiving 1 Liter bolus over 4 hours and medications adjusted.  Encouraged nurse to call a rapid response as needed if patient status changes.

## 2025-03-12 LAB
ALBUMIN SERPL BCP-MCNC: 3.1 G/DL (ref 3.4–5)
ALP SERPL-CCNC: 83 U/L (ref 33–110)
ALT SERPL W P-5'-P-CCNC: 14 U/L (ref 7–45)
ANION GAP SERPL CALC-SCNC: 11 MMOL/L (ref 10–20)
APTT PPP: 29 SECONDS (ref 26–36)
AST SERPL W P-5'-P-CCNC: 9 U/L (ref 9–39)
BASOPHILS # BLD MANUAL: 0 X10*3/UL (ref 0–0.1)
BASOPHILS NFR BLD MANUAL: 0 %
BILIRUB DIRECT SERPL-MCNC: 0.1 MG/DL (ref 0–0.3)
BILIRUB SERPL-MCNC: 0.3 MG/DL (ref 0–1.2)
BUN SERPL-MCNC: 9 MG/DL (ref 6–23)
CALCIUM SERPL-MCNC: 8.5 MG/DL (ref 8.6–10.6)
CHLORIDE SERPL-SCNC: 103 MMOL/L (ref 98–107)
CO2 SERPL-SCNC: 31 MMOL/L (ref 21–32)
CREAT SERPL-MCNC: 0.54 MG/DL (ref 0.5–1.05)
EGFRCR SERPLBLD CKD-EPI 2021: >90 ML/MIN/1.73M*2
EOSINOPHIL # BLD MANUAL: 0 X10*3/UL (ref 0–0.7)
EOSINOPHIL NFR BLD MANUAL: 0 %
ERYTHROCYTE [DISTWIDTH] IN BLOOD BY AUTOMATED COUNT: 13.7 % (ref 11.5–14.5)
FIBRINOGEN PPP-MCNC: 383 MG/DL (ref 200–400)
GLUCOSE SERPL-MCNC: 104 MG/DL (ref 74–99)
HCT VFR BLD AUTO: 21.4 % (ref 36–46)
HGB BLD-MCNC: 7.1 G/DL (ref 12–16)
IMM GRANULOCYTES # BLD AUTO: 0.12 X10*3/UL (ref 0–0.7)
IMM GRANULOCYTES NFR BLD AUTO: 4.7 % (ref 0–0.9)
INR PPP: 1.1 (ref 0.9–1.1)
LDH SERPL L TO P-CCNC: 154 U/L (ref 84–246)
LYMPHOCYTES # BLD MANUAL: 0.44 X10*3/UL (ref 1.2–4.8)
LYMPHOCYTES NFR BLD MANUAL: 17.7 %
MAGNESIUM SERPL-MCNC: 1.85 MG/DL (ref 1.6–2.4)
MCH RBC QN AUTO: 31.6 PG (ref 26–34)
MCHC RBC AUTO-ENTMCNC: 33.2 G/DL (ref 32–36)
MCV RBC AUTO: 95 FL (ref 80–100)
MONOCYTES # BLD MANUAL: 0.33 X10*3/UL (ref 0.1–1)
MONOCYTES NFR BLD MANUAL: 13.3 %
NEUTS SEG # BLD MANUAL: 1.66 X10*3/UL (ref 1.2–7)
NEUTS SEG NFR BLD MANUAL: 66.4 %
NRBC BLD-RTO: 0 /100 WBCS (ref 0–0)
PHOSPHATE SERPL-MCNC: 5 MG/DL (ref 2.5–4.9)
PLATELET # BLD AUTO: 21 X10*3/UL (ref 150–450)
POTASSIUM SERPL-SCNC: 3.5 MMOL/L (ref 3.5–5.3)
PROMYELOCYTES # BLD MANUAL: 0.04 X10*3/UL
PROMYELOCYTES NFR BLD MANUAL: 1.7 %
PROT SERPL-MCNC: 4.9 G/DL (ref 6.4–8.2)
PROTHROMBIN TIME: 11.9 SECONDS (ref 9.8–12.4)
RBC # BLD AUTO: 2.25 X10*6/UL (ref 4–5.2)
RBC MORPH BLD: ABNORMAL
SODIUM SERPL-SCNC: 141 MMOL/L (ref 136–145)
TOTAL CELLS COUNTED BLD: 113
VARIANT LYMPHS # BLD MANUAL: 0.02 X10*3/UL (ref 0–0.5)
VARIANT LYMPHS NFR BLD: 0.9 %
WBC # BLD AUTO: 2.5 X10*3/UL (ref 4.4–11.3)

## 2025-03-12 PROCEDURE — 84100 ASSAY OF PHOSPHORUS: CPT | Performed by: NURSE PRACTITIONER

## 2025-03-12 PROCEDURE — 99232 SBSQ HOSP IP/OBS MODERATE 35: CPT | Performed by: STUDENT IN AN ORGANIZED HEALTH CARE EDUCATION/TRAINING PROGRAM

## 2025-03-12 PROCEDURE — 1170000001 HC PRIVATE ONCOLOGY ROOM DAILY

## 2025-03-12 PROCEDURE — 2500000001 HC RX 250 WO HCPCS SELF ADMINISTERED DRUGS (ALT 637 FOR MEDICARE OP): Mod: SE | Performed by: INTERNAL MEDICINE

## 2025-03-12 PROCEDURE — 2500000004 HC RX 250 GENERAL PHARMACY W/ HCPCS (ALT 636 FOR OP/ED): Mod: SE | Performed by: NURSE PRACTITIONER

## 2025-03-12 PROCEDURE — 2500000004 HC RX 250 GENERAL PHARMACY W/ HCPCS (ALT 636 FOR OP/ED): Mod: SE

## 2025-03-12 PROCEDURE — 2500000002 HC RX 250 W HCPCS SELF ADMINISTERED DRUGS (ALT 637 FOR MEDICARE OP, ALT 636 FOR OP/ED): Mod: SE | Performed by: NURSE PRACTITIONER

## 2025-03-12 PROCEDURE — 85027 COMPLETE CBC AUTOMATED: CPT | Performed by: NURSE PRACTITIONER

## 2025-03-12 PROCEDURE — 85007 BL SMEAR W/DIFF WBC COUNT: CPT | Performed by: NURSE PRACTITIONER

## 2025-03-12 PROCEDURE — 83735 ASSAY OF MAGNESIUM: CPT | Performed by: NURSE PRACTITIONER

## 2025-03-12 PROCEDURE — 82248 BILIRUBIN DIRECT: CPT | Performed by: NURSE PRACTITIONER

## 2025-03-12 PROCEDURE — 85384 FIBRINOGEN ACTIVITY: CPT

## 2025-03-12 PROCEDURE — 2500000001 HC RX 250 WO HCPCS SELF ADMINISTERED DRUGS (ALT 637 FOR MEDICARE OP): Mod: SE | Performed by: NURSE PRACTITIONER

## 2025-03-12 PROCEDURE — 85730 THROMBOPLASTIN TIME PARTIAL: CPT

## 2025-03-12 PROCEDURE — 83615 LACTATE (LD) (LDH) ENZYME: CPT

## 2025-03-12 PROCEDURE — 80048 BASIC METABOLIC PNL TOTAL CA: CPT | Performed by: NURSE PRACTITIONER

## 2025-03-12 RX ORDER — LIDOCAINE HYDROCHLORIDE 20 MG/ML
1.25 SOLUTION OROPHARYNGEAL EVERY 4 HOURS PRN
Status: DISCONTINUED | OUTPATIENT
Start: 2025-03-12 | End: 2025-03-17 | Stop reason: HOSPADM

## 2025-03-12 RX ADMIN — PREDNISONE 10 MG: 10 TABLET ORAL at 08:25

## 2025-03-12 RX ADMIN — ACYCLOVIR 400 MG: 400 TABLET ORAL at 08:26

## 2025-03-12 RX ADMIN — DULOXETINE HYDROCHLORIDE 60 MG: 60 CAPSULE, DELAYED RELEASE ORAL at 08:25

## 2025-03-12 RX ADMIN — DULOXETINE HYDROCHLORIDE 60 MG: 60 CAPSULE, DELAYED RELEASE ORAL at 21:40

## 2025-03-12 RX ADMIN — OXYCODONE 5 MG: 5 TABLET ORAL at 15:58

## 2025-03-12 RX ADMIN — QUETIAPINE FUMARATE 100 MG: 25 TABLET ORAL at 21:40

## 2025-03-12 RX ADMIN — LORAZEPAM 0.5 MG: 0.5 TABLET ORAL at 08:25

## 2025-03-12 RX ADMIN — OXYCODONE 5 MG: 5 TABLET ORAL at 08:25

## 2025-03-12 RX ADMIN — LORAZEPAM 0.5 MG: 0.5 TABLET ORAL at 21:44

## 2025-03-12 RX ADMIN — ACYCLOVIR 400 MG: 400 TABLET ORAL at 21:40

## 2025-03-12 RX ADMIN — HYDROMORPHONE HYDROCHLORIDE 2 MG: 2 TABLET ORAL at 21:44

## 2025-03-12 RX ADMIN — HYDROXYCHLOROQUINE SULFATE 200 MG: 200 TABLET, FILM COATED ORAL at 08:25

## 2025-03-12 RX ADMIN — PANTOPRAZOLE SODIUM 40 MG: 40 TABLET, DELAYED RELEASE ORAL at 06:21

## 2025-03-12 RX ADMIN — PROCHLORPERAZINE EDISYLATE 10 MG: 5 INJECTION INTRAMUSCULAR; INTRAVENOUS at 21:44

## 2025-03-12 RX ADMIN — POLYETHYLENE GLYCOL 3350 17 G: 17 POWDER, FOR SOLUTION ORAL at 08:26

## 2025-03-12 RX ADMIN — ATORVASTATIN CALCIUM 40 MG: 40 TABLET, FILM COATED ORAL at 08:25

## 2025-03-12 ASSESSMENT — COGNITIVE AND FUNCTIONAL STATUS - GENERAL
DAILY ACTIVITIY SCORE: 24
DAILY ACTIVITIY SCORE: 24
MOBILITY SCORE: 24
MOBILITY SCORE: 24

## 2025-03-12 ASSESSMENT — PAIN SCALES - PAIN ASSESSMENT IN ADVANCED DEMENTIA (PAINAD): TOTALSCORE: MEDICATION (SEE MAR)

## 2025-03-12 ASSESSMENT — PAIN SCALES - GENERAL
PAINLEVEL_OUTOF10: 7
PAINLEVEL_OUTOF10: 4
PAINLEVEL_OUTOF10: 4
PAINLEVEL_OUTOF10: 6
PAINLEVEL_OUTOF10: 6
PAINLEVEL_OUTOF10: 4

## 2025-03-12 ASSESSMENT — PAIN DESCRIPTION - LOCATION: LOCATION: BACK

## 2025-03-12 ASSESSMENT — PAIN - FUNCTIONAL ASSESSMENT: PAIN_FUNCTIONAL_ASSESSMENT: 0-10

## 2025-03-12 NOTE — PROGRESS NOTES
03/10/25 1200   Discharge Planning   Living Arrangements Alone   Support Systems Children;Parent;Family members   Type of Residence Homeless  (was at [facility] prior to admission but no longer has skilled need)   Who is requesting discharge planning? Patient   Home or Post Acute Services Community services   Expected Discharge Disposition Othe  ([facility] referral submitted 3/10)   Does the patient need discharge transport arranged? Yes   RoundTrip coordination needed? Yes   Has discharge transport been arranged? No     3/10- Per provider, pt is medically ready for discharge in 1-2 days. Pt was at [facility] prior to hospital admission for Auto SCT, but no longer has skilled need (ambulating independently and independent with ADLs, no skilled nursing needs). Pt was homeless prior to SNF and reports she cannot stay with any family members. Pt is open to referral being made to [facility], application, release and medical records faxed over today, awaiting response. LSW to cont to follow.    3/11- LSW called [facility] and spoke with Genna [phone] to follow up on referral. Genna reports she did not receive referral and asked that it be emailed to her instead. LSW emailed referral. Genna reports they do have openings at this time and she will review referral for acceptance and call LSW back. LSW to cont to follow.    3/12 1180- LSW received email from Genna that they have tried to reach pt to complete screen but have not heard back, requested pt call the community health worker Gely [phone] to complete screen. LSW met with pt at bedside to provide information and encourage pt to return call. Pt expressed concern about going to [facility] and asked why she cannot return to SNF. LSW discussed with SNF criteria with pt and why pt does not qualify as she is independent with ambulation and ADLs. Pt expressed anxiety about not knowing anything about [facility]. LSW provided supportive listening and  validation of feeling and answered pt's questions. LSW previously provided a packet of information about [facility] to pt which she confirmed she had. LSW encouraged pt to call Tearra back at number provided to complete screen and also ask her more about [facility] to feel more comfortable. LSW also informed pt that she does not have to go to [facility], but that the other options would be a regular shelter or finding someone she can stay with. Pt in agreement that [facility] seems like her best option at this time and reports she will call to complete screen. AMINTAW to cont to follow.    3/12 1700- LSW notified by RN that pt told her she no longer wants to go to [facility]. LSW met with pt at bedside to discuss. Pt reports her  is staying with a friend in [facility] and she would like to go there and that she called them and is waiting to hear back. Pt reports if she goes there she would use [id number] for appts as she was before. Pt does not have any DME/HHC need at this time. LSW made pt aware that pt is medically ready so if she is going to friend's house we would plan to discharge her tomorrow (3/13), pt expressed understanding. LSW to cont to follow.

## 2025-03-12 NOTE — CARE PLAN
The patient's goals for the shift include      The clinical goals for the shift include Patient will remain HDS through end of shift    Over the shift, the patient did make progress toward the following goals. Barriers to progression include continued anxiety.  Recommendations to address these barriers include educate patient .

## 2025-03-12 NOTE — PROGRESS NOTES
Sagrario Garber is a 49 y.o. female on day 22 of admission presenting with Peripheral T-cell lymphoma (Multi).    Subjective   Patient states she feels tired today. Had mild nausea without emesis. Eating well, drinking mostly beverages with caffeine. Educated patient to drink fluids without caffeine to promote hydration. LBM 4 days ago. Denies HA, dizziness, CP, SOB, emesis, diarrhea. All other ROS otherwise negative.      Objective   Physical Exam  Constitutional:       General: She is not in acute distress.     Appearance: Normal appearance. She is normal weight. She is not ill-appearing.   HENT:      Head: Normocephalic and atraumatic.      Nose: Nose normal.      Mouth/Throat:      Mouth: Mucous membranes are moist.      Pharynx: Oropharynx is clear.      Comments: Poor dentition  +1x1mm lesion bottom gums  Eyes:      General: No scleral icterus.     Extraocular Movements: Extraocular movements intact.      Conjunctiva/sclera: Conjunctivae normal.   Cardiovascular:      Rate and Rhythm: Normal rate and regular rhythm.      Pulses: Normal pulses.      Heart sounds: Normal heart sounds. No murmur heard.     No gallop.   Pulmonary:      Effort: Pulmonary effort is normal.      Breath sounds: Normal breath sounds.   Abdominal:      General: Abdomen is flat. Bowel sounds are normal.      Palpations: Abdomen is soft.   Musculoskeletal:         General: No swelling. Normal range of motion.      Cervical back: Normal range of motion and neck supple.   Skin:     General: Skin is warm and dry.      Coloration: Skin is not jaundiced or pale.      Findings: No erythema or rash.   Neurological:      General: No focal deficit present.      Mental Status: She is alert and oriented to person, place, and time. Mental status is at baseline.      Cranial Nerves: No cranial nerve deficit.   Psychiatric:         Mood and Affect: Mood normal.         Behavior: Behavior normal.      Comments: Fluent speech      Last Recorded  "Vitals  Blood pressure 95/61, pulse 102, temperature 37.5 °C (99.5 °F), temperature source Temporal, resp. rate 18, height 1.685 m (5' 6.34\"), weight 73.7 kg (162 lb 7.7 oz), SpO2 96%.  Intake/Output last 3 Shifts:  I/O last 3 completed shifts:  In: 2240 (30.4 mL/kg) [P.O.:240; IV Piggyback:2000]  Out: - (0 mL/kg)   Weight: 73.7 kg   This patient has a central line   Reason for the central line remaining today? Hemodynamic monitoring    Assessment/Plan   Assessment & Plan  Peripheral T-cell lymphoma (Multi)    Angioimmunoblastic T-cell lymphoma    Ms. 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 admitted for Auto Transplant prepped with BE[facility] (T0=2/24/25). Counts recovered. Pt ortho positive, encouraging non caffeinated PO fluids, plan discharge to [facility], application pending.    T+16   Auto (T0=2/24/25)      ONC: (per chart review; see onc history for complete treatment)    # Angioimmunoblastic T-Cell Lymphoma (CD 30+ AK Negative)   - Restaging PET 12/23/24 s/p Cycle 5: excellent response  - S/P BV-CHP (June 2024) x 6 cycles    - Autologous PBSCT prepped with BEAM T0=2/24/2025. Received a total dose of 5.63 x10^6 CD34      Surveillance Monitoring   - IgG: On admission : 445  - Coag/Fibrinogen 2x/week: WNL   - LDH/Hapto: Weekly: WNL       HEME:   # Pancytopenia secondary to chemotherapy, no evidence of bleeding    - Transfuse if Hgb<7 and/or Plt<10  - stopped filgrastim 3/10 since neutrophils >500  # DVT prophy: Holding Lovenox 40mg for thrombocytopenia    ID:   Allergy: Amoxicillin     - Prophylaxis: Acyclovir.   - Stopped fluconazole and Levofloxacin since no longer neutropenic  - Plan Cefepime for neutropenic fever due to PCN allergy    - Plan Bactrim 800/160mg qMWF start T+30     FEN/GI:   Admit Wt: 77.1 Kg,  Current wt: 73.7 kg (3/11)  # PPI prophy w/protonix on admit   # Chemo induced nausea, improved  - PRN " Compazine, Zofran  # constipation: elisa colace x2 tabs once   - Miralax daily   - bisacodyl x1  # Oral mucositis, throat pain, improving  - BMX TID     CARDIAC:   # History of HTN  - home Norvasc held in the setting of hypotension   - Atorvastatin 40mg/day @ HS    - ECHO (1/22/25): EF 60-65%  - PT following  #Orthostatic hypotension (3/11)   -s/p 2L IVF bolus (3/11)   -Repeat orthostatic vitals pending      RHEUMATOID:   # History of Lupus and Rheumatoid Arthritis    - Currently on Placquenil, Prednisone 10mg/day,    - Follows with Dr Dobbs    - Previously on Saphnelo (interferon alpha antagonist until 7/2023)   - Cont PRN meds for pain, if worsening, consult Supp Onc     PSYCH:   # Anxiety/Depression    - Continue home Seroquil, Cymbalta, Ativan  - Has followed with behavioral health at [facility] but not established  - Consulted inpatient Psych (2/27), EKG on 2/28     MISC:   # Nicotine Dependence  - Patient considering smoking cessation     DISPO:   - Full Code confirmed on admit    - NOK Mom: Stephanie Wilson [phone]   - Non-Tunneled CVC (placed 2/11) remove at discharge     - Mediport (not accessed)  - Primary Onc: Dr. Gunjan Varela  - Currently pt is homeless, prev at SNF, mom & sister are both unable to take her in.  - Plan discharge to [facility] (shelter), application pending, room is available.  - follow up with post transplant and infusion scheduled: 3/14, 3/17, 3/19, 3/21 ([facility] can assist with setting up transport thru insurance.  - Also requested follow up with Rheumatology & Psych.     Pt seen, examined and discussed w/ Dr. Nereida Quarles PA-C

## 2025-03-12 NOTE — PROGRESS NOTES
Spiritual Care Visit  Spiritual Care Request    Reason for Visit:  Routine Visit: Follow-up  Continue Visiting: No     Request Received From:  Referral From: Other (Comment) (long term pt visited to identify and address unmet needs)    Focus of Care:  Visited With: Patient         Refer to :          Spiritual Care Assessment    Spiritual Assessment:                      Care Provided:       Sense of Community and or Protestant Affiliation:  [employer]ism         Addressed Needs/Concerns and/or Vane Through:  Protestant Encounters  Protestant Needs:  (provided supportive presence)       Outcome:        Advance Directives:         Spiritual Care Annotation    Annotation:  Long term pt visited to identify and address unmet needs.  Pt expressed gratitude for progress and care.

## 2025-03-12 NOTE — PROGRESS NOTES
Spiritual Care Visit  Spiritual Care Request    Reason for Visit:  Routine Visit: Introduction  Continue Visiting: Yes     Request Received From:  Referral From: Other (Comment) (Spiritual Care Volunteer)    Focus of Care:  Visited With: Patient       Refer to :  Referral To:        Spiritual Care Assessment    Care Provided:  Intended Effects: Demonstrate caring and concern, Establish rapport and connectedness  Methods: Collaborate with care team member, Encourage sharing of feelings, Offer support  Interventions: Acknowledge current situation, Acknowledge response to difficult experience, Active listening, Communicate patient's needs/concerns to others, Discuss concerns, Discuss coping mechanisms with someone    Sense of Community and or Restorationist Affiliation:  [facility]      Spiritual Care Annotation    Annotation:   introduced self and role to patient Sagrario Garber. Patient shared that she was feeling tired today. She shared that she has been dealing with not only her own health concerns but that of her . She expressed concern with [facility] and inquired if she could return to facility she came from.  communicated concerns to  and returned with information for patient to help her understand her options. Patient shared that she primarily pramod through crafts and that she has limited support from family.  provided care through reflective listening and supportive conversation. Patient was appreciative of care and did not have any further needs at this time. Spiritual Care remains available as needed/requested.    Rev. Cortney Hart MDiv, [facility]

## 2025-03-13 LAB
ALBUMIN SERPL BCP-MCNC: 3.3 G/DL (ref 3.4–5)
ALP SERPL-CCNC: 82 U/L (ref 33–110)
ALT SERPL W P-5'-P-CCNC: 18 U/L (ref 7–45)
ANION GAP SERPL CALC-SCNC: 14 MMOL/L (ref 10–20)
AST SERPL W P-5'-P-CCNC: 13 U/L (ref 9–39)
BASOPHILS # BLD MANUAL: 0.02 X10*3/UL (ref 0–0.1)
BASOPHILS NFR BLD MANUAL: 0.9 %
BILIRUB DIRECT SERPL-MCNC: 0.1 MG/DL (ref 0–0.3)
BILIRUB SERPL-MCNC: 0.4 MG/DL (ref 0–1.2)
BUN SERPL-MCNC: 7 MG/DL (ref 6–23)
CALCIUM SERPL-MCNC: 8.7 MG/DL (ref 8.6–10.6)
CHLORIDE SERPL-SCNC: 101 MMOL/L (ref 98–107)
CO2 SERPL-SCNC: 30 MMOL/L (ref 21–32)
CREAT SERPL-MCNC: 0.57 MG/DL (ref 0.5–1.05)
EGFRCR SERPLBLD CKD-EPI 2021: >90 ML/MIN/1.73M*2
EOSINOPHIL # BLD MANUAL: 0 X10*3/UL (ref 0–0.7)
EOSINOPHIL NFR BLD MANUAL: 0 %
ERYTHROCYTE [DISTWIDTH] IN BLOOD BY AUTOMATED COUNT: 14.5 % (ref 11.5–14.5)
GLUCOSE SERPL-MCNC: 97 MG/DL (ref 74–99)
HCT VFR BLD AUTO: 23 % (ref 36–46)
HGB BLD-MCNC: 7.5 G/DL (ref 12–16)
IMM GRANULOCYTES # BLD AUTO: 0.13 X10*3/UL (ref 0–0.7)
IMM GRANULOCYTES NFR BLD AUTO: 4.9 % (ref 0–0.9)
LYMPHOCYTES # BLD MANUAL: 0.57 X10*3/UL (ref 1.2–4.8)
LYMPHOCYTES NFR BLD MANUAL: 21.2 %
MAGNESIUM SERPL-MCNC: 1.82 MG/DL (ref 1.6–2.4)
MCH RBC QN AUTO: 31.8 PG (ref 26–34)
MCHC RBC AUTO-ENTMCNC: 32.6 G/DL (ref 32–36)
MCV RBC AUTO: 98 FL (ref 80–100)
METAMYELOCYTES # BLD MANUAL: 0.02 X10*3/UL
METAMYELOCYTES NFR BLD MANUAL: 0.8 %
MONOCYTES # BLD MANUAL: 0.5 X10*3/UL (ref 0.1–1)
MONOCYTES NFR BLD MANUAL: 18.6 %
MYELOCYTES # BLD MANUAL: 0.02 X10*3/UL
MYELOCYTES NFR BLD MANUAL: 0.9 %
NEUTROPHILS # BLD MANUAL: 1.56 X10*3/UL (ref 1.2–7.7)
NEUTS BAND # BLD MANUAL: 0.05 X10*3/UL (ref 0–0.7)
NEUTS BAND NFR BLD MANUAL: 1.7 %
NEUTS SEG # BLD MANUAL: 1.51 X10*3/UL (ref 1.2–7)
NEUTS SEG NFR BLD MANUAL: 55.9 %
NRBC BLD-RTO: 0.8 /100 WBCS (ref 0–0)
PHOSPHATE SERPL-MCNC: 5.1 MG/DL (ref 2.5–4.9)
PLATELET # BLD AUTO: 26 X10*3/UL (ref 150–450)
POTASSIUM SERPL-SCNC: 3.7 MMOL/L (ref 3.5–5.3)
PROT SERPL-MCNC: 5.3 G/DL (ref 6.4–8.2)
RBC # BLD AUTO: 2.36 X10*6/UL (ref 4–5.2)
RBC MORPH BLD: ABNORMAL
SODIUM SERPL-SCNC: 141 MMOL/L (ref 136–145)
TOTAL CELLS COUNTED BLD: 118
WBC # BLD AUTO: 2.7 X10*3/UL (ref 4.4–11.3)

## 2025-03-13 PROCEDURE — 2500000004 HC RX 250 GENERAL PHARMACY W/ HCPCS (ALT 636 FOR OP/ED): Mod: SE | Performed by: NURSE PRACTITIONER

## 2025-03-13 PROCEDURE — 80048 BASIC METABOLIC PNL TOTAL CA: CPT | Performed by: NURSE PRACTITIONER

## 2025-03-13 PROCEDURE — 2500000001 HC RX 250 WO HCPCS SELF ADMINISTERED DRUGS (ALT 637 FOR MEDICARE OP): Mod: SE | Performed by: INTERNAL MEDICINE

## 2025-03-13 PROCEDURE — 1170000001 HC PRIVATE ONCOLOGY ROOM DAILY

## 2025-03-13 PROCEDURE — 85007 BL SMEAR W/DIFF WBC COUNT: CPT | Performed by: NURSE PRACTITIONER

## 2025-03-13 PROCEDURE — 85027 COMPLETE CBC AUTOMATED: CPT | Performed by: NURSE PRACTITIONER

## 2025-03-13 PROCEDURE — 82248 BILIRUBIN DIRECT: CPT | Performed by: NURSE PRACTITIONER

## 2025-03-13 PROCEDURE — 83735 ASSAY OF MAGNESIUM: CPT | Performed by: NURSE PRACTITIONER

## 2025-03-13 PROCEDURE — 99232 SBSQ HOSP IP/OBS MODERATE 35: CPT | Performed by: STUDENT IN AN ORGANIZED HEALTH CARE EDUCATION/TRAINING PROGRAM

## 2025-03-13 PROCEDURE — 2500000002 HC RX 250 W HCPCS SELF ADMINISTERED DRUGS (ALT 637 FOR MEDICARE OP, ALT 636 FOR OP/ED): Mod: SE | Performed by: NURSE PRACTITIONER

## 2025-03-13 PROCEDURE — 2500000001 HC RX 250 WO HCPCS SELF ADMINISTERED DRUGS (ALT 637 FOR MEDICARE OP): Mod: SE | Performed by: NURSE PRACTITIONER

## 2025-03-13 PROCEDURE — 2500000004 HC RX 250 GENERAL PHARMACY W/ HCPCS (ALT 636 FOR OP/ED): Mod: SE

## 2025-03-13 PROCEDURE — 84100 ASSAY OF PHOSPHORUS: CPT | Performed by: NURSE PRACTITIONER

## 2025-03-13 PROCEDURE — 2500000001 HC RX 250 WO HCPCS SELF ADMINISTERED DRUGS (ALT 637 FOR MEDICARE OP): Mod: SE | Performed by: HOME HEALTH AIDE

## 2025-03-13 RX ORDER — ONDANSETRON HYDROCHLORIDE 2 MG/ML
8 INJECTION, SOLUTION INTRAVENOUS EVERY 6 HOURS PRN
Status: DISCONTINUED | OUTPATIENT
Start: 2025-03-13 | End: 2025-03-14

## 2025-03-13 RX ORDER — AMOXICILLIN 250 MG
1 CAPSULE ORAL NIGHTLY
Status: DISCONTINUED | OUTPATIENT
Start: 2025-03-13 | End: 2025-03-14

## 2025-03-13 RX ADMIN — LORAZEPAM 0.5 MG: 0.5 TABLET ORAL at 08:14

## 2025-03-13 RX ADMIN — OXYCODONE 5 MG: 5 TABLET ORAL at 08:15

## 2025-03-13 RX ADMIN — HYDROXYCHLOROQUINE SULFATE 200 MG: 200 TABLET, FILM COATED ORAL at 08:15

## 2025-03-13 RX ADMIN — POLYETHYLENE GLYCOL 3350 17 G: 17 POWDER, FOR SOLUTION ORAL at 08:15

## 2025-03-13 RX ADMIN — PREDNISONE 10 MG: 10 TABLET ORAL at 08:14

## 2025-03-13 RX ADMIN — PANTOPRAZOLE SODIUM 40 MG: 40 TABLET, DELAYED RELEASE ORAL at 08:15

## 2025-03-13 RX ADMIN — QUETIAPINE FUMARATE 100 MG: 25 TABLET ORAL at 20:57

## 2025-03-13 RX ADMIN — OXYCODONE 5 MG: 5 TABLET ORAL at 20:57

## 2025-03-13 RX ADMIN — PROCHLORPERAZINE EDISYLATE 10 MG: 5 INJECTION INTRAMUSCULAR; INTRAVENOUS at 21:00

## 2025-03-13 RX ADMIN — ACYCLOVIR 400 MG: 400 TABLET ORAL at 20:57

## 2025-03-13 RX ADMIN — DULOXETINE HYDROCHLORIDE 60 MG: 60 CAPSULE, DELAYED RELEASE ORAL at 08:15

## 2025-03-13 RX ADMIN — DULOXETINE HYDROCHLORIDE 60 MG: 60 CAPSULE, DELAYED RELEASE ORAL at 20:57

## 2025-03-13 RX ADMIN — SENNOSIDES AND DOCUSATE SODIUM 1 TABLET: 50; 8.6 TABLET ORAL at 20:57

## 2025-03-13 RX ADMIN — ATORVASTATIN CALCIUM 40 MG: 40 TABLET, FILM COATED ORAL at 08:15

## 2025-03-13 RX ADMIN — LORAZEPAM 0.5 MG: 0.5 TABLET ORAL at 20:57

## 2025-03-13 RX ADMIN — ACYCLOVIR 400 MG: 400 TABLET ORAL at 08:15

## 2025-03-13 ASSESSMENT — COGNITIVE AND FUNCTIONAL STATUS - GENERAL
MOBILITY SCORE: 24
DAILY ACTIVITIY SCORE: 24
DAILY ACTIVITIY SCORE: 24
MOBILITY SCORE: 24

## 2025-03-13 ASSESSMENT — PAIN SCALES - GENERAL
PAINLEVEL_OUTOF10: 4
PAINLEVEL_OUTOF10: 6
PAINLEVEL_OUTOF10: 6
PAINLEVEL_OUTOF10: 0 - NO PAIN

## 2025-03-13 ASSESSMENT — PAIN SCALES - PAIN ASSESSMENT IN ADVANCED DEMENTIA (PAINAD): TOTALSCORE: MEDICATION (SEE MAR)

## 2025-03-13 ASSESSMENT — PAIN DESCRIPTION - LOCATION
LOCATION: GENERALIZED
LOCATION: GENERALIZED

## 2025-03-13 ASSESSMENT — PAIN - FUNCTIONAL ASSESSMENT: PAIN_FUNCTIONAL_ASSESSMENT: 0-10

## 2025-03-13 NOTE — PROGRESS NOTES
Sagrario Garber is a 49 y.o. female on day 23 of admission presenting with Peripheral T-cell lymphoma (Multi).    Subjective   Patient reports constipation, LBM 5 days ago. Added scheduled elisa-colace. Also notes mild nausea without emesis, palliated with antiemetics. Notes she is trying to drink more, reported she drank 1.5L water over the last 24 hours. Denies Zuniga, dizziness, CP, SOB, diarrhea. All other ROS otherwise negative.     Objective   Physical Exam  Constitutional:       General: She is not in acute distress.     Appearance: Normal appearance. She is normal weight. She is not ill-appearing.   HENT:      Head: Normocephalic and atraumatic.      Nose: Nose normal.      Mouth/Throat:      Mouth: Mucous membranes are moist.      Pharynx: Oropharynx is clear.      Comments: Poor dentition  +1x1mm lesion bottom gums  Eyes:      General: No scleral icterus.     Extraocular Movements: Extraocular movements intact.      Conjunctiva/sclera: Conjunctivae normal.   Cardiovascular:      Rate and Rhythm: Normal rate and regular rhythm.      Pulses: Normal pulses.      Heart sounds: Normal heart sounds. No murmur heard.     No gallop.   Pulmonary:      Effort: Pulmonary effort is normal.      Breath sounds: Normal breath sounds.   Abdominal:      General: Abdomen is flat. Bowel sounds are normal.      Palpations: Abdomen is soft.   Musculoskeletal:         General: No swelling. Normal range of motion.      Cervical back: Normal range of motion and neck supple.   Skin:     General: Skin is warm and dry.      Coloration: Skin is not jaundiced or pale.      Findings: No erythema or rash.   Neurological:      General: No focal deficit present.      Mental Status: She is alert and oriented to person, place, and time. Mental status is at baseline.      Cranial Nerves: No cranial nerve deficit.   Psychiatric:         Mood and Affect: Mood normal.         Behavior: Behavior normal.      Comments: Fluent speech      Last Recorded  "Vitals  Blood pressure 95/62, pulse 110, temperature 35.2 °C (95.4 °F), temperature source Temporal, resp. rate 18, height 1.685 m (5' 6.34\"), weight 74.4 kg (164 lb 0.4 oz), SpO2 94%.  Intake/Output last 3 Shifts:  I/O last 3 completed shifts:  In: 1240 (16.8 mL/kg) [P.O.:240; IV Piggyback:1000]  Out: - (0 mL/kg)   Weight: 73.7 kg   This patient has a central line   Reason for the central line remaining today? Hemodynamic monitoring    Assessment/Plan   Assessment & Plan  Peripheral T-cell lymphoma (Multi)    Angioimmunoblastic T-cell lymphoma    Ms. 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 admitted for Auto Transplant prepped with BEAM (T0=2/24/25). Counts recovered. Pt ortho positive, encouraging non caffeinated PO fluids, plan discharge to [facility], application pending.    T+17   Auto (T0=2/24/25)      ONC: (per chart review; see onc history for complete treatment)    # Angioimmunoblastic T-Cell Lymphoma (CD 30+ AK Negative)   - Restaging PET 12/23/24 s/p Cycle 5: excellent response  - S/P BV-CHP (June 2024) x 6 cycles    - Autologous PBSCT prepped with [facility]AM T0=2/24/2025. Received a total dose of 5.63 x10^6 CD34      Surveillance Monitoring   - IgG: On admission : 445  - Coag/Fibrinogen 2x/week: WNL   - LDH/Hapto: Weekly: WNL       HEME:   # Pancytopenia secondary to chemotherapy, no evidence of bleeding    - Transfuse if Hgb<7 and/or Plt<10  - stopped filgrastim 3/10 since neutrophils >500  # DVT prophy: Holding Lovenox 40mg for thrombocytopenia    ID:   Allergy: Amoxicillin     - Prophylaxis: Acyclovir.   - Stopped fluconazole and Levofloxacin since no longer neutropenic  - Plan Cefepime for neutropenic fever due to PCN allergy    - Plan Bactrim 800/160mg qMWF start T+30     FEN/GI:   Admit Wt: 77.1 Kg,  Current wt: 74.4kg (3/13)   # PPI prophy w/protonix on admit   # Chemo induced nausea, improved  - PRN " Compazine, Zofran  # constipation:   - Miralax & elisa-colace daily   - bisacodyl x1  # Oral mucositis, throat pain, improving  - BMX TID     CARDIAC:   # History of HTN  - home Norvasc held in the setting of hypotension   - Atorvastatin 40mg/day @ HS    - ECHO (1/22/25): EF 60-65%  - PT following  #Orthostatic hypotension (3/11); resolved   -s/p 2L IVF bolus (3/11); encourage oral intake   -Repeat orthostatic vitals negative     RHEUMATOID:   # History of Lupus and Rheumatoid Arthritis    - Currently on Placquenil, Prednisone 10mg/day,    - Follows with Dr Dobbs    - Previously on Saphnelo (interferon alpha antagonist until 7/2023)   - Cont PRN meds for pain, if worsening, consult Supp Onc     PSYCH:   # Anxiety/Depression    - Continue home Seroquil, Cymbalta, Ativan  - Has followed with behavioral health at [facility] but not established  - Consulted inpatient Psych (2/27), EKG on 2/28, Qtc 425     MISC:   # Nicotine Dependence  - Patient considering smoking cessation     DISPO:   - Full Code confirmed on admit    - NOK Mom: Stephanie Wilson [phone]   - Non-Tunneled CVC (placed 2/11) remove at discharge     - Mediport (not accessed)  - Primary Onc: Dr. Gunjan Varela  - Currently pt is homeless, prev at SNF, mom & sister are both unable to take her in.  - Plan discharge to [facility] (shelter), application pending, room is available.  - follow up with post transplant and infusion scheduled: 3/14, 3/17, 3/19, 3/21 ([facility] can assist with setting up transport thru insurance.     Pt seen, examined and discussed w/ Dr. Nereida Quarles PA-C

## 2025-03-13 NOTE — CARE PLAN
The patient's goals for the shift include    Problem: Pain - Adult  Goal: Verbalizes/displays adequate comfort level or baseline comfort level  Outcome: Progressing     Problem: Safety - Adult  Goal: Free from fall injury  Outcome: Progressing     Problem: Discharge Planning  Goal: Discharge to home or other facility with appropriate resources  Outcome: Progressing     Problem: Chronic Conditions and Co-morbidities  Goal: Patient's chronic conditions and co-morbidity symptoms are monitored and maintained or improved  Outcome: Progressing     Problem: Nutrition  Goal: Nutrient intake appropriate for maintaining nutritional needs  Outcome: Progressing     Problem: Fall/Injury  Goal: Not fall by end of shift  Outcome: Progressing  Goal: Be free from injury by end of the shift  Outcome: Progressing  Goal: Verbalize understanding of personal risk factors for fall in the hospital  Outcome: Progressing  Goal: Verbalize understanding of risk factor reduction measures to prevent injury from fall in the home  Outcome: Progressing  Goal: Use assistive devices by end of the shift  Outcome: Progressing  Goal: Pace activities to prevent fatigue by end of the shift  Outcome: Progressing     Problem: Pain  Goal: Takes deep breaths with improved pain control throughout the shift  Outcome: Progressing  Goal: Turns in bed with improved pain control throughout the shift  Outcome: Progressing  Goal: Walks with improved pain control throughout the shift  Outcome: Progressing  Goal: Performs ADL's with improved pain control throughout shift  Outcome: Progressing  Goal: Participates in PT with improved pain control throughout the shift  Outcome: Progressing  Goal: Free from opioid side effects throughout the shift  Outcome: Progressing  Goal: Free from acute confusion related to pain meds throughout the shift  Outcome: Progressing     Problem: Skin  Goal: Decreased wound size/increased tissue granulation at next dressing change  Outcome:  Progressing  Goal: Participates in plan/prevention/treatment measures  Outcome: Progressing  Goal: Prevent/manage excess moisture  Outcome: Progressing  Goal: Prevent/minimize sheer/friction injuries  Outcome: Progressing  Goal: Promote/optimize nutrition  Outcome: Progressing  Goal: Promote skin healing  Outcome: Progressing       The clinical goals for the shift include pt will remain HDS

## 2025-03-13 NOTE — PROGRESS NOTES
03/13/25 1300   Discharge Planning   Living Arrangements Alone   Support Systems Children;Parent;Family members   Type of Residence Homeless  (was at [facility] prior to admission but no longer has skilled need)   Who is requesting discharge planning? Patient   Home or Post Acute Services Community services   Expected Discharge Disposition Othe  ([facility] referral submitted 3/10)   Does the patient need discharge transport arranged? Yes   RoundTrip coordination needed? Yes   Has discharge transport been arranged? No     ZAHRA met with the patient today and she shared she had called intake for [facility] and is now willing to go there at discharge.  ZAHRA contacted Genna at [facility] and she confirmed the patient had completed the intake over the phone. Genna shared that the  will review with the team and they will determine if they can accept the patient and she thought they would have an answer by the end of the day.  Genna called back at 15:30 and shared that they can accept the patient but they can not admit her until Monday. They also want to make certain the patient has a follow up appointment with behavioral health and this has already been completed. ZAHRA shared that patient will bring her discharge summary with her and that will have all her appointments scheduled and the patient will have all  her medications with her on admission.  Patient has been updated and in agreement with the plan. Will continue to follow for supportive measures.  QI Garcia  3/14/25@9:30

## 2025-03-13 NOTE — CARE PLAN
The patient's goals for the shift include      The clinical goals for the shift include Patient will remain HDS through end of shift    Over the shift, the patient did make progress toward the following goals. Barriers to progression include continued discharge barriers causing stress. Recommendations to address these barriers include continue educating patient.

## 2025-03-14 ENCOUNTER — APPOINTMENT (OUTPATIENT)
Dept: OTHER | Facility: HOSPITAL | Age: 50
End: 2025-03-14
Payer: COMMERCIAL

## 2025-03-14 LAB
ALBUMIN SERPL BCP-MCNC: 3.3 G/DL (ref 3.4–5)
ALP SERPL-CCNC: 84 U/L (ref 33–110)
ALT SERPL W P-5'-P-CCNC: 18 U/L (ref 7–45)
ANION GAP SERPL CALC-SCNC: 13 MMOL/L
APTT PPP: 29 SECONDS (ref 26–36)
AST SERPL W P-5'-P-CCNC: 12 U/L (ref 9–39)
BASOPHILS # BLD MANUAL: 0 X10*3/UL (ref 0–0.1)
BASOPHILS NFR BLD MANUAL: 0 %
BILIRUB DIRECT SERPL-MCNC: 0.1 MG/DL (ref 0–0.3)
BILIRUB SERPL-MCNC: 0.4 MG/DL (ref 0–1.2)
BUN SERPL-MCNC: 10 MG/DL (ref 6–23)
CALCIUM SERPL-MCNC: 8.6 MG/DL (ref 8.6–10.6)
CHLORIDE SERPL-SCNC: 101 MMOL/L (ref 98–107)
CO2 SERPL-SCNC: 31 MMOL/L (ref 21–32)
CREAT SERPL-MCNC: 0.58 MG/DL (ref 0.5–1.05)
EGFRCR SERPLBLD CKD-EPI 2021: >90 ML/MIN/1.73M*2
EOSINOPHIL # BLD MANUAL: 0 X10*3/UL (ref 0–0.7)
EOSINOPHIL NFR BLD MANUAL: 0 %
ERYTHROCYTE [DISTWIDTH] IN BLOOD BY AUTOMATED COUNT: 14.7 % (ref 11.5–14.5)
FIBRINOGEN PPP-MCNC: 383 MG/DL (ref 200–400)
GLUCOSE SERPL-MCNC: 111 MG/DL (ref 74–99)
HCT VFR BLD AUTO: 22 % (ref 36–46)
HGB BLD-MCNC: 7.4 G/DL (ref 12–16)
IMM GRANULOCYTES # BLD AUTO: 0.16 X10*3/UL (ref 0–0.7)
IMM GRANULOCYTES NFR BLD AUTO: 5.1 % (ref 0–0.9)
INR PPP: 1 (ref 0.9–1.1)
LDH SERPL L TO P-CCNC: 203 U/L (ref 84–246)
LYMPHOCYTES # BLD MANUAL: 0.73 X10*3/UL (ref 1.2–4.8)
LYMPHOCYTES NFR BLD MANUAL: 22.8 %
MAGNESIUM SERPL-MCNC: 1.98 MG/DL (ref 1.6–2.4)
MCH RBC QN AUTO: 31.9 PG (ref 26–34)
MCHC RBC AUTO-ENTMCNC: 33.6 G/DL (ref 32–36)
MCV RBC AUTO: 95 FL (ref 80–100)
METAMYELOCYTES # BLD MANUAL: 0.03 X10*3/UL
METAMYELOCYTES NFR BLD MANUAL: 0.9 %
MONOCYTES # BLD MANUAL: 0.36 X10*3/UL (ref 0.1–1)
MONOCYTES NFR BLD MANUAL: 11.4 %
NEUTROPHILS # BLD MANUAL: 2.08 X10*3/UL (ref 1.2–7.7)
NEUTS BAND # BLD MANUAL: 0.17 X10*3/UL (ref 0–0.7)
NEUTS BAND NFR BLD MANUAL: 5.3 %
NEUTS SEG # BLD MANUAL: 1.91 X10*3/UL (ref 1.2–7)
NEUTS SEG NFR BLD MANUAL: 59.6 %
NRBC BLD-RTO: 0.6 /100 WBCS (ref 0–0)
PHOSPHATE SERPL-MCNC: 5.2 MG/DL (ref 2.5–4.9)
PLATELET # BLD AUTO: 29 X10*3/UL (ref 150–450)
POTASSIUM SERPL-SCNC: 3.7 MMOL/L (ref 3.5–5.3)
PROT SERPL-MCNC: 5.3 G/DL (ref 6.4–8.2)
PROTHROMBIN TIME: 11.5 SECONDS (ref 9.8–12.4)
RBC # BLD AUTO: 2.32 X10*6/UL (ref 4–5.2)
RBC MORPH BLD: ABNORMAL
SODIUM SERPL-SCNC: 141 MMOL/L (ref 136–145)
TOTAL CELLS COUNTED BLD: 114
WBC # BLD AUTO: 3.2 X10*3/UL (ref 4.4–11.3)

## 2025-03-14 PROCEDURE — 1170000001 HC PRIVATE ONCOLOGY ROOM DAILY

## 2025-03-14 PROCEDURE — 2500000001 HC RX 250 WO HCPCS SELF ADMINISTERED DRUGS (ALT 637 FOR MEDICARE OP): Mod: SE | Performed by: INTERNAL MEDICINE

## 2025-03-14 PROCEDURE — 2500000001 HC RX 250 WO HCPCS SELF ADMINISTERED DRUGS (ALT 637 FOR MEDICARE OP): Mod: SE | Performed by: NURSE PRACTITIONER

## 2025-03-14 PROCEDURE — 82374 ASSAY BLOOD CARBON DIOXIDE: CPT | Performed by: NURSE PRACTITIONER

## 2025-03-14 PROCEDURE — 2500000002 HC RX 250 W HCPCS SELF ADMINISTERED DRUGS (ALT 637 FOR MEDICARE OP, ALT 636 FOR OP/ED): Mod: SE | Performed by: NURSE PRACTITIONER

## 2025-03-14 PROCEDURE — 83615 LACTATE (LD) (LDH) ENZYME: CPT

## 2025-03-14 PROCEDURE — 80053 COMPREHEN METABOLIC PANEL: CPT | Performed by: NURSE PRACTITIONER

## 2025-03-14 PROCEDURE — 2500000004 HC RX 250 GENERAL PHARMACY W/ HCPCS (ALT 636 FOR OP/ED): Mod: SE | Performed by: NURSE PRACTITIONER

## 2025-03-14 PROCEDURE — 99232 SBSQ HOSP IP/OBS MODERATE 35: CPT | Performed by: STUDENT IN AN ORGANIZED HEALTH CARE EDUCATION/TRAINING PROGRAM

## 2025-03-14 PROCEDURE — 85384 FIBRINOGEN ACTIVITY: CPT

## 2025-03-14 PROCEDURE — 85027 COMPLETE CBC AUTOMATED: CPT | Performed by: NURSE PRACTITIONER

## 2025-03-14 PROCEDURE — 85730 THROMBOPLASTIN TIME PARTIAL: CPT

## 2025-03-14 PROCEDURE — 84100 ASSAY OF PHOSPHORUS: CPT | Performed by: NURSE PRACTITIONER

## 2025-03-14 PROCEDURE — 85007 BL SMEAR W/DIFF WBC COUNT: CPT | Performed by: NURSE PRACTITIONER

## 2025-03-14 PROCEDURE — 83735 ASSAY OF MAGNESIUM: CPT | Performed by: NURSE PRACTITIONER

## 2025-03-14 RX ORDER — AMOXICILLIN 250 MG
1 CAPSULE ORAL 2 TIMES DAILY
Status: DISCONTINUED | OUTPATIENT
Start: 2025-03-14 | End: 2025-03-17 | Stop reason: HOSPADM

## 2025-03-14 RX ORDER — BISACODYL 5 MG
5 TABLET, DELAYED RELEASE (ENTERIC COATED) ORAL DAILY PRN
Status: DISCONTINUED | OUTPATIENT
Start: 2025-03-14 | End: 2025-03-17 | Stop reason: HOSPADM

## 2025-03-14 RX ORDER — ONDANSETRON HYDROCHLORIDE 8 MG/1
8 TABLET, FILM COATED ORAL EVERY 8 HOURS PRN
Status: DISCONTINUED | OUTPATIENT
Start: 2025-03-14 | End: 2025-03-17 | Stop reason: HOSPADM

## 2025-03-14 RX ORDER — PROCHLORPERAZINE MALEATE 10 MG
10 TABLET ORAL EVERY 6 HOURS PRN
Status: DISCONTINUED | OUTPATIENT
Start: 2025-03-14 | End: 2025-03-17 | Stop reason: HOSPADM

## 2025-03-14 RX ADMIN — SENNOSIDES AND DOCUSATE SODIUM 1 TABLET: 50; 8.6 TABLET ORAL at 20:29

## 2025-03-14 RX ADMIN — ACYCLOVIR 400 MG: 400 TABLET ORAL at 08:41

## 2025-03-14 RX ADMIN — DULOXETINE HYDROCHLORIDE 60 MG: 60 CAPSULE, DELAYED RELEASE ORAL at 20:29

## 2025-03-14 RX ADMIN — SENNOSIDES AND DOCUSATE SODIUM 1 TABLET: 50; 8.6 TABLET ORAL at 08:41

## 2025-03-14 RX ADMIN — HYDROXYCHLOROQUINE SULFATE 200 MG: 200 TABLET, FILM COATED ORAL at 08:41

## 2025-03-14 RX ADMIN — HYDROMORPHONE HYDROCHLORIDE 2 MG: 2 TABLET ORAL at 20:29

## 2025-03-14 RX ADMIN — QUETIAPINE FUMARATE 100 MG: 25 TABLET ORAL at 20:29

## 2025-03-14 RX ADMIN — LORAZEPAM 0.5 MG: 0.5 TABLET ORAL at 08:41

## 2025-03-14 RX ADMIN — POLYETHYLENE GLYCOL 3350 17 G: 17 POWDER, FOR SOLUTION ORAL at 08:41

## 2025-03-14 RX ADMIN — DULOXETINE HYDROCHLORIDE 60 MG: 60 CAPSULE, DELAYED RELEASE ORAL at 08:41

## 2025-03-14 RX ADMIN — PREDNISONE 10 MG: 10 TABLET ORAL at 08:41

## 2025-03-14 RX ADMIN — ACYCLOVIR 400 MG: 400 TABLET ORAL at 20:29

## 2025-03-14 RX ADMIN — OXYCODONE 5 MG: 5 TABLET ORAL at 08:41

## 2025-03-14 RX ADMIN — ATORVASTATIN CALCIUM 40 MG: 40 TABLET, FILM COATED ORAL at 08:41

## 2025-03-14 RX ADMIN — PROCHLORPERAZINE MALEATE 10 MG: 10 TABLET, FILM COATED ORAL at 20:30

## 2025-03-14 RX ADMIN — LORAZEPAM 0.5 MG: 0.5 TABLET ORAL at 20:29

## 2025-03-14 ASSESSMENT — COGNITIVE AND FUNCTIONAL STATUS - GENERAL
MOBILITY SCORE: 24
DAILY ACTIVITIY SCORE: 24

## 2025-03-14 ASSESSMENT — PAIN DESCRIPTION - LOCATION: LOCATION: GENERALIZED

## 2025-03-14 ASSESSMENT — PAIN SCALES - GENERAL
PAINLEVEL_OUTOF10: 5 - MODERATE PAIN
PAINLEVEL_OUTOF10: 5 - MODERATE PAIN
PAINLEVEL_OUTOF10: 7
PAINLEVEL_OUTOF10: 7

## 2025-03-14 ASSESSMENT — PAIN - FUNCTIONAL ASSESSMENT
PAIN_FUNCTIONAL_ASSESSMENT: 0-10

## 2025-03-14 ASSESSMENT — PAIN DESCRIPTION - DESCRIPTORS
DESCRIPTORS: ACHING;SORE
DESCRIPTORS: ACHING;SORE

## 2025-03-14 NOTE — PROGRESS NOTES
03/14/25 1600   Discharge Planning   Living Arrangements Alone   Support Systems Children;Parent;Family members   Type of Residence Homeless  (was at [facility] prior to admission but no longer has skilled need)   Who is requesting discharge planning? Patient   Home or Post Acute Services Community services   Expected Discharge Disposition Othe  ([facility])   Does the patient need discharge transport arranged? Yes   RoundTrip coordination needed? Yes   Has discharge transport been arranged? Yes   What day is the transport expected? 03/17/25   What time is the transport expected? 0930     Pt with Peripheral T-Cell Lymphoma is T+18 from an Auto PBSCT.  The pt will be discharged on 3/17/25 to [facility].  Wheelchair transport has been requested for a 9:30  (not confirmed.)  The pt will have her non-tunneled cathter removed (also has a mediport.)  Her MD is Dr. Gunjan Varela.  Will continue to follow to assist with the discharge plan.  Savannah Soni RN, [facility]

## 2025-03-14 NOTE — PROGRESS NOTES
Art Therapy Note    Sagrario Garber     Therapy Session  Referral Type: New referral this admission  Visit Type: Follow-up visit  Session Start Time: 1512  Session End Time: 1522  Intervention Delivery: In-person  Conflict of Service: None  Number of family members present: 0  Number of staff members present: 0              Treatment/Interventions  Areas of Focus: Coping, Self-expression, Anxiety reduction  Art Therapy Interventions: Active art engagement, Empathic listening/validating emotions  Interruption: No  Patient Fell Asleep at End of Session: No    Post-assessment  Total Session Time (min): 10 minutes    Narrative  Assessment Detail: ATR made a visit to not only emotionally check in but to offer an AT session.  Pt sitting up in bed, appeared tired and sleepy and was not her usual upbeat self.  Pt seemd to have less energy.  Pt smiled and welcomed the visit.  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 Pt has completed bacelets and neckalces left last session for making. Pt returned remaining unused beading materials to ATR.  Then he two brainstormed new interventions.  Pt decided on mosaicing a mint frame with gems.  Pt gathered supplies desired for project and shared she may attempt to work later butwa feeling tired to work now.  Pt expressed apprciaiton for the check in and the supplies provided for her new project.  Pt open to continued visits.  Follow-up: ATR will continue to follwo Pt and offer services per her request [facility].    Education Documentation  No documentation found.

## 2025-03-14 NOTE — CARE PLAN
Problem: Pain - Adult  Goal: Verbalizes/displays adequate comfort level or baseline comfort level  Outcome: Progressing     Problem: Safety - Adult  Goal: Free from fall injury  Outcome: Progressing     Problem: Nutrition  Goal: Nutrient intake appropriate for maintaining nutritional needs  Outcome: Progressing     Problem: Fall/Injury  Goal: Not fall by end of shift  Outcome: Progressing  Goal: Be free from injury by end of the shift  Outcome: Progressing  Goal: Verbalize understanding of personal risk factors for fall in the hospital  Outcome: Progressing  Goal: Verbalize understanding of risk factor reduction measures to prevent injury from fall in the home  Outcome: Progressing  Goal: Use assistive devices by end of the shift  Outcome: Progressing  Goal: Pace activities to prevent fatigue by end of the shift  Outcome: Progressing

## 2025-03-14 NOTE — PROGRESS NOTES
Sagrario Garber is a 49 y.o. female on day 24 of admission presenting with Peripheral T-cell lymphoma (Multi).    Subjective   Still waiting on BM. Accepted to [facility], discharge on Mon AM, tranport set at 9:30 AM, needs to check in between 10-11.    Denies Zuniga, dizziness, CP, SOB, diarrhea. All other ROS otherwise negative.     Objective   Physical Exam  Constitutional:       General: She is not in acute distress.     Appearance: Normal appearance. She is normal weight. She is not ill-appearing.   HENT:      Head: Normocephalic and atraumatic.      Nose: Nose normal.      Mouth/Throat:      Mouth: Mucous membranes are moist.      Pharynx: Oropharynx is clear.      Comments: Poor dentition  +1x1mm lesion bottom gums  Eyes:      General: No scleral icterus.     Extraocular Movements: Extraocular movements intact.      Conjunctiva/sclera: Conjunctivae normal.   Cardiovascular:      Rate and Rhythm: Normal rate and regular rhythm.      Pulses: Normal pulses.      Heart sounds: Normal heart sounds. No murmur heard.     No gallop.   Pulmonary:      Effort: Pulmonary effort is normal.      Breath sounds: Normal breath sounds.   Abdominal:      General: Abdomen is flat. Bowel sounds are normal.      Palpations: Abdomen is soft.   Musculoskeletal:         General: No swelling. Normal range of motion.      Cervical back: Normal range of motion and neck supple.   Skin:     General: Skin is warm and dry.      Coloration: Skin is not jaundiced or pale.      Findings: No erythema or rash.   Neurological:      General: No focal deficit present.      Mental Status: She is alert and oriented to person, place, and time. Mental status is at baseline.      Cranial Nerves: No cranial nerve deficit.   Psychiatric:         Mood and Affect: Mood normal.         Behavior: Behavior normal.      Comments: Fluent speech      Last Recorded Vitals  Blood pressure 90/52, pulse 98, temperature 36 °C (96.8 °F), temperature source Temporal,  "resp. rate 16, height 1.685 m (5' 6.34\"), weight 74.4 kg (164 lb 0.4 oz), SpO2 96%.  Intake/Output last 3 Shifts:  No intake/output data recorded.  This patient has a central line   Reason for the central line remaining today? Hemodynamic monitoring    Assessment/Plan   Assessment & Plan  Peripheral T-cell lymphoma (Multi)    Angioimmunoblastic T-cell lymphoma    Ms. 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 admitted for Auto Transplant prepped with BEAM (T0=2/24/25). Counts recovered. Pt ortho positive, encouraging non caffeinated PO fluids, plan discharge to [facility] on Mon 3/17, plan for transport 9:30 AM, need to check in between 11-12.    T+18   Auto (T0=2/24/25)   - Counts are recovered, medically ready for discharge.     ONC: (per chart review; see onc history for complete treatment)    # Angioimmunoblastic T-Cell Lymphoma (CD 30+ AK Negative)   - Restaging PET 12/23/24 s/p Cycle 5: excellent response  - S/P BV-CHP (June 2024) x 6 cycles    - Autologous PBSCT prepped with BEAM T0=2/24/2025. Received a total dose of 5.63 x10^6 CD34      Surveillance Monitoring   - IgG: On admission : 445  - Coag/Fibrinogen 2x/week: WNL   - LDH/Hapto: Weekly: WNL       HEME:   # Pancytopenia secondary to chemotherapy, no evidence of bleeding    - Transfuse if Hgb<7 and/or Plt<10  - stopped filgrastim 3/10 since neutrophils >500  # DVT prophy: Holding Lovenox 40mg for thrombocytopenia    ID:   Allergy: Amoxicillin     - Prophylaxis: Acyclovir.   - Stopped fluconazole and Levofloxacin since no longer neutropenic  - Plan Cefepime for neutropenic fever due to PCN allergy    - Plan Bactrim 800/160mg qMWF start T+30     FEN/GI:   Admit Wt: 77.1 Kg,  Current wt: 74.4kg (3/13)   # PPI prophy w/protonix on admit   # Chemo induced nausea, improved  - PRN Compazine, Zofran  # Constipation:   - Miralax & elisa-colace daily   - bisacodyl PRN  # " Oral mucositis, throat pain, improving  - BMX TID     CARDIAC:   # History of HTN  - home Norvasc held in the setting of hypotension   - Atorvastatin 40mg/day @ HS    - ECHO (1/22/25): EF 60-65%  - PT following  #Orthostatic hypotension (3/11); resolved   -s/p 2L IVF bolus (3/11); encourage oral intake   -Repeat orthostatic vitals negative     RHEUMATOID:   # History of Lupus and Rheumatoid Arthritis    - Currently on Placquenil, Prednisone 10mg/day,    - Follows with Dr Dobbs    - Previously on Saphnelo (interferon alpha antagonist until 7/2023)   - Cont PRN meds for pain, if worsening, consult Supp Onc     PSYCH:   # Anxiety/Depression    - Continue home Seroquil, Cymbalta, Ativan  - Has followed with behavioral health at [facility] but not established  - Consulted inpatient Psych (2/27), EKG on 2/28, Qtc 425     MISC:   # Nicotine Dependence  - Patient considering smoking cessation     DISPO:   - Full Code confirmed on admit    - STELLAK Mom: Stephanie Wilson [phone]   - Non-Tunneled CVC (placed 2/11) remove at discharge     - Mediport (not accessed)  - Primary Onc: Dr. Gunjan Varela  - Currently pt is homeless, prev at SNF, mom & sister are both unable to take her in.  - Plan discharge to [facility] (shelter) on Mon 3/17, transport 9:30 AM. Need to check in between 10-11am.  - follow up with post transplant and infusion scheduled: 3/19, 3/21 ([facility] can assist with setting up transport thru insurance).     Pt seen, examined and discussed w/ Dr. Nereida Keenan, APRN-CNP

## 2025-03-15 LAB
ALBUMIN SERPL BCP-MCNC: 3.3 G/DL (ref 3.4–5)
ALP SERPL-CCNC: 82 U/L (ref 33–110)
ALT SERPL W P-5'-P-CCNC: 18 U/L (ref 7–45)
ANION GAP SERPL CALC-SCNC: 14 MMOL/L (ref 10–20)
AST SERPL W P-5'-P-CCNC: 11 U/L (ref 9–39)
BASOPHILS # BLD MANUAL: 0 X10*3/UL (ref 0–0.1)
BASOPHILS NFR BLD MANUAL: 0 %
BILIRUB DIRECT SERPL-MCNC: 0.1 MG/DL (ref 0–0.3)
BILIRUB SERPL-MCNC: 0.3 MG/DL (ref 0–1.2)
BUN SERPL-MCNC: 11 MG/DL (ref 6–23)
CALCIUM SERPL-MCNC: 9 MG/DL (ref 8.6–10.6)
CHLORIDE SERPL-SCNC: 102 MMOL/L (ref 98–107)
CO2 SERPL-SCNC: 27 MMOL/L (ref 21–32)
CREAT SERPL-MCNC: 0.5 MG/DL (ref 0.5–1.05)
EGFRCR SERPLBLD CKD-EPI 2021: >90 ML/MIN/1.73M*2
EOSINOPHIL # BLD MANUAL: 0.03 X10*3/UL (ref 0–0.7)
EOSINOPHIL NFR BLD MANUAL: 0.8 %
ERYTHROCYTE [DISTWIDTH] IN BLOOD BY AUTOMATED COUNT: 15.3 % (ref 11.5–14.5)
GLUCOSE SERPL-MCNC: 98 MG/DL (ref 74–99)
HCT VFR BLD AUTO: 23.2 % (ref 36–46)
HGB BLD-MCNC: 7.4 G/DL (ref 12–16)
IMM GRANULOCYTES # BLD AUTO: 0.14 X10*3/UL (ref 0–0.7)
IMM GRANULOCYTES NFR BLD AUTO: 3.5 % (ref 0–0.9)
LYMPHOCYTES # BLD MANUAL: 1.12 X10*3/UL (ref 1.2–4.8)
LYMPHOCYTES NFR BLD MANUAL: 27.3 %
MAGNESIUM SERPL-MCNC: 1.93 MG/DL (ref 1.6–2.4)
MCH RBC QN AUTO: 31.5 PG (ref 26–34)
MCHC RBC AUTO-ENTMCNC: 31.9 G/DL (ref 32–36)
MCV RBC AUTO: 99 FL (ref 80–100)
MONOCYTES # BLD MANUAL: 0.7 X10*3/UL (ref 0.1–1)
MONOCYTES NFR BLD MANUAL: 17.1 %
MYELOCYTES # BLD MANUAL: 0.04 X10*3/UL
MYELOCYTES NFR BLD MANUAL: 0.9 %
NEUTS SEG # BLD MANUAL: 1.93 X10*3/UL (ref 1.2–7)
NEUTS SEG NFR BLD MANUAL: 47 %
NRBC BLD-RTO: 0.5 /100 WBCS (ref 0–0)
PHOSPHATE SERPL-MCNC: 4.9 MG/DL (ref 2.5–4.9)
PLATELET # BLD AUTO: 33 X10*3/UL (ref 150–450)
POTASSIUM SERPL-SCNC: 3.7 MMOL/L (ref 3.5–5.3)
PROMYELOCYTES # BLD MANUAL: 0.04 X10*3/UL
PROMYELOCYTES NFR BLD MANUAL: 0.9 %
PROT SERPL-MCNC: 5.6 G/DL (ref 6.4–8.2)
RBC # BLD AUTO: 2.35 X10*6/UL (ref 4–5.2)
RBC MORPH BLD: ABNORMAL
SODIUM SERPL-SCNC: 139 MMOL/L (ref 136–145)
TOTAL CELLS COUNTED BLD: 117
VARIANT LYMPHS # BLD MANUAL: 0.25 X10*3/UL (ref 0–0.5)
VARIANT LYMPHS NFR BLD: 6 %
WBC # BLD AUTO: 4.1 X10*3/UL (ref 4.4–11.3)

## 2025-03-15 PROCEDURE — 99233 SBSQ HOSP IP/OBS HIGH 50: CPT | Performed by: STUDENT IN AN ORGANIZED HEALTH CARE EDUCATION/TRAINING PROGRAM

## 2025-03-15 PROCEDURE — 84100 ASSAY OF PHOSPHORUS: CPT | Performed by: NURSE PRACTITIONER

## 2025-03-15 PROCEDURE — 2500000001 HC RX 250 WO HCPCS SELF ADMINISTERED DRUGS (ALT 637 FOR MEDICARE OP): Mod: SE | Performed by: NURSE PRACTITIONER

## 2025-03-15 PROCEDURE — 85027 COMPLETE CBC AUTOMATED: CPT | Performed by: NURSE PRACTITIONER

## 2025-03-15 PROCEDURE — 80076 HEPATIC FUNCTION PANEL: CPT | Performed by: NURSE PRACTITIONER

## 2025-03-15 PROCEDURE — 2500000001 HC RX 250 WO HCPCS SELF ADMINISTERED DRUGS (ALT 637 FOR MEDICARE OP): Mod: SE | Performed by: INTERNAL MEDICINE

## 2025-03-15 PROCEDURE — 2500000004 HC RX 250 GENERAL PHARMACY W/ HCPCS (ALT 636 FOR OP/ED): Mod: SE | Performed by: NURSE PRACTITIONER

## 2025-03-15 PROCEDURE — 1170000001 HC PRIVATE ONCOLOGY ROOM DAILY

## 2025-03-15 PROCEDURE — 2500000002 HC RX 250 W HCPCS SELF ADMINISTERED DRUGS (ALT 637 FOR MEDICARE OP, ALT 636 FOR OP/ED): Mod: SE | Performed by: NURSE PRACTITIONER

## 2025-03-15 PROCEDURE — 83735 ASSAY OF MAGNESIUM: CPT | Performed by: NURSE PRACTITIONER

## 2025-03-15 PROCEDURE — 85007 BL SMEAR W/DIFF WBC COUNT: CPT | Performed by: NURSE PRACTITIONER

## 2025-03-15 RX ADMIN — PANTOPRAZOLE SODIUM 40 MG: 40 TABLET, DELAYED RELEASE ORAL at 06:24

## 2025-03-15 RX ADMIN — SENNOSIDES AND DOCUSATE SODIUM 1 TABLET: 50; 8.6 TABLET ORAL at 08:56

## 2025-03-15 RX ADMIN — LORAZEPAM 0.5 MG: 0.5 TABLET ORAL at 21:05

## 2025-03-15 RX ADMIN — ACYCLOVIR 400 MG: 400 TABLET ORAL at 21:05

## 2025-03-15 RX ADMIN — DULOXETINE HYDROCHLORIDE 60 MG: 60 CAPSULE, DELAYED RELEASE ORAL at 21:05

## 2025-03-15 RX ADMIN — OXYCODONE 5 MG: 5 TABLET ORAL at 21:05

## 2025-03-15 RX ADMIN — ACYCLOVIR 400 MG: 400 TABLET ORAL at 08:56

## 2025-03-15 RX ADMIN — POLYETHYLENE GLYCOL 3350 17 G: 17 POWDER, FOR SOLUTION ORAL at 08:56

## 2025-03-15 RX ADMIN — SENNOSIDES AND DOCUSATE SODIUM 1 TABLET: 50; 8.6 TABLET ORAL at 21:05

## 2025-03-15 RX ADMIN — OXYCODONE 5 MG: 5 TABLET ORAL at 08:56

## 2025-03-15 RX ADMIN — HYDROXYCHLOROQUINE SULFATE 200 MG: 200 TABLET, FILM COATED ORAL at 08:56

## 2025-03-15 RX ADMIN — ATORVASTATIN CALCIUM 40 MG: 40 TABLET, FILM COATED ORAL at 08:56

## 2025-03-15 RX ADMIN — LORAZEPAM 0.5 MG: 0.5 TABLET ORAL at 08:56

## 2025-03-15 RX ADMIN — DULOXETINE HYDROCHLORIDE 60 MG: 60 CAPSULE, DELAYED RELEASE ORAL at 08:56

## 2025-03-15 RX ADMIN — QUETIAPINE FUMARATE 100 MG: 25 TABLET ORAL at 21:05

## 2025-03-15 RX ADMIN — PREDNISONE 10 MG: 10 TABLET ORAL at 08:56

## 2025-03-15 ASSESSMENT — COGNITIVE AND FUNCTIONAL STATUS - GENERAL
DAILY ACTIVITIY SCORE: 24
MOBILITY SCORE: 24
DAILY ACTIVITIY SCORE: 24
MOBILITY SCORE: 24

## 2025-03-15 ASSESSMENT — PAIN SCALES - GENERAL
PAINLEVEL_OUTOF10: 4
PAINLEVEL_OUTOF10: 6
PAINLEVEL_OUTOF10: 6

## 2025-03-15 ASSESSMENT — PAIN DESCRIPTION - LOCATION: LOCATION: GENERALIZED

## 2025-03-15 ASSESSMENT — PAIN - FUNCTIONAL ASSESSMENT: PAIN_FUNCTIONAL_ASSESSMENT: 0-10

## 2025-03-15 NOTE — CARE PLAN
The patient's goals for the shift include  rest and comfort    The clinical goals for the shift include pt will remain HDS this shift

## 2025-03-15 NOTE — CARE PLAN
The patient's goals for the shift include      The clinical goals for the shift include Patient will remain HDS through end of shift    Over the shift, the patient did make progress toward the following goals. Barriers to progression include lethargy. Recommendations to address these barriers include encourage movement.

## 2025-03-15 NOTE — PROGRESS NOTES
Sagrario Garber is a 49 y.o. female on day 25 of admission presenting with Peripheral T-cell lymphoma (Multi).    Subjective   [facility]ed to [facility], discharge on Mon AM, tranport set at 9:30 AM, needs to check in between 10-11. Plan to remove central line today. Pt has Mediport if needed.    Denies Zuniga, dizziness, CP, SOB, diarrhea. All other ROS otherwise negative.     Objective   Physical Exam  Constitutional:       General: She is not in acute distress.     Appearance: Normal appearance. She is normal weight. She is not ill-appearing.   HENT:      Head: Normocephalic and atraumatic.      Nose: Nose normal.      Mouth/Throat:      Mouth: Mucous membranes are moist.      Pharynx: Oropharynx is clear.      Comments: Poor dentition  +1x1mm lesion bottom gums  Eyes:      General: No scleral icterus.     Extraocular Movements: Extraocular movements intact.      Conjunctiva/sclera: Conjunctivae normal.   Cardiovascular:      Rate and Rhythm: Normal rate and regular rhythm.      Pulses: Normal pulses.      Heart sounds: Normal heart sounds. No murmur heard.     No gallop.   Pulmonary:      Effort: Pulmonary effort is normal.      Breath sounds: Normal breath sounds.   Abdominal:      General: Abdomen is flat. Bowel sounds are normal.      Palpations: Abdomen is soft.   Musculoskeletal:         General: No swelling. Normal range of motion.      Cervical back: Normal range of motion and neck supple.   Skin:     General: Skin is warm and dry.      Coloration: Skin is not jaundiced or pale.      Findings: No erythema or rash.   Neurological:      General: No focal deficit present.      Mental Status: She is alert and oriented to person, place, and time. Mental status is at baseline.      Cranial Nerves: No cranial nerve deficit.   Psychiatric:         Mood and Affect: Mood normal.         Behavior: Behavior normal.      Comments: Fluent speech      Last Recorded Vitals  Blood pressure 105/70, pulse 103, temperature 37.2 °C  "(99 °F), temperature source Temporal, resp. rate 16, height 1.685 m (5' 6.34\"), weight 74.6 kg (164 lb 7.4 oz), SpO2 92%.  Intake/Output last 3 Shifts:  No intake/output data recorded.  This patient has a central line   Reason for the central line remaining today? Hemodynamic monitoring    Assessment/Plan   Assessment & Plan  Peripheral T-cell lymphoma (Multi)    Angioimmunoblastic T-cell lymphoma    Ms. 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 admitted for Auto Transplant prepped with BEAM (T0=2/24/25). Counts recovered. Pt ortho positive, encouraging non caffeinated PO fluids, plan discharge to [facility] on Mon 3/17, transport 9:30 AM, need to check in between 11-12.    T+19   Auto (T0=2/24/25)   - Counts are recovered, medically ready for discharge.     ONC: (per chart review; see onc history for complete treatment)    # Angioimmunoblastic T-Cell Lymphoma (CD 30+ AK Negative)   - Restaging PET 12/23/24 s/p Cycle 5: excellent response  - S/P BV-CHP (June 2024) x 6 cycles    - Autologous PBSCT prepped with BEAM T0=2/24/2025. Received a total dose of 5.63 x10^6 CD34      Surveillance Monitoring   - IgG: On admission : 445  - Coag/Fibrinogen 2x/week: WNL   - LDH/Hapto: Weekly: WNL       HEME:   # Pancytopenia secondary to chemotherapy, no evidence of bleeding    - Transfuse if Hgb<7 and/or Plt<10  - stopped filgrastim 3/10 since neutrophils >500  # DVT prophy: Holding Lovenox 40mg for thrombocytopenia    ID:   Allergy: Amoxicillin     - Prophylaxis: Acyclovir.   - Stopped fluconazole and Levofloxacin since no longer neutropenic  - Plan Cefepime for neutropenic fever due to PCN allergy    - Plan Bactrim 800/160mg qMWF start T+30     FEN/GI:   Admit Wt: 77.1 Kg,  Current wt: 74.4 kg (3/13)   # PPI prophy w/protonix on admit   # Chemo induced nausea, improved  - PRN Compazine, Zofran  # Constipation:   - Miralax & " elisa-colace daily   - bisacodyl PRN  # Oral mucositis, throat pain, improving  - BMX TID     CARDIAC:   # History of HTN  - home Norvasc held in the setting of hypotension   - Atorvastatin 40mg/day @ HS    - ECHO (1/22/25): EF 60-65%  - PT following  # Orthostatic hypotension (3/11); resolved   - s/p 2L IVF bolus (3/11); encourage oral intake   - Repeat orthostatic vitals negative     RHEUMATOID:   # History of Lupus and Rheumatoid Arthritis    - Currently on Placquenil, Prednisone 10mg/day,    - Follows with Dr Dobbs    - Previously on Saphnelo (interferon alpha antagonist until 7/2023)   - Cont PRN meds for pain, if worsening, consult Supp Onc     PSYCH:   # Anxiety/Depression    - Continue home Seroquil, Cymbalta, Ativan  - Has followed with behavioral health at [facility] but not established  - Consulted inpatient Psych (2/27), EKG on 2/28, Qtc 425     MISC:   # Nicotine Dependence  - Patient considering smoking cessation     DISPO:   - Full Code confirmed on admit    - STELLAK Mom: Stephanie Wilson [phone]   - Non-Tunneled CVC (placed 2/11) remove at discharge     - Mediport (not accessed)  - Primary Onc: Dr. Gunjan Varela  - Currently pt is homeless, prev at SNF, mom & sister are both unable to take her in.  - Plan discharge to [facility] (shelter) on Mon 3/17, transport 9:30 AM. Need to check in between 10-11am.  - follow up with post transplant and infusion scheduled: 3/19, 3/21 ([facility] can assist with setting up transport thru insurance).     Pt seen, examined and discussed w/ Dr. Houston Elise.    Nikita Keenan, APRDEBRA-CNP

## 2025-03-16 VITALS
DIASTOLIC BLOOD PRESSURE: 58 MMHG | SYSTOLIC BLOOD PRESSURE: 90 MMHG | OXYGEN SATURATION: 93 % | WEIGHT: 164.46 LBS | HEART RATE: 103 BPM | HEIGHT: 66 IN | BODY MASS INDEX: 26.43 KG/M2 | RESPIRATION RATE: 18 BRPM | TEMPERATURE: 99 F

## 2025-03-16 LAB
ALBUMIN SERPL BCP-MCNC: 3.4 G/DL (ref 3.4–5)
ALP SERPL-CCNC: 83 U/L (ref 33–110)
ALT SERPL W P-5'-P-CCNC: 18 U/L (ref 7–45)
ANION GAP SERPL CALC-SCNC: 11 MMOL/L (ref 10–20)
AST SERPL W P-5'-P-CCNC: 11 U/L (ref 9–39)
BASOPHILS # BLD AUTO: 0.02 X10*3/UL (ref 0–0.1)
BASOPHILS NFR BLD AUTO: 0.5 %
BILIRUB DIRECT SERPL-MCNC: 0.1 MG/DL (ref 0–0.3)
BILIRUB SERPL-MCNC: 0.3 MG/DL (ref 0–1.2)
BUN SERPL-MCNC: 9 MG/DL (ref 6–23)
CALCIUM SERPL-MCNC: 8.8 MG/DL (ref 8.6–10.6)
CHLORIDE SERPL-SCNC: 101 MMOL/L (ref 98–107)
CO2 SERPL-SCNC: 31 MMOL/L (ref 21–32)
CREAT SERPL-MCNC: 0.57 MG/DL (ref 0.5–1.05)
EGFRCR SERPLBLD CKD-EPI 2021: >90 ML/MIN/1.73M*2
EOSINOPHIL # BLD AUTO: 0.01 X10*3/UL (ref 0–0.7)
EOSINOPHIL NFR BLD AUTO: 0.2 %
ERYTHROCYTE [DISTWIDTH] IN BLOOD BY AUTOMATED COUNT: 15.5 % (ref 11.5–14.5)
GLUCOSE SERPL-MCNC: 102 MG/DL (ref 74–99)
HCT VFR BLD AUTO: 22 % (ref 36–46)
HGB BLD-MCNC: 7.3 G/DL (ref 12–16)
IMM GRANULOCYTES # BLD AUTO: 0.11 X10*3/UL (ref 0–0.7)
IMM GRANULOCYTES NFR BLD AUTO: 2.6 % (ref 0–0.9)
LYMPHOCYTES # BLD AUTO: 1.17 X10*3/UL (ref 1.2–4.8)
LYMPHOCYTES NFR BLD AUTO: 27.9 %
MAGNESIUM SERPL-MCNC: 1.93 MG/DL (ref 1.6–2.4)
MCH RBC QN AUTO: 31.6 PG (ref 26–34)
MCHC RBC AUTO-ENTMCNC: 33.2 G/DL (ref 32–36)
MCV RBC AUTO: 95 FL (ref 80–100)
MONOCYTES # BLD AUTO: 0.77 X10*3/UL (ref 0.1–1)
MONOCYTES NFR BLD AUTO: 18.3 %
NEUTROPHILS # BLD AUTO: 2.12 X10*3/UL (ref 1.2–7.7)
NEUTROPHILS NFR BLD AUTO: 50.5 %
NRBC BLD-RTO: 0.5 /100 WBCS (ref 0–0)
PHOSPHATE SERPL-MCNC: 5.4 MG/DL (ref 2.5–4.9)
PLATELET # BLD AUTO: 39 X10*3/UL (ref 150–450)
POLYCHROMASIA BLD QL SMEAR: NORMAL
POTASSIUM SERPL-SCNC: 3.7 MMOL/L (ref 3.5–5.3)
PROT SERPL-MCNC: 5.7 G/DL (ref 6.4–8.2)
RBC # BLD AUTO: 2.31 X10*6/UL (ref 4–5.2)
RBC MORPH BLD: NORMAL
SODIUM SERPL-SCNC: 139 MMOL/L (ref 136–145)
WBC # BLD AUTO: 4.2 X10*3/UL (ref 4.4–11.3)

## 2025-03-16 PROCEDURE — 2500000001 HC RX 250 WO HCPCS SELF ADMINISTERED DRUGS (ALT 637 FOR MEDICARE OP): Mod: SE | Performed by: NURSE PRACTITIONER

## 2025-03-16 PROCEDURE — 82248 BILIRUBIN DIRECT: CPT | Performed by: NURSE PRACTITIONER

## 2025-03-16 PROCEDURE — 80048 BASIC METABOLIC PNL TOTAL CA: CPT | Performed by: NURSE PRACTITIONER

## 2025-03-16 PROCEDURE — 2500000004 HC RX 250 GENERAL PHARMACY W/ HCPCS (ALT 636 FOR OP/ED): Mod: SE | Performed by: NURSE PRACTITIONER

## 2025-03-16 PROCEDURE — 85025 COMPLETE CBC W/AUTO DIFF WBC: CPT | Performed by: NURSE PRACTITIONER

## 2025-03-16 PROCEDURE — 1170000001 HC PRIVATE ONCOLOGY ROOM DAILY

## 2025-03-16 PROCEDURE — 83735 ASSAY OF MAGNESIUM: CPT | Performed by: NURSE PRACTITIONER

## 2025-03-16 PROCEDURE — 37799 UNLISTED PX VASCULAR SURGERY: CPT | Performed by: NURSE PRACTITIONER

## 2025-03-16 PROCEDURE — 2500000002 HC RX 250 W HCPCS SELF ADMINISTERED DRUGS (ALT 637 FOR MEDICARE OP, ALT 636 FOR OP/ED): Mod: SE | Performed by: NURSE PRACTITIONER

## 2025-03-16 PROCEDURE — 99233 SBSQ HOSP IP/OBS HIGH 50: CPT | Performed by: STUDENT IN AN ORGANIZED HEALTH CARE EDUCATION/TRAINING PROGRAM

## 2025-03-16 PROCEDURE — 2500000001 HC RX 250 WO HCPCS SELF ADMINISTERED DRUGS (ALT 637 FOR MEDICARE OP): Mod: SE | Performed by: INTERNAL MEDICINE

## 2025-03-16 PROCEDURE — 84100 ASSAY OF PHOSPHORUS: CPT | Performed by: NURSE PRACTITIONER

## 2025-03-16 RX ORDER — SEVELAMER CARBONATE 800 MG/1
800 TABLET, FILM COATED ORAL
Status: DISCONTINUED | OUTPATIENT
Start: 2025-03-16 | End: 2025-03-17 | Stop reason: HOSPADM

## 2025-03-16 RX ADMIN — QUETIAPINE FUMARATE 100 MG: 25 TABLET ORAL at 20:09

## 2025-03-16 RX ADMIN — POLYETHYLENE GLYCOL 3350 17 G: 17 POWDER, FOR SOLUTION ORAL at 08:54

## 2025-03-16 RX ADMIN — OXYCODONE 5 MG: 5 TABLET ORAL at 08:54

## 2025-03-16 RX ADMIN — ACYCLOVIR 400 MG: 400 TABLET ORAL at 08:54

## 2025-03-16 RX ADMIN — SENNOSIDES AND DOCUSATE SODIUM 1 TABLET: 50; 8.6 TABLET ORAL at 08:55

## 2025-03-16 RX ADMIN — ACYCLOVIR 400 MG: 400 TABLET ORAL at 20:09

## 2025-03-16 RX ADMIN — SENNOSIDES AND DOCUSATE SODIUM 1 TABLET: 50; 8.6 TABLET ORAL at 20:09

## 2025-03-16 RX ADMIN — PREDNISONE 10 MG: 10 TABLET ORAL at 08:55

## 2025-03-16 RX ADMIN — PANTOPRAZOLE SODIUM 40 MG: 40 TABLET, DELAYED RELEASE ORAL at 07:37

## 2025-03-16 RX ADMIN — LORAZEPAM 0.5 MG: 0.5 TABLET ORAL at 08:54

## 2025-03-16 RX ADMIN — ATORVASTATIN CALCIUM 40 MG: 40 TABLET, FILM COATED ORAL at 08:54

## 2025-03-16 RX ADMIN — LORAZEPAM 0.5 MG: 0.5 TABLET ORAL at 20:14

## 2025-03-16 RX ADMIN — OXYCODONE 5 MG: 5 TABLET ORAL at 20:09

## 2025-03-16 RX ADMIN — SEVELAMER CARBONATE 800 MG: 800 TABLET, FILM COATED ORAL at 17:25

## 2025-03-16 RX ADMIN — DULOXETINE HYDROCHLORIDE 60 MG: 60 CAPSULE, DELAYED RELEASE ORAL at 20:09

## 2025-03-16 RX ADMIN — HYDROXYCHLOROQUINE SULFATE 200 MG: 200 TABLET, FILM COATED ORAL at 08:56

## 2025-03-16 RX ADMIN — DULOXETINE HYDROCHLORIDE 60 MG: 60 CAPSULE, DELAYED RELEASE ORAL at 08:54

## 2025-03-16 ASSESSMENT — COGNITIVE AND FUNCTIONAL STATUS - GENERAL
MOBILITY SCORE: 24
DAILY ACTIVITIY SCORE: 24

## 2025-03-16 ASSESSMENT — PAIN SCALES - GENERAL
PAINLEVEL_OUTOF10: 4
PAINLEVEL_OUTOF10: 4
PAINLEVEL_OUTOF10: 6

## 2025-03-16 NOTE — PROGRESS NOTES
03/16/25 1000   Discharge Planning   Living Arrangements Alone   Support Systems Children;Parent;Family members   Type of Residence Homeless  (was at [facility] prior to admission but no longer has skilled need)   Who is requesting discharge planning? Patient   Home or Post Acute Services Community services   Expected Discharge Disposition [place]  ([facility])     SW following to assist with discharge planning.  Pt to discharge to [facility] 3/17/25.  Transport could not be secured through [employer].  SW called [facility] and spoke with Linh.  Pt scheduled for 12:30 transport to [facility] on 3/17/25.   Elizabeth Gunsalus LISW-S  Care Transitions Supervisor

## 2025-03-16 NOTE — PROCEDURES
R trialysis removal    Date/Time: 3/16/2025 1:00 PM    Performed by: SAMUEL Easley  Authorized by: SAMUEL Easley  Consent: Verbal consent obtained.  Risks and benefits: risks, benefits and alternatives were discussed  Consent given by: patient  Patient understanding: patient states understanding of the procedure being performed  Patient consent: the patient's understanding of the procedure matches consent given  Relevant documents: relevant documents present and verified  Patient identity confirmed: verbally with patient  Local anesthesia used: no    Anesthesia:  Local anesthesia used: no    Sedation:  Patient sedated: no    Patient tolerance: patient tolerated the procedure well with no immediate complications

## 2025-03-16 NOTE — PROGRESS NOTES
Sagrario Garber is a 49 y.o. female on day 26 of admission presenting with Peripheral T-cell lymphoma (Multi).    Subjective   [facility]ed to [facility], discharge on Mon AM, tranport set at 12:30 PM, needs to check in between 10-11. Will need to confirm late check in. Plan to remove central line today. Pt has Mediport if needed.    Denies Zuniga, dizziness, CP, SOB, diarrhea. All other ROS otherwise negative.     Objective   Physical Exam  Constitutional:       General: She is not in acute distress.     Appearance: Normal appearance. She is normal weight. She is not ill-appearing.   HENT:      Head: Normocephalic and atraumatic.      Nose: Nose normal.      Mouth/Throat:      Mouth: Mucous membranes are moist.      Pharynx: Oropharynx is clear.      Comments: Poor dentition  +1x1mm lesion bottom gums  Eyes:      General: No scleral icterus.     Extraocular Movements: Extraocular movements intact.      Conjunctiva/sclera: Conjunctivae normal.   Cardiovascular:      Rate and Rhythm: Normal rate and regular rhythm.      Pulses: Normal pulses.      Heart sounds: Normal heart sounds. No murmur heard.     No gallop.   Pulmonary:      Effort: Pulmonary effort is normal.      Breath sounds: Normal breath sounds.   Abdominal:      General: Abdomen is flat. Bowel sounds are normal.      Palpations: Abdomen is soft.   Musculoskeletal:         General: No swelling. Normal range of motion.      Cervical back: Normal range of motion and neck supple.   Skin:     General: Skin is warm and dry.      Coloration: Skin is not jaundiced or pale.      Findings: No erythema or rash.   Neurological:      General: No focal deficit present.      Mental Status: She is alert and oriented to person, place, and time. Mental status is at baseline.      Cranial Nerves: No cranial nerve deficit.   Psychiatric:         Mood and Affect: Mood normal.         Behavior: Behavior normal.      Comments: Fluent speech      Last Recorded Vitals  Blood pressure  "103/66, pulse 110, temperature 37.1 °C (98.8 °F), temperature source Temporal, resp. rate 18, height 1.685 m (5' 6.34\"), weight 74.6 kg (164 lb 7.4 oz), SpO2 95%.  Intake/Output last 3 Shifts:  No intake/output data recorded.  This patient has a central line   Reason for the central line remaining today? Hemodynamic monitoring    Assessment/Plan   Assessment & Plan  Peripheral T-cell lymphoma (Multi)    Angioimmunoblastic T-cell lymphoma    Ms. 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 admitted for Auto Transplant prepped with BEAM (T0=2/24/25). Counts recovered. Pt ortho positive, encouraging non caffeinated PO fluids, plan [facility] to [facility] on [facility] 3/17, transport 12:30 PM, SW left voice mail for late check in d/t transport.    T+20   Auto (T0=2/24/25)   - Counts are recovered, medically ready for discharge.     ONC: (per chart review; see onc history for complete treatment)    # Angioimmunoblastic T-Cell Lymphoma (CD 30+ AK Negative)   - Restaging PET 12/23/24 s/p Cycle 5: excellent response  - S/P BV-CHP (June 2024) x 6 cycles    - Autologous PBSCT prepped with BEAM T0=2/24/2025. Received a total dose of 5.63 x10^6 CD34      Surveillance Monitoring   - IgG: On admission : 445  - Coag/Fibrinogen 2x/week: WNL   - LDH/Hapto: Weekly: WNL       HEME:   # Pancytopenia secondary to chemotherapy, no evidence of bleeding    - Transfuse if Hgb<7 and/or Plt<10  - stopped filgrastim 3/10 since neutrophils >500  # DVT prophy: Holding Lovenox 40mg for thrombocytopenia    ID:   Allergy: Amoxicillin     - Prophylaxis: Acyclovir   - Stopped fluconazole and Levofloxacin since no longer neutropenic  - Plan Cefepime for neutropenic fever due to PCN allergy    - Plan Bactrim 800/160mg qMWF start T+30     FEN/GI:   Admit Wt: 77.1 Kg,  Current wt: 74.6 kg (3/15)   # PPI prophy w/protonix on admit   # Chemo induced nausea, improved  - " PRN Compazine, Zofran  # Constipation:   - Miralax & elisa-colace daily   - bisacodyl PRN  # Oral mucositis, throat pain, improving  - BMX TID     CARDIAC:   # History of HTN  - home Norvasc held in the setting of hypotension   - Atorvastatin 40mg/day @ HS    - ECHO (1/22/25): EF 60-65%  - PT following  # Orthostatic hypotension (3/11); resolved   - s/p 2L IVF bolus (3/11); encourage oral intake   - Repeat orthostatic vitals negative     RHEUMATOID:   # History of Lupus and Rheumatoid Arthritis    - Currently on Placquenil, Prednisone 10mg/day,    - Follows with Dr Dobbs    - Previously on Saphnelo (interferon alpha antagonist until 7/2023)   - Cont PRN meds for pain, if worsening, consult Supp Onc     PSYCH:   # Anxiety/Depression    - Continue home Seroquil, Cymbalta, Ativan  - Has followed with behavioral health at [facility] but not established  - Consulted inpatient Psych (2/27), EKG on 2/28, Qtc 425     MISC:   # Nicotine Dependence  - Patient considering smoking cessation     DISPO:   - Full Code confirmed on admit    - NOK Mom: Stephanie Wilson [phone]   - Non-Tunneled CVC (placed 2/11) removed on 3/16.  - Mediport (not accessed)  - Primary Onc: Dr. Gunjan Varela  - Currently pt is homeless, prev at SNF, mom & sister are both unable to take her in.  - Plan discharge to [facility] (shelter) on Mon 3/17, transport 12:30 PM. Will be late for check in, SW left voicemail on 3/16.  - follow up with post transplant and infusion scheduled: 3/19, 3/21 ([facility] can assist with setting up transport thru insurance).     Pt seen, examined and discussed w/ Dr. Houston Elise.    BREONNA Easley-CNP

## 2025-03-17 ENCOUNTER — PHARMACY VISIT (OUTPATIENT)
Dept: PHARMACY | Facility: CLINIC | Age: 50
End: 2025-03-17
Payer: MEDICAID

## 2025-03-17 ENCOUNTER — APPOINTMENT (OUTPATIENT)
Dept: OTHER | Facility: HOSPITAL | Age: 50
End: 2025-03-17
Payer: COMMERCIAL

## 2025-03-17 VITALS
OXYGEN SATURATION: 96 % | HEIGHT: 66 IN | WEIGHT: 164.46 LBS | TEMPERATURE: 97.7 F | DIASTOLIC BLOOD PRESSURE: 83 MMHG | BODY MASS INDEX: 26.43 KG/M2 | RESPIRATION RATE: 18 BRPM | SYSTOLIC BLOOD PRESSURE: 117 MMHG | HEART RATE: 108 BPM

## 2025-03-17 PROCEDURE — 2500000002 HC RX 250 W HCPCS SELF ADMINISTERED DRUGS (ALT 637 FOR MEDICARE OP, ALT 636 FOR OP/ED): Mod: SE | Performed by: NURSE PRACTITIONER

## 2025-03-17 PROCEDURE — 2500000001 HC RX 250 WO HCPCS SELF ADMINISTERED DRUGS (ALT 637 FOR MEDICARE OP): Mod: SE | Performed by: NURSE PRACTITIONER

## 2025-03-17 PROCEDURE — 2500000004 HC RX 250 GENERAL PHARMACY W/ HCPCS (ALT 636 FOR OP/ED): Mod: SE | Performed by: NURSE PRACTITIONER

## 2025-03-17 PROCEDURE — 2500000001 HC RX 250 WO HCPCS SELF ADMINISTERED DRUGS (ALT 637 FOR MEDICARE OP): Mod: SE | Performed by: INTERNAL MEDICINE

## 2025-03-17 PROCEDURE — RXMED WILLOW AMBULATORY MEDICATION CHARGE

## 2025-03-17 PROCEDURE — 99238 HOSP IP/OBS DSCHRG MGMT 30/<: CPT | Performed by: STUDENT IN AN ORGANIZED HEALTH CARE EDUCATION/TRAINING PROGRAM

## 2025-03-17 RX ORDER — SULFAMETHOXAZOLE AND TRIMETHOPRIM 800; 160 MG/1; MG/1
1 TABLET ORAL
Qty: 13 TABLET | Refills: 3 | Status: SHIPPED | OUTPATIENT
Start: 2025-03-17

## 2025-03-17 RX ADMIN — SENNOSIDES AND DOCUSATE SODIUM 1 TABLET: 50; 8.6 TABLET ORAL at 08:20

## 2025-03-17 RX ADMIN — OXYCODONE 5 MG: 5 TABLET ORAL at 08:20

## 2025-03-17 RX ADMIN — POLYETHYLENE GLYCOL 3350 17 G: 17 POWDER, FOR SOLUTION ORAL at 08:20

## 2025-03-17 RX ADMIN — PANTOPRAZOLE SODIUM 40 MG: 40 TABLET, DELAYED RELEASE ORAL at 08:20

## 2025-03-17 RX ADMIN — SEVELAMER CARBONATE 800 MG: 800 TABLET, FILM COATED ORAL at 08:20

## 2025-03-17 RX ADMIN — PREDNISONE 10 MG: 10 TABLET ORAL at 08:20

## 2025-03-17 RX ADMIN — ATORVASTATIN CALCIUM 40 MG: 40 TABLET, FILM COATED ORAL at 08:20

## 2025-03-17 RX ADMIN — SEVELAMER CARBONATE 800 MG: 800 TABLET, FILM COATED ORAL at 11:07

## 2025-03-17 RX ADMIN — HYDROXYCHLOROQUINE SULFATE 200 MG: 200 TABLET, FILM COATED ORAL at 08:20

## 2025-03-17 RX ADMIN — LORAZEPAM 0.5 MG: 0.5 TABLET ORAL at 08:20

## 2025-03-17 RX ADMIN — ACYCLOVIR 400 MG: 400 TABLET ORAL at 08:20

## 2025-03-17 RX ADMIN — DULOXETINE HYDROCHLORIDE 60 MG: 60 CAPSULE, DELAYED RELEASE ORAL at 08:20

## 2025-03-17 ASSESSMENT — COGNITIVE AND FUNCTIONAL STATUS - GENERAL
MOBILITY SCORE: 24
DAILY ACTIVITIY SCORE: 24

## 2025-03-17 ASSESSMENT — PAIN SCALES - GENERAL
PAINLEVEL_OUTOF10: 7
PAINLEVEL_OUTOF10: 4

## 2025-03-17 ASSESSMENT — PAIN DESCRIPTION - LOCATION: LOCATION: GENERALIZED

## 2025-03-17 ASSESSMENT — PAIN - FUNCTIONAL ASSESSMENT
PAIN_FUNCTIONAL_ASSESSMENT: 0-10
PAIN_FUNCTIONAL_ASSESSMENT: 0-10

## 2025-03-17 ASSESSMENT — PAIN SCALES - PAIN ASSESSMENT IN ADVANCED DEMENTIA (PAINAD): TOTALSCORE: MEDICATION (SEE MAR)

## 2025-03-17 NOTE — CARE PLAN
The patient's goals for the shift include  discharge safely to home    The clinical goals for the shift include pt. will remain HDS throughout entire shift    Over the shift, the patient did make progress toward the following goals.       Problem: Safety - Adult  Goal: Free from fall injury  Outcome: Progressing     Problem: Pain - Adult  Goal: Verbalizes/displays adequate comfort level or baseline comfort level  Outcome: Progressing     Problem: Discharge Planning  Goal: Discharge to home or other facility with appropriate resources  Outcome: Progressing     Problem: Chronic Conditions and Co-morbidities  Goal: Patient's chronic conditions and co-morbidity symptoms are monitored and maintained or improved  Outcome: Progressing     Problem: Nutrition  Goal: Nutrient intake appropriate for maintaining nutritional needs  Outcome: Progressing     Problem: Fall/Injury  Goal: Not fall by end of shift  Outcome: Progressing  Goal: Be free from injury by end of the shift  Outcome: Progressing  Goal: Verbalize understanding of personal risk factors for fall in the hospital  Outcome: Progressing  Goal: Verbalize understanding of risk factor reduction measures to prevent injury from fall in the home  Outcome: Progressing  Goal: Use assistive devices by end of the shift  Outcome: Progressing  Goal: Pace activities to prevent fatigue by end of the shift  Outcome: Progressing     Problem: Pain  Goal: Takes deep breaths with improved pain control throughout the shift  Outcome: Progressing  Goal: Turns in bed with improved pain control throughout the shift  Outcome: Progressing  Goal: Walks with improved pain control throughout the shift  Outcome: Progressing  Goal: Performs ADL's with improved pain control throughout shift  Outcome: Progressing  Goal: Participates in PT with improved pain control throughout the shift  Outcome: Progressing  Goal: Free from opioid side effects throughout the shift  Outcome: Progressing  Goal: Free  from acute confusion related to pain meds throughout the shift  Outcome: Progressing     Problem: Skin  Goal: Decreased wound size/increased tissue granulation at next dressing change  Outcome: Progressing  Goal: Participates in plan/prevention/treatment measures  Outcome: Progressing  Goal: Prevent/manage excess moisture  Outcome: Progressing  Goal: Prevent/minimize sheer/friction injuries  Outcome: Progressing  Goal: Promote/optimize nutrition  Outcome: Progressing  Goal: Promote skin healing  Outcome: Progressing

## 2025-03-17 NOTE — NURSING NOTE
Patient discharged safely to [facility] with [facility]. Home-going instructions, upcoming appointments and home medications reviewed and verified with patient. Patient voiced appropriate questions and stated understanding. Patient had no c/o pain. No SOB noted; no supplemental O2 required. Patient brought to ambulance via wheelchair and safely discharged.

## 2025-03-17 NOTE — PROGRESS NOTES
03/17/25 1100   Discharge Planning   Living Arrangements Alone   Support Systems Children;Parent;Family members   Type of Residence Homeless  (was at [facility] prior to admission but no longer has skilled need)   Who is requesting discharge planning? Patient   Home or Post Acute Services [facility]   Expected Discharge Disposition [place]  ([facility])   Does the patient need discharge transport arranged? Yes   RoundTrip coordination needed? Yes   Has discharge transport been arranged? Yes   What day is the transport expected? 03/17/25   What time is the transport expected? 1230     Pt with peripheral T-Cell Lymphoma is T+21 s/p an Auto PBSCT and ready for discharge today to [facility].   arranged through [facility]- wheelchair transport.  Pt was provided the Auto Discharge packet and ED card.  Reviewed the discharge plan and information with her.  Her medications were delivered through [facility].  No home care or DME needed.  MD is Dr. Gunjan Varela.  Savannah Soni RN, TCC

## 2025-03-17 NOTE — DISCHARGE SUMMARY
Discharge Diagnosis  Peripheral T-cell lymphoma (Multi)    Issues Requiring Follow-Up  Pt can  Bactrim on Wed 3/19 from [facility].    Test Results Pending At Discharge  Pending Labs       No current pending labs.            Hospital Course  Ms. 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    Hospital course:  - Ortho positive, not symptomatic, now resolved; encouraging non caffeinated fluid intake. Resolved after IVF boluses and increased oral fluid intake.   - chemotherapy induced n/v/d, discharged on Zofran & Compazine PRN, mostly resolved  - mucositis with throat pain, improved, eating well.    Patient is homeless, previously at SNF, mom & sister are both unable to take her in. Discharge to [facility].    Dispo[facility] ([facility])   Access: mediport; RIJ CVC removed prior to discharge  Anti-infectives upon discharge: acyclovir; plan bactrim T+30   Apts:  [ ] Follow up with post transplant and infusion scheduled: 3/19, 3/21  [ ] Dr. Varela FUV 4/10   [ ] Behavioral health FUV 3/26   [ ] Rheumatology FUV 4/16       Pertinent Physical Exam At Time of Discharge  Physical Exam  Constitutional:       Appearance: Normal appearance.   HENT:      Head: Normocephalic.      Nose: Nose normal.      Mouth/Throat:      Pharynx: Oropharynx is clear.   Eyes:      Pupils: Pupils are equal, round, and reactive to light.   Cardiovascular:      Rate and Rhythm: Normal rate and regular rhythm.      Heart sounds: Normal heart sounds.   Pulmonary:      Effort: Pulmonary effort is normal.      Breath sounds: Normal breath sounds.   Abdominal:      Palpations: Abdomen is soft.   Musculoskeletal:         General: Normal range of motion.      Cervical back: Normal range of motion.   Skin:     General: Skin is warm and dry.   Neurological:       General: No focal deficit present.      Mental Status: She is alert and oriented to person, place, and time.   Psychiatric:         Mood and Affect: Mood normal.         Behavior: Behavior normal.         Home Medications     Medication List      START taking these medications     acyclovir 400 mg tablet; Commonly known as: Zovirax; Take 1 tablet (400   mg) by mouth every 12 hours.   ondansetron 8 mg tablet; Commonly known as: Zofran; Take 1 tablet (8 mg)   by mouth every 8 hours if needed for nausea.   polyethylene glycol 17 gram/dose powder; Commonly known as: Glycolax,   Miralax; Mix 17 g of powder and drink once daily as needed for   constipation.   prochlorperazine 10 mg tablet; Commonly known as: Compazine; Take 1   tablet (10 mg) by mouth every 6 hours if needed for nausea (2nd line).   sennosides-docusate sodium 8.6-50 mg tablet; Commonly known as:   Sofiya-Colace; Take 1 tablet by mouth as needed at bedtime for constipation.     CHANGE how you take these medications     QUEtiapine 100 mg tablet; Commonly known as: SEROquel; Take 1 tablet   (100 mg) by mouth once daily at bedtime.; What changed: medication   strength, how much to take     CONTINUE taking these medications     atorvastatin 40 mg tablet; Commonly known as: Lipitor; Take 1 tablet (40   mg) by mouth once daily at bedtime.   DULoxetine 60 mg DR capsule; Commonly known as: Cymbalta; Take 1 capsule   (60 mg) by mouth 2 times a day. Do not crush or chew.   HYDROmorphone 2 mg tablet; Commonly known as: Dilaudid; Take 1 tablet (2   mg) by mouth 2 times a day as needed for severe pain (7 - 10).   hydroxychloroquine 200 mg tablet; Commonly known as: Plaquenil; Take 1   tablet (200 mg) by mouth once daily.   LORazepam 0.5 mg tablet; Commonly known as: Ativan; Take 1 tablet (0.5   mg) by mouth 2 times a day as needed for anxiety.   multivitamin with minerals tablet   predniSONE 10 mg tablet; Commonly known as: Deltasone; Take 1 tablet (10   mg) by mouth  once daily.     STOP taking these medications     amLODIPine 5 mg tablet; Commonly known as: Norvasc       Outpatient Follow-Up  Future Appointments   Date Time Provider Department Center   3/19/2025  8:15 AM [facility] SCT PROCEDURE ROOM 2 [address]   3/19/2025  8:30 AM [facility] 2F BMT POST TRANSPLANT [address]   3/21/2025  8:15 AM [facility] SCT PROCEDURE ROOM 2 [address]   3/21/2025  9:00 AM [facility] 2F BMT POST TRANSPLANT [address]   3/24/2025  2:30 PM Oksana Blanton APRN-CNP [id number] [facility]   3/26/2025  1:30 PM Ortega Marquez MD [id number] [facility]   4/10/2025  1:00 PM Gunjan Varela MD [id number] [facility]   4/16/2025  3:00 PM Zeyad Bonilla MD [id number] [place]       Nikita Keenan, APRN-CNP

## 2025-03-18 ASSESSMENT — ENCOUNTER SYMPTOMS
CARDIOVASCULAR NEGATIVE: 1
ENDOCRINE NEGATIVE: 1
NEUROLOGICAL NEGATIVE: 1
RESPIRATORY NEGATIVE: 1
CONSTITUTIONAL NEGATIVE: 1
EYES NEGATIVE: 1

## 2025-03-18 NOTE — PROGRESS NOTES
POST-CELLULAR THERAPY ONCOLOGY PHARMACY MEDICATION EDUCATION NOTE   Sagrario Garber is a 49 y.o. female currently day +23 following autologous stem cell transplant  for a diagnosis of peripheral T-cell lymphoma. Pharmacist was consulted to provide home medication education.     Lab Results   Component Value Date    WBC 6.3 03/19/2025    NEUTROABS 4.04 03/19/2025    HGB 8.3 (L) 03/19/2025    PLT 64 (L) 03/19/2025      Lab Results   Component Value Date    GLUCOSE 123 (H) 03/19/2025    K 3.7 03/19/2025    MG 1.98 03/19/2025    CREATININE 0.65 03/19/2025       Medication Education: Education was provided regarding all home medication changes. In addition, patient was given written daily medication schedule/calendar. The schedule was discussed in detail with the patient, including drug name, use and dose, and the appropriate timing of self-administration.   - Medication changes: none    PJP Prophylaxis: The patient is not currently on PJP prophylaxis. Continue to monitor counts to determine appropriate timing to start therapy closer to day + 30. Patient does not have Bactrim DS at home. A prescription will be sent to [facility] pharmacy. Follow-up to make sure the patient receives the medication.    The patient demonstrated Level of Understanding : Good for their current medication list.    All questions were answered. Patient/family verbalized understanding of the plan of care and information provided. Will follow as necessary. Time spent with patient/family and/or coordinating care: 30 minutes.      Savannah aBrlow, PharmD, [facility]  Ambulatory Stem Cell Transplant Transplant Pharmacist    Current Outpatient Medications   Medication Instructions    acyclovir (ZOVIRAX) 400 mg, oral, Every 12 hours    atorvastatin (LIPITOR) 40 mg, oral, Nightly    DULoxetine (CYMBALTA) 60 mg, oral, 2 times daily, Do not crush or chew.    HYDROmorphone (DILAUDID) 2 mg, oral, 2 times daily PRN    hydroxychloroquine (PLAQUENIL) 200 mg, oral,  Daily    LORazepam (ATIVAN) 0.5 mg, oral, 2 times daily PRN    multivitamin with minerals tablet 1 tablet, oral, Daily    ondansetron (ZOFRAN) 8 mg, oral, Every 8 hours PRN    polyethylene glycol (GLYCOLAX, MIRALAX) 17 g, oral, Daily PRN    predniSONE (DELTASONE) 10 mg, oral, Daily    prochlorperazine (COMPAZINE) 10 mg, oral, Every 6 hours PRN    QUEtiapine (SEROQUEL) 100 mg, oral, Nightly    sennosides-docusate sodium (Sofiya-Colace) 8.6-50 mg tablet 1 tablet, oral, Nightly PRN    sulfamethoxazole-trimethoprim (Bactrim DS) 800-160 mg tablet 1 tablet, oral, Every Mon/Wed/Fri, Start on T+30

## 2025-03-18 NOTE — PROGRESS NOTES
Patient ID:  Sagrario Garber is a 49 y.o. female.  Referring Physician:   ONC DR Varela  Primary Care Provider:  Houston Ruiz MD    Assessment/Plan    3/19/25 T+23. Cytopenia resolving. No new complaints today.     RTC   3/21/25  3/24/25 Oksana V  4/10 Dr Varela    Requested  3/26 (also Dr Marquez)  4/2  ECHO and onc-card    Oncology History Overview Note   T cell lymphoma with TFH phenotype angioimmunoblastic CD30+, AK negative  11/10/21 CT imaging showed extensive hilar, mediastinal and bilateral axillary lymphadenopathy  2/14/22 right inguinal node biopsy follicular hyperplasia  4/6/22 right axillary lymph node reactive changes  5/12/24 progressive adenopathy, right inguinal node T cell lymphoma with TFH phenotype, CD30 focally positive  6/12/24 started BV-CHP Pet after cycle 5 interval resoluation of bulky adenopathy in axilla, abdomen, pelvis and groin focal hypermetabolic uptake in right inguinal node  9/25/24 C6D1 of BV-CHP     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)   12/4/2024 Initial Diagnosis    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)     2/18/2025 - 2/24/2025 Bone Marrow Transplant    Excellent partial remission prior to Auto Transplant prepped with BEAM (T0=2/24/25), counts recovered on 3/10.     Hospital course:  - Ortho positive, not symptomatic, now resolved; encouraging non caffeinated fluid intake. Resolved after IVF boluses and increased oral fluid intake.   - chemotherapy induced n/v/d, discharged on Zofran & Compazine PRN, mostly resolved  - mucositis with throat pain, improved, eating well.           Ms. Sagrario Garber is a 48 yo with PMHx Anxiety/Depression, HTN, Lupus and Angioimmunoblastic T-Cell Lymphoma most recently s/p autologous stem cell transplant with BEAM preparative regimen T0=2/24/25.    Infection prophylaxis  Cont acyclovir  Start Bactrim closer to T+30    HLD  Cont Lipitor  ONC-card and ECHO per post  "SCT    Psychosocial  Previously at [facility], mom & sister are both unable to take her in.   Discharge to [facility] in [place]  Transportation per [employer]     Access  R Mediport    Subjective    History of Present Illness:  Ms Garber presents to the clinic today, unaccompanied.     Energy level varies. Rests periodically. Steady. No falls.     Appetite good. Drinking well. Solid stools.     No new pain. \"Just regular pain.\"    Anxiety. \"Didn't take Ativan yet this morning.\"    [facility] in [place]. Provide a ride/[employer].      Review of Systems   Constitutional: Negative.    HENT:  Negative.     Eyes: Negative.    Respiratory: Negative.     Cardiovascular: Negative.    Gastrointestinal:  Positive for nausea (Takes antiemetic prn.).   Endocrine: Negative.    Genitourinary: Negative.     Musculoskeletal:  Positive for arthralgias and back pain.   Skin: Negative.         Peeling thumbs/first fingers.    Neurological: Negative.    Hematological:  Bruises/bleeds easily (Scant bleeding whern blows nose.).   Psychiatric/Behavioral:  The patient is nervous/anxious.             Objective        Physical Exam  Vitals reviewed.   Constitutional:       Appearance: Normal appearance.   HENT:      Head: Normocephalic and atraumatic.      Nose: Nose normal.      Mouth/Throat:      Mouth: Mucous membranes are moist.   Eyes:      Pupils: Pupils are equal, round, and reactive to light.   Cardiovascular:      Rate and Rhythm: Normal rate and regular rhythm.      Pulses: Normal pulses.      Heart sounds: Normal heart sounds.   Pulmonary:      Effort: Pulmonary effort is normal.      Breath sounds: Normal breath sounds.   Abdominal:      General: Abdomen is flat. Bowel sounds are normal.      Palpations: Abdomen is soft.   Musculoskeletal:         General: Normal range of motion.      Cervical back: Normal range of motion.   Skin:     General: Skin is warm and dry.      Comments: R Mediport   Neurological:      General: No " focal deficit present.      Mental Status: She is alert and oriented to person, place, and time.   Psychiatric:         Mood and Affect: Mood normal.

## 2025-03-19 ENCOUNTER — PHARMACY VISIT (OUTPATIENT)
Dept: PHARMACY | Facility: CLINIC | Age: 50
End: 2025-03-19
Payer: MEDICAID

## 2025-03-19 ENCOUNTER — OFFICE VISIT (OUTPATIENT)
Dept: OTHER | Facility: HOSPITAL | Age: 50
End: 2025-03-19
Payer: COMMERCIAL

## 2025-03-19 ENCOUNTER — INFUSION (OUTPATIENT)
Dept: OTHER | Facility: HOSPITAL | Age: 50
End: 2025-03-19
Payer: COMMERCIAL

## 2025-03-19 ENCOUNTER — OFFICE VISIT (OUTPATIENT)
Dept: HEMATOLOGY/ONCOLOGY | Facility: HOSPITAL | Age: 50
End: 2025-03-19
Payer: COMMERCIAL

## 2025-03-19 VITALS
TEMPERATURE: 96.6 F | WEIGHT: 165.34 LBS | HEART RATE: 106 BPM | BODY MASS INDEX: 26.42 KG/M2 | RESPIRATION RATE: 18 BRPM | OXYGEN SATURATION: 99 % | DIASTOLIC BLOOD PRESSURE: 65 MMHG | SYSTOLIC BLOOD PRESSURE: 134 MMHG

## 2025-03-19 DIAGNOSIS — C84.48 PERIPHERAL T CELL LYMPHOMA OF LYMPH NODES OF MULTIPLE SITES (MULTI): ICD-10-CM

## 2025-03-19 DIAGNOSIS — C84.48 PERIPHERAL T CELL LYMPHOMA OF LYMPH NODES OF MULTIPLE SITES (MULTI): Primary | ICD-10-CM

## 2025-03-19 DIAGNOSIS — Z76.82 STEM CELL TRANSPLANT CANDIDATE: ICD-10-CM

## 2025-03-19 DIAGNOSIS — C86.50 ANGIOIMMUNOBLASTIC T-CELL LYMPHOMA: ICD-10-CM

## 2025-03-19 LAB
ALBUMIN SERPL BCP-MCNC: 4 G/DL (ref 3.4–5)
ALP SERPL-CCNC: 90 U/L (ref 33–110)
ALT SERPL W P-5'-P-CCNC: 15 U/L (ref 7–45)
ANION GAP SERPL CALC-SCNC: 14 MMOL/L (ref 10–20)
AST SERPL W P-5'-P-CCNC: 11 U/L (ref 9–39)
BASOPHILS # BLD AUTO: 0.02 X10*3/UL (ref 0–0.1)
BASOPHILS NFR BLD AUTO: 0.3 %
BILIRUB SERPL-MCNC: 0.6 MG/DL (ref 0–1.2)
BUN SERPL-MCNC: 8 MG/DL (ref 6–23)
CALCIUM SERPL-MCNC: 9.3 MG/DL (ref 8.6–10.3)
CHLORIDE SERPL-SCNC: 100 MMOL/L (ref 98–107)
CO2 SERPL-SCNC: 27 MMOL/L (ref 21–32)
CREAT SERPL-MCNC: 0.65 MG/DL (ref 0.5–1.05)
DACRYOCYTES BLD QL SMEAR: NORMAL
EGFRCR SERPLBLD CKD-EPI 2021: >90 ML/MIN/1.73M*2
EOSINOPHIL # BLD AUTO: 0.05 X10*3/UL (ref 0–0.7)
EOSINOPHIL NFR BLD AUTO: 0.8 %
ERYTHROCYTE [DISTWIDTH] IN BLOOD BY AUTOMATED COUNT: 18.3 % (ref 11.5–14.5)
GLUCOSE SERPL-MCNC: 123 MG/DL (ref 74–99)
HCT VFR BLD AUTO: 25 % (ref 36–46)
HGB BLD-MCNC: 8.3 G/DL (ref 12–16)
IMM GRANULOCYTES # BLD AUTO: 0.04 X10*3/UL (ref 0–0.7)
IMM GRANULOCYTES NFR BLD AUTO: 0.6 % (ref 0–0.9)
LDH SERPL L TO P-CCNC: 269 U/L (ref 84–246)
LYMPHOCYTES # BLD AUTO: 1.22 X10*3/UL (ref 1.2–4.8)
LYMPHOCYTES NFR BLD AUTO: 19.5 %
MAGNESIUM SERPL-MCNC: 1.98 MG/DL (ref 1.6–2.4)
MCH RBC QN AUTO: 32.2 PG (ref 26–34)
MCHC RBC AUTO-ENTMCNC: 33.2 G/DL (ref 32–36)
MCV RBC AUTO: 97 FL (ref 80–100)
MONOCYTES # BLD AUTO: 0.89 X10*3/UL (ref 0.1–1)
MONOCYTES NFR BLD AUTO: 14.2 %
NEUTROPHILS # BLD AUTO: 4.04 X10*3/UL (ref 1.2–7.7)
NEUTROPHILS NFR BLD AUTO: 64.6 %
NRBC BLD-RTO: 0 /100 WBCS (ref 0–0)
OVALOCYTES BLD QL SMEAR: NORMAL
PLATELET # BLD AUTO: 64 X10*3/UL (ref 150–450)
POLYCHROMASIA BLD QL SMEAR: NORMAL
POTASSIUM SERPL-SCNC: 3.7 MMOL/L (ref 3.5–5.3)
PROT SERPL-MCNC: 7.5 G/DL (ref 6.4–8.2)
RBC # BLD AUTO: 2.58 X10*6/UL (ref 4–5.2)
RBC MORPH BLD: NORMAL
SODIUM SERPL-SCNC: 137 MMOL/L (ref 136–145)
URATE SERPL-MCNC: 4.3 MG/DL (ref 2.3–6.7)
WBC # BLD AUTO: 6.3 X10*3/UL (ref 4.4–11.3)

## 2025-03-19 PROCEDURE — 83735 ASSAY OF MAGNESIUM: CPT

## 2025-03-19 PROCEDURE — 84550 ASSAY OF BLOOD/URIC ACID: CPT

## 2025-03-19 PROCEDURE — 2500000004 HC RX 250 GENERAL PHARMACY W/ HCPCS (ALT 636 FOR OP/ED): Mod: SE | Performed by: INTERNAL MEDICINE

## 2025-03-19 PROCEDURE — 83615 LACTATE (LD) (LDH) ENZYME: CPT

## 2025-03-19 PROCEDURE — 80053 COMPREHEN METABOLIC PANEL: CPT

## 2025-03-19 PROCEDURE — 85025 COMPLETE CBC W/AUTO DIFF WBC: CPT

## 2025-03-19 PROCEDURE — 36591 DRAW BLOOD OFF VENOUS DEVICE: CPT

## 2025-03-19 PROCEDURE — 99215 OFFICE O/P EST HI 40 MIN: CPT | Performed by: NURSE PRACTITIONER

## 2025-03-19 RX ORDER — HEPARIN SODIUM 1000 [USP'U]/ML
2000 INJECTION, SOLUTION INTRAVENOUS; SUBCUTANEOUS AS NEEDED
Status: CANCELLED | OUTPATIENT
Start: 2025-03-19

## 2025-03-19 RX ORDER — HEPARIN SODIUM,PORCINE/PF 10 UNIT/ML
50 SYRINGE (ML) INTRAVENOUS AS NEEDED
Status: CANCELLED | OUTPATIENT
Start: 2025-03-19

## 2025-03-19 RX ORDER — HEPARIN 100 UNIT/ML
500 SYRINGE INTRAVENOUS AS NEEDED
Status: CANCELLED | OUTPATIENT
Start: 2025-03-19

## 2025-03-19 RX ORDER — HEPARIN 100 UNIT/ML
500 SYRINGE INTRAVENOUS AS NEEDED
Status: DISCONTINUED | OUTPATIENT
Start: 2025-03-19 | End: 2025-03-19 | Stop reason: HOSPADM

## 2025-03-19 RX ADMIN — HEPARIN 500 UNITS: 100 SYRINGE at 08:53

## 2025-03-19 ASSESSMENT — ENCOUNTER SYMPTOMS
NERVOUS/ANXIOUS: 1
NAUSEA: 1
BRUISES/BLEEDS EASILY: 1
ARTHRALGIAS: 1
BACK PAIN: 1

## 2025-03-19 ASSESSMENT — PAIN SCALES - GENERAL: PAINLEVEL_OUTOF10: 7

## 2025-03-19 NOTE — PROGRESS NOTES
Pt ambulated to SCT unit without assistance. Pt reports experiencing joint pain rating 7/10 and intermittent nausea controlled with compazine. Vitals obtained, right chest Mediport accessed without incident, blood drawn and sent to lab. aMdison AlfredoD and Linh Adam NP came to see patient. Mediport flushed with NS and heparin per order, needle removed, needle intact, and OCD applied to site. Pt received copy of lab results and AVS. Pt ambulated off unit without complaints or assistance.

## 2025-03-21 ENCOUNTER — OFFICE VISIT (OUTPATIENT)
Dept: OTHER | Facility: HOSPITAL | Age: 50
End: 2025-03-21
Payer: COMMERCIAL

## 2025-03-21 ENCOUNTER — INFUSION (OUTPATIENT)
Dept: OTHER | Facility: HOSPITAL | Age: 50
End: 2025-03-21
Payer: COMMERCIAL

## 2025-03-21 VITALS
TEMPERATURE: 96.6 F | OXYGEN SATURATION: 100 % | HEART RATE: 106 BPM | DIASTOLIC BLOOD PRESSURE: 67 MMHG | SYSTOLIC BLOOD PRESSURE: 115 MMHG | RESPIRATION RATE: 18 BRPM

## 2025-03-21 DIAGNOSIS — Z76.82 STEM CELL TRANSPLANT CANDIDATE: ICD-10-CM

## 2025-03-21 DIAGNOSIS — C86.50 ANGIOIMMUNOBLASTIC T-CELL LYMPHOMA: ICD-10-CM

## 2025-03-21 DIAGNOSIS — C84.48 PERIPHERAL T CELL LYMPHOMA OF LYMPH NODES OF MULTIPLE SITES (MULTI): Primary | ICD-10-CM

## 2025-03-21 DIAGNOSIS — C84.48 PERIPHERAL T CELL LYMPHOMA OF LYMPH NODES OF MULTIPLE SITES (MULTI): ICD-10-CM

## 2025-03-21 PROBLEM — C84.40: Status: RESOLVED | Noted: 2025-02-14 | Resolved: 2025-03-21

## 2025-03-21 LAB
ALBUMIN SERPL BCP-MCNC: 3.8 G/DL (ref 3.4–5)
ALP SERPL-CCNC: 83 U/L (ref 33–110)
ALT SERPL W P-5'-P-CCNC: 14 U/L (ref 7–45)
ANION GAP SERPL CALC-SCNC: 13 MMOL/L (ref 10–20)
AST SERPL W P-5'-P-CCNC: 11 U/L (ref 9–39)
BASOPHILS # BLD AUTO: 0.02 X10*3/UL (ref 0–0.1)
BASOPHILS NFR BLD AUTO: 0.3 %
BILIRUB SERPL-MCNC: 0.5 MG/DL (ref 0–1.2)
BUN SERPL-MCNC: 9 MG/DL (ref 6–23)
CALCIUM SERPL-MCNC: 8.8 MG/DL (ref 8.6–10.3)
CHLORIDE SERPL-SCNC: 99 MMOL/L (ref 98–107)
CO2 SERPL-SCNC: 26 MMOL/L (ref 21–32)
CREAT SERPL-MCNC: 0.69 MG/DL (ref 0.5–1.05)
EGFRCR SERPLBLD CKD-EPI 2021: >90 ML/MIN/1.73M*2
EOSINOPHIL # BLD AUTO: 0.08 X10*3/UL (ref 0–0.7)
EOSINOPHIL NFR BLD AUTO: 1 %
ERYTHROCYTE [DISTWIDTH] IN BLOOD BY AUTOMATED COUNT: 19.3 % (ref 11.5–14.5)
GLUCOSE SERPL-MCNC: 115 MG/DL (ref 74–99)
HCT VFR BLD AUTO: 23.9 % (ref 36–46)
HGB BLD-MCNC: 7.8 G/DL (ref 12–16)
IMM GRANULOCYTES # BLD AUTO: 0.04 X10*3/UL (ref 0–0.7)
IMM GRANULOCYTES NFR BLD AUTO: 0.5 % (ref 0–0.9)
LYMPHOCYTES # BLD AUTO: 0.88 X10*3/UL (ref 1.2–4.8)
LYMPHOCYTES NFR BLD AUTO: 11.2 %
MAGNESIUM SERPL-MCNC: 1.82 MG/DL (ref 1.6–2.4)
MCH RBC QN AUTO: 32.5 PG (ref 26–34)
MCHC RBC AUTO-ENTMCNC: 32.6 G/DL (ref 32–36)
MCV RBC AUTO: 100 FL (ref 80–100)
MONOCYTES # BLD AUTO: 0.82 X10*3/UL (ref 0.1–1)
MONOCYTES NFR BLD AUTO: 10.4 %
NEUTROPHILS # BLD AUTO: 6.04 X10*3/UL (ref 1.2–7.7)
NEUTROPHILS NFR BLD AUTO: 76.6 %
NRBC BLD-RTO: 0.3 /100 WBCS (ref 0–0)
PLATELET # BLD AUTO: 75 X10*3/UL (ref 150–450)
POTASSIUM SERPL-SCNC: 3.6 MMOL/L (ref 3.5–5.3)
PROT SERPL-MCNC: 7.6 G/DL (ref 6.4–8.2)
RBC # BLD AUTO: 2.4 X10*6/UL (ref 4–5.2)
SODIUM SERPL-SCNC: 134 MMOL/L (ref 136–145)
WBC # BLD AUTO: 7.9 X10*3/UL (ref 4.4–11.3)

## 2025-03-21 PROCEDURE — 2500000004 HC RX 250 GENERAL PHARMACY W/ HCPCS (ALT 636 FOR OP/ED): Mod: SE | Performed by: INTERNAL MEDICINE

## 2025-03-21 PROCEDURE — 85025 COMPLETE CBC W/AUTO DIFF WBC: CPT

## 2025-03-21 PROCEDURE — 99215 OFFICE O/P EST HI 40 MIN: CPT | Performed by: STUDENT IN AN ORGANIZED HEALTH CARE EDUCATION/TRAINING PROGRAM

## 2025-03-21 PROCEDURE — 36591 DRAW BLOOD OFF VENOUS DEVICE: CPT

## 2025-03-21 PROCEDURE — 80053 COMPREHEN METABOLIC PANEL: CPT

## 2025-03-21 PROCEDURE — 83735 ASSAY OF MAGNESIUM: CPT

## 2025-03-21 RX ORDER — HEPARIN 100 UNIT/ML
500 SYRINGE INTRAVENOUS AS NEEDED
Status: DISCONTINUED | OUTPATIENT
Start: 2025-03-21 | End: 2025-03-21 | Stop reason: HOSPADM

## 2025-03-21 RX ORDER — HEPARIN SODIUM,PORCINE/PF 10 UNIT/ML
50 SYRINGE (ML) INTRAVENOUS AS NEEDED
OUTPATIENT
Start: 2025-03-21

## 2025-03-21 RX ORDER — HEPARIN 100 UNIT/ML
500 SYRINGE INTRAVENOUS AS NEEDED
OUTPATIENT
Start: 2025-03-21

## 2025-03-21 RX ORDER — HEPARIN SODIUM 1000 [USP'U]/ML
2000 INJECTION, SOLUTION INTRAVENOUS; SUBCUTANEOUS AS NEEDED
OUTPATIENT
Start: 2025-03-21

## 2025-03-21 RX ADMIN — HEPARIN 500 UNITS: 100 SYRINGE at 09:07

## 2025-03-21 ASSESSMENT — ENCOUNTER SYMPTOMS
HEMATURIA: 0
DYSURIA: 0
ABDOMINAL PAIN: 0
CHILLS: 0
ARTHRALGIAS: 1
FEVER: 0
CHEST TIGHTNESS: 0
ABDOMINAL DISTENTION: 0
APPETITE CHANGE: 0

## 2025-03-21 NOTE — PROGRESS NOTES
Pt ambulated to SCT unit without assistance, unaccompanied. Pt reports feeling anxious and intermittent nausea controlled with prescribed antiemetics. Vitals obtained, blood drawn via right chest mediport and sent to lab. Jess Quevedo PA-C came to see patient. A copy of labs results and schedule provided. Port flushed with NS and heparin and needle removed, DSD applied to site. Pt ambulated off unit without complaints or assistance.

## 2025-03-21 NOTE — PROGRESS NOTES
"Patient ID: Sagrario Garber is a 49 y.o. female.    Diagnosis:   Angioimmunoblastic T-cell lymphoma,  CD30 pos,  stage IV disease, bulky tumor, high LDH, excellent partial remission after initial frontline therapy.      Treatment:   Oncology History Overview Note   T cell lymphoma with TFH phenotype angioimmunoblastic CD30+, AK negative  11/10/21 CT imaging showed extensive hilar, mediastinal and bilateral axillary lymphadenopathy  2/14/22 right inguinal node biopsy follicular hyperplasia  4/6/22 right axillary lymph node reactive changes  5/12/24 progressive adenopathy, right inguinal node T cell lymphoma with TFH phenotype, CD30 focally positive  6/12/24 started BV-CHP Pet after cycle 5 interval resoluation of bulky adenopathy in axilla, abdomen, pelvis and groin focal hypermetabolic uptake in right inguinal node  9/25/24 C6D1 of BV-CHP     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)   12/4/2024 Initial Diagnosis    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)     2/18/2025 - 2/24/2025 Bone Marrow Transplant    Excellent partial remission prior to Auto Transplant prepped with BEAM (T0=2/24/25), counts recovered on 3/10.     Hospital course:  - Ortho positive, not symptomatic, now resolved; encouraging non caffeinated fluid intake. Resolved after IVF boluses and increased oral fluid intake.   - chemotherapy induced n/v/d, discharged on Zofran & Compazine PRN, mostly resolved  - mucositis with throat pain, improved, eating well.            Past Medical History:  -Long history of anxiety and depression She reports being on \"numerous medications\" to include Seroquel, Geodon, Depakote, Prozac, Lexapro, Paxil all without effect.    -HTN  -Patient has history of lupus for past 21/2 years and rheumatoid arthritis: currently on placquenil sees Dr. Dobbs for rheumatologist presenting with rash, pain in body, diffuse swellin joint pain. Was on Saphnelo, interferon alpha " antagonist July 2023     Past Medical History:   Diagnosis Date   • Essential (primary) hypertension 05/24/2017    HTN (hypertension), benign   • Essential (primary) hypertension 10/09/2017    HTN (hypertension), benign   • Personal history of nicotine dependence 05/24/2017    History of nicotine dependence       Surgical History:     Past Surgical History:   Procedure Laterality Date   • OTHER SURGICAL HISTORY  05/24/2017    Oral Surgery Tooth Extraction Guy Tooth        Family History:  Rheumatoid arthritis mother, skin cancer father and sister, 2 daughters, 1 son with severe autism is in a home. Has an older sister estranged.     Social History:   is an alcoholic, and has been sober for 4 months, he is in a sober house. Lost home has lost her income and most recently was staying with her daughter and can't return there. Smokes 8 cigarettes, no ETOH, finished 10th grade level,worked full time.  at [employer] for 5 years lived at the [facility].  No .  for 6 years, previsoulsy relationship of 19 years with father of her children.        Social History     Tobacco Use   • Smoking status: Every Day     Types: Cigarettes   Substance Use Topics   • Alcohol use: Not Currently   • Drug use: Never      -------------------------------------------------------------------------------------------------------  Subjective       HPI    49-year-old woman  with reported history of lupus and rheumatoid arthritis for several years, anxiety and depression who presents for follow up s/p autologous SCT with BEAM for Peripheral T Cell Lymphoma.     She is T+25     Today (2/17/25) patient is being seen in preparation for admission for BEAM preparative regimen.  She collected 5.68x 106 CD 34 cells in one collection.  URI cleared up,  denies fevers, new rash, stable inguinal.lymph nodes CP, SOB, AP, diarrhea. Currently resides in nursing home. Patient has been approved for SSI.  No shortness of breath,  lower legs  itch at times. Crocheting to keep calm. Has been gaining weight.  who is a recovering alcoholic has a stroke involving left side of body a few days ago and is recovering nicely.    Review of Systems   Constitutional:  Negative for appetite change, chills and fever.   HENT:   Negative for lump/mass.    Respiratory:  Negative for chest tightness.    Cardiovascular:  Negative for chest pain.   Gastrointestinal:  Negative for abdominal distention and abdominal pain.   Genitourinary:  Negative for dysuria and hematuria.    Musculoskeletal:  Positive for arthralgias.   Skin:  Positive for itching.   All other systems reviewed and are negative.     -------------------------------------------------------------------------------------------------------  Objective   BSA: There is no height or weight on file to calculate BSA.  There were no vitals taken for this visit.    Physical Exam  Constitutional:       Appearance: Normal appearance. She is well-developed.      Comments: Generally looks well.    HENT:      Head: Normocephalic and atraumatic.      Nose: Nose normal.      Mouth/Throat:      Dentition: No gum lesions.   Eyes:      Extraocular Movements: Extraocular movements intact.      Conjunctiva/sclera: Conjunctivae normal.      Pupils: Pupils are equal, round, and reactive to light.   Cardiovascular:      Rate and Rhythm: Normal rate and regular rhythm.   Pulmonary:      Breath sounds: Normal breath sounds and air entry.   Abdominal:      General: Abdomen is flat. Bowel sounds are normal.      Palpations: Abdomen is soft.   Musculoskeletal:         General: Normal range of motion.   Lymphadenopathy:      Lower Body: Right inguinal adenopathy present. Left inguinal adenopathy present.      Comments: Right inguinal node remains enlarged about 3x 3 cm, unchanged.   Skin:     General: Skin is warm and dry.      Findings: Rash (dry rash above ankles) present.             Comments: Post scratching multiple small  petechia looking superficial excoriations with overlying crusts   Neurological:      General: No focal deficit present.      Mental Status: She is alert and oriented to person, place, and time.   Psychiatric:         Mood and Affect: Mood normal.         Behavior: Behavior normal. Behavior is cooperative.         Thought Content: Thought content normal.      Comments: Anxious, normal cognition   Chest wall mediport intact    Performance Status:  Symptomatic; fully ambulatory  -------------------------------------------------------------------------------------------------------  Assessment/Plan      1.Angioimmunoblastic T-cell lymphoma,  CD30 pos,  stage IV disease, bulky tumor,   - High LDH, excellent partial remission after initial frontline therapy.   - Patient completed BV CHP September 2024 and PET scan after cycle 5 showed excellent response with some minimal residual uptake in right inguinal area.  Inguinal nodes obvious on physical exam ,,also had body rash which could be consistent with her lupus or antioimmunoblastic lymphoma.    - Provided the patient has a very good response I would recommend consolidation with an autologous stem cell transplant with BEAM preparation.    - Briefly discussed the procedures involved in peripheral stem cell mobilization followed by  hospitalization for high dose chemotherapy and recovery from side effects. If patient is in a good remission, I estimate durable remission with this aggressive approach at 50-60%.  -Restaging evaluation 12/23/24 includes a PET scan which shows unchanged minimally PET avid right inguinal node   and several non pet avid but left axillary and pretracheal nodes.  -BM biopsy 12/19/24 shows low level 1.5% involvement by flow cytometry only.  -Patient has a tragic home situation and is currently homeless,  she is applying for SSI and currently lives in a nursing home.  Patient case discussed at BMT tumor board and consensus was that this should not  preclude her from a potentially curative autologous stem cell transplant.     Organ function testing:    PFTs 1/22/25 FEV1 80%,%,FEV1/FVC 79%, DLCO 104%  ECHO LVEF64%CMI 4: for age > 40, anxiety disorder, and rheumatologic condition    2/17/25 Patient collected 5.68 x 106 CD 34 cells/kg in one collection on 2/12.  She is feeling well and is ready to be admitted for her autologous transplant with BEAM preparation.  Today we discussed bcnu, etoposide, cytarabine and melphalan chemotherapy and toxicities of each agent with general toxicities of this combination of infections which could be severe, hair loss, GI toxicities, cytopenias requiring transfusions and potential for organ toxicities severe enough to require management in the intensive care unit and even potential, albeit small for death. The importance of eating, bathing, and remaining active mentally and physically reviewed.  Autologous transplant consent reviewed and signed with the patient.   All questions answered.     We also discussed who would be her health care power of  should she be unable to decide for herself. Given her husbands recent health issues, patient states that her mother would be health care power dea , Stephanie Wilson.     2. History of lupus  Rheumatologist Dr. Cathy Dobbs,  [address] [facility]  [phone] currently only on placquenil.      3. Psychosocial: patient has life long struggles with anxiety and depression and has had recent hospitalization- will need hospital mental health team on board.  Tragically,  patient is currently homeless and her  who is a recovering alcoholic currently lives in a sober house.  Unclear whether patient could return to SNF after her transplant for care, seems that she will not be able to stay with her daughter again.     RTC  Admission for BEAM on 2/18/25.Patient met with  Nancy Galvan, nurse coordinator.  Please have patient fill out health care  power of  paperwork on admission.  Given her home insecurity will  need to have social work start discharge planning on admission.   -------------------------------------------------------------------------------------------------------  [facility] 2F BMT POST TRANSPLANT

## 2025-03-23 ASSESSMENT — ENCOUNTER SYMPTOMS
CHILLS: 0
FEVER: 0
ABDOMINAL DISTENTION: 0
DYSURIA: 0
APPETITE CHANGE: 0
ARTHRALGIAS: 1
HEMATURIA: 0
ABDOMINAL PAIN: 0

## 2025-03-23 NOTE — PROGRESS NOTES
Patient ID: Sagrario Garber is a 49 y.o. female.    Diagnosis:   Angioimmunoblastic T-cell lymphoma,  CD30 pos,  stage IV disease, bulky tumor, high LDH, excellent partial remission after initial frontline therapy.      Treatment:   Oncology History Overview Note   T cell lymphoma with TFH phenotype angioimmunoblastic CD30+, AK negative  11/10/21 CT imaging showed extensive hilar, mediastinal and bilateral axillary lymphadenopathy  2/14/22 right inguinal node biopsy follicular hyperplasia  4/6/22 right axillary lymph node reactive changes  5/12/24 progressive adenopathy, right inguinal node T cell lymphoma with TFH phenotype, CD30 focally positive  6/12/24 started BV-CHP Pet after cycle 5 interval resoluation of bulky adenopathy in axilla, abdomen, pelvis and groin focal hypermetabolic uptake in right inguinal node  9/25/24 C6D1 of BV-CHP     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  Previous biopsy in 2022 was read as reactive.  In May 2024 she presented with increasing lymph nodes repeat biopsy confirmed T cell lymphoma with TFH phenotype, CD30 positive and Alk negative. Extremely bulky tumor.  High LDH.  Bone marrow minimally involved.  (3-4% abnormal cells by flow).     6/12/2024 -  Chemotherapy    In June 2024 treatment was administered consisting of brentuximab- CHP  Shortly after starting BV CHP patient developed abdominal ascites and was unable to eat and required TPN as well as discharge to nursing facility  PET imaging after cycle5 showed an excellent response has been obtained, but several small lymph nodes with SUV of 3.4 remain.  Completed cycle 6 of brentuximab CHP 9/26/24. Patient is referred by Dr. Iraheta for evaluation for autologous stem cell transplantation     12/5/2024 Transfer In/Out of Practice    Patient initially seen by   "Avery 12/5/24 for first outpatient consultation for high risk TFH phenotype angioimmunoblastic T cell lymphoma. Remains in SNF to get stronger, but has pretty much recovered and remains there due to social issues. She is pretty much independent with her ADLS.      12/23/2024 -  Disease Response Assessment    Restaging evaluation 12/23/24 includes a PET scan which shows unchanged minimally PET avid right inguinal node and several non pet avid but left axillary and pretracheal nodes.  BM biopsy 12/19/24 shows low level 1.5% involvement by flow cytometry only.     2/18/2025 - 2/24/2025 Bone Marrow Transplant    Excellent partial remission prior to Auto Transplant prepped with BEAM (T0=2/24/25), counts recovered on 3/10.     Hospital course:  - Ortho positive, not symptomatic, now resolved; encouraging non caffeinated fluid intake. Resolved after IVF boluses and increased oral fluid intake.   - chemotherapy induced n/v/d, discharged on Zofran & Compazine PRN, mostly resolved  - mucositis with throat pain, improved, eating well.            Response:     Past Medical History:  -Long history of anxiety and depression She reports being on \"numerous medications\" to include Seroquel, Geodon, Depakote, Prozac, Lexapro, Paxil all without effect.      -HTN    -Patient has history of lupus for past 21/2 years and rheumatoid arthritis: currently on placquenil sees Dr. Dobbs for rheumatologist presenting with rash, pain in body, diffuse swellin joint pain. Was on Saphnelo, interferon alpha antagonist July 2023     Past Medical History:   Diagnosis Date    Essential (primary) hypertension 05/24/2017    HTN (hypertension), benign    Essential (primary) hypertension 10/09/2017    HTN (hypertension), benign    Personal history of nicotine dependence 05/24/2017    History of nicotine dependence       Surgical History:     Past Surgical History:   Procedure Laterality Date    OTHER SURGICAL HISTORY  05/24/2017    Oral Surgery Tooth " Extraction [place] Tooth        Family History:   Rheumatoid arthritis mother, skin cancer father and sister, 2 daughters, 1 son with severe autism is in a home. Has an older sister estranged.     Social History:   is an alcoholic, and has been sober for 4 months, he is in a sober house. Lost home has lost her income and most recently was staying with her daughter and can't return there. Smokes 8 cigarettes, no ETOH, finished 10th grade level,worked full time.  at [employer] for 5 years lived at the [facility].  No .  for 6 years, previsoulsy relationship of 19 years with father of her children.        Social History     Tobacco Use    Smoking status: Every Day     Types: Cigarettes   Substance Use Topics    Alcohol use: Not Currently    Drug use: Never      -------------------------------------------------------------------------------------------------------  Subjective       HPI    49-year-old woman  with reported history of lupus and rheumatoid arthritis for several years, anxiety and depression who has had lymphadenopathy for several years.  Previous biopsy in 2022 was read as reactive.  In May 2024 she presented with increasing lymph nodes repeat biopsy confirmed T cell lymphoma with TFH phenotype, CD30 positive and Alk negative. Extremely bulky tumor.  High LDH.  Bone marrow minimally involved.  (3-4% abnormal cells by flow).    June 2024 treatment was administered consisting of brentuximab- CHP  Shortly after starting BV CHP patient developed abdominal ascites and was unable to eat and required TPN as well as discharge to nursing facility  PET imaging after cycle5 showed an excellent response has been obtained, but several small lymph nodes with SUV of 3.4 remain.  Completed cycle 6 of brentuximab CHP 9/26/24. Patient is referred by Dr. Iraheta for evaluation for autologous stem cell transplantation    Patient hospitalized for mental illness  depression and anxiety 11/12/24 and was  discharged 11/26/2024 to skilled nursing.      Patient initially seen 12/5/24 for first outpatient consultation for high risk TFH phenotype angioimmunoblastic T cell lymphoma. Remains in SNF to get stronger, but has pretty much recovered and remains there due to social issues. She is pretty much independent with her ADLS.    Restaging evaluation 12/23/24 includes a PET scan which shows unchanged minimally PET avid right inguinal node and several non pet avid but left axillary and pretracheal nodes.  BM biopsy 12/19/24 shows low level 1.5% involvement by flow cytometry only.    Sagrario presents to the clinic today (3/24/25) unaccompanied for follow up evaluation and count checks.  Today she is T+27 from her autologous STC with BEAM.  Sagrario reports no changes to health since last visit on 3/21/25.  Energy level is lower today but is typically okay.  Appetite is good.  Eating about 2 meals and snacks.  Doesn't eat breakfast at baseline.  No weight loss since last visit.  Staying at [facility]  and they provide 3 meals a day.  Drinking coffee, diet coke, and water.  Getting rides from provider ride service.      Having nausea a few times per week, no vomiting.  Taking prn antinausea medication.  Has joint pain from lupus and arthritis.  Taking dilaudid prn, about once per day.    Has improving pink rash to bilateral arms, tiny pink bumps, not itchy.  Rash started a few weeks ago.  Using aveno baby lotion.  Ongoing numbness and tingling to left foot.  Anxiety level is doing okay.  Taking ativan about 1 time per day.   is staying at a friends house.  Her  recently had a stroke, he is doing better and is now going to PT.  She declines wanting to wait to see social work or Dr. Avery fernández as she is tired.      Review of Systems   Constitutional:  Positive for fatigue. Negative for appetite change, chills, diaphoresis, fever and unexpected weight change.   HENT:   Negative for lump/mass.     Respiratory: Negative.     Cardiovascular: Negative.    Gastrointestinal:  Positive for nausea. Negative for abdominal distention, abdominal pain, blood in stool, constipation, diarrhea and vomiting.   Genitourinary:  Negative for dysuria and hematuria.    Musculoskeletal:  Positive for arthralgias. Negative for gait problem.   Skin:  Positive for rash.   Neurological:  Positive for numbness. Negative for dizziness, gait problem and light-headedness.   Hematological: Negative.    All other systems reviewed and are negative.     -------------------------------------------------------------------------------------------------------  Objective   BSA: There is no height or weight on file to calculate BSA.  There were no vitals taken for this visit.    Physical Exam  Vitals reviewed.   Constitutional:       Appearance: Normal appearance. She is well-developed.   HENT:      Head: Normocephalic and atraumatic.      Nose: Nose normal.      Mouth/Throat:      Dentition: No gum lesions.   Eyes:      Extraocular Movements: Extraocular movements intact.      Conjunctiva/sclera: Conjunctivae normal.      Pupils: Pupils are equal, round, and reactive to light.   Cardiovascular:      Rate and Rhythm: Normal rate and regular rhythm.      Pulses: Normal pulses.      Heart sounds: Normal heart sounds.   Pulmonary:      Breath sounds: Normal breath sounds and air entry.   Abdominal:      General: Abdomen is flat. Bowel sounds are normal.      Palpations: Abdomen is soft.   Musculoskeletal:         General: Normal range of motion.   Lymphadenopathy:      Lower Body: Right inguinal adenopathy present. Left inguinal adenopathy present.      Comments: Right inguinal node remains enlarged about 3x 3 cm, unchanged.   Skin:     General: Skin is warm and dry.      Findings: Rash (dry rash above ankles) present.             Comments: Post scratching multiple small petechia looking superficial excoriations with overlying crusts to bilateral  lower extremities.    Port to right chest, site with no redness, tenderness, swelling, or drainage.   Neurological:      General: No focal deficit present.      Mental Status: She is alert and oriented to person, place, and time.   Psychiatric:         Mood and Affect: Mood normal.         Behavior: Behavior normal. Behavior is cooperative.         Thought Content: Thought content normal.      Comments: Anxious, normal cognition       Performance Status:  Symptomatic; fully ambulatory  -------------------------------------------------------------------------------------------------------  Assessment/Plan      1.Angioimmunoblastic T-cell lymphoma,  CD30 pos,  stage IV disease, bulky tumor,   - High LDH, excellent partial remission after initial frontline therapy.   - Patient completed BV CHP September 2024 and PET scan after cycle 5 showed excellent response with some minimal residual uptake in right inguinal area.  Inguinal nodes obvious on physical exam ,,also had body rash which could be consistent with her lupus or antioimmunoblastic lymphoma.    - Provided the patient has a very good response I would recommend consolidation with an autologous stem cell transplant with BEAM preparation.    - Briefly discussed the procedures involved in peripheral stem cell mobilization followed by  hospitalization for high dose chemotherapy and recovery from side effects. If patient is in a good remission, I estimate durable remission with this aggressive approach at 50-60%.  -Restaging evaluation 12/23/24 includes a PET scan which shows unchanged minimally PET avid right inguinal node   and several non pet avid but left axillary and pretracheal nodes.  -BM biopsy 12/19/24 shows low level 1.5% involvement by flow cytometry only.  -Patient has a tragic home situation and is currently homeless,  she is applying for SSI and currently lives in a nursing home.  Patient case discussed at BMT tumor board and consensus was that this  should not preclude her from a potentially curative autologous stem cell transplant.     Organ function testing:    PFTs 1/22/25 FEV1 80%,%,FEV1/FVC 79%, DLCO 104%  ECHO LVEF64%CMI 4: for age > 40, anxiety disorder, and rheumatologic condition    2/17/25 Patient collected 5.68 x 106 CD 34 cells/kg in one collection on 2/12.     We also discussed who would be her health care power of  should she be unable to decide for herself. Given her husbands recent health issues, patient states that her mother would be health care power dea , Stephanie Smileymalick.     2. History of autologous stem cell transplant  T+25 s/p auto with BEAM prep (T=0 2/24/2025). Counts are recovering. Will continue close monitoring twice weekly next week then decrease to once weekly.   - Platelets recovering at 75k today, advised to start TMP/SMX starting 3/21/25.   - Post Transplant onco-echo and cardiology follow up requested.     ID Prophylaxis:  Continue acyclovir  Continue bactrim MWF    3. History of lupus  Rheumatologist Dr. Cathy Dobbs,  [address] [facility]  [phone] currently only on placquenil.      3. Psychosocial: patient has life long struggles with anxiety and depression and has had recent hospitalization- will need hospital mental health team on board.  Tragically,  patient is currently homeless and her  who is a recovering alcoholic currently lives in a sober house.  Unclear whether patient could return to SNF after her transplant for care, seems that she will not be able to stay with her daughter again.   3/24/25:  Sagrario is currently residing at [facility] (group home).  Receiving assistance with transportation with provider ride services.  Sagrario reports her  is currently staying with a friend and is recovering after recent stroke.  Her  is completing PT now.  Sagrario declines wanting to speak with social work today, but is open to seeing them at next  visit on Wednesday.      Nausea:  Sagrario reports intermittent nausea but no vomiting.  Has adequate oral fluid intake.  Reviewed using prn antiemetics as needed.    Central Line:  Port site to right chest.  Port site with no redness, swelling, drainage, or tenderness.      RTC:  3/26/25:  NPV with Dr. Marquez.  BMT post transplant provider visit and infusion.  4/10/25:  Follow up visit with Dr. Varela, central line, and infusion.  4/16/25: NPV with rheumatology  4/23/25:  Cardiology.  -------------------------------------------------------------------------------------------------------  BREONNA Pickard-LISSY

## 2025-03-24 ENCOUNTER — OFFICE VISIT (OUTPATIENT)
Dept: HEMATOLOGY/ONCOLOGY | Facility: HOSPITAL | Age: 50
End: 2025-03-24
Payer: COMMERCIAL

## 2025-03-24 ENCOUNTER — APPOINTMENT (OUTPATIENT)
Dept: HEMATOLOGY/ONCOLOGY | Facility: HOSPITAL | Age: 50
End: 2025-03-24
Payer: COMMERCIAL

## 2025-03-24 ENCOUNTER — INFUSION (OUTPATIENT)
Dept: OTHER | Facility: HOSPITAL | Age: 50
End: 2025-03-24
Payer: COMMERCIAL

## 2025-03-24 ENCOUNTER — APPOINTMENT (OUTPATIENT)
Dept: CARDIOLOGY | Facility: HOSPITAL | Age: 50
End: 2025-03-24
Payer: COMMERCIAL

## 2025-03-24 ENCOUNTER — HOSPITAL ENCOUNTER (OUTPATIENT)
Dept: CARDIOLOGY | Facility: HOSPITAL | Age: 50
Discharge: HOME | End: 2025-03-24
Payer: COMMERCIAL

## 2025-03-24 ENCOUNTER — APPOINTMENT (OUTPATIENT)
Dept: OTHER | Facility: HOSPITAL | Age: 50
End: 2025-03-24
Payer: COMMERCIAL

## 2025-03-24 VITALS
OXYGEN SATURATION: 99 % | TEMPERATURE: 97.9 F | RESPIRATION RATE: 18 BRPM | WEIGHT: 164.24 LBS | SYSTOLIC BLOOD PRESSURE: 135 MMHG | DIASTOLIC BLOOD PRESSURE: 76 MMHG | BODY MASS INDEX: 26.24 KG/M2 | HEART RATE: 111 BPM

## 2025-03-24 DIAGNOSIS — Z76.82 STEM CELL TRANSPLANT CANDIDATE: ICD-10-CM

## 2025-03-24 DIAGNOSIS — R11.2 CHEMOTHERAPY INDUCED NAUSEA AND VOMITING: ICD-10-CM

## 2025-03-24 DIAGNOSIS — Z78.9 HISTORY OF HOMELESS: ICD-10-CM

## 2025-03-24 DIAGNOSIS — F32.89 OTHER DEPRESSION: ICD-10-CM

## 2025-03-24 DIAGNOSIS — Z94.84 STEM CELLS TRANSPLANT STATUS (MULTI): ICD-10-CM

## 2025-03-24 DIAGNOSIS — Z94.84 HISTORY OF STEM CELL TRANSPLANT (MULTI): ICD-10-CM

## 2025-03-24 DIAGNOSIS — C84.48 PERIPHERAL T CELL LYMPHOMA OF LYMPH NODES OF MULTIPLE SITES (MULTI): ICD-10-CM

## 2025-03-24 DIAGNOSIS — C84.48 PERIPHERAL T CELL LYMPHOMA OF LYMPH NODES OF MULTIPLE SITES (MULTI): Primary | ICD-10-CM

## 2025-03-24 DIAGNOSIS — Z95.828 PORT-A-CATH IN PLACE: ICD-10-CM

## 2025-03-24 DIAGNOSIS — T45.1X5A CHEMOTHERAPY INDUCED NAUSEA AND VOMITING: ICD-10-CM

## 2025-03-24 DIAGNOSIS — D84.9 IMMUNOCOMPROMISED: ICD-10-CM

## 2025-03-24 DIAGNOSIS — M32.9 SYSTEMIC LUPUS ERYTHEMATOSUS, UNSPECIFIED SLE TYPE, UNSPECIFIED ORGAN INVOLVEMENT STATUS (MULTI): ICD-10-CM

## 2025-03-24 LAB
ABO GROUP (TYPE) IN BLOOD: NORMAL
ALBUMIN SERPL BCP-MCNC: 3.8 G/DL (ref 3.4–5)
ALP SERPL-CCNC: 80 U/L (ref 33–110)
ALT SERPL W P-5'-P-CCNC: 13 U/L (ref 7–45)
ANION GAP SERPL CALC-SCNC: 13 MMOL/L (ref 10–20)
ANTIBODY SCREEN: NORMAL
AST SERPL W P-5'-P-CCNC: 14 U/L (ref 9–39)
BASOPHILS # BLD AUTO: 0.02 X10*3/UL (ref 0–0.1)
BASOPHILS NFR BLD AUTO: 0.3 %
BILIRUB SERPL-MCNC: 0.6 MG/DL (ref 0–1.2)
BUN SERPL-MCNC: 6 MG/DL (ref 6–23)
CALCIUM SERPL-MCNC: 9 MG/DL (ref 8.6–10.3)
CHLORIDE SERPL-SCNC: 102 MMOL/L (ref 98–107)
CO2 SERPL-SCNC: 25 MMOL/L (ref 21–32)
CREAT SERPL-MCNC: 0.67 MG/DL (ref 0.5–1.05)
DACRYOCYTES BLD QL SMEAR: NORMAL
EGFRCR SERPLBLD CKD-EPI 2021: >90 ML/MIN/1.73M*2
EJECTION FRACTION APICAL 4 CHAMBER: 62.9
EJECTION FRACTION: 57 %
EOSINOPHIL # BLD AUTO: 0.07 X10*3/UL (ref 0–0.7)
EOSINOPHIL NFR BLD AUTO: 1 %
ERYTHROCYTE [DISTWIDTH] IN BLOOD BY AUTOMATED COUNT: 21.4 % (ref 11.5–14.5)
GLOBAL LONGITUDINAL STRAIN: 17.6 %
GLUCOSE SERPL-MCNC: 112 MG/DL (ref 74–99)
HCT VFR BLD AUTO: 24.7 % (ref 36–46)
HGB BLD-MCNC: 7.9 G/DL (ref 12–16)
IGG SERPL-MCNC: 2660 MG/DL (ref 700–1600)
IMM GRANULOCYTES # BLD AUTO: 0.06 X10*3/UL (ref 0–0.7)
IMM GRANULOCYTES NFR BLD AUTO: 0.8 % (ref 0–0.9)
LDH SERPL L TO P-CCNC: 231 U/L (ref 84–246)
LEFT VENTRICLE INTERNAL DIMENSION DIASTOLE: 4.4 CM (ref 3.5–6)
LYMPHOCYTES # BLD AUTO: 0.78 X10*3/UL (ref 1.2–4.8)
LYMPHOCYTES NFR BLD AUTO: 10.7 %
MAGNESIUM SERPL-MCNC: 1.86 MG/DL (ref 1.6–2.4)
MCH RBC QN AUTO: 32.4 PG (ref 26–34)
MCHC RBC AUTO-ENTMCNC: 32 G/DL (ref 32–36)
MCV RBC AUTO: 101 FL (ref 80–100)
MITRAL VALVE E/A RATIO: 0.86
MONOCYTES # BLD AUTO: 0.71 X10*3/UL (ref 0.1–1)
MONOCYTES NFR BLD AUTO: 9.8 %
NEUTROPHILS # BLD AUTO: 5.64 X10*3/UL (ref 1.2–7.7)
NEUTROPHILS NFR BLD AUTO: 77.4 %
NRBC BLD-RTO: 0 /100 WBCS (ref 0–0)
OVALOCYTES BLD QL SMEAR: NORMAL
PLATELET # BLD AUTO: 96 X10*3/UL (ref 150–450)
POLYCHROMASIA BLD QL SMEAR: NORMAL
POTASSIUM SERPL-SCNC: 3.8 MMOL/L (ref 3.5–5.3)
PROT SERPL-MCNC: 7.9 G/DL (ref 6.4–8.2)
RBC # BLD AUTO: 2.44 X10*6/UL (ref 4–5.2)
RBC MORPH BLD: NORMAL
RH FACTOR (ANTIGEN D): NORMAL
RIGHT VENTRICLE FREE WALL PEAK S': 16.9 CM/S
SODIUM SERPL-SCNC: 136 MMOL/L (ref 136–145)
TRICUSPID ANNULAR PLANE SYSTOLIC EXCURSION: 2.3 CM
URATE SERPL-MCNC: 4.2 MG/DL (ref 2.3–6.7)
URATE SERPL-MCNC: 4.5 MG/DL (ref 2.3–6.7)
WBC # BLD AUTO: 7.3 X10*3/UL (ref 4.4–11.3)

## 2025-03-24 PROCEDURE — 82784 ASSAY IGA/IGD/IGG/IGM EACH: CPT

## 2025-03-24 PROCEDURE — 93325 DOPPLER ECHO COLOR FLOW MAPG: CPT | Performed by: INTERNAL MEDICINE

## 2025-03-24 PROCEDURE — 36591 DRAW BLOOD OFF VENOUS DEVICE: CPT

## 2025-03-24 PROCEDURE — 80053 COMPREHEN METABOLIC PANEL: CPT

## 2025-03-24 PROCEDURE — 76376 3D RENDER W/INTRP POSTPROCES: CPT

## 2025-03-24 PROCEDURE — 93308 TTE F-UP OR LMTD: CPT | Performed by: INTERNAL MEDICINE

## 2025-03-24 PROCEDURE — 99215 OFFICE O/P EST HI 40 MIN: CPT | Mod: 25

## 2025-03-24 PROCEDURE — 2500000004 HC RX 250 GENERAL PHARMACY W/ HCPCS (ALT 636 FOR OP/ED): Mod: SE | Performed by: INTERNAL MEDICINE

## 2025-03-24 PROCEDURE — 93321 DOPPLER ECHO F-UP/LMTD STD: CPT | Performed by: INTERNAL MEDICINE

## 2025-03-24 PROCEDURE — 83615 LACTATE (LD) (LDH) ENZYME: CPT

## 2025-03-24 PROCEDURE — 84550 ASSAY OF BLOOD/URIC ACID: CPT

## 2025-03-24 PROCEDURE — 83735 ASSAY OF MAGNESIUM: CPT

## 2025-03-24 PROCEDURE — 99215 OFFICE O/P EST HI 40 MIN: CPT

## 2025-03-24 PROCEDURE — 86901 BLOOD TYPING SEROLOGIC RH(D): CPT

## 2025-03-24 PROCEDURE — 76376 3D RENDER W/INTRP POSTPROCES: CPT | Performed by: INTERNAL MEDICINE

## 2025-03-24 PROCEDURE — 93356 MYOCRD STRAIN IMG SPCKL TRCK: CPT | Performed by: INTERNAL MEDICINE

## 2025-03-24 PROCEDURE — 85025 COMPLETE CBC W/AUTO DIFF WBC: CPT

## 2025-03-24 RX ORDER — HEPARIN SODIUM 1000 [USP'U]/ML
2000 INJECTION, SOLUTION INTRAVENOUS; SUBCUTANEOUS AS NEEDED
Status: CANCELLED | OUTPATIENT
Start: 2025-03-24

## 2025-03-24 RX ORDER — HEPARIN SODIUM,PORCINE/PF 10 UNIT/ML
50 SYRINGE (ML) INTRAVENOUS AS NEEDED
Status: CANCELLED | OUTPATIENT
Start: 2025-03-24

## 2025-03-24 RX ORDER — HEPARIN 100 UNIT/ML
500 SYRINGE INTRAVENOUS AS NEEDED
Status: CANCELLED | OUTPATIENT
Start: 2025-03-24

## 2025-03-24 RX ORDER — HEPARIN 100 UNIT/ML
500 SYRINGE INTRAVENOUS AS NEEDED
Status: DISCONTINUED | OUTPATIENT
Start: 2025-03-24 | End: 2025-03-24 | Stop reason: HOSPADM

## 2025-03-24 RX ADMIN — HEPARIN 500 UNITS: 100 SYRINGE at 11:49

## 2025-03-24 ASSESSMENT — ENCOUNTER SYMPTOMS
DIAPHORESIS: 0
FATIGUE: 1
NAUSEA: 1
CARDIOVASCULAR NEGATIVE: 1
VOMITING: 0
RESPIRATORY NEGATIVE: 1
LIGHT-HEADEDNESS: 0
DIARRHEA: 0
NUMBNESS: 1
BLOOD IN STOOL: 0
HEMATOLOGIC/LYMPHATIC NEGATIVE: 1
DIZZINESS: 0
UNEXPECTED WEIGHT CHANGE: 0
CONSTIPATION: 0

## 2025-03-24 NOTE — PROGRESS NOTES
"Pt ambulated to SCT unit without assistance. Pt denies complaints or pain today, just \"feels tired\". Vitals obtained, blood drawn and sent to lab via R chest port; accessed without difficulty, +BBR obtained. Oksana Blanton NP came to see patient. Port flushed with NS and heparin per orders, needle removed without difficulty and intact, OCD applied. Pt ambulated off unit without complaints or assistance.  "

## 2025-03-25 ASSESSMENT — ENCOUNTER SYMPTOMS
ARTHRALGIAS: 1
CHILLS: 0
ABDOMINAL PAIN: 0
APPETITE CHANGE: 0
ABDOMINAL DISTENTION: 0
HEMATURIA: 0
FEVER: 0
CHEST TIGHTNESS: 0
DYSURIA: 0

## 2025-03-25 NOTE — PROGRESS NOTES
Patient ID: Sagrario Garber is a 49 y.o. female.    Diagnosis:   Angioimmunoblastic T-cell lymphoma,  CD30 pos,  stage IV disease, bulky tumor, high LDH, excellent partial remission after initial frontline therapy.      Treatment:   Oncology History Overview Note   T cell lymphoma with TFH phenotype angioimmunoblastic CD30+, AK negative  11/10/21 CT imaging showed extensive hilar, mediastinal and bilateral axillary lymphadenopathy  2/14/22 right inguinal node biopsy follicular hyperplasia  4/6/22 right axillary lymph node reactive changes  5/12/24 progressive adenopathy, right inguinal node T cell lymphoma with TFH phenotype, CD30 focally positive  6/12/24 started BV-CHP Pet after cycle 5 interval resoluation of bulky adenopathy in axilla, abdomen, pelvis and groin focal hypermetabolic uptake in right inguinal node  9/25/24 C6D1 of BV-CHP     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  Previous biopsy in 2022 was read as reactive.  In May 2024 she presented with increasing lymph nodes repeat biopsy confirmed T cell lymphoma with TFH phenotype, CD30 positive and Alk negative. Extremely bulky tumor.  High LDH.  Bone marrow minimally involved.  (3-4% abnormal cells by flow).     6/12/2024 -  Chemotherapy    In June 2024 treatment was administered consisting of brentuximab- CHP  Shortly after starting BV CHP patient developed abdominal ascites and was unable to eat and required TPN as well as discharge to nursing facility  PET imaging after cycle5 showed an excellent response has been obtained, but several small lymph nodes with SUV of 3.4 remain.  Completed cycle 6 of brentuximab CHP 9/26/24. Patient is referred by Dr. Iraheta for evaluation for autologous stem cell transplantation     12/5/2024 Transfer In/Out of Practice    Patient initially seen by   "[facility] 12/5/24 for first outpatient consultation for high risk TFH phenotype angioimmunoblastic T cell lymphoma. Remains in SNF to get stronger, but has pretty much recovered and remains there due to social issues. She is pretty much independent with her ADLS.      12/23/2024 -  Disease Response Assessment    Restaging evaluation 12/23/24 includes a PET scan which shows unchanged minimally PET avid right inguinal node and several non pet avid but left axillary and pretracheal nodes.  BM biopsy 12/19/24 shows low level 1.5% involvement by flow cytometry only.     2/18/2025 - 2/24/2025 Bone Marrow Transplant    Excellent partial remission prior to Auto Transplant prepped with BEAM (T0=2/24/25), counts recovered on 3/10.     Hospital course:  - Ortho positive, not symptomatic, now resolved; encouraging non caffeinated fluid intake. Resolved after IVF boluses and increased oral fluid intake.   - chemotherapy induced n/v/d, discharged on Zofran & Compazine PRN, mostly resolved  - mucositis with throat pain, improved, eating well.            Past Medical History:  -Long history of anxiety and depression She reports being on \"numerous medications\" to include Seroquel, Geodon, Depakote, Prozac, Lexapro, Paxil all without effect.    -HTN  -Patient has history of lupus for past 21/2 years and rheumatoid arthritis: currently on placquenil sees Dr. Dobbs for rheumatologist presenting with rash, pain in body, diffuse swellin joint pain. Was on Saphnelo, interferon alpha antagonist July 2023     Past Medical History:   Diagnosis Date    Essential (primary) hypertension 05/24/2017    HTN (hypertension), benign    Essential (primary) hypertension 10/09/2017    HTN (hypertension), benign    Personal history of nicotine dependence 05/24/2017    History of nicotine dependence       Surgical History:     Past Surgical History:   Procedure Laterality Date    OTHER SURGICAL HISTORY  05/24/2017    Oral Surgery Tooth Extraction [place] " Tooth        Family History:  Rheumatoid arthritis mother, skin cancer father and sister, 2 daughters, 1 son with severe autism is in a home. Has an older sister estranged.     Social History:   is an alcoholic, and has been sober for 4 months, he is in a sober house. Lost home has lost her income and most recently was staying with her daughter and can't return there. Smokes 8 cigarettes, no ETOH, finished 10th grade level,worked full time.  at [employer] for 5 years lived at the [employer].  No .  for 6 years, previsoulsy relationship of 19 years with father of her children.        Social History     Tobacco Use    Smoking status: Every Day     Types: Cigarettes   Substance Use Topics    Alcohol use: Not Currently    Drug use: Never      -------------------------------------------------------------------------------------------------------  Subjective       HPI    49-year-old woman  with reported history of lupus and rheumatoid arthritis for several years, anxiety and depression who presents for follow up s/p autologous SCT with BEAM for Peripheral T Cell Lymphoma.     She is T+30     Today she presents for outpatient post auto follow up, unaccompanied. She is feeling fatigued, but has been motivating herself to stay active. Walking around the home, not taking naps but does take rests. No issues since discharge. Has been eating/drinking relatively well. Not 100% yet. She still reports feeling anxious in general, seeing behavioral health this afternoon. Currently at [facility] in [place], transport through [id number].     Review of Systems   Constitutional:  Negative for appetite change, chills and fever.   HENT:   Negative for lump/mass.    Respiratory:  Negative for chest tightness.    Cardiovascular:  Negative for chest pain.   Gastrointestinal:  Negative for abdominal distention and abdominal pain.   Genitourinary:  Negative for dysuria and hematuria.     Musculoskeletal:  Positive for arthralgias.   Skin:  Positive for itching.   All other systems reviewed and are negative.     -------------------------------------------------------------------------------------------------------  Objective   BSA: There is no height or weight on file to calculate BSA.  There were no vitals taken for this visit.    Physical Exam  Constitutional:       Appearance: Normal appearance. She is well-developed.      Comments: Generally looks well.    HENT:      Head: Normocephalic and atraumatic.      Nose: Nose normal.      Mouth/Throat:      Dentition: No gum lesions.   Eyes:      Extraocular Movements: Extraocular movements intact.      Conjunctiva/sclera: Conjunctivae normal.      Pupils: Pupils are equal, round, and reactive to light.   Cardiovascular:      Rate and Rhythm: Regular rhythm. Tachycardia present.   Pulmonary:      Breath sounds: Normal breath sounds and air entry.   Abdominal:      General: Abdomen is flat. Bowel sounds are normal.      Palpations: Abdomen is soft.   Musculoskeletal:         General: Normal range of motion.   Lymphadenopathy:      Lower Body: Right inguinal adenopathy present. Left inguinal adenopathy present.      Comments: Right inguinal node remains enlarged about 3x 3 cm, unchanged.   Skin:     General: Skin is warm and dry.   Neurological:      General: No focal deficit present.      Mental Status: She is alert and oriented to person, place, and time.   Psychiatric:         Mood and Affect: Mood normal.         Behavior: Behavior normal. Behavior is cooperative.         Thought Content: Thought content normal.         Performance Status:  Symptomatic; fully ambulatory  -------------------------------------------------------------------------------------------------------  Assessment/Plan      Angioimmunoblastic T-cell lymphoma,  CD30 pos,  stage IV disease, bulky tumor,   - High LDH, excellent partial remission after initial frontline therapy.   -  Patient completed BV CHP September 2024 and PET scan after cycle 5 showed excellent response with some minimal residual uptake in right inguinal area.  Inguinal nodes obvious on physical exam ,,also had body rash which could be consistent with her lupus or antioimmunoblastic lymphoma.    - Provided the patient has a very good response I would recommend consolidation with an autologous stem cell transplant with BEAM preparation.    - Briefly discussed the procedures involved in peripheral stem cell mobilization followed by  hospitalization for high dose chemotherapy and recovery from side effects. If patient is in a good remission, I estimate durable remission with this aggressive approach at 50-60%.  -Restaging evaluation 12/23/24 includes a PET scan which shows unchanged minimally PET avid right inguinal node   and several non pet avid but left axillary and pretracheal nodes.  -BM biopsy 12/19/24 shows low level 1.5% involvement by flow cytometry only.  -Patient has a tragic home situation and is currently homeless,  she is applying for SSI and currently lives in a nursing home.  Patient case discussed at BMT tumor board and consensus was that this should not preclude her from a potentially curative autologous stem cell transplant.     Organ function testing:    PFTs 1/22/25 FEV1 80%,%,FEV1/FVC 79%, DLCO 104%  ECHO LVEF64%CMI 4: for age > 40, anxiety disorder, and rheumatologic condition    2/17/25 Patient collected 5.68 x 106 CD 34 cells/kg in one collection on 2/12.      We also discussed who would be her health care power of  should she be unable to decide for herself. Given her husbands recent health issues, patient states that her mother would be health care power of , Stephaniearash Wilson.     2. History of autologous stem cell transplant  T+25 s/p auto with BEAM prep (T=0 2/24/2025). Counts are recovering. Will continue close monitoring weekly.   - Post Transplant onco-echo and cardiology  follow up requested.     3. Immunocompromised  - Continue acyclovir, TMP/SMX   - Will require vaccinations starting at T+6 months, COVID after T+90, seasonal influenza after T+120.     4. History of lupus  Rheumatologist Dr. Cathy Dobbs,  [address] [facility]  [phone] currently only on plaquenil.   Will be establishing care with new rheumatologist 4/16/2025    5. Psychosocial: patient has life long struggles with anxiety and depression and has had recent hospitalization- will need hospital mental health team on board.  Tragically,  patient is currently homeless and her  who is a recovering alcoholic currently lives in a sober house. Discharged to [facility] (group home) following transplant.      RTC:   - 4/2 Post BMT  - 4/10 Dr. Varela  - requested every other week appts 4/24, 5/8 with line draw  -------------------------------------------------------------------------------------------------------  Jess Quevedo PA-C

## 2025-03-26 ENCOUNTER — INFUSION (OUTPATIENT)
Dept: OTHER | Facility: HOSPITAL | Age: 50
End: 2025-03-26
Payer: COMMERCIAL

## 2025-03-26 ENCOUNTER — OFFICE VISIT (OUTPATIENT)
Dept: BEHAVIORAL HEALTH | Facility: HOSPITAL | Age: 50
End: 2025-03-26
Payer: COMMERCIAL

## 2025-03-26 ENCOUNTER — APPOINTMENT (OUTPATIENT)
Dept: CARDIOLOGY | Facility: HOSPITAL | Age: 50
End: 2025-03-26
Payer: COMMERCIAL

## 2025-03-26 ENCOUNTER — SOCIAL WORK (OUTPATIENT)
Dept: HEMATOLOGY/ONCOLOGY | Facility: HOSPITAL | Age: 50
End: 2025-03-26

## 2025-03-26 ENCOUNTER — OFFICE VISIT (OUTPATIENT)
Dept: OTHER | Facility: HOSPITAL | Age: 50
End: 2025-03-26
Payer: COMMERCIAL

## 2025-03-26 VITALS
RESPIRATION RATE: 18 BRPM | BODY MASS INDEX: 26.17 KG/M2 | HEART RATE: 106 BPM | WEIGHT: 163.8 LBS | OXYGEN SATURATION: 94 % | DIASTOLIC BLOOD PRESSURE: 66 MMHG | SYSTOLIC BLOOD PRESSURE: 117 MMHG | TEMPERATURE: 99.3 F

## 2025-03-26 VITALS — HEART RATE: 96 BPM

## 2025-03-26 VITALS
HEART RATE: 115 BPM | SYSTOLIC BLOOD PRESSURE: 134 MMHG | DIASTOLIC BLOOD PRESSURE: 77 MMHG | OXYGEN SATURATION: 93 % | TEMPERATURE: 97 F | WEIGHT: 162.4 LBS | RESPIRATION RATE: 14 BRPM | BODY MASS INDEX: 25.94 KG/M2

## 2025-03-26 DIAGNOSIS — C84.48 PERIPHERAL T CELL LYMPHOMA OF LYMPH NODES OF MULTIPLE SITES (MULTI): ICD-10-CM

## 2025-03-26 DIAGNOSIS — Z76.82 STEM CELL TRANSPLANT CANDIDATE: ICD-10-CM

## 2025-03-26 DIAGNOSIS — F33.1 MODERATE EPISODE OF RECURRENT MAJOR DEPRESSIVE DISORDER: ICD-10-CM

## 2025-03-26 DIAGNOSIS — F41.1 GAD (GENERALIZED ANXIETY DISORDER): ICD-10-CM

## 2025-03-26 DIAGNOSIS — C86.50 ANGIOIMMUNOBLASTIC T-CELL LYMPHOMA: ICD-10-CM

## 2025-03-26 PROBLEM — Z78.9: Status: ACTIVE | Noted: 2025-03-26

## 2025-03-26 PROBLEM — D84.9 IMMUNOCOMPROMISED: Status: ACTIVE | Noted: 2025-03-26

## 2025-03-26 PROBLEM — R11.2 CHEMOTHERAPY INDUCED NAUSEA AND VOMITING: Status: ACTIVE | Noted: 2025-03-26

## 2025-03-26 PROBLEM — T45.1X5A CHEMOTHERAPY INDUCED NAUSEA AND VOMITING: Status: ACTIVE | Noted: 2025-03-26

## 2025-03-26 PROBLEM — Z95.828 PORT-A-CATH IN PLACE: Status: ACTIVE | Noted: 2025-03-26

## 2025-03-26 PROBLEM — Z94.84 HISTORY OF STEM CELL TRANSPLANT (MULTI): Status: ACTIVE | Noted: 2025-03-26

## 2025-03-26 LAB
ALBUMIN SERPL BCP-MCNC: 3.9 G/DL (ref 3.4–5)
ALP SERPL-CCNC: 82 U/L (ref 33–110)
ALT SERPL W P-5'-P-CCNC: 15 U/L (ref 7–45)
ANION GAP SERPL CALC-SCNC: 11 MMOL/L (ref 10–20)
AST SERPL W P-5'-P-CCNC: 15 U/L (ref 9–39)
BASOPHILS # BLD AUTO: 0.02 X10*3/UL (ref 0–0.1)
BASOPHILS NFR BLD AUTO: 0.4 %
BILIRUB SERPL-MCNC: 0.4 MG/DL (ref 0–1.2)
BUN SERPL-MCNC: 6 MG/DL (ref 6–23)
CALCIUM SERPL-MCNC: 8.8 MG/DL (ref 8.6–10.3)
CHLORIDE SERPL-SCNC: 100 MMOL/L (ref 98–107)
CO2 SERPL-SCNC: 25 MMOL/L (ref 21–32)
CREAT SERPL-MCNC: 0.63 MG/DL (ref 0.5–1.05)
DACRYOCYTES BLD QL SMEAR: NORMAL
EGFRCR SERPLBLD CKD-EPI 2021: >90 ML/MIN/1.73M*2
EOSINOPHIL # BLD AUTO: 0.09 X10*3/UL (ref 0–0.7)
EOSINOPHIL NFR BLD AUTO: 1.7 %
ERYTHROCYTE [DISTWIDTH] IN BLOOD BY AUTOMATED COUNT: 22.2 % (ref 11.5–14.5)
GLUCOSE SERPL-MCNC: 120 MG/DL (ref 74–99)
HCT VFR BLD AUTO: 25.3 % (ref 36–46)
HGB BLD-MCNC: 8.2 G/DL (ref 12–16)
IMM GRANULOCYTES # BLD AUTO: 0.08 X10*3/UL (ref 0–0.7)
IMM GRANULOCYTES NFR BLD AUTO: 1.5 % (ref 0–0.9)
LYMPHOCYTES # BLD AUTO: 1.06 X10*3/UL (ref 1.2–4.8)
LYMPHOCYTES NFR BLD AUTO: 20.5 %
MAGNESIUM SERPL-MCNC: 1.86 MG/DL (ref 1.6–2.4)
MCH RBC QN AUTO: 32.9 PG (ref 26–34)
MCHC RBC AUTO-ENTMCNC: 32.4 G/DL (ref 32–36)
MCV RBC AUTO: 102 FL (ref 80–100)
MONOCYTES # BLD AUTO: 0.54 X10*3/UL (ref 0.1–1)
MONOCYTES NFR BLD AUTO: 10.4 %
NEUTROPHILS # BLD AUTO: 3.39 X10*3/UL (ref 1.2–7.7)
NEUTROPHILS NFR BLD AUTO: 65.5 %
NRBC BLD-RTO: 0 /100 WBCS (ref 0–0)
OVALOCYTES BLD QL SMEAR: NORMAL
PLATELET # BLD AUTO: 100 X10*3/UL (ref 150–450)
POLYCHROMASIA BLD QL SMEAR: NORMAL
POTASSIUM SERPL-SCNC: 3.9 MMOL/L (ref 3.5–5.3)
PROT SERPL-MCNC: 8.3 G/DL (ref 6.4–8.2)
RBC # BLD AUTO: 2.49 X10*6/UL (ref 4–5.2)
RBC MORPH BLD: NORMAL
SODIUM SERPL-SCNC: 132 MMOL/L (ref 136–145)
WBC # BLD AUTO: 5.2 X10*3/UL (ref 4.4–11.3)

## 2025-03-26 PROCEDURE — 85025 COMPLETE CBC W/AUTO DIFF WBC: CPT

## 2025-03-26 PROCEDURE — 99215 OFFICE O/P EST HI 40 MIN: CPT | Performed by: STUDENT IN AN ORGANIZED HEALTH CARE EDUCATION/TRAINING PROGRAM

## 2025-03-26 PROCEDURE — RXMED WILLOW AMBULATORY MEDICATION CHARGE

## 2025-03-26 PROCEDURE — 99214 OFFICE O/P EST MOD 30 MIN: CPT | Performed by: PSYCHIATRY & NEUROLOGY

## 2025-03-26 PROCEDURE — 83735 ASSAY OF MAGNESIUM: CPT

## 2025-03-26 PROCEDURE — 2500000004 HC RX 250 GENERAL PHARMACY W/ HCPCS (ALT 636 FOR OP/ED): Mod: SE | Performed by: INTERNAL MEDICINE

## 2025-03-26 PROCEDURE — 99214 OFFICE O/P EST MOD 30 MIN: CPT | Mod: AM | Performed by: PSYCHIATRY & NEUROLOGY

## 2025-03-26 PROCEDURE — 80053 COMPREHEN METABOLIC PANEL: CPT

## 2025-03-26 PROCEDURE — 36591 DRAW BLOOD OFF VENOUS DEVICE: CPT

## 2025-03-26 RX ORDER — HEPARIN 100 UNIT/ML
500 SYRINGE INTRAVENOUS AS NEEDED
OUTPATIENT
Start: 2025-03-26

## 2025-03-26 RX ORDER — LORAZEPAM 0.5 MG/1
0.5 TABLET ORAL 2 TIMES DAILY PRN
Qty: 20 TABLET | Refills: 2 | Status: SHIPPED | OUTPATIENT
Start: 2025-04-02

## 2025-03-26 RX ORDER — HEPARIN 100 UNIT/ML
500 SYRINGE INTRAVENOUS AS NEEDED
Status: DISCONTINUED | OUTPATIENT
Start: 2025-03-26 | End: 2025-03-26 | Stop reason: HOSPADM

## 2025-03-26 RX ORDER — QUETIAPINE FUMARATE 100 MG/1
150 TABLET, FILM COATED ORAL NIGHTLY
Qty: 45 TABLET | Refills: 0 | Status: SHIPPED | OUTPATIENT
Start: 2025-04-02 | End: 2025-05-02

## 2025-03-26 RX ORDER — HEPARIN SODIUM,PORCINE/PF 10 UNIT/ML
50 SYRINGE (ML) INTRAVENOUS AS NEEDED
OUTPATIENT
Start: 2025-03-26

## 2025-03-26 RX ORDER — NALOXONE HYDROCHLORIDE 4 MG/.1ML
1 SPRAY NASAL AS NEEDED
Qty: 2 EACH | Refills: 0 | Status: SHIPPED | OUTPATIENT
Start: 2025-03-26

## 2025-03-26 RX ORDER — HEPARIN SODIUM 1000 [USP'U]/ML
2000 INJECTION, SOLUTION INTRAVENOUS; SUBCUTANEOUS AS NEEDED
OUTPATIENT
Start: 2025-03-26

## 2025-03-26 RX ORDER — DULOXETIN HYDROCHLORIDE 60 MG/1
60 CAPSULE, DELAYED RELEASE ORAL 2 TIMES DAILY
Qty: 60 CAPSULE | Refills: 2 | Status: SHIPPED | OUTPATIENT
Start: 2025-04-02

## 2025-03-26 RX ADMIN — HEPARIN 500 UNITS: 100 SYRINGE at 11:51

## 2025-03-26 ASSESSMENT — PAIN SCALES - GENERAL
PAINLEVEL_OUTOF10: 0-NO PAIN
PAINLEVEL_OUTOF10: 6

## 2025-03-26 ASSESSMENT — PAIN - FUNCTIONAL ASSESSMENT: PAIN_FUNCTIONAL_ASSESSMENT: 0-10

## 2025-03-26 NOTE — PROGRESS NOTES
Social Work Note    Referral Source: AMANDA Arenas  Meeting Location: Phone  Person(s) Present: Patient, SW  Identified Needs: Patient requests to be made again aware of range of ways that SW can assist patient.  Impression and Plan: SW informed patient that SW is available to assist with jostin opportunities, refer to community resources, be a support for emotional wellbeing etc. Patient denies having psychosocial needs at this time, but SW provided patient with direct contact information and encouraged her to reach out with any future psychosocial needs.  Patient states that she is currently at [facility] (shelter), and intends to speak with shelter SW to begin search for more permanent housing. She also utilizes her [employer] transportation benefit to attend appointments and is capable of independently arranging rides.  Interventions Provided: Assessment, Grounding, and Supportive Counseling  Estimated Time Spent: 15 minutes    SW will continue to follow as needed.

## 2025-03-26 NOTE — PROGRESS NOTES
Pt ambulated to SCT unit without assistance. Pt denies report experiencing joint pain today. Pt also reports feeling fatigue and  experiencing intermittent nausea, controlled with PRN antiemetics. Vitals obtained, right chest Mediport accessed without incident, blood drawn and sent to lab.  Jess Quevedo PA-C came to see patient. Mediport flushed with NS and heparin, needle removed, needle intact, and OCD applied to site. Copy AVS and lab results given to patient. Pt ambulated off unit without complaints or assistance.

## 2025-03-26 NOTE — PROGRESS NOTES
Patient  Sagrario Garber is a 49 y.o. female, presented for   Chief Complaint   Patient presents with    Anxiety    Depression   .    Referred by: Nikita Keenan, APRN-CNP  [address] [place] [address]     History of presenting Illness:     49 yr old  woman with Angioimmunoblastic T-cell lymphoma, CD30 pos, stage IV disease, in partial remission, s/p chemo, BMT (feb 2025), history of anxiety and MDD, prior psychiatric hospitalizations, at least one prior suicide attempt at age 20 via overdose on OTC meds, who is referred to psychiatry for management of her anxiety and depression.    Patient presented as a pleasant middle aged woman.     She reports that she has been doing well since she was discharged from the hospital. She was discharged to SNF, and currently at a homeless shelter. Reports that she has had days when she feels overwhelmed and anxious, gets tearful when she thinks about her past and her losses. Reports intermittent depressed mood and sadness, intermittent tearfulness, appetite and sleep are adequate with current medications. Reports diminished concentration and energy. Reports several doctors appointments, other psychosocial issues that make her feel worried. She lost job due to lymphoma, was working a [employer] front , current partner/ is going through alcohol rehab, lost her housing and is trying to get back into stable housing. Recently got approved for SSDi, which is helpful. Reports that she had been struggling to sleep, takes seroquel and lorazepam nightly. Sometimes takes additional dose of lorazepm during the day when feeling anxious. Also, takes duloxetine to help with depression.     Review of Systems  Anxiety: General Anxiety Disorder (JUAN MANUEL)JUAN MANUEL Behaviors: difficult to control worry, excessive anxiety/worry, and sleep disturbance  Depression: appetite increased, energy, and sleep decreased   Delirium: negative  Psychosis: negative  Hallie: negative  Safety  Issues: none  Psychiatric ROS Comment: None    Scales:  JUAN MANUEL: No data recorded  PHQ-9: No data recorded        Past Psychiatric History:   Previous therapy: yes; Does not recall names or clinics of previous providers  Previous psychiatric treatment and medication trials: yes - Paxil, sertraline, escitalopram, various others  Previous psychiatric hospitalizations: yes - 6-7 hopitalizations for depression and SI  Previous diagnoses: yes - MDD, JUAN MANUEL  Previous suicide attempts: yes - At age 20, overdosed on OTC pills while intoxicated  History of violence: no    Past Medical History  Past Medical History:   Diagnosis Date    Essential (primary) hypertension 05/24/2017    HTN (hypertension), benign    Essential (primary) hypertension 10/09/2017    HTN (hypertension), benign    Personal history of nicotine dependence 05/24/2017    History of nicotine dependence       Surgical History  Past Surgical History:   Procedure Laterality Date    OTHER SURGICAL HISTORY  05/24/2017    Oral Surgery Tooth Extraction [place] Tooth        Family History:  No family history on file.    Social History:  Social History     Socioeconomic History    Marital status: Single     Spouse name: Not on file    Number of children: Not on file    Years of education: Not on file    Highest education level: Not on file   Occupational History    Not on file   Tobacco Use    Smoking status: Every Day     Types: Cigarettes    Smokeless tobacco: Not on file   Substance and Sexual Activity    Alcohol use: Not Currently    Drug use: Never    Sexual activity: Not on file   Other Topics Concern    Not on file   Social History Narrative    Not on file     Social Drivers of Health     Financial Resource Strain: High Risk (2/27/2025)    Overall Financial Resource Strain (CARDIA)     Difficulty of Paying Living Expenses: Hard   Food Insecurity: Food Insecurity Present (2/18/2025)    Hunger Vital Sign     Worried About Running Out of Food in the Last Year: Sometimes  true     Ran Out of Food in the Last Year: Sometimes true   Transportation Needs: Unmet Transportation Needs (2/27/2025)    PRAPARE - Transportation     Lack of Transportation (Medical): Yes     Lack of Transportation (Non-Medical): Yes   Physical Activity: Not on file   Stress: Not on file   Social Connections: Not on file   Intimate Partner Violence: Not At Risk (2/18/2025)    Humiliation, Afraid, Rape, and Kick questionnaire     Fear of Current or Ex-Partner: No     Emotionally Abused: No     Physically Abused: No     Sexually Abused: No   Housing Stability: High Risk (2/27/2025)    Housing Stability Vital Sign     Unable to Pay for Housing in the Last Year: Yes     Number of Times Moved in the Last Year: 4     Homeless in the Last Year: No         Medication:  Current Outpatient Medications   Medication Instructions    acyclovir (ZOVIRAX) 400 mg, oral, Every 12 hours    atorvastatin (LIPITOR) 40 mg, oral, Nightly    [START ON 4/2/2025] DULoxetine (CYMBALTA) 60 mg, oral, 2 times daily, Do not crush or chew.    HYDROmorphone (DILAUDID) 2 mg, oral, 2 times daily PRN    hydroxychloroquine (PLAQUENIL) 200 mg, oral, Daily    [START ON 4/2/2025] LORazepam (ATIVAN) 0.5 mg, oral, 2 times daily PRN    multivitamin with minerals tablet 1 tablet, Daily    naloxone (NARCAN) 4 mg, nasal, As needed, May repeat every 2-3 minutes if needed, alternating nostrils, until medical assistance becomes available.    ondansetron (ZOFRAN) 8 mg, oral, Every 8 hours PRN    polyethylene glycol (GLYCOLAX, MIRALAX) 17 g, oral, Daily PRN    predniSONE (DELTASONE) 10 mg, oral, Daily    prochlorperazine (COMPAZINE) 10 mg, oral, Every 6 hours PRN    [START ON 4/2/2025] QUEtiapine (SEROQUEL) 150 mg, oral, Nightly    sennosides-docusate sodium (Sofiya-Colace) 8.6-50 mg tablet 1 tablet, oral, Nightly PRN    sulfamethoxazole-trimethoprim (Bactrim DS) 800-160 mg tablet 1 tablet, oral, Every Mon/Wed/Fri, Start on T+30        Allergies  .  Allergies  "  Allergen Reactions    Amoxicillin Hives     Patient says gets hives/welts     Atenolol Hives     Patient says gets hives/welts    Prednisone Hives     Specifically Solumedrol  Able to tolerate prednisone   Patient says gets hives/welts        Vitals:  Visit Vitals  /77 (BP Location: Right arm, Patient Position: Sitting, BP Cuff Size: Adult)   Pulse (!) 115   Temp 36.1 °C (97 °F) (Skin)   Resp 14   Wt 73.7 kg (162 lb 6.4 oz)   SpO2 93%   BMI 25.94 kg/m²   Smoking Status Every Day   BSA 1.86 m²        Mental Status Exam  General: Appears stated age, dressed appropriately  Appearance: Fair hygiene and grooming.  Attitude: Pleasant  Behavior: Cooperative  Motor Activity: WNL  Speech: Soft, normal rate, rhythm and volume.  Mood: \"Anxious\"  Affect: Congruent  Thought Process: Linear and goal directed  Thought Content: Denies suicidal or homicidal ideas, intent or plans. No IOR or delusions.  Thought Perception: No perceptual abnormalities.  Cognition: Grossly intact  Insight: Intact  Judgement: Intact      Psychiatric Risk Assessment  Violence Risk Assessment: none  Acute Risk of Harm to Others is Considered: low   Suicide Risk Assessment: , chronic medical illness, chronic pain, current psychiatric illness, life crisis (shame/despair), living alone or lack of social support, prior suicide attempt, and unmarried  Protective Factors against Suicide: adherence to  treatment, fear of social disapproval, fear of suicide, hopefulness/future orientation, marriage/partnership, moral objections to suicide, Worship affiliation/spirituality, social support/connectedness, and strong therapeutic alliance with provider  Acute Risk of Harm to Self is Considered: low    Labs:  No results found for: \"125D3\", \"TSH\", \"CBCDIF\", \"CMPLAS\", \"ADDTOXURINE\"     Diagnosis:  Problem List Items Addressed This Visit          Hematology and Neoplasia    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)    Relevant " Medications    QUEtiapine (SEROquel) 100 mg tablet (Start on 4/2/2025)       Mental Health    Depression    Relevant Medications    naloxone (Narcan) 4 mg/0.1 mL nasal spray    Other Relevant Orders    Follow Up In Psychiatry     Other Visit Diagnoses       Angioimmunoblastic T-cell lymphoma        Relevant Medications    DULoxetine (Cymbalta) 60 mg DR capsule (Start on 4/2/2025)    LORazepam (Ativan) 0.5 mg tablet (Start on 4/2/2025)    JUAN MANUEL (generalized anxiety disorder)        Relevant Medications    naloxone (Narcan) 4 mg/0.1 mL nasal spray    Other Relevant Orders    Follow Up In Psychiatry             Assessment/Plan   Problem List Items Addressed This Visit          Hematology and Neoplasia    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)    Relevant Medications    QUEtiapine (SEROquel) 100 mg tablet (Start on 4/2/2025)       Mental Health    Depression    Relevant Medications    naloxone (Narcan) 4 mg/0.1 mL nasal spray    Other Relevant Orders    Follow Up In Psychiatry     Other Visit Diagnoses       Angioimmunoblastic T-cell lymphoma        Relevant Medications    DULoxetine (Cymbalta) 60 mg DR capsule (Start on 4/2/2025)    LORazepam (Ativan) 0.5 mg tablet (Start on 4/2/2025)    JUAN MANUEL (generalized anxiety disorder)        Relevant Medications    naloxone (Narcan) 4 mg/0.1 mL nasal spray    Other Relevant Orders    Follow Up In Psychiatry            Patient with history of lifelong MDD and JUAN MANUEL, multiple psychosocial stressors including housing, financial, relationship, lymphoma and medical illness. She is coping well.   Detailed and jessica discussion on risks associated with long term continuous use of lorazepam, especially when taken along with opiate. She verbalized understanding. Discussion the controlled substance provider agreement, she read it, asked pertinent questions and signed it.   Advised to take lorazepam STRICTLY ONLY AS NEEDED.  Advised to increase Seroquel to help with  insomnia and anxiety at nighttime.  Continue with duloxetine, for now.     Medication Consent  Medication Consent: risks, benefits, side effects reviewed for all ordered meds    Please schedule a follow-up with your PCP for your ongoing medical problems.    Dr. Marquez is in office on Mon- Thu. Phone calls may not be returned until next day I am in office.   For scheduling questions: [phone]  For other questions: [phone]       For [facility], [employer] is a 24/7 hotline that you can call for assistance: [phone].     Please call 9-1-1 or go to the nearest emergency room if you feel worse or have thoughts of hurting yourself or anyone else, or hearing voices, seeing visions or having new or scary thoughts about people around you.    I spent    75 minutes in the professional and overall care of this patient.    Ortega Marquez MD

## 2025-04-02 ENCOUNTER — PHARMACY VISIT (OUTPATIENT)
Dept: PHARMACY | Facility: CLINIC | Age: 50
End: 2025-04-02
Payer: MEDICAID

## 2025-04-02 ENCOUNTER — OFFICE VISIT (OUTPATIENT)
Dept: OTHER | Facility: HOSPITAL | Age: 50
End: 2025-04-02
Payer: COMMERCIAL

## 2025-04-02 ENCOUNTER — INFUSION (OUTPATIENT)
Dept: OTHER | Facility: HOSPITAL | Age: 50
End: 2025-04-02
Payer: COMMERCIAL

## 2025-04-02 VITALS
SYSTOLIC BLOOD PRESSURE: 132 MMHG | HEART RATE: 114 BPM | TEMPERATURE: 97.5 F | DIASTOLIC BLOOD PRESSURE: 78 MMHG | OXYGEN SATURATION: 96 % | RESPIRATION RATE: 18 BRPM | BODY MASS INDEX: 26.13 KG/M2 | WEIGHT: 163.58 LBS

## 2025-04-02 DIAGNOSIS — C86.50 ANGIOIMMUNOBLASTIC T-CELL LYMPHOMA: ICD-10-CM

## 2025-04-02 DIAGNOSIS — C84.48 PERIPHERAL T CELL LYMPHOMA OF LYMPH NODES OF MULTIPLE SITES (MULTI): ICD-10-CM

## 2025-04-02 DIAGNOSIS — C84.48 PERIPHERAL T CELL LYMPHOMA OF LYMPH NODES OF MULTIPLE SITES (MULTI): Primary | ICD-10-CM

## 2025-04-02 DIAGNOSIS — Z76.82 STEM CELL TRANSPLANT CANDIDATE: ICD-10-CM

## 2025-04-02 PROBLEM — T45.1X5A CHEMOTHERAPY INDUCED NAUSEA AND VOMITING: Status: RESOLVED | Noted: 2025-03-26 | Resolved: 2025-04-02

## 2025-04-02 PROBLEM — R11.2 CHEMOTHERAPY INDUCED NAUSEA AND VOMITING: Status: RESOLVED | Noted: 2025-03-26 | Resolved: 2025-04-02

## 2025-04-02 LAB
ALBUMIN SERPL BCP-MCNC: 3.9 G/DL (ref 3.4–5)
ALP SERPL-CCNC: 72 U/L (ref 33–110)
ALT SERPL W P-5'-P-CCNC: 14 U/L (ref 7–45)
ANION GAP SERPL CALC-SCNC: 11 MMOL/L (ref 10–20)
AST SERPL W P-5'-P-CCNC: 14 U/L (ref 9–39)
BASOPHILS # BLD AUTO: 0.02 X10*3/UL (ref 0–0.1)
BASOPHILS NFR BLD AUTO: 0.3 %
BILIRUB SERPL-MCNC: 0.3 MG/DL (ref 0–1.2)
BUN SERPL-MCNC: 12 MG/DL (ref 6–23)
CALCIUM SERPL-MCNC: 9.3 MG/DL (ref 8.6–10.3)
CHLORIDE SERPL-SCNC: 103 MMOL/L (ref 98–107)
CO2 SERPL-SCNC: 25 MMOL/L (ref 21–32)
CREAT SERPL-MCNC: 0.53 MG/DL (ref 0.5–1.05)
DACRYOCYTES BLD QL SMEAR: NORMAL
EGFRCR SERPLBLD CKD-EPI 2021: >90 ML/MIN/1.73M*2
EOSINOPHIL # BLD AUTO: 0.06 X10*3/UL (ref 0–0.7)
EOSINOPHIL NFR BLD AUTO: 0.8 %
ERYTHROCYTE [DISTWIDTH] IN BLOOD BY AUTOMATED COUNT: 21.2 % (ref 11.5–14.5)
GLUCOSE SERPL-MCNC: 136 MG/DL (ref 74–99)
HCT VFR BLD AUTO: 29.9 % (ref 36–46)
HGB BLD-MCNC: 9.3 G/DL (ref 12–16)
IMM GRANULOCYTES # BLD AUTO: 0.04 X10*3/UL (ref 0–0.7)
IMM GRANULOCYTES NFR BLD AUTO: 0.5 % (ref 0–0.9)
LYMPHOCYTES # BLD AUTO: 1.12 X10*3/UL (ref 1.2–4.8)
LYMPHOCYTES NFR BLD AUTO: 14.1 %
MAGNESIUM SERPL-MCNC: 1.72 MG/DL (ref 1.6–2.4)
MCH RBC QN AUTO: 33.3 PG (ref 26–34)
MCHC RBC AUTO-ENTMCNC: 31.1 G/DL (ref 32–36)
MCV RBC AUTO: 107 FL (ref 80–100)
MONOCYTES # BLD AUTO: 0.48 X10*3/UL (ref 0.1–1)
MONOCYTES NFR BLD AUTO: 6 %
NEUTROPHILS # BLD AUTO: 6.25 X10*3/UL (ref 1.2–7.7)
NEUTROPHILS NFR BLD AUTO: 78.3 %
NRBC BLD-RTO: 0 /100 WBCS (ref 0–0)
OVALOCYTES BLD QL SMEAR: NORMAL
PLATELET # BLD AUTO: 160 X10*3/UL (ref 150–450)
PLATELET CLUMP BLD QL SMEAR: PRESENT
POLYCHROMASIA BLD QL SMEAR: NORMAL
POTASSIUM SERPL-SCNC: 4 MMOL/L (ref 3.5–5.3)
PROT SERPL-MCNC: 8.3 G/DL (ref 6.4–8.2)
RBC # BLD AUTO: 2.79 X10*6/UL (ref 4–5.2)
RBC MORPH BLD: NORMAL
SODIUM SERPL-SCNC: 135 MMOL/L (ref 136–145)
URATE SERPL-MCNC: 3.8 MG/DL (ref 2.3–6.7)
WBC # BLD AUTO: 8 X10*3/UL (ref 4.4–11.3)

## 2025-04-02 PROCEDURE — 2500000004 HC RX 250 GENERAL PHARMACY W/ HCPCS (ALT 636 FOR OP/ED): Mod: SE | Performed by: INTERNAL MEDICINE

## 2025-04-02 PROCEDURE — RXMED WILLOW AMBULATORY MEDICATION CHARGE

## 2025-04-02 PROCEDURE — 85025 COMPLETE CBC W/AUTO DIFF WBC: CPT

## 2025-04-02 PROCEDURE — 99215 OFFICE O/P EST HI 40 MIN: CPT | Performed by: NURSE PRACTITIONER

## 2025-04-02 PROCEDURE — 36591 DRAW BLOOD OFF VENOUS DEVICE: CPT

## 2025-04-02 PROCEDURE — 83735 ASSAY OF MAGNESIUM: CPT

## 2025-04-02 PROCEDURE — 84550 ASSAY OF BLOOD/URIC ACID: CPT

## 2025-04-02 PROCEDURE — 80053 COMPREHEN METABOLIC PANEL: CPT

## 2025-04-02 RX ORDER — HEPARIN SODIUM 1000 [USP'U]/ML
2000 INJECTION, SOLUTION INTRAVENOUS; SUBCUTANEOUS AS NEEDED
OUTPATIENT
Start: 2025-04-02

## 2025-04-02 RX ORDER — HYDROMORPHONE HYDROCHLORIDE 2 MG/1
2 TABLET ORAL 2 TIMES DAILY PRN
Qty: 20 TABLET | Refills: 0 | Status: SHIPPED | OUTPATIENT
Start: 2025-04-02

## 2025-04-02 RX ORDER — HEPARIN SODIUM,PORCINE/PF 10 UNIT/ML
50 SYRINGE (ML) INTRAVENOUS AS NEEDED
OUTPATIENT
Start: 2025-04-02

## 2025-04-02 RX ORDER — ALBUTEROL SULFATE 0.83 MG/ML
3 SOLUTION RESPIRATORY (INHALATION) AS NEEDED
OUTPATIENT
Start: 2025-08-25

## 2025-04-02 RX ORDER — EPINEPHRINE 0.3 MG/.3ML
0.3 INJECTION SUBCUTANEOUS EVERY 5 MIN PRN
OUTPATIENT
Start: 2025-08-25

## 2025-04-02 RX ORDER — FAMOTIDINE 10 MG/ML
20 INJECTION, SOLUTION INTRAVENOUS ONCE AS NEEDED
OUTPATIENT
Start: 2025-08-25

## 2025-04-02 RX ORDER — HEPARIN 100 UNIT/ML
500 SYRINGE INTRAVENOUS AS NEEDED
Status: DISCONTINUED | OUTPATIENT
Start: 2025-04-02 | End: 2025-04-02 | Stop reason: HOSPADM

## 2025-04-02 RX ORDER — HEPARIN 100 UNIT/ML
500 SYRINGE INTRAVENOUS AS NEEDED
OUTPATIENT
Start: 2025-04-02

## 2025-04-02 RX ORDER — DIPHENHYDRAMINE HYDROCHLORIDE 50 MG/ML
50 INJECTION, SOLUTION INTRAMUSCULAR; INTRAVENOUS AS NEEDED
OUTPATIENT
Start: 2025-08-25

## 2025-04-02 RX ADMIN — HEPARIN 500 UNITS: 100 SYRINGE at 13:16

## 2025-04-02 ASSESSMENT — ENCOUNTER SYMPTOMS
DYSURIA: 0
ABDOMINAL PAIN: 0
FEVER: 0
HEMATURIA: 0
CHILLS: 0
CHEST TIGHTNESS: 0
ARTHRALGIAS: 1
ABDOMINAL DISTENTION: 0
APPETITE CHANGE: 0

## 2025-04-02 ASSESSMENT — PAIN SCALES - GENERAL: PAINLEVEL_OUTOF10: 6

## 2025-04-02 ASSESSMENT — PAIN - FUNCTIONAL ASSESSMENT: PAIN_FUNCTIONAL_ASSESSMENT: 0-10

## 2025-04-02 NOTE — PROGRESS NOTES
Pt ambulated to SCT unit without assistance. Pt reports experiencing her baseline generalized pain today.  Pt denies any other issues. Vitals obtained, right chest Mediport accessed without incident,  blood drawn and sent to lab.Mindy Edwards NP came to see patient. Pt received copy of label results and AVS. Mediport flushed with NS and heparin per protocol, needle removed, needle intact, and OCD applied to site. Pt ambulated off unit without complaints or assistance.

## 2025-04-02 NOTE — PROGRESS NOTES
Patient ID: Sagrario Garber is a 49 y.o. female.    Diagnosis:   Angioimmunoblastic T-cell lymphoma,  CD30 pos,  stage IV disease, bulky tumor, high LDH, excellent partial remission after initial frontline therapy.      Treatment:   Oncology History Overview Note   T cell lymphoma with TFH phenotype angioimmunoblastic CD30+, AK negative  11/10/21 CT imaging showed extensive hilar, mediastinal and bilateral axillary lymphadenopathy  2/14/22 right inguinal node biopsy follicular hyperplasia  4/6/22 right axillary lymph node reactive changes  5/12/24 progressive adenopathy, right inguinal node T cell lymphoma with TFH phenotype, CD30 focally positive  6/12/24 started BV-CHP Pet after cycle 5 interval resoluation of bulky adenopathy in axilla, abdomen, pelvis and groin focal hypermetabolic uptake in right inguinal node  9/25/24 C6D1 of BV-CHP     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  Previous biopsy in 2022 was read as reactive.  In May 2024 she presented with increasing lymph nodes repeat biopsy confirmed T cell lymphoma with TFH phenotype, CD30 positive and Alk negative. Extremely bulky tumor.  High LDH.  Bone marrow minimally involved.  (3-4% abnormal cells by flow).     6/12/2024 -  Chemotherapy    In June 2024 treatment was administered consisting of brentuximab- CHP  Shortly after starting BV CHP patient developed abdominal ascites and was unable to eat and required TPN as well as discharge to nursing facility  PET imaging after cycle5 showed an excellent response has been obtained, but several small lymph nodes with SUV of 3.4 remain.  Completed cycle 6 of brentuximab CHP 9/26/24. Patient is referred by Dr. Iraheta for evaluation for autologous stem cell transplantation     12/5/2024 Transfer In/Out of Practice    Patient initially seen by   "[facility] 12/5/24 for first outpatient consultation for high risk TFH phenotype angioimmunoblastic T cell lymphoma. Remains in SNF to get stronger, but has pretty much recovered and remains there due to social issues. She is pretty much independent with her ADLS.      12/23/2024 -  Disease Response Assessment    Restaging evaluation 12/23/24 includes a PET scan which shows unchanged minimally PET avid right inguinal node and several non pet avid but left axillary and pretracheal nodes.  BM biopsy 12/19/24 shows low level 1.5% involvement by flow cytometry only.     2/18/2025 - 2/24/2025 Bone Marrow Transplant    Excellent partial remission prior to Auto Transplant prepped with BEAM (T0=2/24/25), counts recovered on 3/10.     Hospital course:  - Ortho positive, not symptomatic, now resolved; encouraging non caffeinated fluid intake. Resolved after IVF boluses and increased oral fluid intake.   - chemotherapy induced n/v/d, discharged on Zofran & Compazine PRN, mostly resolved  - mucositis with throat pain, improved, eating well.            Past Medical History:  -Long history of anxiety and depression She reports being on \"numerous medications\" to include Seroquel, Geodon, Depakote, Prozac, Lexapro, Paxil all without effect.    -HTN  -Patient has history of lupus for past 21/2 years and rheumatoid arthritis: currently on placquenil sees Dr. Dobbs for rheumatologist presenting with rash, pain in body, diffuse swellin joint pain. Was on Saphnelo, interferon alpha antagonist July 2023     Past Medical History:   Diagnosis Date    Essential (primary) hypertension 05/24/2017    HTN (hypertension), benign    Essential (primary) hypertension 10/09/2017    HTN (hypertension), benign    Personal history of nicotine dependence 05/24/2017    History of nicotine dependence       Surgical History:     Past Surgical History:   Procedure Laterality Date    OTHER SURGICAL HISTORY  05/24/2017    Oral Surgery Tooth Extraction [place] " Tooth        Family History:  Rheumatoid arthritis mother, skin cancer father and sister, 2 daughters, 1 son with severe autism is in a home. Has an older sister estranged.     Social History:   is an alcoholic, and has been sober for 4 months, he is in a sober house. Lost home has lost her income and most recently was staying with her daughter and can't return there. Smokes 8 cigarettes, no ETOH, finished 10th grade level,worked full time.  at [employer] for 5 years lived at the [employer].  No .  for 6 years, previsoulsy relationship of 19 years with father of her children.        Social History     Tobacco Use    Smoking status: Every Day     Types: Cigarettes   Substance Use Topics    Alcohol use: Not Currently    Drug use: Never      -------------------------------------------------------------------------------------------------------  Subjective       Patient presents today, unaccompanied, for follow up.  Reports feeling well.  She still has some fatigue, but remains active.  Sleeping well.  Reports eating and drinking good.  Occasional nausea, but no emesis. Currently at [facility], transport through [id number].       49-year-old woman  with reported history of lupus and rheumatoid arthritis for several years, anxiety and depression who presents for follow up s/p autologous SCT with BEAM for Peripheral T Cell Lymphoma.     She is T+37     Review of Systems   Constitutional:  Negative for appetite change, chills and fever.   HENT:   Negative for lump/mass.    Respiratory:  Negative for chest tightness.    Cardiovascular:  Negative for chest pain.   Gastrointestinal:  Negative for abdominal distention and abdominal pain.   Genitourinary:  Negative for dysuria and hematuria.    Musculoskeletal:  Positive for arthralgias.   Skin:  Positive for itching.   All other systems reviewed and are negative.      -------------------------------------------------------------------------------------------------------  Objective     Physical Exam  Constitutional:       Appearance: Normal appearance. She is well-developed.      Comments: Generally looks well.    HENT:      Head: Normocephalic and atraumatic.      Nose: Nose normal.      Mouth/Throat:      Dentition: No gum lesions.   Eyes:      Extraocular Movements: Extraocular movements intact.      Conjunctiva/sclera: Conjunctivae normal.      Pupils: Pupils are equal, round, and reactive to light.   Cardiovascular:      Rate and Rhythm: Regular rhythm. Tachycardia present.   Pulmonary:      Breath sounds: Normal breath sounds and air entry.   Abdominal:      General: Abdomen is flat. Bowel sounds are normal.      Palpations: Abdomen is soft.   Musculoskeletal:         General: Normal range of motion.   Lymphadenopathy:      Lower Body: Right inguinal adenopathy present. Left inguinal adenopathy present.      Comments: Right inguinal node remains enlarged about 3x 3 cm, unchanged.   Skin:     General: Skin is warm and dry.   Neurological:      General: No focal deficit present.      Mental Status: She is alert and oriented to person, place, and time.   Psychiatric:         Mood and Affect: Mood normal.         Behavior: Behavior normal. Behavior is cooperative.         Thought Content: Thought content normal.       Performance Status:  Symptomatic; fully ambulatory  -------------------------------------------------------------------------------------------------------  Assessment/Plan      Angioimmunoblastic T-cell lymphoma,  CD30 pos,  stage IV disease, bulky tumor,   - High LDH, excellent partial remission after initial frontline therapy.   - Patient completed BV CHP September 2024 and PET scan after cycle 5 showed excellent response with some minimal residual uptake in right inguinal area.  Inguinal nodes obvious on physical exam ,,also had body rash which could be  consistent with her lupus or antioimmunoblastic lymphoma.    - Provided the patient has a very good response I would recommend consolidation with an autologous stem cell transplant with BEAM preparation.    - Briefly discussed the procedures involved in peripheral stem cell mobilization followed by  hospitalization for high dose chemotherapy and recovery from side effects. If patient is in a good remission, I estimate durable remission with this aggressive approach at 50-60%.  -Restaging evaluation 12/23/24 includes a PET scan which shows unchanged minimally PET avid right inguinal node   and several non pet avid but left axillary and pretracheal nodes.  -BM biopsy 12/19/24 shows low level 1.5% involvement by flow cytometry only.  -Patient has a tragic home situation and is currently homeless,  she is applying for SSI and currently lives in a nursing home.  Patient case discussed at BMT tumor board and consensus was that this should not preclude her from a potentially curative autologous stem cell transplant.     Organ function testing:    PFTs 1/22/25 FEV1 80%,%,FEV1/FVC 79%, DLCO 104%  ECHO LVEF64%CMI 4: for age > 40, anxiety disorder, and rheumatologic condition    2/17/25 Patient collected 5.68 x 106 CD 34 cells/kg in one collection on 2/12.      We also discussed who would be her health care power of  should she be unable to decide for herself. Given her husbands recent health issues, patient states that her mother would be health care power of , Stephanie Wilson.     2. History of autologous stem cell transplant  T+37 s/p auto with BEAM prep (T=0 2/24/2025). Counts are recovering well. Will continue close monitoring every other week.   - Post Transplant cardiology follow up 4/23/25.     3. Immunocompromised  - Continue acyclovir, TMP/SMX   - Will require vaccinations starting at T+6 months, COVID after T+90, seasonal influenza after T+120.     4. History of lupus  Rheumatologist   Cathy Dobbs,  [address] [facility]  [phone] currently only on plaquenil and prednisone  Using hydromorphone as needed (refill for 20 tabs sent today)  Would prefer to see Dr. Dobbs.  She will reach out to his office and see if she can be seen in the next 2 weeks. She does have a new patient appointment with new rheumatologist 4/16/2025, will cancel if needed.      5. Psychosocial: patient has life long struggles with anxiety and depression and has had recent hospitalization- will need hospital mental health team on board.  Tragically,  patient is currently homeless and her  who is a recovering alcoholic currently lives in a sober house. Discharged to [facility] (group home) following transplant.    Paper work completed today for housing assistance    RTC:   - 4/10 Dr. Varela  - 4/24, 5/8 with line draw and DIEGO follow up  -------------------------------------------------------------------------------------------------------  BREONNA Astorga-LISSY

## 2025-04-07 PROCEDURE — RXMED WILLOW AMBULATORY MEDICATION CHARGE

## 2025-04-09 PROCEDURE — RXMED WILLOW AMBULATORY MEDICATION CHARGE

## 2025-04-09 ASSESSMENT — ENCOUNTER SYMPTOMS
NAUSEA: 1
HEMATURIA: 0
CARDIOVASCULAR NEGATIVE: 1
BLOOD IN STOOL: 0
NUMBNESS: 1
DIARRHEA: 0
HEMATOLOGIC/LYMPHATIC NEGATIVE: 1
LIGHT-HEADEDNESS: 0
ARTHRALGIAS: 1
RESPIRATORY NEGATIVE: 1
APPETITE CHANGE: 0
FEVER: 0
CONSTIPATION: 0
VOMITING: 0
ABDOMINAL PAIN: 0
DIAPHORESIS: 0
ABDOMINAL DISTENTION: 0
CHILLS: 0
DIZZINESS: 0
UNEXPECTED WEIGHT CHANGE: 0
FATIGUE: 1
DYSURIA: 0

## 2025-04-10 ENCOUNTER — OFFICE VISIT (OUTPATIENT)
Dept: HEMATOLOGY/ONCOLOGY | Facility: HOSPITAL | Age: 50
End: 2025-04-10
Payer: COMMERCIAL

## 2025-04-10 ENCOUNTER — PHARMACY VISIT (OUTPATIENT)
Dept: PHARMACY | Facility: CLINIC | Age: 50
End: 2025-04-10
Payer: MEDICAID

## 2025-04-10 ENCOUNTER — LAB (OUTPATIENT)
Dept: OTHER | Facility: HOSPITAL | Age: 50
End: 2025-04-10
Payer: COMMERCIAL

## 2025-04-10 VITALS
BODY MASS INDEX: 26.27 KG/M2 | OXYGEN SATURATION: 98 % | HEART RATE: 108 BPM | RESPIRATION RATE: 18 BRPM | SYSTOLIC BLOOD PRESSURE: 148 MMHG | TEMPERATURE: 97.9 F | DIASTOLIC BLOOD PRESSURE: 81 MMHG | WEIGHT: 164.46 LBS

## 2025-04-10 VITALS
WEIGHT: 163.8 LBS | HEART RATE: 87 BPM | DIASTOLIC BLOOD PRESSURE: 69 MMHG | RESPIRATION RATE: 16 BRPM | OXYGEN SATURATION: 100 % | TEMPERATURE: 97 F | BODY MASS INDEX: 26.17 KG/M2 | SYSTOLIC BLOOD PRESSURE: 125 MMHG

## 2025-04-10 DIAGNOSIS — C84.48 PERIPHERAL T CELL LYMPHOMA OF LYMPH NODES OF MULTIPLE SITES (MULTI): ICD-10-CM

## 2025-04-10 DIAGNOSIS — C86.50 ANGIOIMMUNOBLASTIC T-CELL LYMPHOMA: ICD-10-CM

## 2025-04-10 DIAGNOSIS — Z76.82 STEM CELL TRANSPLANT CANDIDATE: ICD-10-CM

## 2025-04-10 LAB
ALBUMIN SERPL BCP-MCNC: 4.1 G/DL (ref 3.4–5)
ALP SERPL-CCNC: 79 U/L (ref 33–110)
ALT SERPL W P-5'-P-CCNC: 21 U/L (ref 7–45)
ANION GAP SERPL CALC-SCNC: 12 MMOL/L (ref 10–20)
AST SERPL W P-5'-P-CCNC: 17 U/L (ref 9–39)
BASOPHILS # BLD AUTO: 0.03 X10*3/UL (ref 0–0.1)
BASOPHILS NFR BLD AUTO: 0.3 %
BILIRUB SERPL-MCNC: 0.4 MG/DL (ref 0–1.2)
BUN SERPL-MCNC: 12 MG/DL (ref 6–23)
CALCIUM SERPL-MCNC: 9.3 MG/DL (ref 8.6–10.6)
CHLORIDE SERPL-SCNC: 103 MMOL/L (ref 98–107)
CO2 SERPL-SCNC: 26 MMOL/L (ref 21–32)
CREAT SERPL-MCNC: 0.6 MG/DL (ref 0.5–1.05)
EGFRCR SERPLBLD CKD-EPI 2021: >90 ML/MIN/1.73M*2
EOSINOPHIL # BLD AUTO: 0.08 X10*3/UL (ref 0–0.7)
EOSINOPHIL NFR BLD AUTO: 0.9 %
ERYTHROCYTE [DISTWIDTH] IN BLOOD BY AUTOMATED COUNT: 19 % (ref 11.5–14.5)
GLUCOSE SERPL-MCNC: 115 MG/DL (ref 74–99)
HCT VFR BLD AUTO: 32.8 % (ref 36–46)
HGB BLD-MCNC: 10.5 G/DL (ref 12–16)
IMM GRANULOCYTES # BLD AUTO: 0.04 X10*3/UL (ref 0–0.7)
IMM GRANULOCYTES NFR BLD AUTO: 0.4 % (ref 0–0.9)
LYMPHOCYTES # BLD AUTO: 1.36 X10*3/UL (ref 1.2–4.8)
LYMPHOCYTES NFR BLD AUTO: 14.7 %
MAGNESIUM SERPL-MCNC: 2.02 MG/DL (ref 1.6–2.4)
MCH RBC QN AUTO: 34.3 PG (ref 26–34)
MCHC RBC AUTO-ENTMCNC: 32 G/DL (ref 32–36)
MCV RBC AUTO: 107 FL (ref 80–100)
MONOCYTES # BLD AUTO: 0.59 X10*3/UL (ref 0.1–1)
MONOCYTES NFR BLD AUTO: 6.4 %
NEUTROPHILS # BLD AUTO: 7.16 X10*3/UL (ref 1.2–7.7)
NEUTROPHILS NFR BLD AUTO: 77.3 %
NRBC BLD-RTO: 0 /100 WBCS (ref 0–0)
PLATELET # BLD AUTO: 176 X10*3/UL (ref 150–450)
POTASSIUM SERPL-SCNC: 4 MMOL/L (ref 3.5–5.3)
PROT SERPL-MCNC: 8.8 G/DL (ref 6.4–8.2)
RBC # BLD AUTO: 3.06 X10*6/UL (ref 4–5.2)
SODIUM SERPL-SCNC: 137 MMOL/L (ref 136–145)
URATE SERPL-MCNC: 4.5 MG/DL (ref 2.3–6.7)
WBC # BLD AUTO: 9.3 X10*3/UL (ref 4.4–11.3)

## 2025-04-10 PROCEDURE — 84075 ASSAY ALKALINE PHOSPHATASE: CPT

## 2025-04-10 PROCEDURE — 2500000004 HC RX 250 GENERAL PHARMACY W/ HCPCS (ALT 636 FOR OP/ED): Mod: SE | Performed by: INTERNAL MEDICINE

## 2025-04-10 PROCEDURE — RXMED WILLOW AMBULATORY MEDICATION CHARGE

## 2025-04-10 PROCEDURE — 36591 DRAW BLOOD OFF VENOUS DEVICE: CPT

## 2025-04-10 PROCEDURE — 83735 ASSAY OF MAGNESIUM: CPT

## 2025-04-10 PROCEDURE — 85025 COMPLETE CBC W/AUTO DIFF WBC: CPT

## 2025-04-10 PROCEDURE — 84550 ASSAY OF BLOOD/URIC ACID: CPT

## 2025-04-10 PROCEDURE — 99214 OFFICE O/P EST MOD 30 MIN: CPT | Performed by: INTERNAL MEDICINE

## 2025-04-10 RX ORDER — LIDOCAINE AND PRILOCAINE 25; 25 MG/G; MG/G
CREAM TOPICAL
COMMUNITY
Start: 2024-08-14

## 2025-04-10 RX ORDER — ATORVASTATIN CALCIUM 40 MG/1
40 TABLET, FILM COATED ORAL NIGHTLY
Qty: 30 TABLET | Refills: 0 | Status: SHIPPED | OUTPATIENT
Start: 2025-04-10 | End: 2025-05-10

## 2025-04-10 RX ORDER — HEPARIN SODIUM,PORCINE/PF 10 UNIT/ML
50 SYRINGE (ML) INTRAVENOUS AS NEEDED
OUTPATIENT
Start: 2025-04-10

## 2025-04-10 RX ORDER — HEPARIN SODIUM 1000 [USP'U]/ML
2000 INJECTION, SOLUTION INTRAVENOUS; SUBCUTANEOUS AS NEEDED
OUTPATIENT
Start: 2025-04-10

## 2025-04-10 RX ORDER — HEPARIN 100 UNIT/ML
500 SYRINGE INTRAVENOUS AS NEEDED
Status: DISCONTINUED | OUTPATIENT
Start: 2025-04-10 | End: 2025-04-10 | Stop reason: HOSPADM

## 2025-04-10 RX ORDER — HEPARIN 100 UNIT/ML
500 SYRINGE INTRAVENOUS AS NEEDED
OUTPATIENT
Start: 2025-04-10

## 2025-04-10 RX ADMIN — HEPARIN 500 UNITS: 100 SYRINGE at 12:43

## 2025-04-10 ASSESSMENT — PAIN SCALES - GENERAL: PAINLEVEL_OUTOF10: 6

## 2025-04-10 NOTE — PROGRESS NOTES
Alia is here alone for follow up with Dr Varela. She is doing well with no new complaints. She is requesting a refill of her Atorvastatin. Meds and allergies reviewed and updated. MD made aware.   Education Documentation  When and How to Contact Clinic, taught by Liliana Crowley RN at 4/10/2025  1:38 PM.  Learner: Patient  Readiness: Acceptance  Method: Explanation  Response: Verbalizes Understanding    General Medication Information, taught by Liliana Crowley RN at 4/10/2025  1:38 PM.  Learner: Patient  Readiness: Acceptance  Method: Explanation  Response: Verbalizes Understanding    Treatment Plan and Schedule, taught by Liliana Crowley RN at 4/10/2025  1:38 PM.  Learner: Patient  Readiness: Acceptance  Method: Explanation  Response: Verbalizes Understanding    Supportive Medications, taught by Liliana Crowley RN at 4/10/2025  1:38 PM.  Learner: Patient  Readiness: Acceptance  Method: Explanation  Response: Verbalizes Understanding    Education Comments  No comments found.

## 2025-04-10 NOTE — PROGRESS NOTES
Patient ID: Sagrario Garber is a 49 y.o. female.    Diagnosis:   Angioimmunoblastic T-cell lymphoma,  CD30 pos,  stage IV disease, bulky tumor, high LDH, excellent partial remission after initial frontline therapy.      Treatment:   Oncology History Overview Note   T cell lymphoma with TFH phenotype angioimmunoblastic CD30+, AK negative  11/10/21 CT imaging showed extensive hilar, mediastinal and bilateral axillary lymphadenopathy  2/14/22 right inguinal node biopsy follicular hyperplasia  4/6/22 right axillary lymph node reactive changes  5/12/24 progressive adenopathy, right inguinal node T cell lymphoma with TFH phenotype, CD30 focally positive  6/12/24 started BV-CHP Pet after cycle 5 interval resoluation of bulky adenopathy in axilla, abdomen, pelvis and groin focal hypermetabolic uptake in right inguinal node  9/25/24 C6D1 of BV-CHP     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  Previous biopsy in 2022 was read as reactive.  In May 2024 she presented with increasing lymph nodes repeat biopsy confirmed T cell lymphoma with TFH phenotype, CD30 positive and Alk negative. Extremely bulky tumor.  High LDH.  Bone marrow minimally involved.  (3-4% abnormal cells by flow).     6/12/2024 -  Chemotherapy    In June 2024 treatment was administered consisting of brentuximab- CHP  Shortly after starting BV CHP patient developed abdominal ascites and was unable to eat and required TPN as well as discharge to nursing facility  PET imaging after cycle5 showed an excellent response has been obtained, but several small lymph nodes with SUV of 3.4 remain.  Completed cycle 6 of brentuximab CHP 9/26/24. Patient is referred by Dr. Iraheta for evaluation for autologous stem cell transplantation     12/5/2024 Transfer In/Out of Practice    Patient initially seen by   "[facility] 12/5/24 for first outpatient consultation for high risk TFH phenotype angioimmunoblastic T cell lymphoma. Remains in SNF to get stronger, but has pretty much recovered and remains there due to social issues. She is pretty much independent with her ADLS.      12/23/2024 -  Disease Response Assessment    Restaging evaluation 12/23/24 includes a PET scan which shows unchanged minimally PET avid right inguinal node and several non pet avid but left axillary and pretracheal nodes.  BM biopsy 12/19/24 shows low level 1.5% involvement by flow cytometry only.     2/18/2025 - 2/24/2025 Bone Marrow Transplant    Excellent partial remission prior to Auto Transplant prepped with BEAM (T0=2/24/25), counts recovered on 3/10.     Hospital course:  - Ortho positive, not symptomatic, now resolved; encouraging non caffeinated fluid intake. Resolved after IVF boluses and increased oral fluid intake.   - chemotherapy induced n/v/d, discharged on Zofran & Compazine PRN, mostly resolved  - mucositis with throat pain, improved, eating well.            Response:     Past Medical History:  -Long history of anxiety and depression She reports being on \"numerous medications\" to include Seroquel, Geodon, Depakote, Prozac, Lexapro, Paxil all without effect.      Patient hospitalized for mental illness  depression and anxiety 11/12/24 and was discharged 11/26/2024 to skilled nursing.     -HTN    -Patient has history of lupus for past 21/2 years and rheumatoid arthritis: currently on placquenil sees Dr. Dobbs for rheumatologist presenting with rash, pain in body, diffuse swellin joint pain. Was on Saphnelo, interferon alpha antagonist July 2023     Past Medical History:   Diagnosis Date    Essential (primary) hypertension 05/24/2017    HTN (hypertension), benign    Essential (primary) hypertension 10/09/2017    HTN (hypertension), benign    Personal history of nicotine dependence 05/24/2017    History of nicotine dependence       Surgical " History:     Past Surgical History:   Procedure Laterality Date    OTHER SURGICAL HISTORY  05/24/2017    Oral Surgery Tooth Extraction [facility]        Family History:   Rheumatoid arthritis mother, skin cancer father and sister, 2 daughters, 1 son with severe autism is in a home. Has an older sister estranged.     Social History:   is an alcoholic, and has been sober for 4 months, he is in a sober house. Lost home has lost her income and most recently was staying with her daughter and can't return there. Smokes 8 cigarettes, no ETOH, finished 10th grade level,worked full time.  at [employer] for 5 years lived at the [employer].  No .  for 6 years, previsoulsy relationship of 19 years with father of her children.        Social History     Tobacco Use    Smoking status: Every Day     Types: Cigarettes   Substance Use Topics    Alcohol use: Not Currently    Drug use: Never      -------------------------------------------------------------------------------------------------------  Subjective       HPI    49-year-old woman  with reported history of lupus and rheumatoid arthritis for several years, anxiety and depression who has had lymphadenopathy for several years.  Previous biopsy in 2022 was read as reactive.  In May 2024 she presented with increasing lymph nodes repeat biopsy confirmed T cell lymphoma with TFH phenotype, CD30 positive and Alk negative. Extremely bulky tumor.  High LDH.  Bone marrow minimally involved.  (3-4% abnormal cells by flow).    June 2024 treatment was administered consisting of brentuximab- CHP  Shortly after starting BV CHP patient developed abdominal ascites and was unable to eat and required TPN as well as discharge to nursing facility  PET imaging after cycle5 showed an excellent response has been obtained, but several small lymph nodes with SUV of 3.4 remain.  Completed cycle 6 of brentuximab CHP 9/26/24. Patient is referred by Dr. Iraheta for evaluation for  autologous stem cell transplantation    Due to high risk nature of disease opted to proceed to autograft with BEAM preparation after restagin PET scan showed only minimal PET avid nodes in right inguinal area. BM biopsy 12/19/24 shows low level 1.5% involvement by flow cytometry only. T0 2/24/25    Sagrario presents to the clinic today (4/10/25) unaccompanied for follow up evaluation and count checks.  Today she is T+45 from her autologous STC with BEAM with T0 2/24/25  Sagrario reports no changes to health since last visit.  She is taking dilaudid for lupus pain once a day.  Energy level is lower today but is typically okay.  Appetite is good and food is adequate. No weight loss since last visit.  Staying at [facility]  and they provide 3 meals a day.    Getting rides from provider ride service.  Weight is stable, has occasional nausea relieved with compazine.  Mild rash on shins.    Her  recently had a stroke, he is doing better and is now going to , now 8 months sober.  Patient trying to find housing.     Review of Systems   Constitutional:  Positive for fatigue. Negative for appetite change, chills, diaphoresis, fever and unexpected weight change.   HENT:   Negative for lump/mass.    Respiratory: Negative.     Cardiovascular: Negative.    Gastrointestinal:  Positive for nausea. Negative for abdominal distention, abdominal pain, blood in stool, constipation, diarrhea and vomiting.   Genitourinary:  Negative for dysuria and hematuria.    Musculoskeletal:  Positive for arthralgias. Negative for gait problem.   Skin:  Positive for rash.   Neurological:  Positive for numbness. Negative for dizziness, gait problem and light-headedness.   Hematological: Negative.    All other systems reviewed and are negative.     -------------------------------------------------------------------------------------------------------  Objective   BSA: There is no height or weight on file to calculate BSA.  There were  no vitals taken for this visit.    Physical Exam  Vitals reviewed.   Constitutional:       Appearance: Normal appearance. She is well-developed.   HENT:      Head: Normocephalic and atraumatic.      Nose: Nose normal.      Mouth/Throat:      Dentition: No gum lesions.   Eyes:      Extraocular Movements: Extraocular movements intact.      Conjunctiva/sclera: Conjunctivae normal.      Pupils: Pupils are equal, round, and reactive to light.   Cardiovascular:      Rate and Rhythm: Normal rate and regular rhythm.      Pulses: Normal pulses.      Heart sounds: Normal heart sounds.   Pulmonary:      Breath sounds: Normal breath sounds and air entry.   Abdominal:      General: Abdomen is flat. Bowel sounds are normal.      Palpations: Abdomen is soft.   Musculoskeletal:         General: Normal range of motion.   Lymphadenopathy:      Lower Body: Right inguinal adenopathy present. Left inguinal adenopathy present.      Comments: Right inguinal node remains enlarged about 3x 3 cm, unchanged.   Skin:     General: Skin is warm and dry.      Findings: Rash (dry rash above ankles) present.             Comments: Post scratching multiple small petechia looking superficial excoriations with overlying crusts to bilateral lower extremities.    Port to right chest, site with no redness, tenderness, swelling, or drainage.   Neurological:      General: No focal deficit present.      Mental Status: She is alert and oriented to person, place, and time.   Psychiatric:         Mood and Affect: Mood normal.         Behavior: Behavior normal. Behavior is cooperative.         Thought Content: Thought content normal.      Comments: Anxious, normal cognition     Performance Status:  Symptomatic; fully ambulatory  -------------------------------------------------------------------------------------------------------  Assessment/Plan      1.Angioimmunoblastic T-cell lymphoma,  CD30 pos,  stage IV disease, bulky tumor,   - High LDH, excellent  partial remission after initial frontline therapy.   - Patient completed BV CHP September 2024 and PET scan after cycle 5 showed excellent response with some minimal residual uptake in right inguinal area.  Inguinal nodes obvious on physical exam ,,also had body rash which could be consistent with her lupus or antioimmunoblastic lymphoma.      -Restaging evaluation 12/23/24 includes a PET scan which shows unchanged minimally PET avid right inguinal node   and several non pet avid but left axillary and pretracheal nodes.  -BM biopsy 12/19/24 shows low level 1.5% involvement by flow cytometry only.  -Patient has a tragic home situation and is currently homeless,  she is applying for SSI and currently lives in a nursing home.  Patient case discussed at BMT tumor board and consensus was that this should not preclude her from a potentially curative autologous stem cell transplant.     Organ function testing:    PFTs 1/22/25 FEV1 80%,%,FEV1/FVC 79%, DLCO 104%  ECHO LVEF64%CMI 4: for age > 40, anxiety disorder, and rheumatologic condition    2/17/25 Patient collected 5.68 x 106 CD 34 cells/kg in one collection on 2/12.     2. History of autologous stem cell transplant  4/10/25 T+46 s/p auto with BEAM prep (T=0 2/24/2025). Counts are recovering. Will continue close monitoring twice weekly next week then decrease to once weekly.   - Post Transplant onco-echo and cardiology follow up requested.     PE residual unchanged right inguinal node, plan day 60 PET imaging. If concern for residual disease plan brentuximab maintenance    ID Prophylaxis:  Continue acyclovir  Continue bactrim MWF    3. History of lupus  Rheumatologist Dr. Cathy Dobbs,  [id number] [facility]  [phone] currently only on placquenil.    Has appointment soon    3. Psychosocial/pain management:   3/24/25:  Sagrario is currently residing at [facility] (group home).  Receiving assistance with transportation with provider  ph[employer] servicesSophia Zabala reports her  is currently staying with a friend and is recovering after recent stroke.  Her  is completing PT now.  Follows with Dr. Marquez at .  She is on chronic dilaudid once a day for lupus pain    Central Line:  Port site to right chest.  Port site with no redness, swelling, drainage, or tenderness.      RTC:  4/10/25:  Follow up visit with Dr. Varela, central line, and infusion.  4/15/25 : Followup with home rheumatologist, Dr. Dobbs  4/23/25:  Cardiology.  Day 60 PET ordered, followup 4/28/25  -------------------------------------------------------------------------------------------------------  Gunjan Varela MD

## 2025-04-10 NOTE — PROGRESS NOTES
Patient arrived to SCT unit via independent ambulation. Vitals taken. Labs drawn via R chest mediport, BBR noted and labs sent. Needle instilled with saline and heparin per orders. Needle removed without complication and dry dressing applied. Patient then left unit via independent ambulation with no further needs or assistance.

## 2025-04-15 PROCEDURE — RXMED WILLOW AMBULATORY MEDICATION CHARGE

## 2025-04-16 ENCOUNTER — APPOINTMENT (OUTPATIENT)
Dept: CARDIOLOGY | Facility: HOSPITAL | Age: 50
End: 2025-04-16
Payer: COMMERCIAL

## 2025-04-16 ENCOUNTER — APPOINTMENT (OUTPATIENT)
Facility: CLINIC | Age: 50
End: 2025-04-16
Payer: COMMERCIAL

## 2025-04-16 PROCEDURE — RXMED WILLOW AMBULATORY MEDICATION CHARGE

## 2025-04-17 ENCOUNTER — PHARMACY VISIT (OUTPATIENT)
Dept: PHARMACY | Facility: CLINIC | Age: 50
End: 2025-04-17
Payer: MEDICAID

## 2025-04-21 ENCOUNTER — TELEPHONE (OUTPATIENT)
Dept: CARDIOLOGY | Facility: HOSPITAL | Age: 50
End: 2025-04-21
Payer: COMMERCIAL

## 2025-04-22 ASSESSMENT — ENCOUNTER SYMPTOMS
ARTHRALGIAS: 1
UNEXPECTED WEIGHT CHANGE: 0
APPETITE CHANGE: 0
VOMITING: 0
CHILLS: 0
DIAPHORESIS: 0
DYSURIA: 0
NUMBNESS: 1
FEVER: 0
HEMATOLOGIC/LYMPHATIC NEGATIVE: 1
BLOOD IN STOOL: 0
FATIGUE: 1
DIZZINESS: 0
LIGHT-HEADEDNESS: 0
DIARRHEA: 0
NAUSEA: 1
ABDOMINAL PAIN: 0
HEMATURIA: 0
ABDOMINAL DISTENTION: 0
RESPIRATORY NEGATIVE: 1
CARDIOVASCULAR NEGATIVE: 1

## 2025-04-22 NOTE — PROGRESS NOTES
Patient:  Sagrario Garber  YOB: 1975  MRN: [id number]     ONCOLOGIST: Gunjan Varela  Chief Complaint/Active Symptoms:       Sagrario Garber is a 49 y.o. female who returns today for cardiac follow-up.    Patient is doing well post-transplant. Still tires on some days but is slowly and progressively improving and increasing her activities. No chest pain or discomfort, no shortness of breath, orthopnea or PND. NO palpitations, dizziness or lightheadedness. No edema.     NO other questions.     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   EKG 2/28/25- Sinus tachycardia, LAE  Prior EKG 10/30/2011 - sinus tachycardia, left atrial enlargement.   BNP 14    Review of Systems   Constitutional:  Positive for fatigue. Negative for chills and fever.   HENT: Negative.     Respiratory: Negative.     Cardiovascular: Negative.    Gastrointestinal: Negative.  Negative for blood in stool.   Genitourinary:  Negative for difficulty urinating, dysuria and hematuria.   Musculoskeletal: Negative.    Neurological:  Negative for dizziness, syncope, weakness, light-headedness and headaches.   Psychiatric/Behavioral: Negative.     All other systems reviewed and are negative.      Objective:     Vitals:    04/23/25 0916   BP: 103/89   Pulse: 95   Resp: 16   Temp: 36.4 °C (97.5 °F)   SpO2: 99%     /89 (04/23/25 0916)    Temp 36.4 °C (97.5 °F) (04/23/25 0916)    Pulse 95 (04/23/25 0916)   Resp 16 (04/23/25 0916)    SpO2 99 % (04/23/25 0916)        Vitals:    04/23/25 0916   Weight: 76 kg (167 lb 9.6 oz)       Allergies:     Allergies[1]       Medications:     Current Outpatient Medications   Medication Instructions    acyclovir (ZOVIRAX) 400 mg, oral, Every 12 hours    atorvastatin (LIPITOR) 40  mg, oral, Nightly    DULoxetine (Cymbalta) 60 mg DR capsule Take 1 capsule (60 mg) by mouth 2 times a day. Do not crush or chew. Do not fill before April 2, 2025.    ergocalciferol (Vitamin D-2) 1250 mcg (50,000 units) capsule Take 1 capsule by mouth once a week for 30 days    HYDROmorphone (DILAUDID) 2 mg, oral, 2 times daily PRN    hydroxychloroquine (PLAQUENIL) 300 mg, oral, Daily    lidocaine-prilocaine (Emla) 2.5-2.5 % cream Once PRN Procedure    LORazepam (Ativan) 0.5 mg tablet Take 1 tablet (0.5 mg) by mouth 2 times a day as needed for anxiety. Do not fill before April 2, 2025.    multivitamin with minerals tablet 1 tablet, Daily    naloxone (NARCAN) 4 mg, nasal, As needed, May repeat every 2-3 minutes if needed, alternating nostrils, until medical assistance becomes available.    ondansetron (ZOFRAN) 8 mg, oral, Every 8 hours PRN    polyethylene glycol (GLYCOLAX, MIRALAX) 17 g, oral, Daily PRN    predniSONE (DELTASONE) 10 mg, oral, Daily    predniSONE (DELTASONE) 10 mg, oral, Daily    prochlorperazine (COMPAZINE) 10 mg, oral, Every 6 hours PRN    QUEtiapine (SEROquel) 100 mg tablet Take 1.5 tablets (150 mg) by mouth once daily at bedtime. Do not fill before April 2, 2025.    sennosides-docusate sodium (Sofiya-Colace) 8.6-50 mg tablet 1 tablet, oral, Nightly PRN    sulfamethoxazole-trimethoprim (Bactrim DS) 800-160 mg tablet 1 tablet, oral, Every Mon/Wed/Fri, Start on T+30       Physical Examination:   GENERAL:  Well developed, well nourished, in no acute distress.  HEENT: NC AT, EOMI with anicteric sclera  NECK:  Supple, no JVD, no bruit.  CHEST:  Symmetric and nontender.  LUNGS:  Clear to auscultation bilaterally, normal respiratory effort.  HEART:  PMI is nondisplaced. RRR with normal S1 and S2, no S3, no mumur or rub. No carotid or abdominal bruits  ABDOMEN: Soft, NT, ND without palpable organomegaly or bruits  EXTREMITIES:  Warm with good color, no clubbing or cyanosis.  There is no edema  "noted.  PERIPHERAL VASCULAR:  Pulses present and equally palpable; 2+ throughout.  MUSCULOSKELETAL: No significant joint or limb deformity.   NEURO/PSYCH:  Alert and oriented times three with approppriate behavior and responses. Nonfocal motor examination with normal gait and ambulation  Skin: no rash or lesions on exposed skin or reported.    Lab:     CBC:   Lab Results   Component Value Date    WBC 9.3 04/10/2025    RBC 3.06 (L) 04/10/2025    HGB 10.5 (L) 04/10/2025    HCT 32.8 (L) 04/10/2025     04/10/2025        CMP:    Lab Results   Component Value Date     04/10/2025    K 4.0 04/10/2025     04/10/2025    CO2 26 04/10/2025    BUN 12 04/10/2025    CREATININE 0.60 04/10/2025    GLUCOSE 115 (H) 04/10/2025    CALCIUM 9.3 04/10/2025       Magnesium:    Lab Results   Component Value Date    MG 2.02 04/10/2025       Lipid Profile:    No results found for: \"CHLPL\", \"TRIG\", \"HDL\", \"LDLCALC\", \"LDLDIRECT\"    TSH:    No results found for: \"TSH\"    BNP:   Lab Results   Component Value Date    BNP 14 01/22/2025        PT/INR:    Lab Results   Component Value Date    PROTIME 11.5 03/14/2025    INR 1.0 03/14/2025       HgBA1c:    No results found for: \"HGBA1C\"    BMP:  Lab Results   Component Value Date     04/10/2025     (L) 04/02/2025     (L) 03/26/2025    K 4.0 04/10/2025    K 4.0 04/02/2025    K 3.9 03/26/2025     04/10/2025     04/02/2025     03/26/2025    CO2 26 04/10/2025    CO2 25 04/02/2025    CO2 25 03/26/2025    BUN 12 04/10/2025    BUN 12 04/02/2025    BUN 6 03/26/2025    CREATININE 0.60 04/10/2025    CREATININE 0.53 04/02/2025    CREATININE 0.63 03/26/2025       Cardiac Enzymes:    Lab Results   Component Value Date    TROPHS 15 01/22/2025       Hepatic Function Panel:    Lab Results   Component Value Date    ALKPHOS 79 04/10/2025    ALT 21 04/10/2025    AST 17 04/10/2025    PROT 8.8 (H) 04/10/2025    BILITOT 0.4 04/10/2025    BILIDIR 0.1 03/16/2025     Labs " reviewed.     Diagnostic Studies:     Transthoracic echo (TTE) complete  Result Date: 1/22/2025   [facility], [address] [place] [address]                Tel [phone] and Fax [phone] TRANSTHORACIC ECHOCARDIOGRAM REPORT  Patient Name:       BEVERLY PRASAD     Reading Physician:    [id number] Jenny Champagne MD Study Date:         1/22/2025           Ordering Provider:    [id number] DILEEP SHIPMAN MRN/PID:            [id number]            Fellow:               [id number] Denis Hatch MD Accession#:         [id number]        Nurse: Date of Birth/Age:  1975 / 49      Sonographer:          Estrella Monahan RDCS                     years Gender assigned at  F                   Additional Staff: Birth: Height:             170.18 cm           Admit Date: Weight:             75.30 kg            Admission Status:     Outpatient BSA / BMI:          1.87 m2 / 26.00     Encounter#:           [id number]                     kg/m2 Blood Pressure:     121/72 mmHg         Department Location:  [facility]                                                               Non Invasive Study Type:    TRANSTHORACIC ECHO (TTE) COMPLETE Diagnosis/ICD: Encounter for monitoring cardiotoxic drug therapy-Z51.81 Indication:    Stem cell transplant canidate, Monitoring cardiotoxic drug                therapy CPT Code:      Echo Complete w Full Doppler-[id number] Patient History: Pertinent History: HTN, Lupus, Peripheral T cell lymphoma. Study Detail: The following Echo studies were performed: 2D, M-Mode, Doppler and               color flow. Technically challenging study due to prominent lung               artifact.  PHYSICIAN INTERPRETATION: Left Ventricle: Left ventricular ejection fraction is normal, calculated by Garcia's biplane at 64%.  There are no regional left ventricular wall motion abnormalities. The left ventricular cavity size is normal. There is normal septal and normal posterior left ventricular wall thickness. Spectral Doppler shows a normal pattern of left ventricular diastolic filling. Left Atrium: The left atrium is normal in size. Right Ventricle: The right ventricle is normal in size. There is normal right ventricular global systolic function. Right Atrium: The right atrium is normal in size. Aortic Valve: The aortic valve is probably trileaflet. There is minimal aortic valve cusp calcification. There is no evidence of aortic valve regurgitation. The peak instantaneous gradient of the aortic valve is 5 mmHg. Mitral Valve: The mitral valve is normal in structure. There is trace mitral valve regurgitation. Tricuspid Valve: The tricuspid valve is structurally normal. There is trace to mild tricuspid regurgitation. The Doppler estimated RVSP is within normal limits at 24.0 mmHg. Pulmonic Valve: The pulmonic valve is structurally normal. There is physiologic pulmonic valve regurgitation. Pericardium: Trivial pericardial effusion. Aorta: The aortic root is normal. There is no dilatation of the ascending aorta. There is no dilatation of the aortic root. Systemic Veins: The inferior vena cava appears normal in size, with IVC inspiratory collapse greater than 50%. In comparison to the previous echocardiogram(s): There are no prior studies on this patient for comparison purposes. No prior echocardiogram available fro comparison.  CONCLUSIONS:  1. Poorly visualized anatomical structures due to suboptimal image quality.  2. Left ventricular ejection fraction is normal, calculated by Garcia's biplane at 64%.  3. There is normal right ventricular global systolic function.  4. Right ventricular systolic pressure is within normal limits.  5. No prior echocardiogram available fro comparison. QUANTITATIVE DATA SUMMARY:  2D MEASUREMENTS:           Normal Ranges: IVSd:            0.75 cm  (0.6-1.1cm) LVPWd:           0.78 cm  (0.6-1.1cm) LVIDd:           4.68 cm  (3.9-5.9cm) LVIDs:           3.10 cm LV Mass Index:   61 g/m2 LVEDV Index:     45 ml/m2 LV % FS          33.8 %  LA VOLUME:                    Normal Ranges: LA Vol A4C:        40.3 ml    (22+/-6mL/m2) LA Vol A2C:        33.8 ml LA Vol BP:         37.6 ml LA Vol Index A4C:  21.6ml/m2 LA Vol Index A2C:  18.1 ml/m2 LA Vol Index BP:   20.1 ml/m2 LA Area A4C:       15.7 cm2 LA Area A2C:       14.1 cm2 LA Major Axis A4C: 5.2 cm LA Major Axis A2C: 5.0 cm  AORTA MEASUREMENTS:         Normal Ranges: Ao Sinus, d:        3.00 cm (2.1-3.5cm) Asc Ao, d:          3.20 cm (2.1-3.4cm) Ao Arch:            3.60 cm (2.0-3.6cm)  LV SYSTOLIC FUNCTION BY 2D PLANIMETRY (MOD):                      Normal Ranges: EF-A4C View:    66 % (>=55%) EF-A2C View:    59 % EF-Biplane:     64 % LV EF Reported: 64 %  LV DIASTOLIC FUNCTION:             Normal Ranges: MV Peak E:             0.73 m/s    (0.7-1.2 m/s) MV Peak A:             0.81 m/s    (0.42-0.7 m/s) E/A Ratio:             0.90        (1.0-2.2) MV e'                  0.110 m/s   (>8.0) MV lateral e'          0.11 m/s MV medial e'           0.11 m/s MV A Dur:              117.65 msec E/e' Ratio:            6.67        (<8.0) MV DT:                 173 msec    (150-240 msec) PulmV Sys David:         39.03 cm/s PulmV Judge David:        34.92 cm/s PulmV S/D David:         1.12 PulmV A Revs David:      23.18 cm/s PulmV A Revs Dur:      110.73 msec  MITRAL VALVE:          Normal Ranges: MV DT:        173 msec (150-240msec)  AORTIC VALVE:           Normal Ranges: AoV Vmax:      1.15 m/s (<=1.7m/s) AoV Peak P.3 mmHg (<20mmHg) LVOT Max David:  0.96 m/s (<=1.1m/s) LVOT VTI:      17.90 cm LVOT Diameter: 2.11 cm  (1.8-2.4cm) AoV Area,Vmax: 2.92 cm2 (2.5-4.5cm2)  RIGHT VENTRICLE: TAPSE: 21.0 mm RV s'  0.14 m/s  TRICUSPID VALVE/RVSP:          Normal Ranges: Peak TR Velocity:     2.29 m/s RV  Syst Pressure:     24 mmHg  (< 30mmHg) IVC Diam:             1.90 cm  PULMONIC VALVE:          Normal Ranges: PV Accel Time:  166 msec (>120ms) PV Max David:     1.1 m/s  (0.6-0.9m/s) PV Max P.8 mmHg  Pulmonary Veins: PulmV A Revs Dur: 110.73 msec PulmV A Revs David: 23.18 cm/s PulmV Ujdge David:   34.92 cm/s PulmV S/D David:    1.12 PulmV Sys David:    39.03 cm/s  AORTA: Asc Ao Diam 3.17 cm  [id number] Jenny Champagne MD Electronically signed on 2025 at 4:59:42 PM  ** Final **     CV testing reviewed and summarized in HPI    Radiology:     Onco-Echo Limited (Strain And 3D)    (Results Pending)     ASSESSMENT     Problem List Items Addressed This Visit       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)    Relevant Orders    Onco-Echo Limited (Strain And 3D)    Encounter for monitoring cardiotoxic drug therapy - Primary    Relevant Orders    Onco-Echo Limited (Strain And 3D)    Follow Up In Cardiology    RESOLVED: Atypical chest pain    History of stem cell transplant (Multi)       PLAN     The patient is doing well after stem cell transplant.  Echocardiogram does not show any significant change or LV dysfunction.  There are no signs or symptoms of angina pectoris or heart failure.  At this time we will get the patient established for her regular follow-up because of her prior doxorubicin therapy.  She completed her original chemotherapy back in 2024 so should be seen in the clinic in September after an echocardiogram and yearly for 5 years.  We have reviewed the signs and symptoms of heart failure for the patient and asked her to contact us immediately if she notices a change in her functional capacity or develop shortness of breath, orthopnea or PND.                   [1]   Allergies  Allergen Reactions    Amoxicillin Hives     Patient says gets hives/welts     Atenolol Hives     Patient says gets hives/welts    Prednisone Hives     Specifically Solumedrol  Able to tolerate prednisone    Patient says gets hives/welts    Methylprednisolone Sodium Succ Hives

## 2025-04-23 ENCOUNTER — OFFICE VISIT (OUTPATIENT)
Dept: CARDIOLOGY | Facility: HOSPITAL | Age: 50
End: 2025-04-23
Payer: COMMERCIAL

## 2025-04-23 VITALS
BODY MASS INDEX: 26.78 KG/M2 | WEIGHT: 167.6 LBS | TEMPERATURE: 97.5 F | OXYGEN SATURATION: 99 % | HEART RATE: 95 BPM | SYSTOLIC BLOOD PRESSURE: 103 MMHG | DIASTOLIC BLOOD PRESSURE: 89 MMHG | RESPIRATION RATE: 16 BRPM

## 2025-04-23 DIAGNOSIS — Z51.81 ENCOUNTER FOR MONITORING CARDIOTOXIC DRUG THERAPY: Primary | ICD-10-CM

## 2025-04-23 DIAGNOSIS — Z79.899 ENCOUNTER FOR MONITORING CARDIOTOXIC DRUG THERAPY: Primary | ICD-10-CM

## 2025-04-23 DIAGNOSIS — R07.89 ATYPICAL CHEST PAIN: ICD-10-CM

## 2025-04-23 DIAGNOSIS — Z94.84 HISTORY OF STEM CELL TRANSPLANT (MULTI): ICD-10-CM

## 2025-04-23 DIAGNOSIS — C84.48 PERIPHERAL T CELL LYMPHOMA OF LYMPH NODES OF MULTIPLE SITES (MULTI): ICD-10-CM

## 2025-04-23 PROCEDURE — 99215 OFFICE O/P EST HI 40 MIN: CPT | Performed by: INTERNAL MEDICINE

## 2025-04-23 ASSESSMENT — ENCOUNTER SYMPTOMS
BLOOD IN STOOL: 0
PSYCHIATRIC NEGATIVE: 1
DYSURIA: 0
DIZZINESS: 0
CARDIOVASCULAR NEGATIVE: 1
HEADACHES: 0
DIFFICULTY URINATING: 0
LIGHT-HEADEDNESS: 0
MUSCULOSKELETAL NEGATIVE: 1
FATIGUE: 1
RESPIRATORY NEGATIVE: 1
WEAKNESS: 0
HEMATURIA: 0
FEVER: 0
CHILLS: 0
GASTROINTESTINAL NEGATIVE: 1

## 2025-04-23 ASSESSMENT — PAIN SCALES - GENERAL: PAINLEVEL_OUTOF10: 0-NO PAIN

## 2025-04-23 NOTE — PROGRESS NOTES
Patient ID: Sagrario Garber is a 49 y.o. female.    Diagnosis:   Angioimmunoblastic T-cell lymphoma,  CD30 pos,  stage IV disease, bulky tumor, high LDH, excellent partial remission after initial frontline therapy.      Treatment:   Oncology History Overview Note   T cell lymphoma with TFH phenotype angioimmunoblastic CD30+, AK negative  11/10/21 CT imaging showed extensive hilar, mediastinal and bilateral axillary lymphadenopathy  2/14/22 right inguinal node biopsy follicular hyperplasia  4/6/22 right axillary lymph node reactive changes  5/12/24 progressive adenopathy, right inguinal node T cell lymphoma with TFH phenotype, CD30 focally positive  6/12/24 started BV-CHP Pet after cycle 5 interval resoluation of bulky adenopathy in axilla, abdomen, pelvis and groin focal hypermetabolic uptake in right inguinal node  9/25/24 C6D1 of BV-CHP     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  Previous biopsy in 2022 was read as reactive.  In May 2024 she presented with increasing lymph nodes repeat biopsy confirmed T cell lymphoma with TFH phenotype, CD30 positive and Alk negative. Extremely bulky tumor.  High LDH.  Bone marrow minimally involved.  (3-4% abnormal cells by flow).     6/12/2024 -  Chemotherapy    In June 2024 treatment was administered consisting of brentuximab- CHP  Shortly after starting BV CHP patient developed abdominal ascites and was unable to eat and required TPN as well as discharge to nursing facility  PET imaging after cycle5 showed an excellent response has been obtained, but several small lymph nodes with SUV of 3.4 remain.  Completed cycle 6 of brentuximab CHP 9/26/24. Patient is referred by Dr. Iraheta for evaluation for autologous stem cell transplantation     12/5/2024 Transfer In/Out of Practice    Patient initially seen by   "[facility] 12/5/24 for first outpatient consultation for high risk TFH phenotype angioimmunoblastic T cell lymphoma. Remains in SNF to get stronger, but has pretty much recovered and remains there due to social issues. She is pretty much independent with her ADLS.      12/23/2024 -  Disease Response Assessment    Restaging evaluation 12/23/24 includes a PET scan which shows unchanged minimally PET avid right inguinal node and several non pet avid but left axillary and pretracheal nodes.  BM biopsy 12/19/24 shows low level 1.5% involvement by flow cytometry only.     2/18/2025 - 2/24/2025 Bone Marrow Transplant    Excellent partial remission prior to Auto Transplant prepped with BEAM (T0=2/24/25), counts recovered on 3/10.     Hospital course:  - Ortho positive, not symptomatic, now resolved; encouraging non caffeinated fluid intake. Resolved after IVF boluses and increased oral fluid intake.   - chemotherapy induced n/v/d, discharged on Zofran & Compazine PRN, mostly resolved  - mucositis with throat pain, improved, eating well.          Response:     Past Medical History:  -Long history of anxiety and depression She reports being on \"numerous medications\" to include Seroquel, Geodon, Depakote, Prozac, Lexapro, Paxil all without effect.      Patient hospitalized for mental illness  depression and anxiety 11/12/24 and was discharged 11/26/2024 to skilled nursing.     -HTN    -Patient has history of lupus for past 21/2 years and rheumatoid arthritis: currently on placquenil sees Dr. Dobbs for rheumatologist presenting with rash, pain in body, diffuse swellin joint pain. Was on Saphnelo, interferon alpha antagonist July 2023     Past Medical History:   Diagnosis Date    Atypical chest pain 01/22/2025    Essential (primary) hypertension 05/24/2017    HTN (hypertension), benign    Essential (primary) hypertension 10/09/2017    HTN (hypertension), benign    History of stem cell transplant (Multi) 03/26/2025    Personal " history of nicotine dependence 05/24/2017    History of nicotine dependence     Surgical History:     Past Surgical History:   Procedure Laterality Date    OTHER SURGICAL HISTORY  05/24/2017    Oral Surgery Tooth Extraction [facility]      Family History:  Rheumatoid arthritis mother, skin cancer father and sister, 2 daughters, 1 son with severe autism is in a home. Has an older sister estranged.     Social History:   is an alcoholic, and has been sober for 4 months, he is in a sober house. Lost home has lost her income and most recently was staying with her daughter and can't return there. Smokes 8 cigarettes, no ETOH, finished 10th grade level,worked full time.  at [employer] for 5 years lived at the [employer].  No .  for 6 years, previsoulsy relationship of 19 years with father of her children.        Social History     Tobacco Use    Smoking status: Every Day     Types: Cigarettes   Substance Use Topics    Alcohol use: Not Currently    Drug use: Never      -------------------------------------------------------------------------------------------------------  Subjective       HPI    49-year-old woman with reported history of lupus and rheumatoid arthritis for several years, anxiety and depression who has had lymphadenopathy for several years.  Previous biopsy in 2022 was read as reactive.  In May 2024 she presented with increasing lymph nodes repeat biopsy confirmed T cell lymphoma with TFH phenotype, CD30 positive and Alk negative. Extremely bulky tumor.  High LDH.  Bone marrow minimally involved.  (3-4% abnormal cells by flow).    June 2024 treatment was administered consisting of brentuximab- CHP  Shortly after starting BV CHP patient developed abdominal ascites and was unable to eat and required TPN as well as discharge to nursing facility  PET imaging after cycle5 showed an excellent response has been obtained, but several small lymph nodes with SUV of 3.4 remain.  Completed cycle 6  of brentuximab CHP 9/26/24. Patient is referred by Dr. Iraheta for evaluation for autologous stem cell transplantation    Due to high risk nature of disease opted to proceed to autograft with BEAM preparation after restagin PET scan showed only minimal PET avid nodes in right inguinal area. BM biopsy 12/19/24 shows low level 1.5% involvement by flow cytometry only.     Sagrario presents to the clinic today (4/24/25) unaccompanied for follow up evaluation and count checks.  Today she is T+58 from her autologous STC with BEAM with T0 2/24/25.    Reports she is doing okay, but states her left ear is sore since yesterday.  Has left upper crown that may be bothering her as she is having soreness to upper gums.  States she hasn't been to see a dentist for a long time.  Energy level remains lower.  Appetite is good and weight is stable.  Intermittent nausea a few times a week.  No vomiting.  Takes compazine as needed.  Intermittent constipation, takes stool softener.  Taking dilaudid about 1 time per day for lupus pain back and joint pains.  Numbness to bilateral feet (but is worse to left foot), staying the same.  Has dry skin.  Using oatmeal Aveno lotion. Mild redness to face from lupus.  Taking 5-10 mg of prednisone daily, rheumatologist is trying to cut back on dose.    Continues to feel lymph nodes to bilateral groin, bigger right than left.  Lymph nodes are non-tender.  Continues to reside at [facility] and they provide 3 meals a day.  Getting rides from provider ride service.  Her  recently had a stroke, he is doing better and is now going to PT, now 8 months sober.  Patient trying to find housing.  Declines needing to see social work today.      Review of Systems   Constitutional:  Positive for fatigue. Negative for appetite change, chills, diaphoresis, fever and unexpected weight change.   HENT:   Negative for lump/mass.    Respiratory: Negative.     Cardiovascular: Negative.    Gastrointestinal:   Positive for constipation and nausea. Negative for abdominal distention, abdominal pain, blood in stool, diarrhea and vomiting.   Genitourinary:  Negative for dysuria and hematuria.    Musculoskeletal:  Positive for arthralgias. Negative for gait problem.   Skin:  Positive for rash.   Neurological:  Positive for numbness. Negative for dizziness, gait problem and light-headedness.   Hematological: Negative.    All other systems reviewed and are negative.     -------------------------------------------------------------------------------------------------------  Objective   BSA: 1.86 meters squared  /87 (BP Location: Right arm, Patient Position: Sitting, BP Cuff Size: Adult)   Pulse 100   Temp 36.5 °C (97.7 °F) (Skin)   Resp 16   Wt 74.2 kg (163 lb 8 oz)   SpO2 100%   BMI 26.12 kg/m²     Physical Exam  Vitals reviewed.   Constitutional:       Appearance: Normal appearance. She is well-developed.   HENT:      Head: Normocephalic and atraumatic.      Right Ear: Tympanic membrane normal.      Left Ear: Tympanic membrane normal.      Ears:      Comments: Left TM has minimal erythema, fluid is clear, no TM bulging.       Nose: Nose normal.      Mouth/Throat:      Dentition: No gum lesions.   Eyes:      Extraocular Movements: Extraocular movements intact.      Conjunctiva/sclera: Conjunctivae normal.      Pupils: Pupils are equal, round, and reactive to light.   Cardiovascular:      Rate and Rhythm: Normal rate and regular rhythm.      Pulses: Normal pulses.      Heart sounds: Normal heart sounds.   Pulmonary:      Breath sounds: Normal breath sounds and air entry.   Abdominal:      General: Abdomen is flat. Bowel sounds are normal.      Palpations: Abdomen is soft.   Musculoskeletal:         General: Normal range of motion.   Lymphadenopathy:      Lower Body: Right inguinal adenopathy present. Left inguinal adenopathy present.      Comments: Right inguinal node remains enlarged about 3x 3 cm, unchanged.    Skin:     General: Skin is warm and dry.             Comments: Dry skin to bilateral shins, improved since prior visit.    Port to right chest, site with no redness, tenderness, swelling, or drainage.   Neurological:      General: No focal deficit present.      Mental Status: She is alert and oriented to person, place, and time.   Psychiatric:         Mood and Affect: Mood normal.         Behavior: Behavior normal. Behavior is cooperative.         Thought Content: Thought content normal.      Comments: Anxious, normal cognition       Performance Status:  Symptomatic; fully ambulatory  -------------------------------------------------------------------------------------------------------  Assessment/Plan      Angioimmunoblastic T-cell lymphoma,  CD30 pos,  stage IV disease, bulky tumor,   - High LDH, excellent partial remission after initial frontline therapy.   - Patient completed BV CHP September 2024 and PET scan after cycle 5 showed excellent response with some minimal residual uptake in right inguinal area.  Inguinal nodes obvious on physical exam ,,also had body rash which could be consistent with her lupus or antioimmunoblastic lymphoma.      -Restaging evaluation 12/23/24 includes a PET scan which shows unchanged minimally PET avid right inguinal node   and several non pet avid but left axillary and pretracheal nodes.  -BM biopsy 12/19/24 shows low level 1.5% involvement by flow cytometry only.  -Patient has a tragic home situation and is currently homeless,  she is applying for SSI and currently lives in a nursing home.  Patient case discussed at BMT tumor board and consensus was that this should not preclude her from a potentially curative autologous stem cell transplant.     Organ function testing:    PFTs 1/22/25 FEV1 80%,%,FEV1/FVC 79%, DLCO 104%  ECHO LVEF64%CMI 4: for age > 40, anxiety disorder, and rheumatologic condition    2/17/25 Patient collected 5.68 x 106 CD 34 cells/kg in one  collection on 2/12.     History of autologous stem cell transplant  4/10/25 T+46 s/p auto with BEAM prep (T=0 2/24/2025). Counts are recovering. Will continue close monitoring twice weekly next week then decrease to once weekly.   - Post Transplant onco-echo and cardiology follow up requested.     PE residual unchanged right inguinal node, plan day 60 PET imaging. If concern for residual disease plan brentuximab maintenance.    4/25/25:  CBC results from today are stable.  Continue to palpate enlarged right inguinal LN.  PET scan obtained today but results are in process.  Sagrario declined follow up visit next week to review PET scan results.  Requested to follow up on 5/8/25.      ID Prophylaxis:  Continue acyclovir  Continue bactrim [facility]    History of lupus  Rheumatologist Dr. Cathy Dobbs.  [id number] [facility]  [phone] currently only on placquenil.    Had rheumatologist appt 4/15/25, trying to decrease prednisone.  Taking either 5 or 10 mg daily depending on symptoms.    Continues chronic dilaudid use about once per day for lupus pain.      Cardiology:  Following with Dr. Albarran for monitoring following doxorubicin therapy.  ECHO (3/24/25): LVEF 57%. Plan for follow up again September 2025 with ECHO prior, then yearly for next 5 years.      Left Ear Pain:  4/24/25:  Sagrario reports new left ear pain that started yesterday.  Unsure if left upper crown to molar that may be bothering her as she is also having soreness to upper gums.  States she hasn't been to see a dentist for a long time.  Minimal erythema noted to left TM, no bulging, and fluid to ear is clear.  Possible otitis media with effusion, advised to try OTC decongestant.  Instructed to call oncology team with worsening ear pain, fevers, chills, or other concerning symptoms.  Advised to have dental evaluation.      Psychosocial:  4/24/25:  Sagrario is currently residing at [facility] (group home).  Receiving  assistance with transportation with provider ride services.  Sagrario reports her  is currently staying with a friend and is recovering after recent stroke.  Her  is completing PT now.  Follows with Dr. Marquez at .     Central Line:  Port site to right chest.  Port site with no redness, swelling, drainage, or tenderness.      RTC:  5/8/25:  Follow up visit with Oksana Blanton NP and central line appt.  -------------------------------------------------------------------------------------------------------  BREONNA Pickard-LISSY

## 2025-04-24 ENCOUNTER — HOSPITAL ENCOUNTER (OUTPATIENT)
Dept: RADIOLOGY | Facility: HOSPITAL | Age: 50
Discharge: HOME | End: 2025-04-24
Payer: COMMERCIAL

## 2025-04-24 ENCOUNTER — LAB (OUTPATIENT)
Dept: HEMATOLOGY/ONCOLOGY | Facility: HOSPITAL | Age: 50
End: 2025-04-24
Payer: COMMERCIAL

## 2025-04-24 ENCOUNTER — PHARMACY VISIT (OUTPATIENT)
Dept: PHARMACY | Facility: CLINIC | Age: 50
End: 2025-04-24
Payer: MEDICAID

## 2025-04-24 ENCOUNTER — OFFICE VISIT (OUTPATIENT)
Dept: HEMATOLOGY/ONCOLOGY | Facility: HOSPITAL | Age: 50
End: 2025-04-24
Payer: COMMERCIAL

## 2025-04-24 VITALS
SYSTOLIC BLOOD PRESSURE: 135 MMHG | BODY MASS INDEX: 26.12 KG/M2 | TEMPERATURE: 97.7 F | RESPIRATION RATE: 16 BRPM | HEART RATE: 100 BPM | WEIGHT: 163.5 LBS | DIASTOLIC BLOOD PRESSURE: 87 MMHG | OXYGEN SATURATION: 100 %

## 2025-04-24 DIAGNOSIS — C86.50 ANGIOIMMUNOBLASTIC T-CELL LYMPHOMA: Primary | ICD-10-CM

## 2025-04-24 DIAGNOSIS — Z95.828 PORT-A-CATH IN PLACE: ICD-10-CM

## 2025-04-24 DIAGNOSIS — D84.9 IMMUNOCOMPROMISED: ICD-10-CM

## 2025-04-24 DIAGNOSIS — Z78.9 HISTORY OF HOMELESS: ICD-10-CM

## 2025-04-24 DIAGNOSIS — C84.48 PERIPHERAL T CELL LYMPHOMA OF LYMPH NODES OF MULTIPLE SITES (MULTI): ICD-10-CM

## 2025-04-24 DIAGNOSIS — Z76.82 STEM CELL TRANSPLANT CANDIDATE: ICD-10-CM

## 2025-04-24 DIAGNOSIS — H92.02 LEFT EAR PAIN: ICD-10-CM

## 2025-04-24 DIAGNOSIS — Z94.84 HISTORY OF STEM CELL TRANSPLANT (MULTI): ICD-10-CM

## 2025-04-24 DIAGNOSIS — F32.89 OTHER DEPRESSION: ICD-10-CM

## 2025-04-24 DIAGNOSIS — M32.9 SYSTEMIC LUPUS ERYTHEMATOSUS, UNSPECIFIED SLE TYPE, UNSPECIFIED ORGAN INVOLVEMENT STATUS (MULTI): ICD-10-CM

## 2025-04-24 LAB
ALBUMIN SERPL BCP-MCNC: 4.1 G/DL (ref 3.4–5)
ALP SERPL-CCNC: 70 U/L (ref 33–110)
ALT SERPL W P-5'-P-CCNC: 16 U/L (ref 7–45)
ANION GAP SERPL CALC-SCNC: 12 MMOL/L (ref 10–20)
AST SERPL W P-5'-P-CCNC: 14 U/L (ref 9–39)
BASOPHILS # BLD AUTO: 0.02 X10*3/UL (ref 0–0.1)
BASOPHILS NFR BLD AUTO: 0.3 %
BILIRUB SERPL-MCNC: 0.4 MG/DL (ref 0–1.2)
BUN SERPL-MCNC: 9 MG/DL (ref 6–23)
CALCIUM SERPL-MCNC: 9.5 MG/DL (ref 8.6–10.3)
CHLORIDE SERPL-SCNC: 102 MMOL/L (ref 98–107)
CO2 SERPL-SCNC: 27 MMOL/L (ref 21–32)
CREAT SERPL-MCNC: 0.66 MG/DL (ref 0.5–1.05)
EGFRCR SERPLBLD CKD-EPI 2021: >90 ML/MIN/1.73M*2
EOSINOPHIL # BLD AUTO: 0.08 X10*3/UL (ref 0–0.7)
EOSINOPHIL NFR BLD AUTO: 1.1 %
ERYTHROCYTE [DISTWIDTH] IN BLOOD BY AUTOMATED COUNT: 15.6 % (ref 11.5–14.5)
GLUCOSE BLD MANUAL STRIP-MCNC: 102 MG/DL (ref 74–99)
GLUCOSE SERPL-MCNC: 134 MG/DL (ref 74–99)
HCT VFR BLD AUTO: 34.8 % (ref 36–46)
HGB BLD-MCNC: 11.3 G/DL (ref 12–16)
IGG SERPL-MCNC: 2050 MG/DL (ref 700–1600)
IMM GRANULOCYTES # BLD AUTO: 0.07 X10*3/UL (ref 0–0.7)
IMM GRANULOCYTES NFR BLD AUTO: 0.9 % (ref 0–0.9)
LYMPHOCYTES # BLD AUTO: 0.71 X10*3/UL (ref 1.2–4.8)
LYMPHOCYTES NFR BLD AUTO: 9.4 %
MAGNESIUM SERPL-MCNC: 1.91 MG/DL (ref 1.6–2.4)
MCH RBC QN AUTO: 34.9 PG (ref 26–34)
MCHC RBC AUTO-ENTMCNC: 32.5 G/DL (ref 32–36)
MCV RBC AUTO: 107 FL (ref 80–100)
MONOCYTES # BLD AUTO: 0.31 X10*3/UL (ref 0.1–1)
MONOCYTES NFR BLD AUTO: 4.1 %
NEUTROPHILS # BLD AUTO: 6.37 X10*3/UL (ref 1.2–7.7)
NEUTROPHILS NFR BLD AUTO: 84.2 %
NRBC BLD-RTO: 0 /100 WBCS (ref 0–0)
PLATELET # BLD AUTO: 180 X10*3/UL (ref 150–450)
POTASSIUM SERPL-SCNC: 3.8 MMOL/L (ref 3.5–5.3)
PROT SERPL-MCNC: 7.7 G/DL (ref 6.4–8.2)
RBC # BLD AUTO: 3.24 X10*6/UL (ref 4–5.2)
SODIUM SERPL-SCNC: 137 MMOL/L (ref 136–145)
URATE SERPL-MCNC: 4.6 MG/DL (ref 2.3–6.7)
WBC # BLD AUTO: 7.6 X10*3/UL (ref 4.4–11.3)

## 2025-04-24 PROCEDURE — 84550 ASSAY OF BLOOD/URIC ACID: CPT

## 2025-04-24 PROCEDURE — 82565 ASSAY OF CREATININE: CPT

## 2025-04-24 PROCEDURE — 78815 PET IMAGE W/CT SKULL-THIGH: CPT | Mod: PS

## 2025-04-24 PROCEDURE — RXMED WILLOW AMBULATORY MEDICATION CHARGE

## 2025-04-24 PROCEDURE — 3430000001 HC RX 343 DIAGNOSTIC RADIOPHARMACEUTICALS: Mod: SE | Performed by: INTERNAL MEDICINE

## 2025-04-24 PROCEDURE — 36591 DRAW BLOOD OFF VENOUS DEVICE: CPT

## 2025-04-24 PROCEDURE — 83735 ASSAY OF MAGNESIUM: CPT

## 2025-04-24 PROCEDURE — 2500000004 HC RX 250 GENERAL PHARMACY W/ HCPCS (ALT 636 FOR OP/ED): Mod: JZ,SE | Performed by: INTERNAL MEDICINE

## 2025-04-24 PROCEDURE — 82947 ASSAY GLUCOSE BLOOD QUANT: CPT

## 2025-04-24 PROCEDURE — 82784 ASSAY IGA/IGD/IGG/IGM EACH: CPT

## 2025-04-24 PROCEDURE — 99215 OFFICE O/P EST HI 40 MIN: CPT

## 2025-04-24 PROCEDURE — 85025 COMPLETE CBC W/AUTO DIFF WBC: CPT

## 2025-04-24 PROCEDURE — A9552 F18 FDG: HCPCS | Mod: SE | Performed by: INTERNAL MEDICINE

## 2025-04-24 RX ORDER — HEPARIN 100 UNIT/ML
500 SYRINGE INTRAVENOUS AS NEEDED
Status: DISCONTINUED | OUTPATIENT
Start: 2025-04-24 | End: 2025-04-24 | Stop reason: HOSPADM

## 2025-04-24 RX ORDER — HEPARIN SODIUM 1000 [USP'U]/ML
2000 INJECTION, SOLUTION INTRAVENOUS; SUBCUTANEOUS AS NEEDED
OUTPATIENT
Start: 2025-04-24

## 2025-04-24 RX ORDER — HYDROMORPHONE HYDROCHLORIDE 2 MG/1
2 TABLET ORAL 2 TIMES DAILY PRN
Qty: 20 TABLET | Refills: 0 | Status: SHIPPED | OUTPATIENT
Start: 2025-04-24

## 2025-04-24 RX ORDER — HEPARIN SODIUM,PORCINE/PF 10 UNIT/ML
50 SYRINGE (ML) INTRAVENOUS AS NEEDED
OUTPATIENT
Start: 2025-04-24

## 2025-04-24 RX ORDER — PROCHLORPERAZINE MALEATE 10 MG
10 TABLET ORAL EVERY 6 HOURS PRN
Qty: 30 TABLET | Refills: 0 | Status: SHIPPED | OUTPATIENT
Start: 2025-04-24

## 2025-04-24 RX ORDER — HEPARIN 100 UNIT/ML
500 SYRINGE INTRAVENOUS AS NEEDED
OUTPATIENT
Start: 2025-04-24

## 2025-04-24 RX ORDER — FLUDEOXYGLUCOSE F 18 200 MCI/ML
10.6 INJECTION, SOLUTION INTRAVENOUS
Status: COMPLETED | OUTPATIENT
Start: 2025-04-24 | End: 2025-04-24

## 2025-04-24 RX ADMIN — FLUDEOXYGLUCOSE F 18 10.6 MILLICURIE: 200 INJECTION, SOLUTION INTRAVENOUS at 08:38

## 2025-04-24 RX ADMIN — HEPARIN 500 UNITS: 100 SYRINGE at 11:06

## 2025-04-24 ASSESSMENT — ENCOUNTER SYMPTOMS: CONSTIPATION: 1

## 2025-04-24 ASSESSMENT — PAIN SCALES - GENERAL: PAINLEVEL_OUTOF10: 5

## 2025-04-25 PROBLEM — H92.02 LEFT EAR PAIN: Status: ACTIVE | Noted: 2025-04-25

## 2025-04-28 ENCOUNTER — APPOINTMENT (OUTPATIENT)
Dept: HEMATOLOGY/ONCOLOGY | Facility: HOSPITAL | Age: 50
End: 2025-04-28
Payer: COMMERCIAL

## 2025-04-28 DIAGNOSIS — C84.48 PERIPHERAL T CELL LYMPHOMA OF LYMPH NODES OF MULTIPLE SITES (MULTI): Primary | ICD-10-CM

## 2025-04-30 ENCOUNTER — OFFICE VISIT (OUTPATIENT)
Dept: BEHAVIORAL HEALTH | Facility: HOSPITAL | Age: 50
End: 2025-04-30
Payer: COMMERCIAL

## 2025-04-30 ENCOUNTER — PHARMACY VISIT (OUTPATIENT)
Dept: PHARMACY | Facility: CLINIC | Age: 50
End: 2025-04-30
Payer: MEDICAID

## 2025-04-30 VITALS
OXYGEN SATURATION: 97 % | TEMPERATURE: 97.9 F | HEART RATE: 104 BPM | SYSTOLIC BLOOD PRESSURE: 129 MMHG | DIASTOLIC BLOOD PRESSURE: 76 MMHG | RESPIRATION RATE: 18 BRPM | BODY MASS INDEX: 26.49 KG/M2 | WEIGHT: 165.79 LBS

## 2025-04-30 DIAGNOSIS — C86.50 ANGIOIMMUNOBLASTIC T-CELL LYMPHOMA: ICD-10-CM

## 2025-04-30 DIAGNOSIS — F41.1 GAD (GENERALIZED ANXIETY DISORDER): ICD-10-CM

## 2025-04-30 DIAGNOSIS — F33.1 MODERATE EPISODE OF RECURRENT MAJOR DEPRESSIVE DISORDER: ICD-10-CM

## 2025-04-30 PROCEDURE — 4004F PT TOBACCO SCREEN RCVD TLK: CPT | Performed by: PSYCHIATRY & NEUROLOGY

## 2025-04-30 PROCEDURE — 99215 OFFICE O/P EST HI 40 MIN: CPT | Mod: AM | Performed by: PSYCHIATRY & NEUROLOGY

## 2025-04-30 PROCEDURE — RXMED WILLOW AMBULATORY MEDICATION CHARGE

## 2025-04-30 PROCEDURE — 99215 OFFICE O/P EST HI 40 MIN: CPT | Performed by: PSYCHIATRY & NEUROLOGY

## 2025-04-30 RX ORDER — HYDROXYZINE HYDROCHLORIDE 50 MG/1
50 TABLET, FILM COATED ORAL 2 TIMES DAILY PRN
Qty: 60 TABLET | Refills: 2 | Status: SHIPPED | OUTPATIENT
Start: 2025-04-30 | End: 2025-07-29

## 2025-04-30 RX ORDER — LORAZEPAM 0.5 MG/1
0.5 TABLET ORAL 2 TIMES DAILY PRN
Qty: 20 TABLET | Refills: 2 | Status: SHIPPED | OUTPATIENT
Start: 2025-04-30

## 2025-04-30 ASSESSMENT — PAIN SCALES - GENERAL: PAINLEVEL_OUTOF10: 6

## 2025-04-30 NOTE — TUMOR BOARD NOTE
TUMOR BOARD DISCUSSION SUMMARY    PRESENTER: Dr. Gunjan Varela    DIAGNOSIS: T cell follicular lymphoma     SUMMARY/PRESENTATION: Sagrario Garber is a 49 y.o. female patient who presents with T cell follicular lymphoma with TFH phenotype angioimmunoblastic CD30+ s/p BV-CHP     History: lupus and rheumatoid arthritis     Previous treatment: brentuximab- CHP,  TPN       Information reviewed: Radiology Review    Radiology:   PET CT 04/24/25  IMPRESSION:  1. Compared to prior PET, given difference in technique, there is  mild interval increase in FDG avidity within previously seen  bilateral inguinal, axillary, right paratracheal, peritoneal and  iliac lymph nodes as described. However FDG uptake is less than  mediastinal blood pool, Deauville score 2. if there is significant  concern for progression, short-term follow-up would be of value.  2. No FDG avid extranodal disease.    RECOMMENDATIONS: Surveillance           Disclaimer     [facility] tumor board recommendations represent the consensus opinion of physicians present at a weekly patient care conference. The treating [facility] physician is not always present, and many of the physicians formulating the recommendation have not personally seen or examined the patient under discussion. It is understood that the treating [facility] physician considers the expertise of the Tumor Board Recommendation in formulating his/her plan for the patient. However, in many situations, based on individualized patient considerations, a different plan is determined by the treating physician to be the optimal medical management.     Scribe Attestation  By signing my name below, Yessi JENKINS Scribe   attest that this documentation has been prepared under the direction and in the presence of MALIGNANT HEME TUMOR BOARD.

## 2025-04-30 NOTE — PROGRESS NOTES
Patient  Sagrario Garber is a 49 y.o. female, presented for   Chief Complaint   Patient presents with    Follow-up   .    Referred by: Ortega Marquez MD  [id number]  [place] [address]     49 yr old  woman with Angioimmunoblastic T-cell lymphoma, CD30 pos, stage IV disease, in partial remission, s/p chemo, BMT (feb 2025), history of anxiety and MDD, prior psychiatric hospitalizations, at least one prior suicide attempt at age 20 via overdose on OTC meds, who is referred to psychiatry for management of her anxiety and depression.     History of presenting Illness:   Reports that she is doing well. Depression and anxiety are better with medications. Still has several psychosocial stressors. Episodes of panic symptoms, she takes lorazepam to help, sxs resolve in 20-30 mins. Sleep and appetite are good. Has been getting involved in more healtheir coping, support group, reading etc.     Review of Systems  Anxiety: None  Depression: None  Delirium: negative  Psychosis: negative  Hallie: negative  Safety Issues: none  Psychiatric ROS Comment: None     Scales:  JUAN MANUEL: No data recorded  PHQ-9: No data recorded                                         Past Psychiatric History:   Previous therapy: yes; Does not recall names or clinics of previous providers  Previous psychiatric treatment and medication trials: yes - Paxil, sertraline, escitalopram, various others  Previous psychiatric hospitalizations: yes - 6-7 hopitalizations for depression and SI  Previous diagnoses: yes - MDD, JUAN MANUEL  Previous suicide attempts: yes - At age 20, overdosed on OTC pills while intoxicated  History of violence: no    Past Medical History  Medical History[1]    Surgical History  Surgical History[2]     Family History:  Family History[3]    Social History:  Social History     Socioeconomic History    Marital status: Single     Spouse name: Not on file    Number of children: Not on file    Years of education:  Not on file    Highest education level: Not on file   Occupational History    Not on file   Tobacco Use    Smoking status: Every Day     Types: Cigarettes    Smokeless tobacco: Not on file   Substance and Sexual Activity    Alcohol use: Not Currently    Drug use: Never    Sexual activity: Not on file   Other Topics Concern    Not on file   Social History Narrative    Not on file     Social Drivers of Health     Financial Resource Strain: High Risk (2/27/2025)    Overall Financial Resource Strain (CARDIA)     Difficulty of Paying Living Expenses: Hard   Food Insecurity: Food Insecurity Present (2/18/2025)    Hunger Vital Sign     Worried About Running Out of Food in the Last Year: Sometimes true     Ran Out of Food in the Last Year: Sometimes true   Transportation Needs: Unmet Transportation Needs (2/27/2025)    PRAPARE - Transportation     Lack of Transportation (Medical): Yes     Lack of Transportation (Non-Medical): Yes   Physical Activity: Not on file   Stress: Not on file   Social Connections: Not on file   Intimate Partner Violence: Not At Risk (2/18/2025)    Humiliation, Afraid, Rape, and Kick questionnaire     Fear of Current or Ex-Partner: No     Emotionally Abused: No     Physically Abused: No     Sexually Abused: No   Housing Stability: High Risk (2/27/2025)    Housing Stability Vital Sign     Unable to Pay for Housing in the Last Year: Yes     Number of Times Moved in the Last Year: 4     Homeless in the Last Year: No         Medication:  Current Outpatient Medications   Medication Instructions    acyclovir (ZOVIRAX) 400 mg, oral, Every 12 hours    atorvastatin (LIPITOR) 40 mg, oral, Nightly    DULoxetine (Cymbalta) 60 mg DR capsule Take 1 capsule (60 mg) by mouth 2 times a day. Do not crush or chew. Do not fill before April 2, 2025.    ergocalciferol (Vitamin D-2) 1250 mcg (50,000 units) capsule Take 1 capsule by mouth once a week for 30 days    HYDROmorphone (DILAUDID) 2 mg, oral, 2 times daily PRN     "hydroxychloroquine (PLAQUENIL) 300 mg, oral, Daily    LORazepam (Ativan) 0.5 mg tablet Take 1 tablet (0.5 mg) by mouth 2 times a day as needed for anxiety. Do not fill before April 2, 2025.    naloxone (NARCAN) 4 mg, nasal, As needed, May repeat every 2-3 minutes if needed, alternating nostrils, until medical assistance becomes available.    ondansetron (ZOFRAN) 8 mg, oral, Every 8 hours PRN    polyethylene glycol (GLYCOLAX, MIRALAX) 17 g, oral, Daily PRN    predniSONE (DELTASONE) 10 mg, oral, Daily    predniSONE (DELTASONE) 10 mg, oral, Daily    prochlorperazine (COMPAZINE) 10 mg, oral, Every 6 hours PRN    QUEtiapine (SEROquel) 100 mg tablet Take 1.5 tablets (150 mg) by mouth once daily at bedtime. Do not fill before April 2, 2025.    sennosides-docusate sodium (Sofiya-Colace) 8.6-50 mg tablet 1 tablet, oral, Nightly PRN    sulfamethoxazole-trimethoprim (Bactrim DS) 800-160 mg tablet 1 tablet, oral, Every Mon/Wed/Fri, Start on T+30        Allergies  .Allergies[4]     Vitals:  Visit Vitals  Smoking Status Every Day        Mental Status Exam  General: Appears stated age, dressed appropriately  Appearance: Fair hygiene and grooming.  Attitude: Pleasant  Behavior: Cooperative  Motor Activity: WNL  Speech: Soft, normal rate, rhythm and volume.  Mood: \"better\"  Affect: Congruent  Thought Process: Linear and goal directed  Thought Content: Denies suicidal or homicidal ideas, intent or plans. No IOR or delusions.  Thought Perception: No perceptual abnormalities.  Cognition: Grossly intact  Insight: Intact  Judgement: Intact        Psychiatric Risk Assessment  Violence Risk Assessment: none  Acute Risk of Harm to Others is Considered: low   Suicide Risk Assessment: , chronic medical illness, chronic pain, current psychiatric illness, life crisis (shame/despair), living alone or lack of social support, prior suicide attempt, and unmarried  Protective Factors against Suicide: adherence to  treatment, fear of social " "disapproval, fear of suicide, hopefulness/future orientation, marriage/partnership, moral objections to suicide, [employer] affiliation/spirituality, social support/connectedness, and strong therapeutic alliance with provider  Acute Risk of Harm to Self is Considered: low    Labs:  No results found for: \"125D3\", \"TSH\", \"CBCDIF\", \"CMPLAS\", \"ADDTOXURINE\"     Diagnosis:  Problem List Items Addressed This Visit          Mental Health    Moderate episode of recurrent major depressive disorder    JUAN MANUEL (generalized anxiety disorder)        Assessment/Plan   Problem List Items Addressed This Visit          Mental Health    Moderate episode of recurrent major depressive disorder    JUAN MANUEL (generalized anxiety disorder)       Patient with history of lifelong MDD and JUAN MANUEL, multiple psychosocial stressors including housing, financial, relationship, lymphoma and medical illness. She is coping well.   Detailed and jessica discussion on risks associated with long term continuous use of lorazepam, especially when taken along with opiate. She verbalized understanding. Discussion the controlled substance provider agreement, she read it, asked pertinent questions and signed it.   - Start hydroxyzine 50mg PO BID PRN anxiety - first line for anxiety  - Continue lorazepam 0.5mg PO qdaily PRN anxiety; advised to take lorazepam STRICTLY ONLY AS NEEDED.  - Quetiapine 100mg PO at bedtime  - Cymbalta 60mg PO BID    RTC 6weeks    Medication Consent  Medication Consent: risks, benefits, side effects reviewed for all ordered meds    Please schedule a follow-up with your PCP for your ongoing medical problems.    Dr. Marquez is in office on Mon- Thu. Phone calls may not be returned until next day I am in office.   For scheduling questions: [phone]  For other questions: [phone]       For [facility], [employer] is a 24/7 hotline that you can call for assistance: [phone].     Please call 9-1-1 or go to the nearest emergency room " if you feel worse or have thoughts of hurting yourself or anyone else, or hearing voices, seeing visions or having new or scary thoughts about people around you.    I spent    30 minutes in the professional and overall care of this patient.    Ortega Marquez MD             [1]   Past Medical History:  Diagnosis Date    Atypical chest pain 01/22/2025    Essential (primary) hypertension 05/24/2017    HTN (hypertension), benign    Essential (primary) hypertension 10/09/2017    HTN (hypertension), benign    History of stem cell transplant (Multi) 03/26/2025    Personal history of nicotine dependence 05/24/2017    History of nicotine dependence   [2]   Past Surgical History:  Procedure Laterality Date    OTHER SURGICAL HISTORY  05/24/2017    Oral Surgery Tooth Extraction [facility]   [3] No family history on file.  [4]   Allergies  Allergen Reactions    Amoxicillin Hives     Patient says gets hives/welts     Atenolol Hives     Patient says gets hives/welts    Prednisone Hives     Specifically Solumedrol  Able to tolerate prednisone   Patient says gets hives/welts    Methylprednisolone Sodium Succ Hives

## 2025-05-01 ENCOUNTER — TUMOR BOARD CONFERENCE (OUTPATIENT)
Dept: HEMATOLOGY/ONCOLOGY | Facility: HOSPITAL | Age: 50
End: 2025-05-01
Payer: COMMERCIAL

## 2025-05-07 ASSESSMENT — ENCOUNTER SYMPTOMS
CARDIOVASCULAR NEGATIVE: 1
DYSURIA: 0
CHILLS: 0
VOMITING: 0
BLOOD IN STOOL: 0
FEVER: 0
ABDOMINAL DISTENTION: 0
NAUSEA: 1
APPETITE CHANGE: 0
DIZZINESS: 0
UNEXPECTED WEIGHT CHANGE: 0
RESPIRATORY NEGATIVE: 1
HEMATOLOGIC/LYMPHATIC NEGATIVE: 1
NUMBNESS: 1
DIAPHORESIS: 0
ARTHRALGIAS: 1
HEMATURIA: 0
CONSTIPATION: 1
LIGHT-HEADEDNESS: 0
ABDOMINAL PAIN: 0
FATIGUE: 1
DIARRHEA: 0

## 2025-05-07 NOTE — PROGRESS NOTES
Patient ID: Sagrario Garber is a 49 y.o. female.    Diagnosis:   Angioimmunoblastic T-cell lymphoma,  CD30 pos,  stage IV disease, bulky tumor, high LDH, excellent partial remission after initial frontline therapy.      Treatment:   Oncology History Overview Note   T cell lymphoma with TFH phenotype angioimmunoblastic CD30+, AK negative  11/10/21 CT imaging showed extensive hilar, mediastinal and bilateral axillary lymphadenopathy  2/14/22 right inguinal node biopsy follicular hyperplasia  4/6/22 right axillary lymph node reactive changes  5/12/24 progressive adenopathy, right inguinal node T cell lymphoma with TFH phenotype, CD30 focally positive  6/12/24 started BV-CHP Pet after cycle 5 interval resoluation of bulky adenopathy in axilla, abdomen, pelvis and groin focal hypermetabolic uptake in right inguinal node  9/25/24 C6D1 of BV-CHP     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  Previous biopsy in 2022 was read as reactive.  In May 2024 she presented with increasing lymph nodes repeat biopsy confirmed T cell lymphoma with TFH phenotype, CD30 positive and Alk negative. Extremely bulky tumor.  High LDH.  Bone marrow minimally involved.  (3-4% abnormal cells by flow).     6/12/2024 -  Chemotherapy    In June 2024 treatment was administered consisting of brentuximab- CHP  Shortly after starting BV CHP patient developed abdominal ascites and was unable to eat and required TPN as well as discharge to nursing facility  PET imaging after cycle5 showed an excellent response has been obtained, but several small lymph nodes with SUV of 3.4 remain.  Completed cycle 6 of brentuximab CHP 9/26/24. Patient is referred by Dr. Iraheta for evaluation for autologous stem cell transplantation     12/5/2024 Transfer In/Out of Practice    Patient initially seen by   "[facility] 12/5/24 for first outpatient consultation for high risk TFH phenotype angioimmunoblastic T cell lymphoma. Remains in SNF to get stronger, but has pretty much recovered and remains there due to social issues. She is pretty much independent with her ADLS.      12/23/2024 -  Disease Response Assessment    Restaging evaluation 12/23/24 includes a PET scan which shows unchanged minimally PET avid right inguinal node and several non pet avid but left axillary and pretracheal nodes.  BM biopsy 12/19/24 shows low level 1.5% involvement by flow cytometry only.     2/18/2025 - 2/24/2025 Bone Marrow Transplant    Excellent partial remission prior to Auto Transplant prepped with BEAM (T0=2/24/25), counts recovered on 3/10.     Hospital course:  - Ortho positive, not symptomatic, now resolved; encouraging non caffeinated fluid intake. Resolved after IVF boluses and increased oral fluid intake.   - chemotherapy induced n/v/d, discharged on Zofran & Compazine PRN, mostly resolved  - mucositis with throat pain, improved, eating well.        4/24/2025 Imaging    PET (4/24/25): IMPRESSION:  1. Compared to prior PET, given difference in technique, there is mild interval increase in FDG avidity within previously seen bilateral inguinal, axillary, right paratracheal, peritoneal and iliac lymph nodes as described. However FDG uptake is less than mediastinal blood pool, Deauville score 2. if there is significant concern for progression, short-term follow-up would be of value.  2. No FDG avid extranodal disease.       Response:     Past Medical History:  -Long history of anxiety and depression She reports being on \"numerous medications\" to include Seroquel, Geodon, Depakote, Prozac, Lexapro, Paxil all without effect.      Patient hospitalized for mental illness  depression and anxiety 11/12/24 and was discharged 11/26/2024 to skilled nursing.     -HTN    -Patient has history of lupus for past 21/2 years and rheumatoid arthritis: " currently on placquenil sees Dr. Dobbs for rheumatologist presenting with rash, pain in body, diffuse swellin joint pain. Was on Saphnelo, interferon alpha antagonist July 2023     Past Medical History:   Diagnosis Date    Atypical chest pain 01/22/2025    Essential (primary) hypertension 05/24/2017    HTN (hypertension), benign    Essential (primary) hypertension 10/09/2017    HTN (hypertension), benign    History of stem cell transplant (Multi) 03/26/2025    Personal history of nicotine dependence 05/24/2017    History of nicotine dependence     Surgical History:     Past Surgical History:   Procedure Laterality Date    OTHER SURGICAL HISTORY  05/24/2017    Oral Surgery Tooth Extraction [facility]      Family History:  Rheumatoid arthritis mother, skin cancer father and sister, 2 daughters, 1 son with severe autism is in a home. Has an older sister estranged.     Social History:   is an alcoholic, and has been sober for 4 months, he is in a sober house. Lost home has lost her income and most recently was staying with her daughter and can't return there. Smokes 8 cigarettes, no ETOH, finished 10th grade level,worked full time.  at [employer] for 5 years lived at the [employer].  No .  for 6 years, previsoulsy relationship of 19 years with father of her children.        Social History     Tobacco Use    Smoking status: Every Day     Types: Cigarettes   Substance Use Topics    Alcohol use: Not Currently    Drug use: Never      -------------------------------------------------------------------------------------------------------  Subjective       HPI    49-year-old woman with reported history of lupus and rheumatoid arthritis for several years, anxiety and depression who has had lymphadenopathy for several years.  Previous biopsy in 2022 was read as reactive.  In May 2024 she presented with increasing lymph nodes repeat biopsy confirmed T cell lymphoma with TFH phenotype, CD30 positive and  Alk negative. Extremely bulky tumor.  High LDH.  Bone marrow minimally involved.  (3-4% abnormal cells by flow).    June 2024 treatment was administered consisting of brentuximab- CHP  Shortly after starting BV CHP patient developed abdominal ascites and was unable to eat and required TPN as well as discharge to nursing facility  PET imaging after cycle5 showed an excellent response has been obtained, but several small lymph nodes with SUV of 3.4 remain.  Completed cycle 6 of brentuximab CHP 9/26/24. Patient is referred by Dr. Iraheta for evaluation for autologous stem cell transplantation    Due to high risk nature of disease opted to proceed to autograft with BEAM preparation after restagin PET scan showed only minimal PET avid nodes in right inguinal area. BM biopsy 12/19/24 shows low level 1.5% involvement by flow cytometry only.     Sagrario presents to the clinic today (5/8/25) unaccompanied for follow up evaluation and count checks.  Today she is T+73 from her autologous STC with BEAM with (T0 2/24/25).  Reports she is doing okay.  Energy level remains lower, depends on the day.  Appetite is good, has been eating more.  Has gained some weight.  Intermittent nausea a few times a week.  No vomiting.  Takes compazine as needed.  Intermittent constipation, takes stool softener.  Taking dilaudid about 1 time per day for lupus pain back and joint pains.  Intermittent numbness to bilateral feet (but is worse to left foot), staying the same.  Has dry skin.  Using oatmeal Aveno lotion as needed.  Mild redness to face from lupus.  Taking 5-10 mg of prednisone daily, rheumatologist is trying to cut back on dose.    Continues to feel lymph nodes to bilateral groin, bigger right than left.  Lymph nodes are non-tender.  Reports she is no longer having pain to left ear.  Continues to reside at [facility] and they provide 3 meals a day.  Patient trying to find housing as June 15th is the deadline for staying at [facility]  "and [facility], plans to fill out paperwork tomorrow.  Getting rides from provider ride service.  Declines needing to see social work today.  Her  recently had a stroke, he is doing better and is going to PT, now 8 months sober.      Review of Systems   Constitutional:  Positive for fatigue. Negative for appetite change, chills, diaphoresis, fever and unexpected weight change.   HENT:   Negative for lump/mass.    Respiratory: Negative.     Cardiovascular: Negative.    Gastrointestinal:  Positive for constipation and nausea. Negative for abdominal distention, abdominal pain, blood in stool, diarrhea and vomiting.   Genitourinary:  Negative for dysuria and hematuria.    Musculoskeletal:  Positive for arthralgias. Negative for gait problem.   Skin:  Positive for rash.   Neurological:  Positive for numbness. Negative for dizziness, gait problem and light-headedness.   Hematological: Negative.    All other systems reviewed and are negative.     -------------------------------------------------------------------------------------------------------  Objective   BSA: 1.9 meters squared  /69   Pulse 103   Temp 36.5 °C (97.7 °F)   Resp 17   Ht 1.695 m (5' 6.73\")   Wt 76.9 kg (169 lb 8.5 oz)   SpO2 96%   BMI 26.77 kg/m²     Physical Exam  Vitals reviewed.   Constitutional:       Appearance: Normal appearance. She is well-developed.   HENT:      Head: Normocephalic and atraumatic.      Nose: Nose normal.      Mouth/Throat:      Dentition: No gum lesions.   Eyes:      Extraocular Movements: Extraocular movements intact.      Conjunctiva/sclera: Conjunctivae normal.      Pupils: Pupils are equal, round, and reactive to light.   Cardiovascular:      Rate and Rhythm: Normal rate and regular rhythm.      Pulses: Normal pulses.      Heart sounds: Normal heart sounds.   Pulmonary:      Breath sounds: Normal breath sounds and air entry.   Abdominal:      General: Abdomen is flat. Bowel sounds are normal.      " Palpations: Abdomen is soft.   Musculoskeletal:         General: No swelling. Normal range of motion.   Lymphadenopathy:      Lower Body: Right inguinal adenopathy present. Left inguinal adenopathy present.      Comments: Right inguinal node remains enlarged about 3x 3 cm, unchanged.   Skin:     General: Skin is warm and dry.             Comments: Dry skin to bilateral shins, improved since prior visit.    Port to right chest, site with no redness, tenderness, swelling, or drainage.   Neurological:      General: No focal deficit present.      Mental Status: She is alert and oriented to person, place, and time.   Psychiatric:         Mood and Affect: Mood normal.         Behavior: Behavior normal. Behavior is cooperative.         Thought Content: Thought content normal.      Comments: Anxious, normal cognition     Performance Status:  Symptomatic; fully ambulatory  -------------------------------------------------------------------------------------------------------  Assessment/Plan      Angioimmunoblastic T-cell lymphoma,  CD30 pos,  stage IV disease, bulky tumor,   - High LDH, excellent partial remission after initial frontline therapy.   - Patient completed BV CHP September 2024 and PET scan after cycle 5 showed excellent response with some minimal residual uptake in right inguinal area.  Inguinal nodes obvious on physical exam ,,also had body rash which could be consistent with her lupus or antioimmunoblastic lymphoma.      -Restaging evaluation 12/23/24 includes a PET scan which shows unchanged minimally PET avid right inguinal node   and several non pet avid but left axillary and pretracheal nodes.  -BM biopsy 12/19/24 shows low level 1.5% involvement by flow cytometry only.  -Patient has a tragic home situation and is currently homeless,  she is applying for SSI and currently lives in a nursing home.  Patient case discussed at BMT tumor board and consensus was that this should not preclude her from a  potentially curative autologous stem cell transplant.     Organ function testing:    PFTs 1/22/25 FEV1 80%,%,FEV1/FVC 79%, DLCO 104%  ECHO LVEF64%CMI 4: for age > 40, anxiety disorder, and rheumatologic condition    2/17/25 Patient collected 5.68 x 106 CD 34 cells/kg in one collection on 2/12.     History of autologous stem cell transplant  4/10/25 T+46 s/p auto with BEAM prep (T=0 2/24/2025). Counts are recovering. Will continue close monitoring twice weekly next week then decrease to once weekly.   - Post Transplant onco-echo and cardiology follow up requested.     PE residual unchanged right inguinal node, plan day 60 PET imaging. If concern for residual disease plan brentuximab maintenance.    PET (4/24/25): IMPRESSION:  1. Compared to prior PET, given difference in technique, there is mild interval increase in FDG avidity within previously seen bilateral inguinal, axillary, right paratracheal, peritoneal and iliac lymph nodes as described. However FDG uptake is less than mediastinal blood pool, Deauville score 2. if there is significant concern for progression, short-term follow-up would be of value.  2. No FDG avid extranodal disease.    5/8/25:  CBC results from today are stable.  Continue to palpate enlarged right inguinal LN, non-tender.  Reviewed PET scan results from 4/24/25 with Sagrario-see above.  Plan to obtain repeat PET scan in about 3 months.  Follow up visit 6/2/25.    ID Prophylaxis:  Continue acyclovir  Continue bactrim MWF    Vaccines:  Plan for influenza and covid vaccines around T+120  Post transplant vaccines to start around 6 months-Aug 2025    History of lupus  Rheumatologist Dr. Cathy Dobbs.  [id number] [facility]  [phone] currently only on placquenil.    Had rheumatologist appt 4/15/25, trying to decrease prednisone.  Taking either 5 or 10 mg daily depending on symptoms.    Continues chronic dilaudid use about once per day for lupus pain.       Cardiology:  Following with Dr. Albarran for monitoring following doxorubicin therapy.  ECHO (3/24/25): LVEF 57%. Plan for follow up again September 2025 with ECHO prior, then yearly for next 5 years.      Psychosocial:  Sagrario is currently residing at [facility] (group home).  Receiving assistance with transportation with provider ride services.  Sagrario reports her  is currently staying with a friend and is recovering after recent stroke.  Her  is completing PT now.  Follows with Dr. Marquez at .      Social Work:  5/8/25:  Continues to reside at [facility], but states she is looking for new housing as she can only stay until June 15th.  Using ride service for transportation to appointments.  Pt declines wanting to speak to social work today.      Central Line:  Port site to right chest.  Port site with no redness, swelling, drainage, or tenderness.      RTC:  6/2/25:  Central line appt.  Follow up visit with provider.    Plan for PET scan around July 2025.    -------------------------------------------------------------------------------------------------------  SAMUEL Pickard

## 2025-05-08 ENCOUNTER — LAB (OUTPATIENT)
Dept: HEMATOLOGY/ONCOLOGY | Facility: HOSPITAL | Age: 50
End: 2025-05-08
Payer: COMMERCIAL

## 2025-05-08 ENCOUNTER — OFFICE VISIT (OUTPATIENT)
Dept: HEMATOLOGY/ONCOLOGY | Facility: HOSPITAL | Age: 50
End: 2025-05-08
Payer: COMMERCIAL

## 2025-05-08 VITALS
BODY MASS INDEX: 26.61 KG/M2 | RESPIRATION RATE: 17 BRPM | TEMPERATURE: 97.7 F | WEIGHT: 169.53 LBS | HEIGHT: 67 IN | HEART RATE: 103 BPM | DIASTOLIC BLOOD PRESSURE: 69 MMHG | OXYGEN SATURATION: 96 % | SYSTOLIC BLOOD PRESSURE: 122 MMHG

## 2025-05-08 DIAGNOSIS — C86.50 ANGIOIMMUNOBLASTIC T-CELL LYMPHOMA: Primary | ICD-10-CM

## 2025-05-08 DIAGNOSIS — Z94.84 HISTORY OF STEM CELL TRANSPLANT (MULTI): ICD-10-CM

## 2025-05-08 DIAGNOSIS — Z76.82 STEM CELL TRANSPLANT CANDIDATE: ICD-10-CM

## 2025-05-08 DIAGNOSIS — C84.48 PERIPHERAL T CELL LYMPHOMA OF LYMPH NODES OF MULTIPLE SITES (MULTI): ICD-10-CM

## 2025-05-08 DIAGNOSIS — D84.9 IMMUNOCOMPROMISED: ICD-10-CM

## 2025-05-08 DIAGNOSIS — M32.9 SYSTEMIC LUPUS ERYTHEMATOSUS, UNSPECIFIED SLE TYPE, UNSPECIFIED ORGAN INVOLVEMENT STATUS (MULTI): ICD-10-CM

## 2025-05-08 DIAGNOSIS — Z78.9 HISTORY OF HOMELESS: ICD-10-CM

## 2025-05-08 DIAGNOSIS — Z95.828 PORT-A-CATH IN PLACE: ICD-10-CM

## 2025-05-08 LAB
ALBUMIN SERPL BCP-MCNC: 4.2 G/DL (ref 3.4–5)
ALP SERPL-CCNC: 77 U/L (ref 33–110)
ALT SERPL W P-5'-P-CCNC: 17 U/L (ref 7–45)
ANION GAP SERPL CALC-SCNC: 12 MMOL/L (ref 10–20)
AST SERPL W P-5'-P-CCNC: 14 U/L (ref 9–39)
BASOPHILS # BLD AUTO: 0.03 X10*3/UL (ref 0–0.1)
BASOPHILS NFR BLD AUTO: 0.4 %
BILIRUB SERPL-MCNC: 0.3 MG/DL (ref 0–1.2)
BUN SERPL-MCNC: 11 MG/DL (ref 6–23)
CALCIUM SERPL-MCNC: 9.1 MG/DL (ref 8.6–10.3)
CHLORIDE SERPL-SCNC: 103 MMOL/L (ref 98–107)
CO2 SERPL-SCNC: 25 MMOL/L (ref 21–32)
CREAT SERPL-MCNC: 0.57 MG/DL (ref 0.5–1.05)
EGFRCR SERPLBLD CKD-EPI 2021: >90 ML/MIN/1.73M*2
EOSINOPHIL # BLD AUTO: 0.06 X10*3/UL (ref 0–0.7)
EOSINOPHIL NFR BLD AUTO: 0.8 %
ERYTHROCYTE [DISTWIDTH] IN BLOOD BY AUTOMATED COUNT: 13.6 % (ref 11.5–14.5)
GLUCOSE SERPL-MCNC: 126 MG/DL (ref 74–99)
HCT VFR BLD AUTO: 38 % (ref 36–46)
HGB BLD-MCNC: 12.3 G/DL (ref 12–16)
IMM GRANULOCYTES # BLD AUTO: 0.03 X10*3/UL (ref 0–0.7)
IMM GRANULOCYTES NFR BLD AUTO: 0.4 % (ref 0–0.9)
LYMPHOCYTES # BLD AUTO: 0.54 X10*3/UL (ref 1.2–4.8)
LYMPHOCYTES NFR BLD AUTO: 7.6 %
MAGNESIUM SERPL-MCNC: 1.9 MG/DL (ref 1.6–2.4)
MCH RBC QN AUTO: 34.3 PG (ref 26–34)
MCHC RBC AUTO-ENTMCNC: 32.4 G/DL (ref 32–36)
MCV RBC AUTO: 106 FL (ref 80–100)
MONOCYTES # BLD AUTO: 0.32 X10*3/UL (ref 0.1–1)
MONOCYTES NFR BLD AUTO: 4.5 %
NEUTROPHILS # BLD AUTO: 6.16 X10*3/UL (ref 1.2–7.7)
NEUTROPHILS NFR BLD AUTO: 86.3 %
NRBC BLD-RTO: 0 /100 WBCS (ref 0–0)
PLATELET # BLD AUTO: 181 X10*3/UL (ref 150–450)
POTASSIUM SERPL-SCNC: 4.2 MMOL/L (ref 3.5–5.3)
PROT SERPL-MCNC: 7.8 G/DL (ref 6.4–8.2)
RBC # BLD AUTO: 3.59 X10*6/UL (ref 4–5.2)
SODIUM SERPL-SCNC: 136 MMOL/L (ref 136–145)
URATE SERPL-MCNC: 3.2 MG/DL (ref 2.3–6.7)
WBC # BLD AUTO: 7.1 X10*3/UL (ref 4.4–11.3)

## 2025-05-08 PROCEDURE — 84550 ASSAY OF BLOOD/URIC ACID: CPT

## 2025-05-08 PROCEDURE — 80053 COMPREHEN METABOLIC PANEL: CPT

## 2025-05-08 PROCEDURE — 3008F BODY MASS INDEX DOCD: CPT

## 2025-05-08 PROCEDURE — 83735 ASSAY OF MAGNESIUM: CPT

## 2025-05-08 PROCEDURE — 2500000004 HC RX 250 GENERAL PHARMACY W/ HCPCS (ALT 636 FOR OP/ED): Mod: JZ,SE | Performed by: INTERNAL MEDICINE

## 2025-05-08 PROCEDURE — 36591 DRAW BLOOD OFF VENOUS DEVICE: CPT

## 2025-05-08 PROCEDURE — 99214 OFFICE O/P EST MOD 30 MIN: CPT

## 2025-05-08 PROCEDURE — 85025 COMPLETE CBC W/AUTO DIFF WBC: CPT

## 2025-05-08 RX ORDER — HEPARIN SODIUM,PORCINE/PF 10 UNIT/ML
50 SYRINGE (ML) INTRAVENOUS AS NEEDED
OUTPATIENT
Start: 2025-05-08

## 2025-05-08 RX ORDER — HEPARIN 100 UNIT/ML
500 SYRINGE INTRAVENOUS AS NEEDED
OUTPATIENT
Start: 2025-05-08

## 2025-05-08 RX ORDER — HEPARIN SODIUM 1000 [USP'U]/ML
2000 INJECTION, SOLUTION INTRAVENOUS; SUBCUTANEOUS AS NEEDED
OUTPATIENT
Start: 2025-05-08

## 2025-05-08 RX ORDER — HEPARIN 100 UNIT/ML
500 SYRINGE INTRAVENOUS AS NEEDED
Status: DISCONTINUED | OUTPATIENT
Start: 2025-05-08 | End: 2025-05-08 | Stop reason: HOSPADM

## 2025-05-08 RX ADMIN — HEPARIN 500 UNITS: 100 SYRINGE at 12:41

## 2025-05-08 ASSESSMENT — PAIN SCALES - GENERAL: PAINLEVEL_OUTOF10: 0-NO PAIN

## 2025-05-09 PROBLEM — H92.02 LEFT EAR PAIN: Status: RESOLVED | Noted: 2025-04-25 | Resolved: 2025-05-09

## 2025-05-16 DIAGNOSIS — C86.50 ANGIOIMMUNOBLASTIC T-CELL LYMPHOMA: ICD-10-CM

## 2025-05-16 PROCEDURE — RXMED WILLOW AMBULATORY MEDICATION CHARGE

## 2025-05-16 RX ORDER — ATORVASTATIN CALCIUM 40 MG/1
40 TABLET, FILM COATED ORAL NIGHTLY
Qty: 30 TABLET | Refills: 2 | Status: SHIPPED | OUTPATIENT
Start: 2025-05-16 | End: 2025-09-13

## 2025-05-16 RX ORDER — AMOXICILLIN 250 MG
1 CAPSULE ORAL NIGHTLY PRN
Qty: 30 TABLET | Refills: 1 | Status: SHIPPED | OUTPATIENT
Start: 2025-05-16

## 2025-05-19 ENCOUNTER — PHARMACY VISIT (OUTPATIENT)
Dept: PHARMACY | Facility: CLINIC | Age: 50
End: 2025-05-19
Payer: MEDICAID

## 2025-05-30 ENCOUNTER — TELEPHONE (OUTPATIENT)
Dept: BEHAVIORAL HEALTH | Facility: CLINIC | Age: 50
End: 2025-05-30
Payer: COMMERCIAL

## 2025-05-30 DIAGNOSIS — C84.48 PERIPHERAL T CELL LYMPHOMA OF LYMPH NODES OF MULTIPLE SITES (MULTI): ICD-10-CM

## 2025-05-30 RX ORDER — QUETIAPINE FUMARATE 100 MG/1
150 TABLET, FILM COATED ORAL NIGHTLY
Qty: 45 TABLET | Refills: 0 | Status: CANCELLED | OUTPATIENT
Start: 2025-05-30 | End: 2025-06-29

## 2025-06-01 ASSESSMENT — ENCOUNTER SYMPTOMS
DIARRHEA: 0
DIZZINESS: 0
CARDIOVASCULAR NEGATIVE: 1
ABDOMINAL DISTENTION: 0
HEMATOLOGIC/LYMPHATIC NEGATIVE: 1
VOMITING: 0
LIGHT-HEADEDNESS: 0
APPETITE CHANGE: 0
NAUSEA: 1
DIAPHORESIS: 0
CHILLS: 0
CONSTIPATION: 1
ABDOMINAL PAIN: 0
BLOOD IN STOOL: 0
RESPIRATORY NEGATIVE: 1
FEVER: 0
HEMATURIA: 0
ARTHRALGIAS: 1
NUMBNESS: 1
FATIGUE: 1
DYSURIA: 0
UNEXPECTED WEIGHT CHANGE: 0

## 2025-06-02 ENCOUNTER — PHARMACY VISIT (OUTPATIENT)
Dept: PHARMACY | Facility: CLINIC | Age: 50
End: 2025-06-02
Payer: MEDICAID

## 2025-06-02 ENCOUNTER — OFFICE VISIT (OUTPATIENT)
Dept: HEMATOLOGY/ONCOLOGY | Facility: HOSPITAL | Age: 50
End: 2025-06-02
Payer: COMMERCIAL

## 2025-06-02 ENCOUNTER — SOCIAL WORK (OUTPATIENT)
Dept: HEMATOLOGY/ONCOLOGY | Facility: HOSPITAL | Age: 50
End: 2025-06-02

## 2025-06-02 ENCOUNTER — LAB (OUTPATIENT)
Dept: HEMATOLOGY/ONCOLOGY | Facility: HOSPITAL | Age: 50
End: 2025-06-02
Payer: COMMERCIAL

## 2025-06-02 VITALS
RESPIRATION RATE: 16 BRPM | HEART RATE: 103 BPM | TEMPERATURE: 98.1 F | BODY MASS INDEX: 25.93 KG/M2 | WEIGHT: 164.24 LBS | SYSTOLIC BLOOD PRESSURE: 132 MMHG | OXYGEN SATURATION: 95 % | DIASTOLIC BLOOD PRESSURE: 71 MMHG

## 2025-06-02 DIAGNOSIS — C86.50 ANGIOIMMUNOBLASTIC T-CELL LYMPHOMA: ICD-10-CM

## 2025-06-02 DIAGNOSIS — C84.48 PERIPHERAL T CELL LYMPHOMA OF LYMPH NODES OF MULTIPLE SITES (MULTI): ICD-10-CM

## 2025-06-02 DIAGNOSIS — Z76.82 STEM CELL TRANSPLANT CANDIDATE: ICD-10-CM

## 2025-06-02 DIAGNOSIS — K21.00 GASTROESOPHAGEAL REFLUX DISEASE WITH ESOPHAGITIS WITHOUT HEMORRHAGE: Primary | ICD-10-CM

## 2025-06-02 LAB
25(OH)D3 SERPL-MCNC: 36 NG/ML (ref 30–100)
ALBUMIN SERPL BCP-MCNC: 4.4 G/DL (ref 3.4–5)
ALP SERPL-CCNC: 63 U/L (ref 33–110)
ALT SERPL W P-5'-P-CCNC: 14 U/L (ref 7–45)
ANION GAP SERPL CALC-SCNC: 14 MMOL/L (ref 10–20)
AST SERPL W P-5'-P-CCNC: 12 U/L (ref 9–39)
BASOPHILS # BLD AUTO: 0.03 X10*3/UL (ref 0–0.1)
BASOPHILS NFR BLD AUTO: 0.4 %
BILIRUB SERPL-MCNC: 0.4 MG/DL (ref 0–1.2)
BUN SERPL-MCNC: 12 MG/DL (ref 6–23)
CALCIUM SERPL-MCNC: 9.6 MG/DL (ref 8.6–10.3)
CHLORIDE SERPL-SCNC: 102 MMOL/L (ref 98–107)
CO2 SERPL-SCNC: 25 MMOL/L (ref 21–32)
CREAT SERPL-MCNC: 0.61 MG/DL (ref 0.5–1.05)
EGFRCR SERPLBLD CKD-EPI 2021: >90 ML/MIN/1.73M*2
EOSINOPHIL # BLD AUTO: 0.05 X10*3/UL (ref 0–0.7)
EOSINOPHIL NFR BLD AUTO: 0.6 %
ERYTHROCYTE [DISTWIDTH] IN BLOOD BY AUTOMATED COUNT: 12 % (ref 11.5–14.5)
GLUCOSE SERPL-MCNC: 121 MG/DL (ref 74–99)
HCT VFR BLD AUTO: 39.9 % (ref 36–46)
HGB BLD-MCNC: 13.3 G/DL (ref 12–16)
IGG SERPL-MCNC: 1560 MG/DL (ref 700–1600)
IMM GRANULOCYTES # BLD AUTO: 0.03 X10*3/UL (ref 0–0.7)
IMM GRANULOCYTES NFR BLD AUTO: 0.4 % (ref 0–0.9)
LDH SERPL L TO P-CCNC: 159 U/L (ref 84–246)
LYMPHOCYTES # BLD AUTO: 0.5 X10*3/UL (ref 1.2–4.8)
LYMPHOCYTES NFR BLD AUTO: 5.9 %
MAGNESIUM SERPL-MCNC: 1.84 MG/DL (ref 1.6–2.4)
MCH RBC QN AUTO: 33.9 PG (ref 26–34)
MCHC RBC AUTO-ENTMCNC: 33.3 G/DL (ref 32–36)
MCV RBC AUTO: 102 FL (ref 80–100)
MONOCYTES # BLD AUTO: 0.47 X10*3/UL (ref 0.1–1)
MONOCYTES NFR BLD AUTO: 5.5 %
NEUTROPHILS # BLD AUTO: 7.42 X10*3/UL (ref 1.2–7.7)
NEUTROPHILS NFR BLD AUTO: 87.2 %
NRBC BLD-RTO: 0 /100 WBCS (ref 0–0)
PLATELET # BLD AUTO: 190 X10*3/UL (ref 150–450)
POTASSIUM SERPL-SCNC: 3.8 MMOL/L (ref 3.5–5.3)
PROT SERPL-MCNC: 7.8 G/DL (ref 6.4–8.2)
RBC # BLD AUTO: 3.92 X10*6/UL (ref 4–5.2)
SODIUM SERPL-SCNC: 137 MMOL/L (ref 136–145)
URATE SERPL-MCNC: 3.9 MG/DL (ref 2.3–6.7)
WBC # BLD AUTO: 8.5 X10*3/UL (ref 4.4–11.3)

## 2025-06-02 PROCEDURE — 82784 ASSAY IGA/IGD/IGG/IGM EACH: CPT

## 2025-06-02 PROCEDURE — 84550 ASSAY OF BLOOD/URIC ACID: CPT

## 2025-06-02 PROCEDURE — 83735 ASSAY OF MAGNESIUM: CPT

## 2025-06-02 PROCEDURE — 99214 OFFICE O/P EST MOD 30 MIN: CPT | Performed by: INTERNAL MEDICINE

## 2025-06-02 PROCEDURE — 82306 VITAMIN D 25 HYDROXY: CPT

## 2025-06-02 PROCEDURE — RXMED WILLOW AMBULATORY MEDICATION CHARGE

## 2025-06-02 PROCEDURE — 36591 DRAW BLOOD OFF VENOUS DEVICE: CPT

## 2025-06-02 PROCEDURE — 80053 COMPREHEN METABOLIC PANEL: CPT

## 2025-06-02 PROCEDURE — 2500000004 HC RX 250 GENERAL PHARMACY W/ HCPCS (ALT 636 FOR OP/ED): Mod: SE | Performed by: INTERNAL MEDICINE

## 2025-06-02 PROCEDURE — 4004F PT TOBACCO SCREEN RCVD TLK: CPT | Performed by: INTERNAL MEDICINE

## 2025-06-02 PROCEDURE — 83615 LACTATE (LD) (LDH) ENZYME: CPT

## 2025-06-02 PROCEDURE — 85025 COMPLETE CBC W/AUTO DIFF WBC: CPT

## 2025-06-02 RX ORDER — QUETIAPINE FUMARATE 100 MG/1
150 TABLET, FILM COATED ORAL NIGHTLY
Qty: 45 TABLET | Refills: 1 | Status: SHIPPED | OUTPATIENT
Start: 2025-06-02 | End: 2025-08-01

## 2025-06-02 RX ORDER — HEPARIN 100 UNIT/ML
500 SYRINGE INTRAVENOUS AS NEEDED
Status: ACTIVE | OUTPATIENT
Start: 2025-06-02

## 2025-06-02 RX ORDER — PANTOPRAZOLE SODIUM 40 MG/1
40 TABLET, DELAYED RELEASE ORAL DAILY
Qty: 30 TABLET | Refills: 5 | Status: SHIPPED | OUTPATIENT
Start: 2025-06-02 | End: 2025-11-29

## 2025-06-02 RX ORDER — HYDROMORPHONE HYDROCHLORIDE 2 MG/1
2 TABLET ORAL 2 TIMES DAILY PRN
Qty: 20 TABLET | Refills: 0 | Status: SHIPPED | OUTPATIENT
Start: 2025-06-02

## 2025-06-02 RX ORDER — HEPARIN 100 UNIT/ML
500 SYRINGE INTRAVENOUS AS NEEDED
OUTPATIENT
Start: 2025-06-02

## 2025-06-02 RX ORDER — HEPARIN SODIUM 1000 [USP'U]/ML
2000 INJECTION, SOLUTION INTRAVENOUS; SUBCUTANEOUS AS NEEDED
OUTPATIENT
Start: 2025-06-02

## 2025-06-02 RX ORDER — QUETIAPINE FUMARATE 100 MG/1
150 TABLET, FILM COATED ORAL NIGHTLY
Qty: 45 TABLET | Refills: 1 | Status: SHIPPED | OUTPATIENT
Start: 2025-06-02 | End: 2025-06-02 | Stop reason: SDUPTHER

## 2025-06-02 RX ORDER — PROCHLORPERAZINE MALEATE 10 MG
10 TABLET ORAL EVERY 6 HOURS PRN
Qty: 30 TABLET | Refills: 0 | Status: SHIPPED | OUTPATIENT
Start: 2025-06-02

## 2025-06-02 RX ORDER — HEPARIN SODIUM,PORCINE/PF 10 UNIT/ML
50 SYRINGE (ML) INTRAVENOUS AS NEEDED
OUTPATIENT
Start: 2025-06-02

## 2025-06-02 RX ADMIN — HEPARIN 500 UNITS: 100 SYRINGE at 12:36

## 2025-06-02 ASSESSMENT — PAIN SCALES - GENERAL: PAINLEVEL_OUTOF10: 6

## 2025-06-02 NOTE — PROGRESS NOTES
Patient ID: Sagrario Garber is a 49 y.o. female.    Diagnosis:   Angioimmunoblastic T-cell lymphoma,  CD30 pos,  stage IV disease, bulky tumor, high LDH, excellent partial remission after initial frontline therapy.      Treatment:   Oncology History Overview Note   T cell lymphoma with TFH phenotype angioimmunoblastic CD30+, AK negative  11/10/21 CT imaging showed extensive hilar, mediastinal and bilateral axillary lymphadenopathy  2/14/22 right inguinal node biopsy follicular hyperplasia  4/6/22 right axillary lymph node reactive changes  5/12/24 progressive adenopathy, right inguinal node T cell lymphoma with TFH phenotype, CD30 focally positive  6/12/24 started BV-CHP Pet after cycle 5 interval resoluation of bulky adenopathy in axilla, abdomen, pelvis and groin focal hypermetabolic uptake in right inguinal node  9/25/24 C6D1 of BV-CHP     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  Previous biopsy in 2022 was read as reactive.  In May 2024 she presented with increasing lymph nodes repeat biopsy confirmed T cell lymphoma with TFH phenotype, CD30 positive and Alk negative. Extremely bulky tumor.  High LDH.  Bone marrow minimally involved.  (3-4% abnormal cells by flow).     6/12/2024 -  Chemotherapy    In June 2024 treatment was administered consisting of brentuximab- CHP  Shortly after starting BV CHP patient developed abdominal ascites and was unable to eat and required TPN as well as discharge to nursing facility  PET imaging after cycle5 showed an excellent response has been obtained, but several small lymph nodes with SUV of 3.4 remain.  Completed cycle 6 of brentuximab CHP 9/26/24. Patient is referred by Dr. Iraheta for evaluation for autologous stem cell transplantation     12/5/2024 Transfer In/Out of Practice    Patient initially seen by   "[facility] 12/5/24 for first outpatient consultation for high risk TFH phenotype angioimmunoblastic T cell lymphoma. Remains in SNF to get stronger, but has pretty much recovered and remains there due to social issues. She is pretty much independent with her ADLS.      12/23/2024 -  Disease Response Assessment    Restaging evaluation 12/23/24 includes a PET scan which shows unchanged minimally PET avid right inguinal node and several non pet avid but left axillary and pretracheal nodes.  BM biopsy 12/19/24 shows low level 1.5% involvement by flow cytometry only.     2/18/2025 - 2/24/2025 Bone Marrow Transplant    Excellent partial remission prior to Auto Transplant prepped with BEAM (T0=2/24/25), counts recovered on 3/10.     Hospital course:  - Ortho positive, not symptomatic, now resolved; encouraging non caffeinated fluid intake. Resolved after IVF boluses and increased oral fluid intake.   - chemotherapy induced n/v/d, discharged on Zofran & Compazine PRN, mostly resolved  - mucositis with throat pain, improved, eating well.        4/24/2025 Imaging    PET (4/24/25): IMPRESSION:  1. Compared to prior PET, given difference in technique, there is mild interval increase in FDG avidity within previously seen bilateral inguinal, axillary, right paratracheal, peritoneal and iliac lymph nodes as described. However FDG uptake is less than mediastinal blood pool, Deauville score 2. if there is significant concern for progression, short-term follow-up would be of value.  2. No FDG avid extranodal disease.       Response:     Past Medical History:  -Long history of anxiety and depression She reports being on \"numerous medications\" to include Seroquel, Geodon, Depakote, Prozac, Lexapro, Paxil all without effect.      Patient hospitalized for mental illness  depression and anxiety 11/12/24 and was discharged 11/26/2024 to skilled nursing.     -HTN    -Patient has history of lupus for past 21/2 years and rheumatoid arthritis: " currently on placquenil sees Dr. Dobbs for rheumatologist presenting with rash, pain in body, diffuse swellin joint pain. Was on Saphnelo, interferon alpha antagonist July 2023     Past Medical History:   Diagnosis Date    Atypical chest pain 01/22/2025    Essential (primary) hypertension 05/24/2017    HTN (hypertension), benign    Essential (primary) hypertension 10/09/2017    HTN (hypertension), benign    History of stem cell transplant (Multi) 03/26/2025    Personal history of nicotine dependence 05/24/2017    History of nicotine dependence     Surgical History:     Past Surgical History:   Procedure Laterality Date    OTHER SURGICAL HISTORY  05/24/2017    Oral Surgery Tooth Extraction [place] [facility]      Family History:  Rheumatoid arthritis mother, skin cancer father and sister, 2 daughters, 1 son with severe autism is in a home. Has an older sister estranged.     Social History:   is an alcoholic, and has been sober for 4 months, he is in a sober house. Lost home has lost her income and most recently was staying with her daughter and can't return there. Smokes 8 cigarettes, no ETOH, finished 10th grade level,worked full time.  at [employer] for 5 years lived at the [employer].  No .  for 6 years, previsoulsy relationship of 19 years with father of her children.        Social History     Tobacco Use    Smoking status: Every Day     Types: Cigarettes   Substance Use Topics    Alcohol use: Not Currently    Drug use: Never      -------------------------------------------------------------------------------------------------------  Subjective       HPI    49-year-old woman with reported history of lupus and rheumatoid arthritis for several years, anxiety and depression who has had lymphadenopathy for several years.  Previous biopsy in 2022 was read as reactive.  In May 2024 she presented with increasing lymph nodes repeat biopsy confirmed T cell lymphoma with TFH phenotype, CD30 positive and  Alk negative. Extremely bulky tumor.  High LDH.  Bone marrow minimally involved.  (3-4% abnormal cells by flow).    June 2024 treatment was administered consisting of brentuximab- CHP  Shortly after starting BV CHP patient developed abdominal ascites and was unable to eat and required TPN as well as discharge to nursing facility  PET imaging after cycle5 showed an excellent response has been obtained, but several small lymph nodes with SUV of 3.4 remain.  Completed cycle 6 of brentuximab CHP 9/26/24. Patient is referred by Dr. Iraheta for evaluation for autologous stem cell transplantation    Due to high risk nature of disease opted to proceed to autograft with BEAM preparation after restagin PET scan showed only minimal PET avid nodes in right inguinal area. BM biopsy 12/19/24 shows low level 1.5% involvement by flow cytometry only.     Sagrario presents to the clinic today (6/2/25) unaccompanied for follow up evaluation and count checks.  Today she is T+98 from her autologous STC with BEAM with (T0 2/24/25).  Reports she is doing okay.  Energy level remains lower, depends on the day.  Appetite is good, has been eating more.  Has gained some weight.  Intermittent nausea a few times a week.  No vomiting.  Takes compazine as needed.  Intermittent diarrhea and abdominal discomfort. Taking dilaudid about 1 time per day for lupus pain back and joint pains.  Intermittent numbness to bilateral feet (but is worse to left foot), staying the same.  Has dry skin.  Using oatmeal Aveno lotion as needed.  Mild redness to face from lupus.  Taking 5-10 mg of prednisone daily, rheumatologist is trying to cut back on dose.  Continues to feel lymph nodes to bilateral groin, bigger right than left, seem to be the same size.      Continues to reside at [facility] and they provide 3 meals a day.  Patient trying to find housing as June 15th is the deadline for staying at [facility], plans to fill out paperwork tomorrow.   Getting rides from provider ride service.  Declines needing to see social work today.  Her  recently had a stroke, he is doing better and is going to , now 8 months sober.      Review of Systems   Constitutional:  Positive for fatigue. Negative for appetite change, chills, diaphoresis, fever and unexpected weight change.   HENT:   Negative for lump/mass.    Respiratory: Negative.     Cardiovascular: Negative.    Gastrointestinal:  Positive for constipation and nausea. Negative for abdominal distention, abdominal pain, blood in stool, diarrhea and vomiting.   Genitourinary:  Negative for dysuria and hematuria.    Musculoskeletal:  Positive for arthralgias. Negative for gait problem.   Skin:  Positive for rash.   Neurological:  Positive for numbness. Negative for dizziness, gait problem and light-headedness.   Hematological: Negative.    All other systems reviewed and are negative.     -------------------------------------------------------------------------------------------------------  Objective   BSA: 1.87 meters squared  /71   Pulse 103   Temp 36.7 °C (98.1 °F)   Resp 16   Wt 74.5 kg (164 lb 3.9 oz)   SpO2 95%   BMI 25.93 kg/m²     Physical Exam  Vitals reviewed.   Constitutional:       Appearance: Normal appearance. She is well-developed.   HENT:      Head: Normocephalic and atraumatic.      Nose: Nose normal.      Mouth/Throat:      Dentition: No gum lesions.   Eyes:      Extraocular Movements: Extraocular movements intact.      Conjunctiva/sclera: Conjunctivae normal.      Pupils: Pupils are equal, round, and reactive to light.   Cardiovascular:      Rate and Rhythm: Normal rate and regular rhythm.      Pulses: Normal pulses.      Heart sounds: Normal heart sounds.   Pulmonary:      Breath sounds: Normal breath sounds and air entry.   Abdominal:      General: Abdomen is flat. Bowel sounds are normal.      Palpations: Abdomen is soft.   Musculoskeletal:         General: No swelling. Normal  range of motion.   Lymphadenopathy:      Lower Body: Right inguinal adenopathy present. Left inguinal adenopathy present.      Comments: Right inguinal node remains enlarged about 2x2 cm, unchanged.   Skin:     General: Skin is warm and dry.      Comments: Dry skin to bilateral shins, improved since prior visit.    Port to right chest, site with no redness, tenderness, swelling, or drainage.   Neurological:      General: No focal deficit present.      Mental Status: She is alert and oriented to person, place, and time.   Psychiatric:         Mood and Affect: Mood normal.         Behavior: Behavior normal. Behavior is cooperative.         Thought Content: Thought content normal.      Comments: Anxious, normal cognition     Performance Status:  Symptomatic; fully ambulatory  -------------------------------------------------------------------------------------------------------  Assessment/Plan      Angioimmunoblastic T-cell lymphoma,  CD30 pos,  stage IV disease, bulky tumor,   - High LDH, excellent partial remission after initial frontline therapy.   - Patient completed BV CHP September 2024 and PET scan after cycle 5 showed excellent response with some minimal residual uptake in right inguinal area.  Inguinal nodes obvious on physical exam ,,also had body rash which could be consistent with her lupus or antioimmunoblastic lymphoma.      -Restaging evaluation 12/23/24 includes a PET scan which shows unchanged minimally PET avid right inguinal node   and several non pet avid but left axillary and pretracheal nodes.  -BM biopsy 12/19/24 shows low level 1.5% involvement by flow cytometry only.  -Patient has a tragic home situation and is currently homeless,  she is applying for SSI and currently lives in a nursing home.  Patient case discussed at BMT tumor board and consensus was that this should not preclude her from a potentially curative autologous stem cell transplant.     Organ function testing:    PFTs 1/22/25  FEV1 80%,%,FEV1/FVC 79%, DLCO 104%  ECHO LVEF64%CMI 4: for age > 40, anxiety disorder, and rheumatologic condition    2/17/25 Patient collected 5.68 x 106 CD 34 cells/kg in one collection on 2/12.     History of autologous stem cell transplant  4/10/25 T+46 s/p auto with BEAM prep (T=0 2/24/2025). Counts are recovering. Will continue close monitoring twice weekly next week then decrease to once weekly.   - Post Transplant onco-echo and cardiology follow up requested.       PET (4/24/25): IMPRESSION:  1. Compared to prior PET, given difference in technique, there is mild interval increase in FDG avidity within previously seen bilateral inguinal, axillary, right paratracheal, peritoneal and iliac lymph nodes as described. However FDG uptake is less than mediastinal blood pool, Deauville score 2. if there is significant concern for progression, short-term follow-up would be of value.  2. No FDG avid extranodal disease.    6/2/20255:  CBC results from today are stable.  Continue to palpate enlarged right inguinal LN, non-tender, no change.  Reviewed PET scan results from 4/24/25 with Sagrario-see above. PE residual unchanged right inguinal node < 2 cm Plan to obtain repeat PET scan end of July, but no concerns on exam    ID Prophylaxis:  Continue acyclovir  Continue bactrim MWF    Vaccines:  Plan for influenza and covid vaccines around T+120  Post transplant vaccines to start around 6 months-Aug 2025    History of lupus  Rheumatologist Dr. Cathy Dobbs.  [id number] [facility]  [phone] currently only on placquenil.    Had rheumatologist appt 4/15/25, trying to decrease prednisone.  Taking either 5 or 10 mg daily depending on symptoms.    Continues chronic dilaudid use about once per day for lupus pain.      Cardiology:  Following with Dr. Albarran for monitoring following doxorubicin therapy.  ECHO (3/24/25): LVEF 57%. Plan for follow up again September 2025 with ECHO prior, then yearly  for next 5 years.      Psychosocial:  Sagrario is currently residing at [facility] (group home).  Receiving assistance with transportation with provider ride services.  Sagrario reports her  is currently staying with a friend and is recovering after recent stroke.  Her  is completing PT now.  Follows with Dr. Marquez at .      Social Work:  5/8/25:  Continues to reside at [facility], but states she is looking for new housing as she can only stay until June 15th.  Using ride service for transportation to appointments.  Pt declines wanting to speak to social work today.      Central Line:  Port site to right chest.  Port site with no redness, swelling, drainage, or tenderness.  Flushes ordered    RTC: refilled dilaudid and sertraline as unable to reach Dr. Marquez, Social work in to provide support.  Plan for PET scan around July 2025.  Followup in August, we start 6 month vaccines then  -------------------------------------------------------------------------------------------------------  Gunjan Varela MD

## 2025-06-02 NOTE — PROGRESS NOTES
SW met with patient during clinic appointment on this day to follow up on housing arrangements/planning.  Patient shares that she continues to be transitional in her housing arrangements, most recently having been discharged from her SNF (patient's insurance discontinued paying for her stay, as she did not have a skillable need to remain). She therefore discharged to [facility]. Staff there has informed her that her projected discharge date is 6/15/25, at which point she will need to transition into other housing. When asked what her plans were after 6/15/25, she states it is an unclear plan, but that she is on [facility] waiting list, and she has been provided a list of additional shelters by the designated housing CM's at [facility].  Patient also declines to reissue check from [facility] for Patient Aid jostin ($100 non-renewable fund), as she stated she never cashed the initial check.   She is open to  assistance to apply for [employer] jostin when funding becomes available again.  ZAHRA provided patient with direct contact information and encouraged her to reach out when/if she has further psychosocial needs/concerns.

## 2025-06-05 ENCOUNTER — PHARMACY VISIT (OUTPATIENT)
Dept: PHARMACY | Facility: CLINIC | Age: 50
End: 2025-06-05

## 2025-06-11 ENCOUNTER — APPOINTMENT (OUTPATIENT)
Dept: BEHAVIORAL HEALTH | Facility: HOSPITAL | Age: 50
End: 2025-06-11
Payer: COMMERCIAL

## 2025-06-18 ENCOUNTER — OFFICE VISIT (OUTPATIENT)
Dept: BEHAVIORAL HEALTH | Facility: HOSPITAL | Age: 50
End: 2025-06-18
Payer: COMMERCIAL

## 2025-06-18 VITALS
WEIGHT: 167.4 LBS | RESPIRATION RATE: 14 BRPM | HEART RATE: 106 BPM | DIASTOLIC BLOOD PRESSURE: 90 MMHG | SYSTOLIC BLOOD PRESSURE: 149 MMHG | OXYGEN SATURATION: 95 % | BODY MASS INDEX: 26.43 KG/M2

## 2025-06-18 DIAGNOSIS — C84.48 PERIPHERAL T CELL LYMPHOMA OF LYMPH NODES OF MULTIPLE SITES (MULTI): ICD-10-CM

## 2025-06-18 DIAGNOSIS — C86.50 ANGIOIMMUNOBLASTIC T-CELL LYMPHOMA: ICD-10-CM

## 2025-06-18 DIAGNOSIS — F33.1 MODERATE EPISODE OF RECURRENT MAJOR DEPRESSIVE DISORDER: ICD-10-CM

## 2025-06-18 DIAGNOSIS — F41.1 GAD (GENERALIZED ANXIETY DISORDER): ICD-10-CM

## 2025-06-18 PROCEDURE — 4004F PT TOBACCO SCREEN RCVD TLK: CPT | Performed by: PSYCHIATRY & NEUROLOGY

## 2025-06-18 PROCEDURE — 99214 OFFICE O/P EST MOD 30 MIN: CPT | Performed by: PSYCHIATRY & NEUROLOGY

## 2025-06-18 PROCEDURE — 99214 OFFICE O/P EST MOD 30 MIN: CPT | Mod: AM | Performed by: PSYCHIATRY & NEUROLOGY

## 2025-06-18 RX ORDER — DULOXETIN HYDROCHLORIDE 60 MG/1
60 CAPSULE, DELAYED RELEASE ORAL 2 TIMES DAILY
Qty: 60 CAPSULE | Refills: 1 | Status: SHIPPED | OUTPATIENT
Start: 2025-06-18

## 2025-06-18 RX ORDER — LORAZEPAM 0.5 MG/1
0.5 TABLET ORAL DAILY PRN
Qty: 30 TABLET | Refills: 1 | Status: SHIPPED | OUTPATIENT
Start: 2025-06-18 | End: 2025-08-17

## 2025-06-18 RX ORDER — QUETIAPINE FUMARATE 100 MG/1
150 TABLET, FILM COATED ORAL NIGHTLY
Qty: 45 TABLET | Refills: 1 | Status: SHIPPED | OUTPATIENT
Start: 2025-06-18 | End: 2025-08-17

## 2025-06-18 RX ORDER — QUETIAPINE FUMARATE 50 MG/1
50 TABLET, FILM COATED ORAL 2 TIMES DAILY PRN
Qty: 30 TABLET | Refills: 1 | Status: SHIPPED | OUTPATIENT
Start: 2025-06-18 | End: 2025-07-18

## 2025-06-18 ASSESSMENT — PAIN SCALES - GENERAL: PAINLEVEL_OUTOF10: 0-NO PAIN

## 2025-06-18 NOTE — PROGRESS NOTES
Patient  Sagrario Garber is a 49 y.o. female, presented for No chief complaint on file.  .  49 yr old  woman with Angioimmunoblastic T-cell lymphoma, CD30 pos, stage IV disease, in partial remission, s/p chemo, BMT (feb 2025), history of anxiety and MDD, prior psychiatric hospitalizations, at least one prior suicide attempt at age 20 via overdose on OTC meds, who is referred to psychiatry for management of her anxiety and depression.       History of presenting Illness:     Patient reports that she has been feeling anxious. Continues to struggle with anxiety/worries, multiple throughou the day. States she gets anxious when she has to get out of the house and be among crowds. She gets nervous, shaky, sweating, palpitations and feels like her vision becomes blurry. She usually tries deep breathing and it helps but has been needing ativan for most of these attacks. Reports hydroxyzine was not helpful, not effective.   Reports depression has been better. She is now in a hotel, long term, not in shelter anymore. She has been feeling more hopeful. Has been sleeping better. Energy and appetite are better. No SI/HI.      Review of Systems  Anxiety: General Anxiety Disorder (JUAN MANUEL)JUAN MANUEL Behaviors: excessive anxiety/worry, difficulty concentrating, restlessness, and sleep disturbance and Panic AttackPanic Attack Behaviors: choking/swallowing difficulty, dizzy, shortness of breath, and sweaty/clammy  Depression: negative  Delirium: negative  Psychosis: negative  Hallie: negative  Safety Issues: none  Psychiatric ROS Comment: None         Past Psychiatric History:   Previous therapy: yes; Does not recall names or clinics of previous providers  Previous psychiatric treatment and medication trials: yes - Paxil, sertraline, escitalopram, various others  Previous psychiatric hospitalizations: yes - 6-7 hopitalizations for depression and SI  Previous diagnoses: yes - MDD, JUAN MANUEL  Previous suicide attempts: yes - At age 20,  overdosed on OTC pills while intoxicated  History of violence: no    Past Medical History  Medical History[1]    Surgical History  Surgical History[2]     Family History:  Family History[3]    Social History:  Social History     Socioeconomic History    Marital status: Single     Spouse name: Not on file    Number of children: Not on file    Years of education: Not on file    Highest education level: Not on file   Occupational History    Not on file   Tobacco Use    Smoking status: Every Day     Types: Cigarettes    Smokeless tobacco: Not on file   Substance and Sexual Activity    Alcohol use: Not Currently    Drug use: Never    Sexual activity: Not on file   Other Topics Concern    Not on file   Social History Narrative    Not on file     Social Drivers of Health     Financial Resource Strain: High Risk (2/27/2025)    Overall Financial Resource Strain (CARDIA)     Difficulty of Paying Living Expenses: Hard   Food Insecurity: Food Insecurity Present (2/18/2025)    Hunger Vital Sign     Worried About Running Out of Food in the Last Year: Sometimes true     Ran Out of Food in the Last Year: Sometimes true   Transportation Needs: Unmet Transportation Needs (2/27/2025)    PRAPARE - Transportation     Lack of Transportation (Medical): Yes     Lack of Transportation (Non-Medical): Yes   Physical Activity: Not on file   Stress: Not on file   Social Connections: Not on file   Intimate Partner Violence: Not At Risk (2/18/2025)    Humiliation, Afraid, Rape, and Kick questionnaire     Fear of Current or Ex-Partner: No     Emotionally Abused: No     Physically Abused: No     Sexually Abused: No   Housing Stability: High Risk (2/27/2025)    Housing Stability Vital Sign     Unable to Pay for Housing in the Last Year: Yes     Number of Times Moved in the Last Year: 4     Homeless in the Last Year: No         Medication:  Current Outpatient Medications   Medication Instructions    acyclovir (ZOVIRAX) 400 mg, oral, Every 12 hours     "atorvastatin (LIPITOR) 40 mg, oral, Nightly    DULoxetine (Cymbalta) 60 mg DR capsule Take 1 capsule (60 mg) by mouth 2 times a day. Do not crush or chew. Do not fill before April 2, 2025.    ergocalciferol (Vitamin D-2) 1250 mcg (50,000 units) capsule Take 1 capsule by mouth once a week for 30 days    HYDROmorphone (DILAUDID) 2 mg, oral, 2 times daily PRN    hydroxychloroquine (PLAQUENIL) 300 mg, oral, Daily    hydrOXYzine HCL (ATARAX) 50 mg, oral, 2 times daily PRN    LORazepam (Ativan) 0.5 mg tablet Take 1 tablet (0.5 mg) by mouth 2 times a day as needed for anxiety. Do not fill before April 2, 2025.    naloxone (NARCAN) 4 mg, nasal, As needed, May repeat every 2-3 minutes if needed, alternating nostrils, until medical assistance becomes available.    ondansetron (ZOFRAN) 8 mg, oral, Every 8 hours PRN    pantoprazole (PROTONIX) 40 mg, oral, Daily, Do not crush, chew, or split.    polyethylene glycol (GLYCOLAX, MIRALAX) 17 g, oral, Daily PRN    predniSONE (DELTASONE) 10 mg, oral, Daily    predniSONE (DELTASONE) 10 mg, oral, Daily    prochlorperazine (COMPAZINE) 10 mg, oral, Every 6 hours PRN    QUEtiapine (SEROquel) 100 mg tablet Take 1.5 tablets (150 mg) by mouth once daily at bedtime. Do not fill before April 2, 2025.    sennosides-docusate sodium (Sofiya-Colace) 8.6-50 mg tablet 1 tablet, oral, Nightly PRN    sulfamethoxazole-trimethoprim (Bactrim DS) 800-160 mg tablet 1 tablet, oral, Every Mon/Wed/Fri, Start on T+30        Allergies  .Allergies[4]     Vitals:  Visit Vitals  Smoking Status Every Day        Mental Status Exam  General: Appears stated age, dressed appropriately  Appearance: Fair hygiene and grooming.  Attitude: Pleasant  Behavior: Cooperative  Motor Activity: WNL  Speech: Soft, normal rate, rhythm and volume.  Mood: \"Anxious\"  Affect: Congruent  Thought Process: Linear and goal directed  Thought Content: Denies suicidal or homicidal ideas, intent or plans. No IOR or delusions.  Thought Perception: " "No perceptual abnormalities.  Cognition: Grossly intact  Insight: Intact  Judgement: Intact        Psychiatric Risk Assessment  Violence Risk Assessment: none  Acute Risk of Harm to Others is Considered: low   Suicide Risk Assessment: , chronic medical illness, chronic pain, current psychiatric illness, life crisis (shame/despair), living alone or lack of social support, prior suicide attempt, and unmarried  Protective Factors against Suicide: adherence to  treatment, fear of social disapproval, fear of suicide, hopefulness/future orientation, marriage/partnership, moral objections to suicide, Pent[employer]alism affiliation/spirituality, social support/connectedness, and strong therapeutic alliance with provider  Acute Risk of Harm to Self is Considered: low       Labs:  No results found for: \"125D3\", \"TSH\", \"CBCDIF\", \"CMPLAS\", \"ADDTOXURINE\"     Diagnosis:  Problem List Items Addressed This Visit          Hematology and Neoplasia    Angioimmunoblastic T-cell lymphoma       Mental Health    Moderate episode of recurrent major depressive disorder    JUAN MANUEL (generalized anxiety disorder)        Assessment/Plan   Problem List Items Addressed This Visit          Hematology and Neoplasia    Angioimmunoblastic T-cell lymphoma       Mental Health    Moderate episode of recurrent major depressive disorder    JUAN MANUEL (generalized anxiety disorder)       Patient with history of lifelong MDD and JUAN MANUEL, multiple psychosocial stressors including housing, financial, relationship, lymphoma and medical illness. She is coping well.   Detailed and jessica discussion on risks associated with long term continuous use of lorazepam, especially when taken along with opiate. She verbalized understanding. Discussion the controlled substance provider agreement, she read it, asked pertinent questions and signed it.   - Stop hydroxyzine - patient does not find it effective  - Start quetiapine 50mg PO BID as needed for anxiety.  - Continue lorazepam 0.5mg " PO qdaily PRN anxiety; advised to take lorazepam STRICTLY ONLY AS NEEDED.  - Quetiapine 100mg PO at bedtime  - Cymbalta 60mg PO BID     RTC 6 weeks    Medication Consent  Medication Consent: risks, benefits, side effects reviewed for all ordered meds    Please schedule a follow-up with your PCP for your ongoing medical problems.    Dr. Marquez is in office on Mon- Thu. Phone calls may not be returned until next day I am in office.   For scheduling questions: [phone]  For other questions: [phone]       For [facility], [employer] is a 24/7 hotline that you can call for assistance: [phone].     Please call 9-1-1 or go to the nearest emergency room if you feel worse or have thoughts of hurting yourself or anyone else, or hearing voices, seeing visions or having new or scary thoughts about people around you.    I spent    35 minutes in the professional and overall care of this patient.    Ortega Marquez MD             [1]   Past Medical History:  Diagnosis Date    Atypical chest pain 01/22/2025    Essential (primary) hypertension 05/24/2017    HTN (hypertension), benign    Essential (primary) hypertension 10/09/2017    HTN (hypertension), benign    History of stem cell transplant (Multi) 03/26/2025    Personal history of nicotine dependence 05/24/2017    History of nicotine dependence   [2]   Past Surgical History:  Procedure Laterality Date    OTHER SURGICAL HISTORY  05/24/2017    Oral Surgery Tooth Extraction [facility]   [3] No family history on file.  [4]   Allergies  Allergen Reactions    Amoxicillin Hives     Patient says gets hives/welts     Atenolol Hives     Patient says gets hives/welts    Prednisone Hives     Specifically Solumedrol  Able to tolerate prednisone   Patient says gets hives/welts    Methylprednisolone Sodium Succ Hives

## 2025-07-24 ENCOUNTER — HOSPITAL ENCOUNTER (OUTPATIENT)
Dept: RADIOLOGY | Facility: HOSPITAL | Age: 50
Discharge: HOME | End: 2025-07-24
Payer: COMMERCIAL

## 2025-07-24 ENCOUNTER — LAB (OUTPATIENT)
Dept: HEMATOLOGY/ONCOLOGY | Facility: HOSPITAL | Age: 50
End: 2025-07-24
Payer: COMMERCIAL

## 2025-07-24 DIAGNOSIS — Z76.82 STEM CELL TRANSPLANT CANDIDATE: ICD-10-CM

## 2025-07-24 DIAGNOSIS — C84.48 PERIPHERAL T CELL LYMPHOMA OF LYMPH NODES OF MULTIPLE SITES (MULTI): ICD-10-CM

## 2025-07-24 LAB
ALBUMIN SERPL BCP-MCNC: 4.1 G/DL (ref 3.4–5)
ALP SERPL-CCNC: 62 U/L (ref 33–110)
ALT SERPL W P-5'-P-CCNC: 22 U/L (ref 7–45)
ANION GAP SERPL CALC-SCNC: 12 MMOL/L (ref 10–20)
AST SERPL W P-5'-P-CCNC: 15 U/L (ref 9–39)
BASOPHILS # BLD AUTO: 0.03 X10*3/UL (ref 0–0.1)
BASOPHILS NFR BLD AUTO: 0.4 %
BILIRUB SERPL-MCNC: 0.3 MG/DL (ref 0–1.2)
BUN SERPL-MCNC: 13 MG/DL (ref 6–23)
CALCIUM SERPL-MCNC: 9.3 MG/DL (ref 8.6–10.3)
CHLORIDE SERPL-SCNC: 106 MMOL/L (ref 98–107)
CO2 SERPL-SCNC: 27 MMOL/L (ref 21–32)
CREAT SERPL-MCNC: 0.63 MG/DL (ref 0.5–1.05)
EGFRCR SERPLBLD CKD-EPI 2021: >90 ML/MIN/1.73M*2
EOSINOPHIL # BLD AUTO: 0.07 X10*3/UL (ref 0–0.7)
EOSINOPHIL NFR BLD AUTO: 1 %
ERYTHROCYTE [DISTWIDTH] IN BLOOD BY AUTOMATED COUNT: 12 % (ref 11.5–14.5)
GLUCOSE BLD MANUAL STRIP-MCNC: 149 MG/DL (ref 74–99)
GLUCOSE SERPL-MCNC: 131 MG/DL (ref 74–99)
HCT VFR BLD AUTO: 39 % (ref 36–46)
HGB BLD-MCNC: 12.7 G/DL (ref 12–16)
IGG SERPL-MCNC: 1230 MG/DL (ref 700–1600)
IMM GRANULOCYTES # BLD AUTO: 0.02 X10*3/UL (ref 0–0.7)
IMM GRANULOCYTES NFR BLD AUTO: 0.3 % (ref 0–0.9)
LYMPHOCYTES # BLD AUTO: 0.42 X10*3/UL (ref 1.2–4.8)
LYMPHOCYTES NFR BLD AUTO: 6.1 %
MAGNESIUM SERPL-MCNC: 1.91 MG/DL (ref 1.6–2.4)
MCH RBC QN AUTO: 32.8 PG (ref 26–34)
MCHC RBC AUTO-ENTMCNC: 32.6 G/DL (ref 32–36)
MCV RBC AUTO: 101 FL (ref 80–100)
MONOCYTES # BLD AUTO: 0.43 X10*3/UL (ref 0.1–1)
MONOCYTES NFR BLD AUTO: 6.2 %
NEUTROPHILS # BLD AUTO: 5.94 X10*3/UL (ref 1.2–7.7)
NEUTROPHILS NFR BLD AUTO: 86 %
NRBC BLD-RTO: 0 /100 WBCS (ref 0–0)
PLATELET # BLD AUTO: 186 X10*3/UL (ref 150–450)
POTASSIUM SERPL-SCNC: 3.9 MMOL/L (ref 3.5–5.3)
PROT SERPL-MCNC: 7 G/DL (ref 6.4–8.2)
RBC # BLD AUTO: 3.87 X10*6/UL (ref 4–5.2)
SODIUM SERPL-SCNC: 141 MMOL/L (ref 136–145)
URATE SERPL-MCNC: 4.1 MG/DL (ref 2.3–6.7)
WBC # BLD AUTO: 6.9 X10*3/UL (ref 4.4–11.3)

## 2025-07-24 PROCEDURE — 84550 ASSAY OF BLOOD/URIC ACID: CPT

## 2025-07-24 PROCEDURE — 78815 PET IMAGE W/CT SKULL-THIGH: CPT | Mod: PS

## 2025-07-24 PROCEDURE — 85025 COMPLETE CBC W/AUTO DIFF WBC: CPT

## 2025-07-24 PROCEDURE — 82947 ASSAY GLUCOSE BLOOD QUANT: CPT

## 2025-07-24 PROCEDURE — 80053 COMPREHEN METABOLIC PANEL: CPT

## 2025-07-24 PROCEDURE — 82784 ASSAY IGA/IGD/IGG/IGM EACH: CPT

## 2025-07-24 PROCEDURE — A9552 F18 FDG: HCPCS | Mod: SE | Performed by: INTERNAL MEDICINE

## 2025-07-24 PROCEDURE — 83735 ASSAY OF MAGNESIUM: CPT

## 2025-07-24 PROCEDURE — 3430000001 HC RX 343 DIAGNOSTIC RADIOPHARMACEUTICALS: Mod: SE | Performed by: INTERNAL MEDICINE

## 2025-07-24 PROCEDURE — 36591 DRAW BLOOD OFF VENOUS DEVICE: CPT

## 2025-07-24 RX ORDER — FLUDEOXYGLUCOSE F 18 200 MCI/ML
11.1 INJECTION, SOLUTION INTRAVENOUS
Status: COMPLETED | OUTPATIENT
Start: 2025-07-24 | End: 2025-07-24

## 2025-07-24 RX ORDER — HEPARIN SODIUM 1000 [USP'U]/ML
2000 INJECTION, SOLUTION INTRAVENOUS; SUBCUTANEOUS AS NEEDED
OUTPATIENT
Start: 2025-07-24

## 2025-07-24 RX ORDER — HEPARIN 100 UNIT/ML
500 SYRINGE INTRAVENOUS AS NEEDED
OUTPATIENT
Start: 2025-07-24

## 2025-07-24 RX ORDER — HEPARIN SODIUM,PORCINE/PF 10 UNIT/ML
50 SYRINGE (ML) INTRAVENOUS AS NEEDED
OUTPATIENT
Start: 2025-07-24

## 2025-07-24 RX ADMIN — FLUDEOXYGLUCOSE F 18 11.1 MILLICURIE: 200 INJECTION, SOLUTION INTRAVENOUS at 11:42

## 2025-08-06 ENCOUNTER — TELEMEDICINE (OUTPATIENT)
Dept: BEHAVIORAL HEALTH | Facility: HOSPITAL | Age: 50
End: 2025-08-06
Payer: COMMERCIAL

## 2025-08-06 DIAGNOSIS — F33.1 MODERATE EPISODE OF RECURRENT MAJOR DEPRESSIVE DISORDER: ICD-10-CM

## 2025-08-06 DIAGNOSIS — F41.1 GAD (GENERALIZED ANXIETY DISORDER): ICD-10-CM

## 2025-08-06 DIAGNOSIS — C86.50 ANGIOIMMUNOBLASTIC T-CELL LYMPHOMA: ICD-10-CM

## 2025-08-06 PROCEDURE — 99213 OFFICE O/P EST LOW 20 MIN: CPT | Performed by: PSYCHIATRY & NEUROLOGY

## 2025-08-06 RX ORDER — LORAZEPAM 0.5 MG/1
0.5 TABLET ORAL DAILY PRN
Qty: 30 TABLET | Refills: 1 | Status: SHIPPED | OUTPATIENT
Start: 2025-08-06 | End: 2025-10-05

## 2025-08-06 RX ORDER — QUETIAPINE FUMARATE 50 MG/1
25 TABLET, FILM COATED ORAL 2 TIMES DAILY PRN
Qty: 30 TABLET | Refills: 2 | Status: SHIPPED | OUTPATIENT
Start: 2025-08-06 | End: 2025-11-04

## 2025-08-06 NOTE — PROGRESS NOTES
"Patient  Sagrario Garber is a 49 y.o. female, presented for No chief complaint on file.  .    Patient was seen via virtual visit:   An interactive audio and video telecommunication system which permits real time communications between the patient (at the originating site) and provider (at the distant site) was utilized to provide this telehealth service.   Verbal consent was requested and obtained from Sagrario Garber on this date, 08/06/25 for a telehealth visit and the patient's location was confirmed at the time of the visit.      History of presenting Illness:     Reports that she has been doing better. Her anxiety has been better overall. States that some days she worries about a lot of things, it could be anything, could be about going to appointment or finding an apartment. Usually able to cope with it. \"I just ride through it and get it under control.\"  Reports depression has been better. She is now in a hotel, long term, not in shelter anymore. She has been feeling more hopeful. Has been sleeping better. Energy and appetite are better. No SI/HI.   Reports that she tried taking quetiapine during the day but gets hungry so have avoided taking it during the day to avoid gaining weight. Has gained 10lbs since started quetipine.   Reports that she is sleeping well. Taking quetiapine 125 mg PO at bedtime. 10-12 hours of sleep. Takes about 45 mins to go to sleep.    Review of Systems  Anxiety: General Anxiety Disorder (JUAN MANUEL)JUAN MANUEL Behaviors: excessive anxiety/worry, difficulty concentrating, restlessness, and sleep disturbance and Panic AttackPanic Attack Behaviors: choking/swallowing difficulty, dizzy, shortness of breath, and sweaty/clammy  Depression: negative  Delirium: negative  Psychosis: negative  Hallie: negative  Safety Issues: none  Psychiatric ROS Comment: None                                           Past Psychiatric History:   Previous therapy: yes; Does not recall names or clinics of previous " providers  Previous psychiatric treatment and medication trials: yes - Paxil, sertraline, escitalopram, various others  Previous psychiatric hospitalizations: yes - 6-7 hopitalizations for depression and SI  Previous diagnoses: yes - MDD, JUAN MANUEL  Previous suicide attempts: yes - At age 20, overdosed on OTC pills while intoxicated  History of violence: no    Past Medical History  Medical History[1]    Surgical History  Surgical History[2]     Family History:  Family History[3]    Social History:  Social History     Socioeconomic History    Marital status: Single     Spouse name: Not on file    Number of children: Not on file    Years of education: Not on file    Highest education level: Not on file   Occupational History    Not on file   Tobacco Use    Smoking status: Every Day     Types: Cigarettes    Smokeless tobacco: Not on file   Substance and Sexual Activity    Alcohol use: Not Currently    Drug use: Never    Sexual activity: Not on file   Other Topics Concern    Not on file   Social History Narrative    Not on file     Social Drivers of Health     Financial Resource Strain: High Risk (2/27/2025)    Overall Financial Resource Strain (CARDIA)     Difficulty of Paying Living Expenses: Hard   Food Insecurity: Food Insecurity Present (2/18/2025)    Hunger Vital Sign     Worried About Running Out of Food in the Last Year: Sometimes true     Ran Out of Food in the Last Year: Sometimes true   Transportation Needs: Unmet Transportation Needs (2/27/2025)    PRAPARE - Transportation     Lack of Transportation (Medical): Yes     Lack of Transportation (Non-Medical): Yes   Physical Activity: Not on file   Stress: Not on file   Social Connections: Not on file   Intimate Partner Violence: Not At Risk (2/18/2025)    Humiliation, Afraid, Rape, and Kick questionnaire     Fear of Current or Ex-Partner: No     Emotionally Abused: No     Physically Abused: No     Sexually Abused: No   Housing Stability: High Risk (2/27/2025)     "Housing Stability Vital Sign     Unable to Pay for Housing in the Last Year: Yes     Number of Times Moved in the Last Year: 4     Homeless in the Last Year: No         Medication:  Current Outpatient Medications   Medication Instructions    acyclovir (ZOVIRAX) 400 mg, oral, Every 12 hours    atorvastatin (LIPITOR) 40 mg, oral, Nightly    DULoxetine (CYMBALTA) 60 mg, oral, 2 times daily    ergocalciferol (Vitamin D-2) 1250 mcg (50,000 units) capsule Take 1 capsule by mouth once a week for 30 days    HYDROmorphone (DILAUDID) 2 mg, oral, 2 times daily PRN    hydrOXYzine HCL (ATARAX) 50 mg, oral, 2 times daily PRN    LORazepam (ATIVAN) 0.5 mg, oral, Daily PRN    naloxone (NARCAN) 4 mg, nasal, As needed, May repeat every 2-3 minutes if needed, alternating nostrils, until medical assistance becomes available.    ondansetron (ZOFRAN) 8 mg, oral, Every 8 hours PRN    pantoprazole (PROTONIX) 40 mg, oral, Daily, Do not crush, chew, or split.    polyethylene glycol (GLYCOLAX, MIRALAX) 17 g, oral, Daily PRN    predniSONE (DELTASONE) 10 mg, oral, Daily    prochlorperazine (COMPAZINE) 10 mg, oral, Every 6 hours PRN    QUEtiapine (SEROquel) 100 mg tablet Take 1.5 tablets (150 mg) by mouth once daily at bedtime. Do not fill before April 2, 2025.    QUEtiapine (SEROQUEL) 50 mg, oral, 2 times daily PRN    sennosides-docusate sodium (Sofiya-Colace) 8.6-50 mg tablet 1 tablet, oral, Nightly PRN    sulfamethoxazole-trimethoprim (Bactrim DS) 800-160 mg tablet 1 tablet, oral, Every Mon/Wed/Fri, Start on T+30        Allergies  .Allergies[4]     Vitals:  Visit Vitals  Smoking Status Every Day        Mental Status Exam  General: Appears stated age, dressed appropriately  Appearance: Fair hygiene and grooming.  Attitude: Pleasant  Behavior: Cooperative  Motor Activity: WNL  Speech: Soft, normal rate, rhythm and volume.  Mood: \"Anxious\"  Affect: Congruent  Thought Process: Linear and goal directed  Thought Content: Denies suicidal or homicidal " "ideas, intent or plans. No IOR or delusions.  Thought Perception: No perceptual abnormalities.  Cognition: Grossly intact  Insight: Intact  Judgement: Intact        Psychiatric Risk Assessment  Violence Risk Assessment: none  Acute Risk of Harm to Others is Considered: low   Suicide Risk Assessment: , chronic medical illness, chronic pain, current psychiatric illness, life crisis (shame/despair), living alone or lack of social support, prior suicide attempt, and unmarried  Protective Factors against Suicide: adherence to  treatment, fear of social disapproval, fear of suicide, hopefulness/future orientation, marriage/partnership, moral objections to suicide, Rastafarian affiliation/spirituality, social support/connectedness, and strong therapeutic alliance with provider  Acute Risk of Harm to Self is Considered: low    Labs:  No results found for: \"125D3\", \"TSH\", \"CBCDIF\", \"CMPLAS\", \"ADDTOXURINE\"     Diagnosis:  Problem List Items Addressed This Visit          Mental Health    Moderate episode of recurrent major depressive disorder    JUAN MANUEL (generalized anxiety disorder)        Assessment/Plan   Problem List Items Addressed This Visit          Mental Health    Moderate episode of recurrent major depressive disorder    JUAN MANUEL (generalized anxiety disorder)       Patient with history of lifelong MDD and JUAN MANUEL, multiple psychosocial stressors including housing, financial, relationship, lymphoma and medical illness. She is coping well.   Detailed and jessica discussion on risks associated with long term continuous use of lorazepam, especially when taken along with opiate. She verbalized understanding. Discussion the controlled substance provider agreement, she read it, asked pertinent questions and signed it.   - Change quetiapine 25 mg PO BID as needed for anxiety.  - Continue lorazepam 0.5mg PO qdaily PRN anxiety; advised to take lorazepam STRICTLY ONLY AS NEEDED.  - Quetiapine 150mg PO at bedtime  - Cymbalta 60mg PO " BID     RTC 6 weeks    Medication Consent  Medication Consent: risks, benefits, side effects reviewed for all ordered meds    Please schedule a follow-up with your PCP for your ongoing medical problems.    Dr. Marquez is in office on Mon- Thu. Phone calls may not be returned until next day I am in office.   For scheduling questions: [phone]  For other questions: [phone]       For [facility] residents, [employer] is a 24/7 hotline that you can call for assistance: [phone].     Please call 9-1-1 or go to the nearest emergency room if you feel worse or have thoughts of hurting yourself or anyone else, or hearing voices, seeing visions or having new or scary thoughts about people around you.    I spent    30 minutes in the professional and overall care of this patient.    Ortega Marquez MD             [1]   Past Medical History:  Diagnosis Date    Atypical chest pain 01/22/2025    Essential (primary) hypertension 05/24/2017    HTN (hypertension), benign    Essential (primary) hypertension 10/09/2017    HTN (hypertension), benign    History of stem cell transplant (Multi) 03/26/2025    Personal history of nicotine dependence 05/24/2017    History of nicotine dependence   [2]   Past Surgical History:  Procedure Laterality Date    OTHER SURGICAL HISTORY  05/24/2017    Oral Surgery Tooth Extraction [place] [facility]   [3] No family history on file.  [4]   Allergies  Allergen Reactions    Amoxicillin Hives     Patient says gets hives/welts     Atenolol Hives     Patient says gets hives/welts    Prednisone Hives     Specifically Solumedrol  Able to tolerate prednisone   Patient says gets hives/welts    Methylprednisolone Sodium Succ Hives

## 2025-08-15 DIAGNOSIS — C86.50 ANGIOIMMUNOBLASTIC T-CELL LYMPHOMA: ICD-10-CM

## 2025-08-15 RX ORDER — ATORVASTATIN CALCIUM 40 MG/1
40 TABLET, FILM COATED ORAL NIGHTLY
Qty: 30 TABLET | Refills: 3 | Status: SHIPPED | OUTPATIENT
Start: 2025-08-15

## 2025-08-24 ASSESSMENT — ENCOUNTER SYMPTOMS
FEVER: 0
BLOOD IN STOOL: 0
CHILLS: 0
ARTHRALGIAS: 1
VOMITING: 0
APPETITE CHANGE: 0
DIARRHEA: 0
CARDIOVASCULAR NEGATIVE: 1
CONSTIPATION: 1
RESPIRATORY NEGATIVE: 1
UNEXPECTED WEIGHT CHANGE: 0
NUMBNESS: 1
DIAPHORESIS: 0
NAUSEA: 1

## 2025-08-25 ENCOUNTER — LAB (OUTPATIENT)
Dept: HEMATOLOGY/ONCOLOGY | Facility: HOSPITAL | Age: 50
End: 2025-08-25
Payer: COMMERCIAL

## 2025-08-25 ENCOUNTER — TELEPHONE (OUTPATIENT)
Dept: ADMISSION | Facility: HOSPITAL | Age: 50
End: 2025-08-25

## 2025-08-25 ENCOUNTER — INFUSION (OUTPATIENT)
Dept: HEMATOLOGY/ONCOLOGY | Facility: HOSPITAL | Age: 50
End: 2025-08-25
Payer: COMMERCIAL

## 2025-08-25 ENCOUNTER — OFFICE VISIT (OUTPATIENT)
Dept: HEMATOLOGY/ONCOLOGY | Facility: HOSPITAL | Age: 50
End: 2025-08-25
Payer: COMMERCIAL

## 2025-08-25 VITALS
TEMPERATURE: 97 F | DIASTOLIC BLOOD PRESSURE: 70 MMHG | SYSTOLIC BLOOD PRESSURE: 139 MMHG | BODY MASS INDEX: 28.16 KG/M2 | HEART RATE: 69 BPM | OXYGEN SATURATION: 100 % | WEIGHT: 178.35 LBS | RESPIRATION RATE: 16 BRPM

## 2025-08-25 DIAGNOSIS — C84.48 PERIPHERAL T CELL LYMPHOMA OF LYMPH NODES OF MULTIPLE SITES (MULTI): ICD-10-CM

## 2025-08-25 DIAGNOSIS — Z95.828 PORT-A-CATH IN PLACE: ICD-10-CM

## 2025-08-25 DIAGNOSIS — F41.1 GAD (GENERALIZED ANXIETY DISORDER): ICD-10-CM

## 2025-08-25 DIAGNOSIS — F33.1 MODERATE EPISODE OF RECURRENT MAJOR DEPRESSIVE DISORDER: ICD-10-CM

## 2025-08-25 DIAGNOSIS — Z76.82 STEM CELL TRANSPLANT CANDIDATE: ICD-10-CM

## 2025-08-25 DIAGNOSIS — C84.48 PERIPHERAL T CELL LYMPHOMA OF LYMPH NODES OF MULTIPLE SITES (MULTI): Primary | ICD-10-CM

## 2025-08-25 DIAGNOSIS — Z78.9 HISTORY OF HOMELESS: ICD-10-CM

## 2025-08-25 DIAGNOSIS — D84.9 IMMUNOCOMPROMISED: ICD-10-CM

## 2025-08-25 DIAGNOSIS — Z94.84 HISTORY OF STEM CELL TRANSPLANT (MULTI): ICD-10-CM

## 2025-08-25 DIAGNOSIS — M32.9 SYSTEMIC LUPUS ERYTHEMATOSUS, UNSPECIFIED SLE TYPE, UNSPECIFIED ORGAN INVOLVEMENT STATUS (MULTI): ICD-10-CM

## 2025-08-25 DIAGNOSIS — C86.50 ANGIOIMMUNOBLASTIC T-CELL LYMPHOMA: ICD-10-CM

## 2025-08-25 LAB
ALBUMIN SERPL BCP-MCNC: 4.4 G/DL (ref 3.4–5)
ALP SERPL-CCNC: 77 U/L (ref 33–110)
ALT SERPL W P-5'-P-CCNC: 23 U/L (ref 7–45)
ANION GAP SERPL CALC-SCNC: 12 MMOL/L (ref 10–20)
AST SERPL W P-5'-P-CCNC: 14 U/L (ref 9–39)
BASOPHILS # BLD AUTO: 0.03 X10*3/UL (ref 0–0.1)
BASOPHILS NFR BLD AUTO: 0.5 %
BILIRUB SERPL-MCNC: 0.3 MG/DL (ref 0–1.2)
BUN SERPL-MCNC: 16 MG/DL (ref 6–23)
CALCIUM SERPL-MCNC: 9.4 MG/DL (ref 8.6–10.3)
CHLORIDE SERPL-SCNC: 104 MMOL/L (ref 98–107)
CO2 SERPL-SCNC: 27 MMOL/L (ref 21–32)
CREAT SERPL-MCNC: 0.72 MG/DL (ref 0.5–1.05)
EGFRCR SERPLBLD CKD-EPI 2021: >90 ML/MIN/1.73M*2
EOSINOPHIL # BLD AUTO: 0.13 X10*3/UL (ref 0–0.7)
EOSINOPHIL NFR BLD AUTO: 2 %
ERYTHROCYTE [DISTWIDTH] IN BLOOD BY AUTOMATED COUNT: 12.7 % (ref 11.5–14.5)
GLUCOSE SERPL-MCNC: 133 MG/DL (ref 74–99)
HCT VFR BLD AUTO: 38.1 % (ref 36–46)
HGB BLD-MCNC: 12.7 G/DL (ref 12–16)
IGG SERPL-MCNC: 1090 MG/DL (ref 700–1600)
IMM GRANULOCYTES # BLD AUTO: 0.01 X10*3/UL (ref 0–0.7)
IMM GRANULOCYTES NFR BLD AUTO: 0.2 % (ref 0–0.9)
LDH SERPL L TO P-CCNC: 154 U/L (ref 84–246)
LYMPHOCYTES # BLD AUTO: 0.53 X10*3/UL (ref 1.2–4.8)
LYMPHOCYTES NFR BLD AUTO: 8.2 %
MAGNESIUM SERPL-MCNC: 1.88 MG/DL (ref 1.6–2.4)
MCH RBC QN AUTO: 32.9 PG (ref 26–34)
MCHC RBC AUTO-ENTMCNC: 33.3 G/DL (ref 32–36)
MCV RBC AUTO: 99 FL (ref 80–100)
MONOCYTES # BLD AUTO: 0.47 X10*3/UL (ref 0.1–1)
MONOCYTES NFR BLD AUTO: 7.3 %
NEUTROPHILS # BLD AUTO: 5.26 X10*3/UL (ref 1.2–7.7)
NEUTROPHILS NFR BLD AUTO: 81.8 %
NRBC BLD-RTO: 0 /100 WBCS (ref 0–0)
PLATELET # BLD AUTO: 201 X10*3/UL (ref 150–450)
POTASSIUM SERPL-SCNC: 4.2 MMOL/L (ref 3.5–5.3)
PROT SERPL-MCNC: 7.1 G/DL (ref 6.4–8.2)
RBC # BLD AUTO: 3.86 X10*6/UL (ref 4–5.2)
SODIUM SERPL-SCNC: 139 MMOL/L (ref 136–145)
URATE SERPL-MCNC: 3.4 MG/DL (ref 2.3–6.7)
WBC # BLD AUTO: 6.4 X10*3/UL (ref 4.4–11.3)

## 2025-08-25 PROCEDURE — 83735 ASSAY OF MAGNESIUM: CPT

## 2025-08-25 PROCEDURE — 99215 OFFICE O/P EST HI 40 MIN: CPT | Mod: 25

## 2025-08-25 PROCEDURE — 90471 IMMUNIZATION ADMIN: CPT | Performed by: NURSE PRACTITIONER

## 2025-08-25 PROCEDURE — 2500000004 HC RX 250 GENERAL PHARMACY W/ HCPCS (ALT 636 FOR OP/ED): Mod: SE | Performed by: INTERNAL MEDICINE

## 2025-08-25 PROCEDURE — 80053 COMPREHEN METABOLIC PANEL: CPT

## 2025-08-25 PROCEDURE — 83615 LACTATE (LD) (LDH) ENZYME: CPT

## 2025-08-25 PROCEDURE — 90750 HZV VACC RECOMBINANT IM: CPT | Mod: SE | Performed by: NURSE PRACTITIONER

## 2025-08-25 PROCEDURE — 84550 ASSAY OF BLOOD/URIC ACID: CPT

## 2025-08-25 PROCEDURE — 82784 ASSAY IGA/IGD/IGG/IGM EACH: CPT

## 2025-08-25 PROCEDURE — 85025 COMPLETE CBC W/AUTO DIFF WBC: CPT

## 2025-08-25 PROCEDURE — 90472 IMMUNIZATION ADMIN EACH ADD: CPT | Performed by: NURSE PRACTITIONER

## 2025-08-25 PROCEDURE — 90648 HIB PRP-T VACCINE 4 DOSE IM: CPT | Mod: SE | Performed by: NURSE PRACTITIONER

## 2025-08-25 PROCEDURE — 90696 DTAP-IPV VACCINE 4-6 YRS IM: CPT | Mod: SE | Performed by: NURSE PRACTITIONER

## 2025-08-25 PROCEDURE — 36591 DRAW BLOOD OFF VENOUS DEVICE: CPT

## 2025-08-25 PROCEDURE — 99215 OFFICE O/P EST HI 40 MIN: CPT

## 2025-08-25 PROCEDURE — 2500000004 HC RX 250 GENERAL PHARMACY W/ HCPCS (ALT 636 FOR OP/ED): Mod: SE | Performed by: NURSE PRACTITIONER

## 2025-08-25 PROCEDURE — 90739 HEPB VACC 2/4 DOSE ADULT IM: CPT | Mod: SE | Performed by: NURSE PRACTITIONER

## 2025-08-25 PROCEDURE — 90677 PCV20 VACCINE IM: CPT | Mod: SE | Performed by: NURSE PRACTITIONER

## 2025-08-25 RX ORDER — ALBUTEROL SULFATE 0.83 MG/ML
3 SOLUTION RESPIRATORY (INHALATION) AS NEEDED
OUTPATIENT
Start: 2025-10-20

## 2025-08-25 RX ORDER — ACYCLOVIR 400 MG/1
400 TABLET ORAL EVERY 12 HOURS
Qty: 60 TABLET | Refills: 2 | Status: SHIPPED | OUTPATIENT
Start: 2025-08-25

## 2025-08-25 RX ORDER — PROCHLORPERAZINE MALEATE 10 MG
10 TABLET ORAL EVERY 6 HOURS PRN
Qty: 30 TABLET | Refills: 0 | Status: SHIPPED | OUTPATIENT
Start: 2025-08-25

## 2025-08-25 RX ORDER — HEPARIN 100 UNIT/ML
500 SYRINGE INTRAVENOUS AS NEEDED
OUTPATIENT
Start: 2025-08-25

## 2025-08-25 RX ORDER — FAMOTIDINE 10 MG/ML
20 INJECTION, SOLUTION INTRAVENOUS ONCE AS NEEDED
OUTPATIENT
Start: 2025-10-20

## 2025-08-25 RX ORDER — HEPARIN SODIUM 1000 [USP'U]/ML
2000 INJECTION, SOLUTION INTRAVENOUS; SUBCUTANEOUS AS NEEDED
OUTPATIENT
Start: 2025-08-25

## 2025-08-25 RX ORDER — EPINEPHRINE 0.3 MG/.3ML
0.3 INJECTION SUBCUTANEOUS EVERY 5 MIN PRN
OUTPATIENT
Start: 2025-10-20

## 2025-08-25 RX ORDER — SULFAMETHOXAZOLE AND TRIMETHOPRIM 800; 160 MG/1; MG/1
1 TABLET ORAL
Qty: 13 TABLET | Refills: 3 | Status: SHIPPED | OUTPATIENT
Start: 2025-08-25 | End: 2025-08-25 | Stop reason: SDUPTHER

## 2025-08-25 RX ORDER — SULFAMETHOXAZOLE AND TRIMETHOPRIM 800; 160 MG/1; MG/1
1 TABLET ORAL
Qty: 13 TABLET | Refills: 3 | Status: SHIPPED | OUTPATIENT
Start: 2025-08-25

## 2025-08-25 RX ORDER — HEPARIN SODIUM,PORCINE/PF 10 UNIT/ML
50 SYRINGE (ML) INTRAVENOUS AS NEEDED
OUTPATIENT
Start: 2025-08-25

## 2025-08-25 RX ORDER — HYDROMORPHONE HYDROCHLORIDE 2 MG/1
2 TABLET ORAL 2 TIMES DAILY PRN
Qty: 20 TABLET | Refills: 0 | Status: SHIPPED | OUTPATIENT
Start: 2025-08-25

## 2025-08-25 RX ORDER — DIPHENHYDRAMINE HYDROCHLORIDE 50 MG/ML
50 INJECTION, SOLUTION INTRAMUSCULAR; INTRAVENOUS AS NEEDED
OUTPATIENT
Start: 2025-10-20

## 2025-08-25 RX ORDER — HEPARIN 100 UNIT/ML
500 SYRINGE INTRAVENOUS AS NEEDED
Status: DISCONTINUED | OUTPATIENT
Start: 2025-08-25 | End: 2025-08-25 | Stop reason: HOSPADM

## 2025-08-25 RX ADMIN — DIPHTHERIA AND TETANUS TOXOIDS AND ACELLULAR PERTUSSIS ADSORBED AND INACTIVATED POLIOVIRUS VACCINE 0.5 ML: 25; 10; 25; 8; 25; 40; 8; 32 INJECTION, SUSPENSION INTRAMUSCULAR at 11:36

## 2025-08-25 RX ADMIN — HEPATITIS B VACCINE (RECOMBINANT) ADJUVANTED 20 MCG: 20 INJECTION, SOLUTION INTRAMUSCULAR at 11:37

## 2025-08-25 RX ADMIN — PNEUMOCOCCAL 20-VALENT CONJUGATE VACCINE 0.5 ML
2.2; 2.2; 2.2; 2.2; 2.2; 2.2; 2.2; 2.2; 2.2; 2.2; 2.2; 2.2; 2.2; 2.2; 2.2; 2.2; 4.4; 2.2; 2.2; 2.2 INJECTION, SUSPENSION INTRAMUSCULAR at 11:37

## 2025-08-25 RX ADMIN — HAEMOPHILUS B POLYSACCHARIDE CONJUGATE VACCINE FOR INJ 0.5 ML: RECON SOLN at 11:38

## 2025-08-25 RX ADMIN — Medication 0.5 ML: at 11:38

## 2025-08-25 RX ADMIN — HEPARIN 500 UNITS: 100 SYRINGE at 09:34

## 2025-08-25 ASSESSMENT — PAIN SCALES - GENERAL: PAINLEVEL_OUTOF10: 0-NO PAIN

## 2025-08-25 ASSESSMENT — ENCOUNTER SYMPTOMS
FATIGUE: 0
ADENOPATHY: 1

## 2025-09-03 ENCOUNTER — TELEMEDICINE (OUTPATIENT)
Dept: BEHAVIORAL HEALTH | Facility: HOSPITAL | Age: 50
End: 2025-09-03
Payer: COMMERCIAL

## 2025-09-03 DIAGNOSIS — F41.1 GAD (GENERALIZED ANXIETY DISORDER): ICD-10-CM

## 2025-09-03 DIAGNOSIS — F33.1 MODERATE EPISODE OF RECURRENT MAJOR DEPRESSIVE DISORDER: ICD-10-CM

## 2025-09-03 DIAGNOSIS — C84.48 PERIPHERAL T CELL LYMPHOMA OF LYMPH NODES OF MULTIPLE SITES (MULTI): ICD-10-CM

## 2025-09-03 RX ORDER — ACYCLOVIR 400 MG/1
400 TABLET ORAL EVERY 12 HOURS
Qty: 60 TABLET | Refills: 2 | Status: SHIPPED | OUTPATIENT
Start: 2025-09-03 | End: 2025-09-03

## 2025-09-03 RX ORDER — ACYCLOVIR 400 MG/1
400 TABLET ORAL EVERY 12 HOURS
Qty: 60 TABLET | Refills: 2 | Status: SHIPPED | OUTPATIENT
Start: 2025-09-03